# Patient Record
Sex: FEMALE | Race: BLACK OR AFRICAN AMERICAN | NOT HISPANIC OR LATINO | Employment: OTHER | URBAN - METROPOLITAN AREA
[De-identification: names, ages, dates, MRNs, and addresses within clinical notes are randomized per-mention and may not be internally consistent; named-entity substitution may affect disease eponyms.]

---

## 2017-06-14 ENCOUNTER — HISTORICAL (OUTPATIENT)
Dept: ADMINISTRATIVE | Facility: HOSPITAL | Age: 82
End: 2017-06-14

## 2018-04-27 ENCOUNTER — HISTORICAL (OUTPATIENT)
Dept: ADMINISTRATIVE | Facility: HOSPITAL | Age: 83
End: 2018-04-27

## 2018-11-02 ENCOUNTER — HISTORICAL (OUTPATIENT)
Dept: ADMINISTRATIVE | Facility: HOSPITAL | Age: 83
End: 2018-11-02

## 2019-09-25 ENCOUNTER — HISTORICAL (OUTPATIENT)
Dept: RADIOLOGY | Facility: HOSPITAL | Age: 84
End: 2019-09-25

## 2019-09-25 LAB — BCS RECOMMENDATION EXT: NORMAL

## 2019-11-14 ENCOUNTER — HISTORICAL (OUTPATIENT)
Dept: ADMINISTRATIVE | Facility: HOSPITAL | Age: 84
End: 2019-11-14

## 2020-02-17 ENCOUNTER — HISTORICAL (OUTPATIENT)
Dept: ADMINISTRATIVE | Facility: HOSPITAL | Age: 85
End: 2020-02-17

## 2020-03-12 LAB
LEFT EYE DM RETINOPATHY: POSITIVE
RIGHT EYE DM RETINOPATHY: POSITIVE

## 2020-06-01 ENCOUNTER — HISTORICAL (OUTPATIENT)
Dept: ADMINISTRATIVE | Facility: HOSPITAL | Age: 85
End: 2020-06-01

## 2020-06-09 ENCOUNTER — HISTORICAL (OUTPATIENT)
Dept: ADMINISTRATIVE | Facility: HOSPITAL | Age: 85
End: 2020-06-09

## 2020-07-01 ENCOUNTER — HISTORICAL (OUTPATIENT)
Dept: ADMINISTRATIVE | Facility: HOSPITAL | Age: 85
End: 2020-07-01

## 2021-06-11 ENCOUNTER — HISTORICAL (OUTPATIENT)
Dept: CARDIOLOGY | Facility: HOSPITAL | Age: 86
End: 2021-06-11

## 2021-06-14 ENCOUNTER — HISTORICAL (OUTPATIENT)
Dept: ADMINISTRATIVE | Facility: HOSPITAL | Age: 86
End: 2021-06-14

## 2021-06-14 LAB
ABS NEUT (OLG): 3.92 X10(3)/MCL (ref 2.1–9.2)
ALBUMIN SERPL-MCNC: 3.4 GM/DL (ref 3.4–4.8)
ALBUMIN/GLOB SERPL: 0.8 RATIO (ref 1.1–2)
ALP SERPL-CCNC: 66 UNIT/L (ref 40–150)
ALT SERPL-CCNC: 11 UNIT/L (ref 0–55)
APPEARANCE, UA: CLEAR
AST SERPL-CCNC: 14 UNIT/L (ref 5–34)
BACTERIA SPEC CULT: NORMAL /HPF
BASOPHILS # BLD AUTO: 0 X10(3)/MCL (ref 0–0.2)
BASOPHILS NFR BLD AUTO: 0 %
BILIRUB SERPL-MCNC: 0.2 MG/DL
BILIRUB UR QL STRIP: NEGATIVE
BILIRUBIN DIRECT+TOT PNL SERPL-MCNC: 0.1 MG/DL (ref 0–0.5)
BILIRUBIN DIRECT+TOT PNL SERPL-MCNC: 0.1 MG/DL (ref 0–0.8)
BUN SERPL-MCNC: 9.5 MG/DL (ref 9.8–20.1)
CALCIUM SERPL-MCNC: 9.4 MG/DL (ref 8.4–10.2)
CEA SERPL-MCNC: 22.01 NG/ML (ref 0–3)
CHLORIDE SERPL-SCNC: 105 MMOL/L (ref 98–107)
CHOLEST SERPL-MCNC: 125 MG/DL
CHOLEST/HDLC SERPL: 3 {RATIO} (ref 0–5)
CO2 SERPL-SCNC: 20 MMOL/L (ref 23–31)
COLOR UR: YELLOW
CREAT SERPL-MCNC: 0.73 MG/DL (ref 0.55–1.02)
CREAT UR-MCNC: 76.4 MG/DL (ref 45–106)
EOSINOPHIL # BLD AUTO: 0 X10(3)/MCL (ref 0–0.9)
EOSINOPHIL NFR BLD AUTO: 1 %
ERYTHROCYTE [DISTWIDTH] IN BLOOD BY AUTOMATED COUNT: 14.6 % (ref 11.5–17)
EST. AVERAGE GLUCOSE BLD GHB EST-MCNC: 159.9 MG/DL
GLOBULIN SER-MCNC: 4.1 GM/DL (ref 2.4–3.5)
GLUCOSE (UA): NEGATIVE
GLUCOSE SERPL-MCNC: 111 MG/DL (ref 82–115)
HBA1C MFR BLD: 7.2 %
HCT VFR BLD AUTO: 27.2 % (ref 37–47)
HDLC SERPL-MCNC: 38 MG/DL (ref 35–60)
HGB BLD-MCNC: 8.2 GM/DL (ref 12–16)
HGB UR QL STRIP: NEGATIVE
IRON SATN MFR SERPL: 4 % (ref 20–50)
IRON SERPL-MCNC: 15 UG/DL (ref 50–170)
KETONES UR QL STRIP: NEGATIVE
LDLC SERPL CALC-MCNC: 72 MG/DL (ref 50–140)
LEUKOCYTE ESTERASE UR QL STRIP: NEGATIVE
LYMPHOCYTES # BLD AUTO: 2.2 X10(3)/MCL (ref 0.6–4.6)
LYMPHOCYTES NFR BLD AUTO: 32 %
MCH RBC QN AUTO: 25.4 PG (ref 27–31)
MCHC RBC AUTO-ENTMCNC: 30.1 GM/DL (ref 33–36)
MCV RBC AUTO: 84.2 FL (ref 80–94)
MICROALBUMIN UR-MCNC: 43.7 UG/ML
MICROALBUMIN/CREAT RATIO PNL UR: 57.2 MG/GM CR (ref 0–30)
MONOCYTES # BLD AUTO: 0.6 X10(3)/MCL (ref 0.1–1.3)
MONOCYTES NFR BLD AUTO: 9 %
NEUTROPHILS # BLD AUTO: 3.92 X10(3)/MCL (ref 2.1–9.2)
NEUTROPHILS NFR BLD AUTO: 58 %
NITRITE UR QL STRIP: NEGATIVE
PH UR STRIP: 6.5 [PH] (ref 5–9)
PLATELET # BLD AUTO: 425 X10(3)/MCL (ref 130–400)
PMV BLD AUTO: 10.7 FL (ref 9.4–12.4)
POTASSIUM SERPL-SCNC: 5.7 MMOL/L (ref 3.5–5.1)
PROT SERPL-MCNC: 7.5 GM/DL (ref 5.8–7.6)
PROT UR QL STRIP: NEGATIVE
RBC # BLD AUTO: 3.23 X10(6)/MCL (ref 4.2–5.4)
RBC #/AREA URNS HPF: NORMAL /[HPF]
SODIUM SERPL-SCNC: 136 MMOL/L (ref 136–145)
SP GR UR STRIP: 1.01 (ref 1–1.03)
SQUAMOUS EPITHELIAL, UA: NORMAL /HPF (ref 0–4)
TIBC SERPL-MCNC: 363 UG/DL (ref 70–310)
TIBC SERPL-MCNC: 378 UG/DL (ref 250–450)
TRANSFERRIN SERPL-MCNC: 355 MG/DL
TRIGL SERPL-MCNC: 77 MG/DL (ref 37–140)
UROBILINOGEN UR STRIP-ACNC: 0.2
VLDLC SERPL CALC-MCNC: 15 MG/DL
WBC # SPEC AUTO: 6.8 X10(3)/MCL (ref 4.5–11.5)
WBC #/AREA URNS HPF: NORMAL /[HPF]

## 2021-06-17 ENCOUNTER — HISTORICAL (OUTPATIENT)
Dept: ADMINISTRATIVE | Facility: HOSPITAL | Age: 86
End: 2021-06-17

## 2021-06-17 LAB
COLOR STL: ABNORMAL
CONSISTENCY STL: ABNORMAL
HEMOCCULT SP1 STL QL: POSITIVE

## 2021-06-18 LAB
COLOR STL: ABNORMAL
CONSISTENCY STL: ABNORMAL
HEMOCCULT SP2 STL QL: POSITIVE

## 2021-06-22 ENCOUNTER — HISTORICAL (OUTPATIENT)
Dept: ADMINISTRATIVE | Facility: HOSPITAL | Age: 86
End: 2021-06-22

## 2021-06-22 LAB
COLOR STL: ABNORMAL
CONSISTENCY STL: ABNORMAL
HEMOCCULT SP1 STL QL: POSITIVE

## 2021-10-08 ENCOUNTER — HISTORICAL (OUTPATIENT)
Dept: ADMINISTRATIVE | Facility: HOSPITAL | Age: 86
End: 2021-10-08

## 2021-10-08 LAB
ABS NEUT (OLG): 2.5 X10(3)/MCL (ref 2.1–9.2)
ALBUMIN SERPL-MCNC: 3.7 GM/DL (ref 3.4–4.8)
ALBUMIN/GLOB SERPL: 1 RATIO (ref 1.1–2)
ALP SERPL-CCNC: 71 UNIT/L (ref 40–150)
ALT SERPL-CCNC: 12 UNIT/L (ref 0–55)
APPEARANCE, UA: CLEAR
AST SERPL-CCNC: 16 UNIT/L (ref 5–34)
BACTERIA SPEC CULT: NORMAL /HPF
BASOPHILS # BLD AUTO: 0 X10(3)/MCL (ref 0–0.2)
BASOPHILS NFR BLD AUTO: 0 %
BILIRUB SERPL-MCNC: 0.2 MG/DL
BILIRUB UR QL STRIP: NEGATIVE
BILIRUBIN DIRECT+TOT PNL SERPL-MCNC: 0.1 MG/DL (ref 0–0.5)
BILIRUBIN DIRECT+TOT PNL SERPL-MCNC: 0.1 MG/DL (ref 0–0.8)
BUN SERPL-MCNC: 10.5 MG/DL (ref 9.8–20.1)
CALCIUM SERPL-MCNC: 9.9 MG/DL (ref 8.4–10.2)
CHLORIDE SERPL-SCNC: 106 MMOL/L (ref 98–107)
CHOLEST SERPL-MCNC: 113 MG/DL
CHOLEST/HDLC SERPL: 3 {RATIO} (ref 0–5)
CO2 SERPL-SCNC: 25 MMOL/L (ref 23–31)
COLOR UR: YELLOW
CREAT SERPL-MCNC: 0.69 MG/DL (ref 0.55–1.02)
CREAT UR-MCNC: 41.7 MG/DL (ref 45–106)
EOSINOPHIL # BLD AUTO: 0.1 X10(3)/MCL (ref 0–0.9)
EOSINOPHIL NFR BLD AUTO: 2 %
ERYTHROCYTE [DISTWIDTH] IN BLOOD BY AUTOMATED COUNT: 14.2 % (ref 11.5–17)
EST. AVERAGE GLUCOSE BLD GHB EST-MCNC: 142.7 MG/DL
GLOBULIN SER-MCNC: 3.7 GM/DL (ref 2.4–3.5)
GLUCOSE (UA): NEGATIVE
GLUCOSE SERPL-MCNC: 92 MG/DL (ref 82–115)
HBA1C MFR BLD: 6.6 %
HCT VFR BLD AUTO: 41.5 % (ref 37–47)
HDLC SERPL-MCNC: 39 MG/DL (ref 35–60)
HGB BLD-MCNC: 12.9 GM/DL (ref 12–16)
HGB UR QL STRIP: NEGATIVE
KETONES UR QL STRIP: NEGATIVE
LDLC SERPL CALC-MCNC: 59 MG/DL (ref 50–140)
LEUKOCYTE ESTERASE UR QL STRIP: NEGATIVE
LYMPHOCYTES # BLD AUTO: 2.4 X10(3)/MCL (ref 0.6–4.6)
LYMPHOCYTES NFR BLD AUTO: 44 %
MCH RBC QN AUTO: 29.5 PG (ref 27–31)
MCHC RBC AUTO-ENTMCNC: 31.1 GM/DL (ref 33–36)
MCV RBC AUTO: 95 FL (ref 80–94)
MICROALBUMIN UR-MCNC: 9.4 UG/ML
MICROALBUMIN/CREAT RATIO PNL UR: 22.5 MG/GM CR (ref 0–30)
MONOCYTES # BLD AUTO: 0.4 X10(3)/MCL (ref 0.1–1.3)
MONOCYTES NFR BLD AUTO: 7 %
NEUTROPHILS # BLD AUTO: 2.5 X10(3)/MCL (ref 2.1–9.2)
NEUTROPHILS NFR BLD AUTO: 46 %
NITRITE UR QL STRIP: NEGATIVE
PH UR STRIP: >=9 [PH] (ref 5–9)
PLATELET # BLD AUTO: 338 X10(3)/MCL (ref 130–400)
PMV BLD AUTO: 10.3 FL (ref 9.4–12.4)
POTASSIUM SERPL-SCNC: 4.6 MMOL/L (ref 3.5–5.1)
PROT SERPL-MCNC: 7.4 GM/DL (ref 5.8–7.6)
PROT UR QL STRIP: NEGATIVE
RBC # BLD AUTO: 4.37 X10(6)/MCL (ref 4.2–5.4)
RBC #/AREA URNS HPF: NORMAL /[HPF]
SODIUM SERPL-SCNC: 140 MMOL/L (ref 136–145)
SP GR UR STRIP: 1.01 (ref 1–1.03)
SQUAMOUS EPITHELIAL, UA: NORMAL /HPF (ref 0–4)
TRIGL SERPL-MCNC: 74 MG/DL (ref 37–140)
TSH SERPL-ACNC: 2.45 UIU/ML (ref 0.35–4.94)
UROBILINOGEN UR STRIP-ACNC: 0.2
VLDLC SERPL CALC-MCNC: 15 MG/DL
WBC # SPEC AUTO: 5.4 X10(3)/MCL (ref 4.5–11.5)
WBC #/AREA URNS HPF: NORMAL /[HPF]

## 2021-10-11 ENCOUNTER — HISTORICAL (OUTPATIENT)
Dept: ADMINISTRATIVE | Facility: HOSPITAL | Age: 86
End: 2021-10-11

## 2021-10-11 LAB — HEMOCCULT SP1 STL QL: POSITIVE

## 2021-12-02 ENCOUNTER — HISTORICAL (OUTPATIENT)
Dept: ADMINISTRATIVE | Facility: HOSPITAL | Age: 86
End: 2021-12-02

## 2021-12-02 LAB
ABS NEUT (OLG): 3.75 X10(3)/MCL (ref 2.1–9.2)
ALBUMIN SERPL-MCNC: 3.8 GM/DL (ref 3.4–4.8)
ALBUMIN/GLOB SERPL: 1 RATIO (ref 1.1–2)
ALP SERPL-CCNC: 67 UNIT/L (ref 40–150)
ALT SERPL-CCNC: 12 UNIT/L (ref 0–55)
AST SERPL-CCNC: 15 UNIT/L (ref 5–34)
BASOPHILS # BLD AUTO: 0.1 X10(3)/MCL (ref 0–0.2)
BASOPHILS NFR BLD AUTO: 1 %
BILIRUB SERPL-MCNC: 0.3 MG/DL
BILIRUBIN DIRECT+TOT PNL SERPL-MCNC: 0.1 MG/DL (ref 0–0.8)
BILIRUBIN DIRECT+TOT PNL SERPL-MCNC: 0.2 MG/DL (ref 0–0.5)
BUN SERPL-MCNC: 12.6 MG/DL (ref 9.8–20.1)
CALCIUM SERPL-MCNC: 9.5 MG/DL (ref 8.7–10.5)
CHLORIDE SERPL-SCNC: 106 MMOL/L (ref 98–107)
CO2 SERPL-SCNC: 22 MMOL/L (ref 23–31)
CREAT SERPL-MCNC: 0.7 MG/DL (ref 0.55–1.02)
EOSINOPHIL # BLD AUTO: 0.1 X10(3)/MCL (ref 0–0.9)
EOSINOPHIL NFR BLD AUTO: 2 %
ERYTHROCYTE [DISTWIDTH] IN BLOOD BY AUTOMATED COUNT: 15.4 % (ref 11.5–17)
FERRITIN SERPL-MCNC: 147.03 NG/ML (ref 4.63–204)
FOLATE SERPL-MCNC: 69.9 NG/ML (ref 7–31.4)
GLOBULIN SER-MCNC: 3.7 GM/DL (ref 2.4–3.5)
GLUCOSE SERPL-MCNC: 181 MG/DL (ref 82–115)
HCT VFR BLD AUTO: 37.5 % (ref 37–47)
HGB BLD-MCNC: 12 GM/DL (ref 12–16)
IRON SATN MFR SERPL: 31 % (ref 20–50)
IRON SERPL-MCNC: 95 UG/DL (ref 50–170)
LYMPHOCYTES # BLD AUTO: 2.4 X10(3)/MCL (ref 0.6–4.6)
LYMPHOCYTES NFR BLD AUTO: 35 %
MCH RBC QN AUTO: 29.1 PG (ref 27–31)
MCHC RBC AUTO-ENTMCNC: 32 GM/DL (ref 33–36)
MCV RBC AUTO: 90.8 FL (ref 80–94)
MONOCYTES # BLD AUTO: 0.5 X10(3)/MCL (ref 0.1–1.3)
MONOCYTES NFR BLD AUTO: 7 %
NEUTROPHILS # BLD AUTO: 3.75 X10(3)/MCL (ref 2.1–9.2)
NEUTROPHILS NFR BLD AUTO: 55 %
PLATELET # BLD AUTO: 156 X10(3)/MCL (ref 130–400)
PMV BLD AUTO: 11.4 FL (ref 9.4–12.4)
POTASSIUM SERPL-SCNC: 4.5 MMOL/L (ref 3.5–5.1)
PROT SERPL-MCNC: 7.5 GM/DL (ref 5.8–7.6)
RBC # BLD AUTO: 4.13 X10(6)/MCL (ref 4.2–5.4)
SODIUM SERPL-SCNC: 136 MMOL/L (ref 136–145)
TIBC SERPL-MCNC: 213 UG/DL (ref 70–310)
TIBC SERPL-MCNC: 308 UG/DL (ref 250–450)
TRANSFERRIN SERPL-MCNC: 277 MG/DL
VIT B12 SERPL-MCNC: 482 PG/ML (ref 213–816)
WBC # SPEC AUTO: 6.8 X10(3)/MCL (ref 4.5–11.5)

## 2022-03-24 ENCOUNTER — HISTORICAL (OUTPATIENT)
Dept: RADIOLOGY | Facility: HOSPITAL | Age: 87
End: 2022-03-24

## 2022-03-24 ENCOUNTER — HISTORICAL (OUTPATIENT)
Dept: ADMINISTRATIVE | Facility: HOSPITAL | Age: 87
End: 2022-03-24

## 2022-03-24 LAB
ABS NEUT (OLG): 3.03 (ref 2.1–9.2)
ALBUMIN SERPL-MCNC: 3.8 G/DL (ref 3.4–4.8)
ALBUMIN/GLOB SERPL: 0.8 {RATIO} (ref 1.1–2)
ALP SERPL-CCNC: 63 U/L (ref 40–150)
ALT SERPL-CCNC: 16 U/L (ref 0–55)
APPEARANCE, UA: CLEAR
AST SERPL-CCNC: 17 U/L (ref 5–34)
BACTERIA SPEC CULT: NORMAL
BASOPHILS # BLD AUTO: 0 10*3/UL (ref 0–0.2)
BASOPHILS NFR BLD AUTO: 1 %
BILIRUB SERPL-MCNC: 0.4 MG/DL
BILIRUB UR QL STRIP: NEGATIVE
BILIRUBIN DIRECT+TOT PNL SERPL-MCNC: 0.2 (ref 0–0.5)
BILIRUBIN DIRECT+TOT PNL SERPL-MCNC: 0.2 (ref 0–0.8)
BUN SERPL-MCNC: 12.2 MG/DL (ref 9.8–20.1)
CALCIUM SERPL-MCNC: 9.9 MG/DL (ref 8.7–10.5)
CHLORIDE SERPL-SCNC: 106 MMOL/L (ref 98–107)
CHOLEST SERPL-MCNC: 116 MG/DL
CHOLEST/HDLC SERPL: 2 {RATIO} (ref 0–5)
CO2 SERPL-SCNC: 24 MMOL/L (ref 23–31)
COLOR UR: YELLOW
CREAT SERPL-MCNC: 0.72 MG/DL (ref 0.55–1.02)
CREAT SERPL-MCNC: 0.74 MG/DL (ref 0.55–1.02)
CREAT UR-MCNC: 53 MG/DL (ref 45–106)
EOSINOPHIL # BLD AUTO: 0.1 10*3/UL (ref 0–0.9)
EOSINOPHIL NFR BLD AUTO: 2 %
ERYTHROCYTE [DISTWIDTH] IN BLOOD BY AUTOMATED COUNT: 13.1 % (ref 11.5–14.5)
EST. AVERAGE GLUCOSE BLD GHB EST-MCNC: 159.9 MG/DL
FLAG2 (OHS): 70
FLAG3 (OHS): 80
FLAGS (OHS): 80
GLOBULIN SER-MCNC: 4.6 G/DL (ref 2.4–3.5)
GLUCOSE (UA): NORMAL
GLUCOSE SERPL-MCNC: 123 MG/DL (ref 82–115)
HBA1C MFR BLD: 7.2 %
HCT VFR BLD AUTO: 38 % (ref 35–46)
HDLC SERPL-MCNC: 50 MG/DL (ref 35–60)
HGB BLD-MCNC: 12.8 G/DL (ref 12–16)
HGB UR QL STRIP: NORMAL
HYALINE CASTS #/AREA URNS LPF: NORMAL /[LPF]
ICTERIC INTERF INDEX SERPL-ACNC: 0
IMM GRANULOCYTES # BLD AUTO: 0.01 10*3/UL
IMM GRANULOCYTES NFR BLD AUTO: 0 %
IMM. NE 2 SUSPECT FLAG (OHS): 30
KETONES UR QL STRIP: NEGATIVE
LDLC SERPL CALC-MCNC: 56 MG/DL (ref 50–140)
LEUKOCYTE ESTERASE UR QL STRIP: NEGATIVE
LOW EVENT # SUSPECT FLAG (OHS): 80
LYMPHOCYTES # BLD AUTO: 2.4 10*3/UL (ref 0.6–4.6)
LYMPHOCYTES NFR BLD AUTO: 39 %
MANUAL DIFF? (OHS): NO
MCH RBC QN AUTO: 31.4 PG (ref 26–34)
MCHC RBC AUTO-ENTMCNC: 33.7 G/DL (ref 31–37)
MCV RBC AUTO: 93.4 FL (ref 80–100)
MICROALBUMIN UR-MCNC: 396.8
MICROALBUMIN/CREAT RATIO PNL UR: 748.7 (ref 0–30)
MO BLASTS SUSPECT FLAG (OHS): 40
MONOCYTES # BLD AUTO: 0.5 10*3/UL (ref 0.1–1.3)
MONOCYTES NFR BLD AUTO: 9 %
NEUTROPHILS # BLD AUTO: 3.03 10*3/UL (ref 2.1–9.2)
NEUTROPHILS NFR BLD AUTO: 49 %
NITRITE UR QL STRIP: NEGATIVE
NRBC BLD AUTO-RTO: 0 % (ref 0–0.2)
PH UR STRIP: 7.5 [PH] (ref 4.5–8)
PLATELET # BLD AUTO: 195 10*3/UL (ref 130–400)
PLATELET CLUMPS SUSPECT FLAG (OHS): 10
PMV BLD AUTO: 10.9 FL (ref 7.4–10.4)
POTASSIUM SERPL-SCNC: 3.9 MMOL/L (ref 3.5–5.1)
PROT SERPL-MCNC: 8.4 G/DL (ref 5.8–7.6)
PROT UR QL STRIP: NORMAL
RBC # BLD AUTO: 4.07 10*6/UL (ref 4–5.2)
RBC #/AREA URNS HPF: NORMAL /[HPF] (ref 0–5)
SODIUM SERPL-SCNC: 141 MMOL/L (ref 136–145)
SP GR UR STRIP: 1.02 (ref 1–1.03)
SQUAMOUS EPITHELIAL, UA: NORMAL
TRIGL SERPL-MCNC: 52 MG/DL (ref 37–140)
TSH SERPL-ACNC: 3.98 M[IU]/L (ref 0.35–4.94)
UROBILINOGEN UR STRIP-ACNC: NORMAL
VLDLC SERPL CALC-MCNC: 10 MG/DL
WBC # SPEC AUTO: 6.2 10*3/UL (ref 4.5–11)
WBC #/AREA URNS HPF: NORMAL /[HPF] (ref 0–5)

## 2022-04-07 ENCOUNTER — HISTORICAL (OUTPATIENT)
Dept: ADMINISTRATIVE | Facility: HOSPITAL | Age: 87
End: 2022-04-07

## 2022-04-24 VITALS
BODY MASS INDEX: 15.43 KG/M2 | HEIGHT: 63 IN | SYSTOLIC BLOOD PRESSURE: 120 MMHG | OXYGEN SATURATION: 96 % | DIASTOLIC BLOOD PRESSURE: 60 MMHG | WEIGHT: 87.06 LBS

## 2022-05-10 ENCOUNTER — OFFICE VISIT (OUTPATIENT)
Dept: INTERNAL MEDICINE | Facility: CLINIC | Age: 87
End: 2022-05-10
Payer: MEDICARE

## 2022-05-10 VITALS
HEIGHT: 59 IN | DIASTOLIC BLOOD PRESSURE: 60 MMHG | OXYGEN SATURATION: 97 % | SYSTOLIC BLOOD PRESSURE: 120 MMHG | HEART RATE: 96 BPM | BODY MASS INDEX: 21.37 KG/M2 | WEIGHT: 106 LBS

## 2022-05-10 DIAGNOSIS — E78.00 HYPERCHOLESTEREMIA: ICD-10-CM

## 2022-05-10 DIAGNOSIS — M79.604 PAIN IN BOTH LOWER EXTREMITIES: ICD-10-CM

## 2022-05-10 DIAGNOSIS — D64.9 ANEMIA, UNSPECIFIED TYPE: ICD-10-CM

## 2022-05-10 DIAGNOSIS — C18.2 MALIGNANT NEOPLASM OF ASCENDING COLON: ICD-10-CM

## 2022-05-10 DIAGNOSIS — E11.9 TYPE 2 DIABETES MELLITUS WITHOUT COMPLICATION, WITHOUT LONG-TERM CURRENT USE OF INSULIN: ICD-10-CM

## 2022-05-10 DIAGNOSIS — M79.605 PAIN IN BOTH LOWER EXTREMITIES: ICD-10-CM

## 2022-05-10 DIAGNOSIS — I63.9 ISCHEMIC STROKE: ICD-10-CM

## 2022-05-10 DIAGNOSIS — H61.23 BILATERAL IMPACTED CERUMEN: ICD-10-CM

## 2022-05-10 DIAGNOSIS — R60.9 EDEMA, UNSPECIFIED TYPE: ICD-10-CM

## 2022-05-10 DIAGNOSIS — I10 HYPERTENSION, UNSPECIFIED TYPE: ICD-10-CM

## 2022-05-10 PROCEDURE — 99214 PR OFFICE/OUTPT VISIT, EST, LEVL IV, 30-39 MIN: ICD-10-PCS | Mod: ,,, | Performed by: INTERNAL MEDICINE

## 2022-05-10 PROCEDURE — 99214 OFFICE O/P EST MOD 30 MIN: CPT | Mod: ,,, | Performed by: INTERNAL MEDICINE

## 2022-05-10 RX ORDER — ASPIRIN 325 MG
325 TABLET ORAL DAILY
COMMUNITY
Start: 2022-04-27 | End: 2022-10-26

## 2022-05-10 RX ORDER — AMLODIPINE BESYLATE 10 MG/1
10 TABLET ORAL DAILY
COMMUNITY
Start: 2022-04-27 | End: 2022-10-26

## 2022-05-10 RX ORDER — GLYBURIDE 5 MG/1
5 TABLET ORAL 2 TIMES DAILY
COMMUNITY
Start: 2022-04-27 | End: 2022-10-26

## 2022-05-10 RX ORDER — TRAMADOL HYDROCHLORIDE 50 MG/1
50 TABLET ORAL EVERY 8 HOURS
COMMUNITY
Start: 2022-05-05 | End: 2022-10-26

## 2022-05-10 RX ORDER — FOLIC ACID 1 MG/1
1000 TABLET ORAL DAILY
COMMUNITY
Start: 2022-04-27 | End: 2022-10-26

## 2022-05-10 RX ORDER — LOVASTATIN 40 MG/1
40 TABLET ORAL NIGHTLY
COMMUNITY
Start: 2022-04-27 | End: 2022-10-26

## 2022-05-10 RX ORDER — LISINOPRIL 20 MG/1
20 TABLET ORAL DAILY
COMMUNITY
Start: 2022-04-27 | End: 2022-10-26

## 2022-05-10 RX ORDER — PANTOPRAZOLE SODIUM 40 MG/1
40 TABLET, DELAYED RELEASE ORAL 2 TIMES DAILY
COMMUNITY
Start: 2022-04-27 | End: 2022-10-26

## 2022-05-10 RX ORDER — MEGESTROL ACETATE 40 MG/1
TABLET ORAL
COMMUNITY
Start: 2022-04-27 | End: 2022-10-26

## 2022-05-10 RX ORDER — SODIUM BICARBONATE 650 MG/1
TABLET ORAL
COMMUNITY
Start: 2022-04-27 | End: 2022-10-26

## 2022-05-10 RX ORDER — FERROUS SULFATE 325(65) MG
TABLET ORAL 2 TIMES DAILY
COMMUNITY
Start: 2022-04-27 | End: 2022-10-26

## 2022-05-10 RX ORDER — MIRTAZAPINE 15 MG/1
TABLET, FILM COATED ORAL
COMMUNITY
Start: 2022-04-27 | End: 2022-10-26

## 2022-05-10 NOTE — PROGRESS NOTES
Subjective:      Patient ID: Lita Hess is a 86 y.o. female.    Chief Complaint: Hospital f/u      HPI:  This patient is an 86-year-old established patient who has returned for an outpatient examination.  She is being closely monitored by her family, and home health care is being provided.  She has made a lot of progress since she was in the hospital recently, and she remains under the care of multiple specialties, because of the severity of her underlying medical problems.  In general though, this patient is much improved than in the past, and except for weakness to her lower extremities, she is eating well, taking her medication as directed, and she is being well cared for by her family.     The patient's Health Maintenance was reviewed and the following appears to be due at this time:   Health Maintenance Due   Topic Date Due    Lipid Panel  Never done    COVID-19 Vaccine (1) Never done    TETANUS VACCINE  Never done    Shingles Vaccine (1 of 2) Never done    Pneumococcal Vaccines (Age 65+) (1 - PCV) Never done        Recent Labwork  No visits with results within 1 Month(s) from this visit.   Latest known visit with results is:   No results found for any previous visit.       Past Medical History:  Past Medical History:   Diagnosis Date    Anemia     Cancer     Colon cancer     CVA (cerebral vascular accident)     Diabetes mellitus, type 2     Hyperlipidemia     Hypertension     Loss of appetite     Stroke      Past Surgical History:   Procedure Laterality Date    COLECTOMY      COLONOSCOPY      ESOPHAGOGASTRODUODENOSCOPY      gastro biopsy      HIP REPLACEMENT ARTHROPLASTY      SCLEROTHERAPY WITH ULTRASOUND GUIDANCE      TRANSESOPHAGEAL ECHOCARDIOGRAPHY       Review of patient's allergies indicates:  No Known Allergies  Current Outpatient Medications on File Prior to Visit   Medication Sig Dispense Refill    amLODIPine (NORVASC) 10 MG tablet Take 10 mg by mouth once daily.      aspirin 325  MG tablet Take 325 mg by mouth once daily.      ferrous sulfate (FEOSOL) 325 mg (65 mg iron) Tab tablet 2 (two) times daily.      folic acid (FOLVITE) 1 MG tablet Take 1,000 mcg by mouth once daily.      glyBURIDE (DIABETA) 5 MG tablet 5 mg 2 (two) times daily.      lisinopriL (PRINIVIL,ZESTRIL) 20 MG tablet Take 20 mg by mouth once daily.      lovastatin (MEVACOR) 40 MG tablet Take 40 mg by mouth nightly.      megestroL (MEGACE) 40 MG Tab TAKE (1/2) TABLET ONCE DAILY      mirtazapine (REMERON) 15 MG tablet TAKE TWO TABLETS AT BEDTIME      pantoprazole (PROTONIX) 40 MG tablet 40 mg 2 (two) times daily.      sodium bicarbonate 650 MG tablet TAKE TWO TABLETS 3 TIMES DAILY.      traMADoL (ULTRAM) 50 mg tablet Take 50 mg by mouth every 8 (eight) hours.       No current facility-administered medications on file prior to visit.     Social History     Socioeconomic History    Marital status: Unknown   Tobacco Use    Smoking status: Never Smoker    Smokeless tobacco: Never Used   Substance and Sexual Activity    Alcohol use: Never    Drug use: Never    Sexual activity: Not Currently     History reviewed. No pertinent family history.    Review of Systems  Review of Systems   Constitutional: Negative.    HENT: Negative.  The patient feels that there is wax in her ears, and she has had this problem in the past.    Eyes: Negative.    Respiratory: Negative.    Cardiovascular: Negative.  History of peripheral vascular disease, with greater involvement in the right lower leg than in the left thumb.  By history, she may have an occluded vascular tree on the right, but she has obstructive disease involving the left, which seems to be creating claudication.  She is under the care of her cardiologist for this problem on her  Gastrointestinal: Negative.    Genitourinary: Negative.    Musculoskeletal: Negative.    Neurological: Negative.    Endo/Heme/Allergies: Negative.    Psychiatric/Behavioral: Negative.    Objective:  "  /60   Pulse 96   Ht 4' 11" (1.499 m)   Wt 48.1 kg (106 lb)   SpO2 97%   BMI 21.41 kg/m²         Physical Exam  Vitals and nursing note reviewed.   Constitutional:       General: He is not in acute distress.     Appearance: Normal appearance.   HENT:      Head: Normocephalic and atraumatic.      Right Ear: Tympanic membrane and ear canal normal.  Significant cerumen in the right ear canal and mild involvement on the left.     Left Ear: Tympanic membrane and ear canal normal.       Nose: Nose normal.      Mouth/Throat:      Pharynx: Oropharynx is clear. No oropharyngeal exudate or posterior oropharyngeal erythema.   Eyes:      Extraocular Movements: Extraocular movements intact.      Pupils: Pupils are equal, round, and reactive to light.   Cardiovascular:      Rate and Rhythm: Normal rate and regular rhythm.      Pulses: Normal pulses.      Heart sounds: Normal heart sounds. No murmur heard.    No friction rub. No gallop.   Pulmonary:      Effort: Pulmonary effort is normal. No respiratory distress.      Breath sounds: Normal breath sounds.   Abdominal:      General: Abdomen is flat. Bowel sounds are normal.      Palpations: Abdomen is soft.   Genitourinary:     Rectum: Normal.   Musculoskeletal:         General: Normal range of motion.  Weakness to the lower extremities, especially involving the right lower extremity, where is she has also had symptoms of claudication.     Cervical back: Normal range of motion and neck supple.   Skin:     General: Skin is warm.      Capillary Refill: Capillary refill takes less than 2 seconds.   Neurological:      General: No focal deficit present.      Mental Status: He is alert and oriented to person, place, and time.   Psychiatric:         Mood and Affect: Mood normal.         Behavior: Behavior normal.         Thought Content: Thought content normal.         Judgment: Judgment normal.   Assessment:     1. Malignant neoplasm of ascending colon    2. Ischemic stroke  "   3. Edema, unspecified type    4. Hypercholesteremia    5. Type 2 diabetes mellitus without complication, without long-term current use of insulin    6. Hypertension, unspecified type    7. Anemia, unspecified type    8. Pain in both lower extremities    9. Bilateral impacted cerumen      Plan:   I am having Lita Hess maintain her sodium bicarbonate, traMADoL, pantoprazole, mirtazapine, megestroL, lovastatin, lisinopriL, glyBURIDE, folic acid, ferrous sulfate, aspirin, and amLODIPine.  This patient is currently 86 years of age, and she is accompanied by her son.  She is doing well, and much better than she did while in the hospital.  She has improved enough, so that further testing may be considered at this point, especially in regard to peripheral artery disease, which will need to be determined by a cardiologist.  Have made arrangements for her to have the cerumen removed today, and in the near future, she will have repeat laboratory studies done through home health care, and I will review the results when available.  I will review of records to see if we can find nodes of the cardiologist, to discuss with the patient's son, so that he is prepared to speak to the cardiologist about any abnormal findings, if they have been previously found.This patient should continue to take all medication chronically given for known medical illnesses.    20 minutes were needed to perform an H and P, and to review this patient's test that were taken. It also required an additional 10 minutes to complete this electronic health record, which totaled 30 minutes of time and spent with the patient.  Problem List Items Addressed This Visit    None     Visit Diagnoses     Malignant neoplasm of ascending colon        Ischemic stroke        Edema, unspecified type        Hypercholesteremia        Type 2 diabetes mellitus without complication, without long-term current use of insulin        Relevant Medications    glyBURIDE (DIABETA) 5 MG  tablet    Other Relevant Orders    CBC Auto Differential    Comprehensive Metabolic Panel    Iron and TIBC    Transferrin    Ferritin    Folate    Hypertension, unspecified type        Relevant Orders    CBC Auto Differential    Comprehensive Metabolic Panel    Iron and TIBC    Transferrin    Ferritin    Folate    Anemia, unspecified type        Relevant Orders    CBC Auto Differential    Comprehensive Metabolic Panel    Iron and TIBC    Transferrin    Ferritin    Folate    Pain in both lower extremities        Bilateral impacted cerumen              Lita was seen today for [facility] f/u.    Diagnoses and all orders for this visit:    Malignant neoplasm of ascending colon    Ischemic stroke    Edema, unspecified type    Hypercholesteremia    Type 2 diabetes mellitus without complication, without long-term current use of insulin  -     CBC Auto Differential; Future  -     Comprehensive Metabolic Panel; Future  -     Iron and TIBC; Future  -     Transferrin; Future  -     Ferritin; Future  -     Folate; Future    Hypertension, unspecified type  -     CBC Auto Differential; Future  -     Comprehensive Metabolic Panel; Future  -     Iron and TIBC; Future  -     Transferrin; Future  -     Ferritin; Future  -     Folate; Future    Anemia, unspecified type  -     CBC Auto Differential; Future  -     Comprehensive Metabolic Panel; Future  -     Iron and TIBC; Future  -     Transferrin; Future  -     Ferritin; Future  -     Folate; Future    Pain in both lower extremities    Bilateral impacted cerumen

## 2022-05-11 ENCOUNTER — LAB REQUISITION (OUTPATIENT)
Dept: LAB | Facility: HOSPITAL | Age: 87
End: 2022-05-11
Payer: MEDICARE

## 2022-05-11 DIAGNOSIS — I48.91 UNSPECIFIED ATRIAL FIBRILLATION: ICD-10-CM

## 2022-05-11 DIAGNOSIS — D50.0 IRON DEFICIENCY ANEMIA SECONDARY TO BLOOD LOSS (CHRONIC): ICD-10-CM

## 2022-05-11 DIAGNOSIS — E11.8 TYPE 2 DIABETES MELLITUS WITH UNSPECIFIED COMPLICATIONS: ICD-10-CM

## 2022-05-11 DIAGNOSIS — E44.0 MODERATE PROTEIN-CALORIE MALNUTRITION: ICD-10-CM

## 2022-05-11 LAB
ALBUMIN SERPL-MCNC: 3.9 GM/DL (ref 3.4–4.8)
ALBUMIN/GLOB SERPL: 1 RATIO (ref 1.1–2)
ALP SERPL-CCNC: 62 UNIT/L (ref 40–150)
ALT SERPL-CCNC: 14 UNIT/L (ref 0–55)
AST SERPL-CCNC: 20 UNIT/L (ref 5–34)
BASOPHILS # BLD AUTO: 0.05 X10(3)/MCL (ref 0–0.2)
BASOPHILS NFR BLD AUTO: 0.8 %
BILIRUBIN DIRECT+TOT PNL SERPL-MCNC: 0.4 MG/DL
BUN SERPL-MCNC: 11.9 MG/DL (ref 9.8–20.1)
CALCIUM SERPL-MCNC: 9.4 MG/DL (ref 8.4–10.2)
CHLORIDE SERPL-SCNC: 103 MMOL/L (ref 98–107)
CO2 SERPL-SCNC: 21 MMOL/L (ref 23–31)
CREAT SERPL-MCNC: 0.82 MG/DL (ref 0.55–1.02)
EOSINOPHIL # BLD AUTO: 0.09 X10(3)/MCL (ref 0–0.9)
EOSINOPHIL NFR BLD AUTO: 1.5 %
ERYTHROCYTE [DISTWIDTH] IN BLOOD BY AUTOMATED COUNT: 13.4 % (ref 11.5–17)
FERRITIN SERPL-MCNC: 279.14 NG/ML (ref 4.63–204)
GLOBULIN SER-MCNC: 3.9 GM/DL (ref 2.4–3.5)
GLUCOSE SERPL-MCNC: 229 MG/DL (ref 82–115)
HCT VFR BLD AUTO: 40.7 % (ref 37–47)
HGB BLD-MCNC: 13.2 GM/DL (ref 12–16)
IMM GRANULOCYTES # BLD AUTO: 0.01 X10(3)/MCL (ref 0–0.02)
IMM GRANULOCYTES NFR BLD AUTO: 0.2 % (ref 0–0.43)
IRON SATN MFR SERPL: 34 % (ref 20–50)
IRON SERPL-MCNC: 100 UG/DL (ref 50–170)
LYMPHOCYTES # BLD AUTO: 2.05 X10(3)/MCL (ref 0.6–4.6)
LYMPHOCYTES NFR BLD AUTO: 34.3 %
MCH RBC QN AUTO: 30.8 PG (ref 27–31)
MCHC RBC AUTO-ENTMCNC: 32.4 MG/DL (ref 33–36)
MCV RBC AUTO: 94.9 FL (ref 80–94)
MONOCYTES # BLD AUTO: 0.51 X10(3)/MCL (ref 0.1–1.3)
MONOCYTES NFR BLD AUTO: 8.5 %
NEUTROPHILS # BLD AUTO: 3.3 X10(3)/MCL (ref 2.1–9.2)
NEUTROPHILS NFR BLD AUTO: 54.7 %
NRBC BLD AUTO-RTO: 0 %
PLATELET # BLD AUTO: 256 X10(3)/MCL (ref 130–400)
PMV BLD AUTO: 11 FL (ref 9.4–12.4)
POTASSIUM SERPL-SCNC: 3.7 MMOL/L (ref 3.5–5.1)
PROT SERPL-MCNC: 7.8 GM/DL (ref 5.8–7.6)
RBC # BLD AUTO: 4.29 X10(6)/MCL (ref 4.2–5.4)
SODIUM SERPL-SCNC: 137 MMOL/L (ref 136–145)
TIBC SERPL-MCNC: 197 UG/DL (ref 70–310)
TIBC SERPL-MCNC: 297 UG/DL (ref 250–450)
TRANSFERRIN SERPL-MCNC: 265 MG/DL
WBC # SPEC AUTO: 6 X10(3)/MCL (ref 4.5–11.5)

## 2022-05-11 PROCEDURE — 80053 COMPREHEN METABOLIC PANEL: CPT | Performed by: INTERNAL MEDICINE

## 2022-05-11 PROCEDURE — 83540 ASSAY OF IRON: CPT | Performed by: INTERNAL MEDICINE

## 2022-05-11 PROCEDURE — 82728 ASSAY OF FERRITIN: CPT | Performed by: INTERNAL MEDICINE

## 2022-05-11 PROCEDURE — 85025 COMPLETE CBC W/AUTO DIFF WBC: CPT | Performed by: INTERNAL MEDICINE

## 2022-05-17 DIAGNOSIS — E04.1 NONTOXIC THYROID NODULE: Primary | ICD-10-CM

## 2022-06-20 ENCOUNTER — PATIENT OUTREACH (OUTPATIENT)
Dept: ADMINISTRATIVE | Facility: HOSPITAL | Age: 87
End: 2022-06-20
Payer: MEDICARE

## 2022-06-20 NOTE — PROGRESS NOTES
Population Health Outreach.Records Received, hyper-linked into chart at this time. The following record(s)  below were uploaded for Health Maintenance .             9/25/19 MAMMOGRAM SCREENING       3/12/20 DM EYE EXAM

## 2022-07-08 ENCOUNTER — DOCUMENT SCAN (OUTPATIENT)
Dept: HOME HEALTH SERVICES | Facility: HOSPITAL | Age: 87
End: 2022-07-08
Payer: MEDICARE

## 2022-07-18 RX ORDER — LOVASTATIN 40 MG/1
TABLET ORAL
Qty: 30 TABLET | Refills: 10 | Status: SHIPPED | OUTPATIENT
Start: 2022-07-18 | End: 2023-03-10 | Stop reason: SDUPTHER

## 2022-07-18 RX ORDER — MIRTAZAPINE 15 MG/1
TABLET, FILM COATED ORAL
Qty: 60 TABLET | Refills: 10 | Status: SHIPPED | OUTPATIENT
Start: 2022-07-18 | End: 2023-03-10 | Stop reason: SDUPTHER

## 2022-07-18 RX ORDER — SODIUM BICARBONATE 650 MG/1
TABLET ORAL
Qty: 180 TABLET | Refills: 10 | Status: SHIPPED | OUTPATIENT
Start: 2022-07-18 | End: 2023-03-17 | Stop reason: SDUPTHER

## 2022-07-18 RX ORDER — FOLIC ACID 1 MG/1
TABLET ORAL
Qty: 30 TABLET | Refills: 10 | Status: SHIPPED | OUTPATIENT
Start: 2022-07-18 | End: 2023-03-17 | Stop reason: SDUPTHER

## 2022-07-18 RX ORDER — AMLODIPINE BESYLATE 10 MG/1
TABLET ORAL
Qty: 30 TABLET | Refills: 10 | Status: SHIPPED | OUTPATIENT
Start: 2022-07-18 | End: 2023-03-10 | Stop reason: SDUPTHER

## 2022-07-18 RX ORDER — LISINOPRIL 20 MG/1
TABLET ORAL
Qty: 30 TABLET | Refills: 10 | Status: SHIPPED | OUTPATIENT
Start: 2022-07-18 | End: 2023-03-10 | Stop reason: SDUPTHER

## 2022-07-18 RX ORDER — GLYBURIDE 5 MG/1
TABLET ORAL
Qty: 60 TABLET | Refills: 10 | Status: SHIPPED | OUTPATIENT
Start: 2022-07-18 | End: 2023-03-10 | Stop reason: SDUPTHER

## 2022-07-18 RX ORDER — FERROUS SULFATE 325(65) MG
TABLET ORAL
Qty: 60 TABLET | Refills: 10 | Status: SHIPPED | OUTPATIENT
Start: 2022-07-18 | End: 2023-03-10 | Stop reason: SDUPTHER

## 2022-07-18 RX ORDER — MEGESTROL ACETATE 40 MG/1
TABLET ORAL
Qty: 30 TABLET | Refills: 10 | Status: SHIPPED | OUTPATIENT
Start: 2022-07-18 | End: 2023-03-10 | Stop reason: SDUPTHER

## 2022-07-18 RX ORDER — ASPIRIN 325 MG
TABLET ORAL
Qty: 30 TABLET | Refills: 10 | Status: SHIPPED | OUTPATIENT
Start: 2022-07-18 | End: 2023-03-17 | Stop reason: SDUPTHER

## 2022-07-18 RX ORDER — ERGOCALCIFEROL 1.25 MG/1
CAPSULE ORAL
Qty: 4 CAPSULE | Refills: 10 | Status: SHIPPED | OUTPATIENT
Start: 2022-07-18 | End: 2023-03-17 | Stop reason: SDUPTHER

## 2022-07-18 RX ORDER — PANTOPRAZOLE SODIUM 40 MG/1
TABLET, DELAYED RELEASE ORAL
Qty: 60 TABLET | Refills: 10 | Status: SHIPPED | OUTPATIENT
Start: 2022-07-18 | End: 2023-03-10 | Stop reason: SDUPTHER

## 2022-07-27 ENCOUNTER — EXTERNAL HOME HEALTH (OUTPATIENT)
Dept: HOME HEALTH SERVICES | Facility: HOSPITAL | Age: 87
End: 2022-07-27
Payer: MEDICARE

## 2022-08-06 ENCOUNTER — DOCUMENT SCAN (OUTPATIENT)
Dept: HOME HEALTH SERVICES | Facility: HOSPITAL | Age: 87
End: 2022-08-06

## 2022-08-18 RX ORDER — TRAMADOL HYDROCHLORIDE 50 MG/1
TABLET ORAL
Qty: 30 TABLET | Refills: 5 | Status: SHIPPED | OUTPATIENT
Start: 2022-08-18 | End: 2022-10-13

## 2022-09-08 ENCOUNTER — TELEPHONE (OUTPATIENT)
Dept: INTERNAL MEDICINE | Facility: CLINIC | Age: 87
End: 2022-09-08
Payer: MEDICARE

## 2022-09-08 DIAGNOSIS — S91.301A OPEN WOUND OF RIGHT HEEL, INITIAL ENCOUNTER: Primary | ICD-10-CM

## 2022-09-08 RX ORDER — DOXYCYCLINE 100 MG/1
100 CAPSULE ORAL 2 TIMES DAILY
Qty: 20 CAPSULE | Refills: 0 | Status: SHIPPED | OUTPATIENT
Start: 2022-09-08 | End: 2022-09-18

## 2022-09-08 NOTE — TELEPHONE ENCOUNTER
Connie has started putting betadine on it and do wound care. Per Dr. Flynn ok to send out Doxycycline 100mg bid for 10 days. Connie advised    ----- Message from Slim Bansal sent at 9/8/2022  2:52 PM CDT -----  Regarding: Connie @ [facility]  Connie @ [facility] Called to speak to Nurse .    Lita Hess has a new wound on the heel of right foot . Needs advice.    Please call Connie at [phone].

## 2022-09-12 ENCOUNTER — TELEPHONE (OUTPATIENT)
Dept: INTERNAL MEDICINE | Facility: CLINIC | Age: 87
End: 2022-09-12
Payer: MEDICARE

## 2022-09-12 NOTE — TELEPHONE ENCOUNTER
Indira with [facility] called the patient has a wound on heel and it has opened up. The family wants to know if she should go to our wound clinic. Spoke to Dr. Flynn and he is ok with that. Indira advised

## 2022-09-13 PROCEDURE — G0179 MD RECERTIFICATION HHA PT: HCPCS | Mod: ,,, | Performed by: INTERNAL MEDICINE

## 2022-09-13 PROCEDURE — G0179 PR HOME HEALTH MD RECERTIFICATION: ICD-10-PCS | Mod: ,,, | Performed by: INTERNAL MEDICINE

## 2022-09-15 ENCOUNTER — HOSPITAL ENCOUNTER (OUTPATIENT)
Dept: WOUND CARE | Facility: HOSPITAL | Age: 87
Discharge: HOME | End: 2022-09-15
Attending: EMERGENCY MEDICINE
Payer: MEDICARE

## 2022-09-15 ENCOUNTER — TELEPHONE (OUTPATIENT)
Dept: WOUND CARE | Facility: HOSPITAL | Age: 87
End: 2022-09-15

## 2022-09-15 VITALS
RESPIRATION RATE: 20 BRPM | BODY MASS INDEX: 21.57 KG/M2 | TEMPERATURE: 99 F | WEIGHT: 107 LBS | SYSTOLIC BLOOD PRESSURE: 168 MMHG | HEART RATE: 87 BPM | HEIGHT: 59 IN | DIASTOLIC BLOOD PRESSURE: 76 MMHG

## 2022-09-15 DIAGNOSIS — E78.5 HYPERLIPIDEMIA, UNSPECIFIED HYPERLIPIDEMIA TYPE: ICD-10-CM

## 2022-09-15 DIAGNOSIS — L97.412 SKIN ULCER OF RIGHT HEEL WITH FAT LAYER EXPOSED: ICD-10-CM

## 2022-09-15 DIAGNOSIS — L97.509 TYPE 2 DIABETES MELLITUS WITH FOOT ULCER, UNSPECIFIED WHETHER LONG TERM INSULIN USE: ICD-10-CM

## 2022-09-15 DIAGNOSIS — I10 BENIGN HYPERTENSION: ICD-10-CM

## 2022-09-15 DIAGNOSIS — E11.621 TYPE 2 DIABETES MELLITUS WITH FOOT ULCER, UNSPECIFIED WHETHER LONG TERM INSULIN USE: ICD-10-CM

## 2022-09-15 DIAGNOSIS — E44.0 MALNUTRITION OF MODERATE DEGREE: ICD-10-CM

## 2022-09-15 DIAGNOSIS — D64.9 ANEMIA, UNSPECIFIED TYPE: ICD-10-CM

## 2022-09-15 DIAGNOSIS — I25.10 ATHEROSCLEROSIS OF NATIVE CORONARY ARTERY OF NATIVE HEART WITHOUT ANGINA PECTORIS: ICD-10-CM

## 2022-09-15 DIAGNOSIS — E55.9 VITAMIN D DEFICIENCY: ICD-10-CM

## 2022-09-15 DIAGNOSIS — I48.91 ATRIAL FIBRILLATION, UNSPECIFIED TYPE: ICD-10-CM

## 2022-09-15 LAB — POCT GLUCOSE: 316 MG/DL (ref 70–110)

## 2022-09-15 PROCEDURE — 27000999 HC MEDICAL RECORD PHOTO DOCUMENTATION

## 2022-09-15 PROCEDURE — 99204 OFFICE O/P NEW MOD 45 MIN: CPT

## 2022-09-15 NOTE — PROGRESS NOTES
Subjective:       Patient ID: Lita Hess is a 86 y.o. female.    Chief Complaint: Pressure Ulcer (Right posterior heel)    85 y/o BFreferred to this Wound Care Clinic to treat a right diabetic /pressure heel ulcer.  Patient has a history of diabetes, hypertension, anemia, malnutrition, prior stroke and colon cancer. She is debilitated and sedentary.   Patient has had [facility] help with her home needs since 2021.  They noted the onset of a right heel eschar in July 2022.  It was treated with topical Betadine but in early September it was noted to begin to change in open up so it was requested the patient come to the Wound Care Clinic for evaluation.  He also gave ok to send in doxycycline for 10 days on 9/8/22. She comes in today on 9/15/22 with her sister. Nurse here says soft eschar wiped off the wound bed;   No fever/chills; pt spends most of day in bed; does ambulate still; lives alone; says [facility] gave her an offloading bootie. Sister says sore much better.      Pcp: nayeli  Cards: Tatyana  Surgeon: LALITHA slade  Gi: jelena caraballo    Past Medical History:  No date: Anemia  No date: Cancer  No date: Colon cancer  No date: CVA (cerebral vascular accident)  No date: Diabetes mellitus, type 2  No date: Hyperlipidemia  No date: Hypertension  No date: Loss of appetite  No date: Stroke    Past Surgical History:  No date: COLECTOMY  No date: COLONOSCOPY  No date: ESOPHAGOGASTRODUODENOSCOPY  No date: gastro biopsy  No date: HIP REPLACEMENT ARTHROPLASTY  No date: SCLEROTHERAPY WITH ULTRASOUND GUIDANCE  No date: TRANSESOPHAGEAL ECHOCARDIOGRAPHY      Sh; lives at home,sister brings her food; son does grocery shopping   nonsmoker; no etoh      Review of Systems   Constitutional:  Negative for activity change, chills, fatigue and fever.   HENT: Negative.     Respiratory: Negative.     Cardiovascular:  Negative for chest pain, palpitations and leg swelling.   Gastrointestinal: Negative.    Musculoskeletal:   Positive for arthralgias.   Skin:  Positive for wound (see hpi). Negative for rash.   Neurological:  Positive for syncope. Negative for seizures, numbness and headaches. Weakness: general.      Objective:      Vitals:    09/15/22 1122   BP: (!) 168/76   Pulse: 87   Resp: 20   Temp: 99 °F (37.2 °C)     @poctglucose@  Recent Labs   Lab 09/15/22  1108   POCTGLUCOSE 316*     Physical Exam  Constitutional:       Appearance: She is underweight.   HENT:      Head: Normocephalic and atraumatic.      Mouth/Throat:      Pharynx: Oropharynx is clear.   Eyes:      Conjunctiva/sclera: Conjunctivae normal.   Cardiovascular:      Pulses:           Dorsalis pedis pulses are detected w/ Doppler on the right side and detected w/ Doppler on the left side.        Posterior tibial pulses are detected w/ Doppler on the right side and detected w/ Doppler on the left side.   Pulmonary:      Effort: Pulmonary effort is normal.   Abdominal:      General: Abdomen is flat.   Musculoskeletal:      Right lower leg: No edema.      Left lower leg: No edema.        Feet:    Neurological:      Mental Status: She is alert.   Psychiatric:         Behavior: Behavior is cooperative.            Altered Skin Integrity 09/15/22 1105 Right posterior Heel #1 Other (comment) (Active)   09/15/22 1105   Altered Skin Integrity Present on Admission: yes   Side: Right   Orientation: posterior   Location: Heel   Wound Number: #1   Is this injury device related?: No   Primary Wound Type: Other   Description of Altered Skin Integrity:    Ankle-Brachial Index: maryjane done 9/15/22 = (right) dp = 0.86, pt =0.92 / (left) dp = 0.89, pt = 0.94   Pulses: non palpable, + doppler, monophasic x 4   Removal Indication and Assessment:    Wound Outcome:    (Retired) Wound Length (cm):    (Retired) Wound Width (cm):    (Retired) Depth (cm):    Wound Description (Comments):    Removal Indications:    Wound Image   09/15/22 1100   Dressing Appearance Intact;Moist drainage 09/15/22 1100    Drainage Amount Moderate 09/15/22 1100   Drainage Characteristics/Odor Yellow;Serosanguineous;No odor 09/15/22 1100   Appearance Red 09/15/22 1100   Tissue loss description Full thickness 09/15/22 1100   Black (%), Wound Tissue Color 0 % 09/15/22 1100   Red (%), Wound Tissue Color 100 % 09/15/22 1100   Yellow (%), Wound Tissue Color 0 % 09/15/22 1100   Periwound Area Intact;Dry 09/15/22 1100   Wound Edges Defined 09/15/22 1100   Wound Length (cm) 1.5 cm 09/15/22 1100   Wound Width (cm) 2 cm 09/15/22 1100   Wound Depth (cm) 0.1 cm 09/15/22 1100   Wound Volume (cm^3) 0.3 cm^3 09/15/22 1100   Wound Surface Area (cm^2) 3 cm^2 09/15/22 1100   Care Cleansed with:;Wound cleanser;Applied: 09/15/22 1100   Dressing Applied;Collagen;Absorptive Pad;Cast padding;Rolled gauze 09/15/22 1100   Periwound Care Absorptive dressing applied 09/15/22 1100           Assessment:       1. Skin ulcer of right heel with fat layer exposed    2. Type 2 diabetes mellitus with foot ulcer, unspecified whether long term insulin use    3. Atrial fibrillation, unspecified type    4. Benign hypertension    5. Anemia, unspecified type    6. Atherosclerosis of native coronary artery of native heart without angina pectoris    7. Malnutrition of moderate degree    8. Hyperlipidemia, unspecified hyperlipidemia type    9. Vitamin D deficiency            Right heel diabetic heel ulcer with mixed pressure component  Diabetes, last hba1c 7.2 March 2022  Hypertension  Dyslipidemia  Prior colon cancer with resection 2021  Prior stroke  Anemia  Malnutrition    Lab Results   Component Value Date    WBC 6.0 05/11/2022    HGB 13.2 05/11/2022    HCT 40.7 05/11/2022    MCV 94.9 (H) 05/11/2022     05/11/2022         CMP  Sodium Level   Date Value Ref Range Status   05/11/2022 137 136 - 145 mmol/L Final     Potassium Level   Date Value Ref Range Status   05/11/2022 3.7 3.5 - 5.1 mmol/L Final     Carbon Dioxide   Date Value Ref Range Status   05/11/2022 21 (L)  23 - 31 mmol/L Final     Blood Urea Nitrogen   Date Value Ref Range Status   05/11/2022 11.9 9.8 - 20.1 mg/dL Final     Creatinine   Date Value Ref Range Status   05/11/2022 0.82 0.55 - 1.02 mg/dL Final     Calcium Level Total   Date Value Ref Range Status   05/11/2022 9.4 8.4 - 10.2 mg/dL Final     Albumin Level   Date Value Ref Range Status   05/11/2022 3.9 3.4 - 4.8 gm/dL Final     Bilirubin Total   Date Value Ref Range Status   05/11/2022 0.4 <=1.5 mg/dL Final     Alkaline Phosphatase   Date Value Ref Range Status   05/11/2022 62 40 - 150 unit/L Final     Aspartate Aminotransferase   Date Value Ref Range Status   05/11/2022 20 5 - 34 unit/L Final     Alanine Aminotransferase   Date Value Ref Range Status   05/11/2022 14 0 - 55 unit/L Final     Estimated GFR-Non    Date Value Ref Range Status   03/24/2022 79 >=90      Lab Results   Component Value Date    HGBA1C 7.2 03/24/2022     No results found for: SEDRATE  No results found for: CRP    Plan:     Plan of Care:    This is a new patient to this clinic.  [facility] has been following this lady for about a year and noted a right heel eschar in July of 2022.  They notified the PCP Dr. Flynn and asked for a referral to the Wound Care Clinic when it seems the eschar was coming off so she comes in today on 09/15/2022.  I reviewed the records available in [place] [facility].    I cannot find any prior vascular workup on her extremities and pt nor her sister are aware  Ulcer appears clean and healthy  Wound Care Orders: dressings of collagen; HH can apply twice a week on Tuesday and Thursdays along with a bulky heel wrap to help offload the heal  Offloading: must offload the heel/foot o potentiate wound healing: we spoke about free floating foot by putting pillow under calf; using the offloading device [facility] gave etc; pt voiced understanding  Diabetes: must have a strict diabetic diet, take meds and encourage lower hba1c; cbg high today;  she ate cereal which I explained is heavily carb loaded;  Nutrition: high protein diet to improve wound healing; target over 100g /day; take MVI and linus  Both pt and sister said very difficult to get here and relunctant to return but willing to come back in 2 weeks         The time spent including preparing to see the patient, obtaining patient history and assessment, evaluation of the plan of care, patient/caregiver counseling and education, orders, documentation, coordination of care, and other professional medical management activities for today's encounter was 50 minutes.

## 2022-09-15 NOTE — PATIENT INSTRUCTIONS
Pt seen today by: Dr. Maite Ku Home health and self care DRESSING INSTRUCTIONS:          Home health to visit and assist with dressing changes :   2 x/week or on Tuesday and Friday.     Patient and/or family may be asked to assist on other days.      Wound location: Right posterior heel    Cleanse wound with wound cleanser or saline  Apply promogran to wound bed  Apply exdry, cast padding and [place]  Change dressing 2 x per week by home health on Tuesdays and Fridays       Return visit: September 29, 2022 at 11:00 a.m.    Call our [facility] wound clinic for questions/concerns a [phone].     Wound may have been debrided in clinic: if so, WHAT YOU NEED TO KNOW:    Debridement is the removal of infected, damaged, or dead tissue so a wound can heal properly. Your wound may need more than one debridement. Debridement can cause bleeding, and a small amount of blood is expected.  AFTER A DEBRIDEMENT:    Keep your wound clean and dry. Do not remove the dressing unless instructed.  Follow the wound care orders provided to you or your home health care provider.  If you have pain, take over the counter pain relievers or pain medication if prescribed.  Elevate the wound and limit excessive activity to prevent bleeding and/or swelling in your wound.  If you see blood coming through the dressing, apply gauze and tape over the dressing and hold firm pressure to the wound with your hand for 5-10 minutes continuously, without peeking, to help the bleeding stop.  Contact [facility] wound care team at [phone] or go to the nearest Emergency department if:    You have a fever greater than 101 taken by mouth.  Your pain gets worse or does not go away, even after taking your regular pain medicine.  Your skin around your wound is red, hot, swollen, or draining pus.  You have bleeding that continues to come through the dressing after holding pressure for 10 minutes       Compression: You may be using a compressive type of  dressings to control edema: If so, keep your compression wrap or tubigrip in place. Do not get them wet, they should feel snug. If they feel tight, or cause pain in any way,  elevate your legs above your heart for 15 minutes. If the wraps still cause pain or you can not tolerate compression,  please remove and notify the clinic or your home health.     Nutrition:  The current daily value (%DV) for protein is 50 grams per day and is meant as a general goal for most people. Further increasing your dietary protein intake is very important for wound healing. Typically one needs over 100g of protein per day to help with wound healing needs.  If you are a dialysis patient or have problems with your kidneys, talk to your Nephrologist about how much protein you can take in with your condition.  Examples of high protein items that can be added to your diet include: eggs, chicken, red meats, almonds, cottage cheese, Greek yogurt, beans, and peanut butter.  Fortified protein bars, shakes and drinks can add 15-30 additional grams of protein per serving.   Also add:   1 daily general multivitamin   Kiko : 1 packet twice daily   Vitamin C : 500mg twice daily   Zinc 220 mg daily  Vit D : once daily    Offloading   Offload your wound. This means to reduce pressure on and around the wound that reduces blood flow to the wound and prevents healing. Your wound care team will discuss specific ways for you to offload your specific wound. Common offloading strategies include:    Turn or reposition every 2 hours or sooner  Use pillows, wedges, ROHO wheelchair cushions or other special devices like boots and shoes to lift the wound off of hard surfaces  Alternating Low Air-loss (ALAL) mattress may be ordered  Padded dressings can reduce wound pressure

## 2022-09-19 ENCOUNTER — EXTERNAL HOME HEALTH (OUTPATIENT)
Dept: HOME HEALTH SERVICES | Facility: HOSPITAL | Age: 87
End: 2022-09-19
Payer: MEDICARE

## 2022-09-22 ENCOUNTER — TELEPHONE (OUTPATIENT)
Dept: INTERNAL MEDICINE | Facility: CLINIC | Age: 87
End: 2022-09-22
Payer: MEDICARE

## 2022-09-22 NOTE — TELEPHONE ENCOUNTER
PAN Carlin but she was not home at the time. Tried to call again before leaving and phone not working. Left message on voice to call back with what they are needing and if Home Health could help with this as well (if she has HH)    ----- Message from Annia Lorenzo sent at 9/21/2022  3:10 PM CDT -----  Ms Hess is urinating too much especially at night, wets herself at times  Ms Carlin called to inquire about a tube for urine problems, not sure what kind of tube  Our patient Sharoncarol Richard has one  Please let Ms Carlin know [phone]

## 2022-09-23 ENCOUNTER — HOSPITAL ENCOUNTER (OUTPATIENT)
Dept: RADIOLOGY | Facility: HOSPITAL | Age: 87
Discharge: HOME | End: 2022-09-23
Attending: OTOLARYNGOLOGY
Payer: MEDICARE

## 2022-09-23 DIAGNOSIS — E04.1 NONTOXIC THYROID NODULE: ICD-10-CM

## 2022-09-23 PROCEDURE — 76536 US EXAM OF HEAD AND NECK: CPT | Mod: TC

## 2022-09-29 ENCOUNTER — HOSPITAL ENCOUNTER (OUTPATIENT)
Dept: WOUND CARE | Facility: HOSPITAL | Age: 87
Discharge: HOME | End: 2022-09-29
Attending: EMERGENCY MEDICINE
Payer: MEDICARE

## 2022-09-29 VITALS
SYSTOLIC BLOOD PRESSURE: 152 MMHG | WEIGHT: 107 LBS | BODY MASS INDEX: 21.57 KG/M2 | HEIGHT: 59 IN | HEART RATE: 89 BPM | TEMPERATURE: 99 F | RESPIRATION RATE: 16 BRPM | DIASTOLIC BLOOD PRESSURE: 74 MMHG

## 2022-09-29 DIAGNOSIS — D64.9 ANEMIA, UNSPECIFIED TYPE: ICD-10-CM

## 2022-09-29 DIAGNOSIS — L97.509 TYPE 2 DIABETES MELLITUS WITH FOOT ULCER, UNSPECIFIED WHETHER LONG TERM INSULIN USE: ICD-10-CM

## 2022-09-29 DIAGNOSIS — L97.412 SKIN ULCER OF RIGHT HEEL WITH FAT LAYER EXPOSED: ICD-10-CM

## 2022-09-29 DIAGNOSIS — I10 BENIGN HYPERTENSION: ICD-10-CM

## 2022-09-29 DIAGNOSIS — E78.5 HYPERLIPIDEMIA, UNSPECIFIED HYPERLIPIDEMIA TYPE: ICD-10-CM

## 2022-09-29 DIAGNOSIS — I25.10 ATHEROSCLEROSIS OF NATIVE CORONARY ARTERY OF NATIVE HEART WITHOUT ANGINA PECTORIS: ICD-10-CM

## 2022-09-29 DIAGNOSIS — I48.91 ATRIAL FIBRILLATION, UNSPECIFIED TYPE: ICD-10-CM

## 2022-09-29 DIAGNOSIS — E44.0 MALNUTRITION OF MODERATE DEGREE: ICD-10-CM

## 2022-09-29 DIAGNOSIS — E11.621 TYPE 2 DIABETES MELLITUS WITH FOOT ULCER, UNSPECIFIED WHETHER LONG TERM INSULIN USE: ICD-10-CM

## 2022-09-29 LAB — POCT GLUCOSE: 265 MG/DL (ref 70–110)

## 2022-09-29 PROCEDURE — 99213 OFFICE O/P EST LOW 20 MIN: CPT

## 2022-09-29 PROCEDURE — 27000999 HC MEDICAL RECORD PHOTO DOCUMENTATION

## 2022-09-29 NOTE — PROGRESS NOTES
Subjective:       Patient ID: Lita Hess is a 86 y.o. female.    Chief Complaint: No chief complaint on file.    87 y/o sendentary BF with diabetes, hypertension, anemia, malnutrition, prior stroke and colon cancer who was referred to this Wound Care Clinic to treat a chronic right diabetic /pressure heel ulcer present since at least July 2022.    Patient has had [facility] help with her home needs since 2021.  They noted the onset of a right heel eschar in July 2022.  It was treated with topical Betadine but in early September it was noted to begin to change in open up so it was requested the patient come to the Wound Care Clinic for evaluation.  He also gave ok to send in doxycycline for 10 days on 9/8/22.  She had her first clinic halle here on  9/15/22 :ulcer no  longer had eschar and looked really good/healthy. We advised of dressings of collagen and to continue offloading. Here for a recheck on 9/29/22 and it has healed.          Review of Systems   Constitutional:  Negative for activity change, chills, fatigue and fever.   HENT: Negative.     Respiratory: Negative.     Cardiovascular:  Negative for chest pain, palpitations and leg swelling.   Gastrointestinal: Negative.    Musculoskeletal:  Positive for arthralgias.   Skin:  Positive for wound (see hpi). Negative for rash.   Neurological:  Positive for syncope. Negative for seizures, numbness and headaches. Weakness: general.      Objective:      Vitals:    09/29/22 1130   BP: (!) 152/74   Pulse: 89   Resp: 16   Temp: 99.4 °F (37.4 °C)     @poctglucose@  Recent Labs   Lab 09/29/22  1128   POCTGLUCOSE 265*     Physical Exam  Constitutional:       Appearance: She is underweight.   HENT:      Head: Normocephalic and atraumatic.      Mouth/Throat:      Pharynx: Oropharynx is clear.   Eyes:      Conjunctiva/sclera: Conjunctivae normal.   Cardiovascular:      Pulses:           Dorsalis pedis pulses are detected w/ Doppler on the right side and detected w/  Doppler on the left side.        Posterior tibial pulses are detected w/ Doppler on the right side and detected w/ Doppler on the left side.   Pulmonary:      Effort: Pulmonary effort is normal.   Abdominal:      General: Abdomen is flat.   Musculoskeletal:         General: No swelling or tenderness.      Right lower leg: No edema.      Left lower leg: No edema.        Feet:    Skin:     General: Skin is warm and dry.      Capillary Refill: Capillary refill takes less than 2 seconds.   Neurological:      General: No focal deficit present.      Mental Status: She is alert and oriented to person, place, and time. Mental status is at baseline.   Psychiatric:         Behavior: Behavior is cooperative.            Altered Skin Integrity 09/15/22 1105 Right posterior Heel #1 Diabetic Ulcer (Active)   09/15/22 1105   Altered Skin Integrity Present on Admission: yes   Side: Right   Orientation: posterior   Location: Heel   Wound Number: #1   Is this injury device related?: No   Primary Wound Type: Diabetic ulc   Description of Altered Skin Integrity:    Ankle-Brachial Index: maryjane done 9/15/22 = (right) dp = 0.86, pt =0.92 / (left) dp = 0.89, pt = 0.94   Pulses: non palpable, + doppler, monophasic x 4   Removal Indication and Assessment:    Wound Outcome:    (Retired) Wound Length (cm):    (Retired) Wound Width (cm):    (Retired) Depth (cm):    Wound Description (Comments):    Removal Indications:    Wound Image   09/29/22 1133   Dressing Appearance Intact 09/29/22 1133   Drainage Amount None 09/29/22 1133   Appearance Intact 09/29/22 1133   Tissue loss description Not applicable 09/29/22 1133   Black (%), Wound Tissue Color 0 % 09/29/22 1133   Red (%), Wound Tissue Color 0 % 09/29/22 1133   Yellow (%), Wound Tissue Color 0 % 09/29/22 1133   Periwound Area Intact 09/29/22 1133   Wound Edges Approximated 09/29/22 1133   Wound Length (cm) 0 cm 09/29/22 1133   Wound Width (cm) 0 cm 09/29/22 1133   Wound Depth (cm) 0 cm 09/29/22  1133   Wound Volume (cm^3) 0 cm^3 09/29/22 1133   Wound Surface Area (cm^2) 0 cm^2 09/29/22 1133   Care Cleansed with:;Soap and water 09/29/22 1133           Assessment:       1. Skin ulcer of right heel with fat layer exposed    2. Type 2 diabetes mellitus with foot ulcer, unspecified whether long term insulin use    3. Atrial fibrillation, unspecified type    4. Benign hypertension    5. Anemia, unspecified type    6. Atherosclerosis of native coronary artery of native heart without angina pectoris    7. Malnutrition of moderate degree    8. Hyperlipidemia, unspecified hyperlipidemia type            Right heel diabetic heel ulcer with mixed pressure component: began July 2022, first clinic visit on 9/15/22, closed on 9/29/22  Diabetes, last hba1c 7.2 March 2022  Hypertension  Dyslipidemia  Prior colon cancer with resection 2021  Prior stroke  Anemia  Malnutrition        Lab Results   Component Value Date    HGBA1C 7.2 03/24/2022     Plan:     Plan of Care:         Ulcer has healed   I advise to keep it offloaded and padded with a  bordered foam dressing. Always at risk for reopening; Home health to go at [facility] weekly to monitor and to let us know if reopens. Otherwise will d/c from clinic today  Diabetes: must have a strict diabetic diet, take meds and encourage lower hba1c;  Nutrition: high protein diet to improve wound healing; target over 100g /day; take MVI and linus       The time spent including preparing to see the patient, obtaining patient history and assessment, evaluation of the plan of care, patient/caregiver counseling and education, orders, documentation, coordination of care, and other professional medical management activities for today's encounter was 20 minutes.

## 2022-09-29 NOTE — PATIENT INSTRUCTIONS
Pt seen today by: Dr. Maite Ku Home health and self care DRESSING INSTRUCTIONS:    Home health to visit and assist with dressing changes: Please visit pt on wednesdays to monitor skin integrity    Patient and/or family may be asked to assist on other days.      Wound location: Right posterior heel    Cover with border foam for protection  Change dressing when Home Health comes 1x week    Return visit: no f/u required    Call our [facility] wound clinic for questions/concerns [phone].

## 2022-10-07 ENCOUNTER — TELEPHONE (OUTPATIENT)
Dept: INTERNAL MEDICINE | Facility: CLINIC | Age: 87
End: 2022-10-07
Payer: MEDICARE

## 2022-10-07 DIAGNOSIS — L89.610 PRESSURE INJURY OF RIGHT HEEL, UNSTAGEABLE: Primary | ICD-10-CM

## 2022-10-07 DIAGNOSIS — E11.9 TYPE 2 DIABETES MELLITUS WITHOUT COMPLICATION, WITHOUT LONG-TERM CURRENT USE OF INSULIN: ICD-10-CM

## 2022-10-07 NOTE — TELEPHONE ENCOUNTER
Received a note from [facility] patient is needing wound care for wound on right heel. Referral sent to [facility]

## 2022-10-20 DIAGNOSIS — I10 HYPERTENSION, UNSPECIFIED TYPE: ICD-10-CM

## 2022-10-20 DIAGNOSIS — Z00.00 WELL ADULT EXAM: Primary | ICD-10-CM

## 2022-10-20 DIAGNOSIS — E78.00 HYPERCHOLESTEREMIA: ICD-10-CM

## 2022-10-20 DIAGNOSIS — E11.9 TYPE 2 DIABETES MELLITUS WITHOUT COMPLICATION, WITHOUT LONG-TERM CURRENT USE OF INSULIN: ICD-10-CM

## 2022-10-20 DIAGNOSIS — D64.9 ANEMIA, UNSPECIFIED TYPE: ICD-10-CM

## 2022-10-21 LAB
ALBUMIN/GLOB SERPL ELPH: 1.4 {RATIO}
ALBUMIN: 4.5
ALP ISOS SERPL LEV INH-CCNC: 84 U/L
ALT: 15
AST: 15
BASOS (CD4 / CD8): 1
BILIRUBIN TOTAL: 0.3
BUN BLD-MCNC: 11 MG/DL (ref 4–21)
BUN/CREAT RATIO: 17
CALCIUM SERPL-MCNC: 10 MG/DL
CHLORIDE: 97 MMOL/L
CHOLEST SERPL-MCNC: 109 MG/DL
CO2 SERPL-SCNC: 21 MMOL/L
CREAT SERPL-MCNC: 0.7 MG/DL
EOS, FLUID: 2 %
ERYTHROCYTE [DISTWIDTH] IN BLOOD BY AUTOMATED COUNT: 13.4 %
GLOBULIN: 3.3
GLUCOSE: 183
HBA1C MFR BLD: 10.4 %
HCT VFR BLD AUTO: 42.4 % (ref 36–46)
HDLC SERPL-MCNC: 43 MG/DL
HGB BLD-MCNC: 13.7 G/DL (ref 12–16)
LDLC SERPL CALC-MCNC: 50 MG/DL
LYMPHOCYTES NFR CSF MANUAL: 38 %
MCH RBC QN AUTO: 30.1 PG
MCHC RBC AUTO-ENTMCNC: 32.3 G/DL
MCV RBC AUTO: 93 FL
MONOCYTES NFR BLD: 9 %
NEUTROPHILS NFR BLD: 50 %
PLATELETS: 239
POTASSIUM: 3.9 MMOL/L
PROTEIN: 7.8
RBC # BLD AUTO: 4.55 10*6/UL
SODIUM: 138 MMOL/L
TRIGL SERPL-MCNC: 81 MG/DL
TSH: 3.81
VLDL CHOLESTEROL: 16 MG/DL
WBC: 5.7

## 2022-10-26 ENCOUNTER — OFFICE VISIT (OUTPATIENT)
Dept: INTERNAL MEDICINE | Facility: CLINIC | Age: 87
End: 2022-10-26
Payer: MEDICARE

## 2022-10-26 VITALS
DIASTOLIC BLOOD PRESSURE: 68 MMHG | HEIGHT: 59 IN | SYSTOLIC BLOOD PRESSURE: 124 MMHG | OXYGEN SATURATION: 97 % | HEART RATE: 80 BPM | BODY MASS INDEX: 21.61 KG/M2 | RESPIRATION RATE: 14 BRPM

## 2022-10-26 DIAGNOSIS — E11.65 TYPE 2 DIABETES MELLITUS WITH HYPERGLYCEMIA, WITHOUT LONG-TERM CURRENT USE OF INSULIN: ICD-10-CM

## 2022-10-26 DIAGNOSIS — G89.29 CHRONIC BACK PAIN, UNSPECIFIED BACK LOCATION, UNSPECIFIED BACK PAIN LATERALITY: ICD-10-CM

## 2022-10-26 DIAGNOSIS — G45.9 TRANSIENT ISCHEMIC ATTACK: ICD-10-CM

## 2022-10-26 DIAGNOSIS — R00.2 PALPITATIONS: ICD-10-CM

## 2022-10-26 DIAGNOSIS — I65.23 BILATERAL CAROTID ARTERY STENOSIS: ICD-10-CM

## 2022-10-26 DIAGNOSIS — R35.0 URINARY FREQUENCY: ICD-10-CM

## 2022-10-26 DIAGNOSIS — Z23 NEED FOR VACCINATION FOR PNEUMOCOCCUS: ICD-10-CM

## 2022-10-26 DIAGNOSIS — D50.8 IRON DEFICIENCY ANEMIA SECONDARY TO INADEQUATE DIETARY IRON INTAKE: ICD-10-CM

## 2022-10-26 DIAGNOSIS — M54.9 CHRONIC BACK PAIN, UNSPECIFIED BACK LOCATION, UNSPECIFIED BACK PAIN LATERALITY: ICD-10-CM

## 2022-10-26 DIAGNOSIS — R07.9 EXERTIONAL CHEST PAIN: ICD-10-CM

## 2022-10-26 DIAGNOSIS — Z00.00 WELL ADULT EXAM: Primary | ICD-10-CM

## 2022-10-26 DIAGNOSIS — I25.10 ARTERIOSCLEROSIS OF CORONARY ARTERY: ICD-10-CM

## 2022-10-26 DIAGNOSIS — K21.9 GASTROESOPHAGEAL REFLUX DISEASE WITHOUT ESOPHAGITIS: ICD-10-CM

## 2022-10-26 DIAGNOSIS — E78.00 HYPERCHOLESTEROLEMIA: ICD-10-CM

## 2022-10-26 DIAGNOSIS — Z23 NEED FOR INFLUENZA VACCINATION: ICD-10-CM

## 2022-10-26 DIAGNOSIS — M41.9 SCOLIOSIS, UNSPECIFIED SCOLIOSIS TYPE, UNSPECIFIED SPINAL REGION: ICD-10-CM

## 2022-10-26 PROBLEM — I65.29 STENOSIS OF CAROTID ARTERY: Status: ACTIVE | Noted: 2022-10-26

## 2022-10-26 PROBLEM — I10 HYPERTENSION: Status: ACTIVE | Noted: 2022-10-26

## 2022-10-26 PROBLEM — K44.9 HIATAL HERNIA: Status: ACTIVE | Noted: 2022-10-26

## 2022-10-26 PROBLEM — Z91.89 AT RISK OF PRESSURE INJURY OF SKIN: Status: ACTIVE | Noted: 2022-10-26

## 2022-10-26 PROBLEM — E11.9 TYPE 2 DIABETES MELLITUS WITHOUT COMPLICATION: Status: ACTIVE | Noted: 2022-10-26

## 2022-10-26 LAB
ALBUMIN, URINE: 168.1 MG/L
APPEARANCE SNV: CLEAR
BILIRUBIN UA: NEGATIVE
CREATININE, URINE: 95.6 MG/DL
CRYSTALS UA, AUTO: ABNORMAL
EPITHELIAL CELLS UA, AUTO: ABNORMAL
GLUCOSE UR QL: NEGATIVE
HGB UR QL STRIP: NEGATIVE
KETONES UR QL STRIP: NEGATIVE
MUCOUS THREADS #/AREA URNS HPF: ABNORMAL /HPF
NITRITE UR QL STRIP: NEGATIVE
PH UR STRIP: 6.5 [PH] (ref 4.5–8)
PLATELET # BLD: ABNORMAL 10*3/UL
PROT UR QL STRIP: ABNORMAL
RBC # UR STRIP: ABNORMAL /UL
SP GR UR STRIP: 1.02
URINE-COLOR: YELLOW
UROBILINOGEN, URINE: 1
WBC # UR STRIP: ABNORMAL /UL

## 2022-10-26 PROCEDURE — G0008 ADMIN INFLUENZA VIRUS VAC: HCPCS | Mod: ,,, | Performed by: NURSE PRACTITIONER

## 2022-10-26 PROCEDURE — 99214 OFFICE O/P EST MOD 30 MIN: CPT | Mod: 25,,, | Performed by: NURSE PRACTITIONER

## 2022-10-26 PROCEDURE — 99214 PR OFFICE/OUTPT VISIT, EST, LEVL IV, 30-39 MIN: ICD-10-PCS | Mod: 25,,, | Performed by: NURSE PRACTITIONER

## 2022-10-26 PROCEDURE — 90694 VACC AIIV4 NO PRSRV 0.5ML IM: CPT | Mod: ,,, | Performed by: NURSE PRACTITIONER

## 2022-10-26 PROCEDURE — 90677 PNEUMOCOCCAL CONJUGATE VACCINE 20-VALENT: ICD-10-PCS | Mod: ,,, | Performed by: NURSE PRACTITIONER

## 2022-10-26 PROCEDURE — G0008 FLU VACCINE - QUADRIVALENT - ADJUVANTED: ICD-10-PCS | Mod: ,,, | Performed by: NURSE PRACTITIONER

## 2022-10-26 PROCEDURE — 90694 FLU VACCINE - QUADRIVALENT - ADJUVANTED: ICD-10-PCS | Mod: ,,, | Performed by: NURSE PRACTITIONER

## 2022-10-26 PROCEDURE — G0009 ADMIN PNEUMOCOCCAL VACCINE: HCPCS | Mod: ,,, | Performed by: NURSE PRACTITIONER

## 2022-10-26 PROCEDURE — G0439 PPPS, SUBSEQ VISIT: HCPCS | Mod: ,,, | Performed by: NURSE PRACTITIONER

## 2022-10-26 PROCEDURE — G0009 PNEUMOCOCCAL CONJUGATE VACCINE 20-VALENT: ICD-10-PCS | Mod: ,,, | Performed by: NURSE PRACTITIONER

## 2022-10-26 PROCEDURE — 90677 PCV20 VACCINE IM: CPT | Mod: ,,, | Performed by: NURSE PRACTITIONER

## 2022-10-26 PROCEDURE — G0439 PR MEDICARE ANNUAL WELLNESS SUBSEQUENT VISIT: ICD-10-PCS | Mod: ,,, | Performed by: NURSE PRACTITIONER

## 2022-10-26 RX ORDER — CARTILAGE/COLLAGEN/BOR/HYALUR 40-10-5 MG
500 TABLET ORAL
Status: ON HOLD | COMMUNITY
Start: 2021-08-20 | End: 2023-06-20

## 2022-10-26 RX ORDER — FLUTICASONE PROPIONATE 50 MCG
SPRAY, SUSPENSION (ML) NASAL
Status: ON HOLD | COMMUNITY
Start: 2021-10-19 | End: 2023-06-20

## 2022-10-26 RX ORDER — METFORMIN HYDROCHLORIDE 500 MG/1
500 TABLET ORAL NIGHTLY
Qty: 30 TABLET | Refills: 5 | Status: SHIPPED | OUTPATIENT
Start: 2022-10-26 | End: 2023-03-10 | Stop reason: SDUPTHER

## 2022-10-26 NOTE — PATIENT INSTRUCTIONS
Diabetes and Heart Disease  If you have diabetes, you are two to four times more likely to have heart disease than someone without diabetes. This is because most people with diabetes also have the main risk factors for heart disease. But theres good news. You can help control your health risks by making some changes in your life.      Your healthcare team will develop a treatment plan that works best for you.        Your Main Risk Factors  Three major risk factors for heart disease are high blood sugar, high blood pressure, and high levels of lipids. By keeping risk factors under control, you can help keep your heart and arteries healthy. This may reduce your chances of a heart attack.  Blood sugar. High blood sugar can make artery walls rough. Plaque (waxy material in the blood) can then build up, making it harder for blood to flow through the arteries. And having high blood sugar makes you more likely to have high blood pressure and high levels of lipids.  Blood pressure. When blood pressure is high all the time, artery walls become damaged. Plaque is more likely to build up.  Lipids. The body needs some lipids in the blood to stay healthy. But lipid levels that are too high can damage the artery walls. Lipids include cholesterol and triglycerides. There are two kinds of cholesterol. LDL (bad) cholesterol can damage the arteries. But HDL (good) cholesterol helps clear LDL cholesterol from the blood. This helps keep the arteries healthy. When blood sugar is high, the level of triglycerides in the blood may also be high. High blood triglyceride levels can cause plaque to form.   Other Risk Factors  Certain lifestyle factors can increase levels of your blood sugar, blood pressure, and lipids. Such increases raise your risk of heart disease.  Smoking damages the lining of your arteries. This allows plaque to build up in the artery walls. Smoking also constricts (narrows) the arteries, which can raise blood  pressure.  Not being active makes it harder for your heart to do its work. Inactivity is linked to many other risk factors, such as high blood pressure and poor cholesterol levels.  Being overweight makes it harder for your body to use insulin. It also makes your heart work too hard.   Changes You Can Make        Take your medications as directed each day, even if you feel fine.    Following a few simple steps can help keep your risk factors under control. Work with your healthcare team to reach your goals.  Quitting smoking could save your life. Smoking damages the lining of the blood vessels and raises blood pressure. Smoking also affects how your body uses insulin. This makes it harder to keep blood sugar under control. If you smoke and need help quitting, talk to your healthcare team.  Testing your blood sugar  is the only way to know whether it is under control. Be sure to test your blood sugar yourself. Also get your blood tested in the lab, as directed.  Monitoring your blood pressure and lipid levels can help you achieve safe levels. Visit your healthcare team as scheduled.  Taking medications as directed can help control blood sugar, blood pressure, blood clotting, and/or cholesterol levels.  Eating right can reduce your risk factors and help you lose weight. Try to limit the amount of carbohydrates you eat at one time. Eat foods low in saturated fat and cholesterol. Eat fiber, including vegetables and whole grains. And cut down on salt. A dietitian or diabetes educator can help form a meal plan that works for you.  Being active can help reduce your weight, strengthen your heart, and lower your lipid levels and blood pressure. Exercise and activity are good for your whole body. Talk to your healthcare team about increasing your activity safely over time.  Keeping your appointments with your healthcare provider helps you stay healthy. Go in for checkups and lab tests as scheduled.  © 7483-5060 [facility], [address] [place] [address]. All rights reserved. This information is not intended as a substitute for professional medical care. Always follow your healthcare professional's instructions.Diabetes and Kidney Disease  Diabetes makes your body less able to use the foods you eat as sources of energy. As a result, glucose (the form of sugar the body uses as fuel) builds up in the blood. Over time, having too much glucose in your blood can damage blood vessels and kidneys. By controlling diabetes, you can maintain a healthy blood glucose level and slow or prevent kidney damage.   Visit your healthcare provider as scheduled.    Follow Your Diet    To get the most energy from the foods you eat and feel your best, you may have to follow a special diet. Work closely with your healthcare team to design a meal plan that is right for you.  You May Also Need To:  Eat less protein.  Drink less fluid.  Limit sodium (salt) intake.  Eat foods that are low in phosphorus and potassium.  Take Insulin and Diabetes Medication as Directed  Insulin is a hormone that helps your body use glucose. You may give yourself insulin to increase your bodys supply. Or you may take other medications to help your body release more insulin or use insulin better. The stage of your kidney disease can reduce the amount of insulin your body needs. So your insulin injections or other medication may be adjusted. Talk with your doctor if your blood glucose level is often too low. Monitor your blood glucose with a meter as directed by your doctor.  Stay Active    Exercise helps the body use glucose. For best results:  Talk with your doctor before starting a fitness program.  Ask your doctor how often you should exercise and for how long.  Your doctor may be able to suggest activities that will help you feel your best.  Eat 1-2 hours before you exercise.  © 9268-1298 [facility], [address] [place] [address]. All rights  reserved. This information is not intended as a substitute for professional medical care. Always follow your healthcare professional's instructions.    DIABETES [General Information]  Cells of the body need glucose (sugar) for fuel. Insulin is the hormone in the body that lets glucose move from the blood into the cells. Diabetes is a chronic health condition where the body is not able to produce enough insulin, or does not respond well to its own insulin. Because the glucose in the blood cannot get into the cells, it builds up in the blood causing high blood sugar (hyperglycemia).  Your actual blood sugar level is a result of the balance between several factors. These include what kind of food you eat and how much of it you eat, how much exercise you get, and the amount of insulin present in your body. Eating too much of the wrong kinds of food or not taking diabetes medicine on time can cause high blood sugar. Infections can cause high blood sugar even if you are taking medicines correctly. Missing meals, not eating enough food, or taking too much diabetes medicine can lead to low blood sugar.  Untreated over long periods of time, diabetes can cause serious problems such as heart disease, stroke, kidney failure, blindness, nerve pain or loss of feeling in the legs and feet, and gangrene of the feet. With good treatment keeping your blood sugar under control, you can prevent or delay the complications of diabetes. Normal blood sugar levels are between  before a meal and not more than 180 two hours after a meal.  HOME CARE:  1) Follow your prescribed diabetic diet and take insulin or oral diabetic medicine exactly as ordered.  2) Monitor blood sugars as advised. Keep a log of your results. This will help your doctor adjust your medicines to keep your blood sugar under control.  3) Try to achieve your ideal weight. Proper diet and exercise can reduce or eliminate the need to take diabetes medicine.  4) Avoid  "tobacco smoking, which worsens the effect of diabetes on your circulation. The risk of a heart attack in a diabetic is 15 times more likely if you smoke.  5) Pay attention to good foot care. If you have lost feeling in your feet you may not notice an injury or infection. Check your feet and between your toes at least once a week.  6) Wear a Medic-Alert bracelet or carry a card in your wallet explaining that you are diabetic. In the event that you become very ill and are unable to give this information, it will help medical personnel provide proper care.  7) If you become ill with an infection (viral or bacterial), check your blood sugar often. If the blood sugar is above your usual range, and if you take insulin, use the "sliding scale" dose of regular (R) insulin dose your doctor gave you. Recheck your blood sugar in four hours. If you do not take insulin or if no "sliding scale" instructions were given, contact your doctor for further advice.  8) Always carry a source of high sugar food with you in case you get symptoms of low blood sugar again. At the first sign of low blood sugar, eat the sugar source to raise your blood sugar while you seek medical help. Examples of high sugar foods include:  Glucose tablets (found at most drug stores), non-diet cola drinks (Coke, Pepsi, Root Beer, etc.), milk chocolate candies, hard candies, orange juice or apple juice with added sugar.  Check your blood sugar 20 minutes after treating yourself. If it is still low, go to an emergency room.  9) If you are not able to eat due to illness, loss of appetite or vomiting, you MUST reduce your diabetes medicine. If you take oral diabetes medicine, stop your pills and contact your doctor. If you take insulin, contact your doctor to ask for a temporary adjustment of your dose. If there is a delay in reaching your doctor, reduce your daily insulindose to ONE-HALF (½) of what you usually take. Monitor your blood sugar every 4-6 hours, " until you are able to begin eating again normally.  FOLLOW UP with your doctor as advised by our staff. For more information, contact the American Diabetes Association. www.diabetes.org or 800-DIABETES). You can get a Medic-Alert bracelet from www.medicalert.org ([phone].  GET PROMPT MEDICAL ATTENTION if any of the following occur:  -- HIGH BLOOD SUGAR: frequent urination, dizziness, drowsiness, thirst, headache, nausea or vomiting, abdominal pain, vision changes, fast breathing, confusion or loss of consciousness  -- LOW BLOOD SUGAR: fatigue, headache, shakes, excess sweating, hunger, feeling anxious or restless, vision changes, drowsiness, weakness, confusion or loss of consciousness  -- Chest pain or shortness of breath  -- Dizziness or fainting  -- Weakness of an arm or leg or one side of the face  -- Trouble with speech or vision  © 4399-5946 [facility], [address] [place] [address]. All rights reserved. This information is not intended as a substitute for professional medical care. Always follow your healthcare professional's instructions.    Diabetes: Activity Tips    Being more active can help you manage your diabetes. The tips on this sheet can help you get the most from your exercise. They can also help you stay safe.  Benefit from Briskness  Brisk activity gets your heart beating faster. This can help you increase your fitness, lose extra weight, and manage your blood sugar level. Try brisk walking. Or, if you have foot or leg problems, you can try swimming or bike riding. You can break up your exercise into chunks throughout the day. Work up to at least 30 minutes of steady, brisk exercise on most days.  Warm Up and Cool Down  Warming up and cooling down reduce your risk of injury. They also help limit muscle soreness. Do a mild version of your activity for 5 minutes before and after your routine. You can also learn stretches that will help keep your muscles loose. Your  healthcare provider may show you good ways to warm up and stretch.  Do the Talk-Sing Test  The talk-sing test is a simple way to tell how hard youre exercising. If you can talk while exercising, youre in a safe range. If youre out of breath, slow down. If you can carry a tune, its time to  the pace. Walk up a hill. Increase the resistance on your stationary bike. Or swim faster.  What About Eating?  You may be told to plan your exercise for 1-2 hours after a meal. In most cases, you dont need to eat while being active. If you take insulin or medication that can cause low blood sugar, test your blood sugar before exercising. And carry a fast-acting sugar that will raise your blood sugar level quickly. This includes glucose tablets or hard candy. Use it if you feel low blood sugar symptoms.  Safety Tips  These tips can help you stay safe as you become fit:  Exercise with a friend or carry a cell phone if you have one.  Carry or wear identification that says you have diabetes.  Use the proper footwear and safety equipment for your activity.  Drink water before, during, and after exercise.  Dress properly for the weather.  Dont exercise in very hot or very cold weather.  Dont exercise if you are sick.  If you are instructed to do so, test your blood sugar before and after you exercise.  When to Stop Exercising and Call Your Doctor  Stop exercising and call your doctor right away if you notice any of the following:  Pain, pressure, tightness, or heaviness in the chest  Pain or heaviness in the neck, shoulders, back, arms, legs, or feet  Unusual shortness of breath  Dizziness or lightheadedness  Unusually rapid or slow pulse  Increased joint or muscle pain  Nausea or vomiting  © 1510-2908 [facility], [address] [place] [address], [place] [address]. All rights reserved. This information is not intended as a substitute for professional medical care. Always follow your healthcare professional's  instructions.Diabetes: Caring for Your Body  When you have diabetes, your body needs special care. This care helps you stay healthy and prevent complications. Exercise and healthy eating are a part of this. You can also protect yourself by taking special care of your feet, skin, and teeth.    Caring for Your Feet  Follow these tips to help keep your feet healthy.  Check your feet every day for redness, blisters, cracks, dry skin, or numbness. Use a mirror to inspect the bottoms of your feet, if needed. Or, ask for help.  Wash your feet in warm (not hot) water. Dont soak them.  Use an emery board to keep your toenails even with the ends of your toes. File away sharp edges. A podiatrist (foot doctor) may need to cut your toenails for you.  Always wear shoes or slippers, even inside your home. Make sure that shoes are properly fitted. Change your socks daily.  Call your healthcare provider right away if your feet are numb or painful, or if a cut or sore doesnt heal within a few days.  Preventing Skin Infections  To prevent skin infections, bathe every day. Dry yourself well, especially between your toes. Wash any cuts with warm, soapy water and cover with a sterile bandage. Call your healthcare provider if a cut or sore doesn't heal in a few days, feels warm, itches, or has a bad odor.  Caring for Your Teeth  Brush your teeth twice daily.  Floss your teeth daily.  See your dentist at least twice yearly.  If You Smoke, Quit   Smoking is dangerous for everyone, especially people with diabetes. It can harm the blood vessels in your eyes, kidneys, and heart. It raises blood pressure. Smoking can also slow healing, so infections are more likely. Ask your healthcare provider about programs to help you stop smoking.    © 2627-8909 [facility], [address] [facility], [place] [address]. All rights reserved. This information is not intended as a substitute for professional medical care. Always follow your healthcare  professional's instructions.Diabetes: Food Pyramid  The diabetes food pyramid is a tool to help you eat a wide range of healthy foods. If you eat the lowest number of servings for each type of food, youll eat about 1,600 calories a day. The highest number of servings will give you about 2,800 calories a day. Your calorie needs are based in part on your height, gender, and activity level. Your healthcare provider can help you determine a calorie level thats right for you.    Fruits  (2 to 4 servings a day)  A serving has 15 grams of carbohydrate, about 60 calories, and no fat.  Healthy low-fat choices: whole fresh fruits or canned fruit with no sugar added.  Milk and Yogurt  (2 to 3 servings a day)  A serving has 12 grams of carbohydrate and 8 grams of protein. Low-fat and fat-free choices have about 100 calories and little fat.  Healthy, low-fat choices: low-fat or fat-free milk and fat-free yogurt with artificial sweetener.  Breads, Grains, and Other Starches  (6 to 11 servings a day)  A serving has 15 grams of carbohydrate, 3 grams of protein, and about 80 calories. Most have no more than 1 gram of fat.  Healthy, low-fat choices: whole-grain breads and cereals, corn, tortillas, oatmeal, bulgur, brown rice, dried beans, lentils, peas, yams, acorn or butternut squash, pumpkin.  Fats, Sweets, and Alcohol  (use sparingly)  The foods in this group are high in calories. The best choices of fat are olive and canola oil and tub or liquid margarine. A serving of fat is 1 teaspoon. It has 45 calories and 5 grams of fat. Sweets often have fat and carbohydrate. Eat them in small amounts.  Meat, Meat Substitutes, and Other Proteins  (2 to 3 servings a day)  A serving has 21 grams of protein and no carbohydrate. Lean and very lean choices have the least fat and the fewest calories.  Healthy, low-fat choices: fish, white-meat chicken or turkey, lean red meat, reduced-fat or fat-free cheese.  Vegetables  (3 to 5 servings a  day)  A serving has 5 grams of carbohydrate, about 25 calories, and no fat.  Healthy low-fat choices: fresh vegetables or frozen vegetables without sauce, butter, or margarine.  What About Alcohol?   Work with your healthcare team to decide if you can have alcohol. If you do drink, do so in moderation. And always eat a carbohydrate at the same time to avoid low blood sugar.    © 7077-2573 [facility], [address] [place] [address], [place] [address]. All rights reserved. This information is not intended as a substitute for professional medical care. Always follow your healthcare professional's instructions.Diabetes: Getting Started with Exercise  Getting started is easier than you think. Simple and small movements can get you started on a regular exercise routine. You dont need to join a gym to start moving. Choose an activity you enjoy. Start slowly and set small goals. Work activity into your daily life. Talk to your healthcare provider before starting an activity program. You may need to have a checkup before you begin.  Start with Movement    If youre not used to being active, start with gentle movements while you watch TV. Raise your arms and legs while seated. Then repeat for 5-10 minutes. With time, add some slow walking. Even taking a flight of stairs instead of the elevator can lift you to healthier heights. These types of brief activities are great ways to get started. They can help lower your blood sugar level, strengthen your heart, and improve your energy.  Steps Toward Being More Active  Your goal, especially at first, is to keep your activity simple. Slowly work up to 30 minutes of activity a day. But you dont need to do it all at once. You can be active in three 10-minute sessions a day. You can also combine being active with the other things you need to do. For instance, stand up from your desk and walk around often when at work. Or, go for a walk around the mall before you shop.  Keep Your Activity  Simple  Why make activity hard on yourself? Choose things that you like to do and that fit into your schedule. Here are some tips:  Get off the bus a stop or two early and walk the rest of the way.  Run small shopping errands on your bike.  Go for a 10-minute walk after each meal.  Park your car in the space farthest from where youre going.  Get a pedometer that records the number of steps you take. Make a goal for the number of steps you take each day. Increase your goal a little each week.  Keep Your Activity Safe  Be sure to warm up before you start and cool down when youre done.  Carry or wear identification that says that you have diabetes.  Eat 1-2 hours before you exercise, if instructed.  Check your blood sugar before and after you exercise, if instructed. Check your blood sugar if you feel symptoms.  Carry fast-acting sugar with you in case you have low blood sugar.  Wear socks and well-fitting shoes.  Think about the weather in your area. At times, you may need to choose indoor rather than outdoor activities.  Make Your Activity Fun  Mix fitness with fun. The more fun you have, the more likely you are to stick to your plan. You can have a better blood sugar level along with an active, fun day. Try these hints:  Choose an exercise that you enjoy and can do easily.  Join a social club that goes for walks or does other physical activities.  Go bird watching or do something else that gets you outdoors.  Put on some music and dance.  Involve your family or friends in your physical activity.  © 1737-4394 [facility], [address] [place] [address], [place] [address]. All rights reserved. This information is not intended as a substitute for professional medical care. Always follow your healthcare professional's instructions.Diabetes: Inspecting Your Feet  Diabetes increases your chances of developing foot problems. So inspect your feet every day. This helps you find small skin irritations before they become serious  infections. If you have trouble seeing the bottoms of your feet, use a mirror or ask a family member or friend to help.      Pressure spots on the bottom of the foot are common areas where problems develop.    How to Check Your Feet  Below are tips to help you look for foot problems. Try to check your feet at the same time each day, such as when you get out of bed in the morning.  Check the top of each foot. The tops of toes, back of the heel, and outer edge of the foot can get a lot of rubbing from poor-fitting shoes.  Check the bottom of each foot. Daily wear and tear often leads to problems at pressure spots.  Check the toes and nails. Fungal infections often occur between toes. Toenail problems can also be a sign of fungal infections or lead to breaks in the skin.  Check your shoes, too. Loose objects inside a shoe can injure the foot. Use your hand to feel inside your shoes for things like nehemiah, loose stitching, or rough areas that could irritate your skin.  Warning Signs  Look for any color changes in the foot. Redness with streaks can signal a severe infection, which needs immediate medical attention. Tell your doctor right away if you have any of these problems:  Swelling, sometimes with color changes, may be a sign of poor blood flow or infection. Symptoms include tenderness and an increase in the size of your foot.  Warm or hot areas on your feet may be signs of infection. A foot that is cold may not be getting enough blood.  Sensations such as burning, tingling, or pins and needles can be signs of a problem. Also check for areas that may be numb.  Hot spots  are caused by friction or pressure. Look for hot spots in areas that get a lot of rubbing. Hot spots can turn into blisters, calluses, or sores.  Cracks and sores are caused by dry or irritated skin. They are a sign that the skin is breaking down, which can lead to infection.  Toenail problems to watch for include nails growing into the skin  (ingrown toenail) and causing redness or pain. Thick, yellow, or discolored nails can signal a fungal infection.  Drainage and odor can develop from untreated sores and ulcers. Call your doctor right away if you notice white or yellow drainage, bleeding, or unpleasant odor.   © 8567-1625 [facility], [address] [place] [address]. All rights reserved. This information is not intended as a substitute for professional medical care. Always follow your healthcare professional's instructions.Diabetes: Keeping Feet Healthy      Inspect your feet every day for signs of a problem.    Diabetes can damage nerves in your feet and cause neuropathy. This condition makes it hard for you to feel injuries or sore spots. Diabetes can also change blood flow, making it harder for small problems, like a blister, to heal properly. In fact, minor injuries can quickly become serious infections that send you to the hospital. Practice self-care to protect your feet and keep them healthy.   Take Special Care  Inspect your feet daily for problems such as redness, blisters, cracks, dry skin, or numbness. Use a mirror to see the bottoms of your feet. Or, ask for help.  Manage your diabetes. Monitor and control your blood sugar. Take all your medications as prescribed.  Avoid walking barefoot, even indoors.  Wash your feet with warm water and mild soap. Dry well, especially between toes.  Dont treat corns or calluses yourself. Talk to your doctor or podiatrist (a doctor who specializes in foot care) if you need assistance trimming your toenails.  Use moisturizing cream or lotion if you have dry skin, but dont use it between toes.  Dont use heating pads on your feet. If you have neuropathy, you could get a burn and not feel it.  Stop smoking. Smoking restricts blood flow and can make it harder for wounds to heal.  Have Regular Checkups  Foot problems can develop quickly. So be sure to follow your healthcare teams schedule for  regular checkups. During office visits, take off your shoes and socks as soon as you get in the exam room. Ask your healthcare provider to examine your feet for problems. This will make it easier to find and treat small skin irritations before they get worse. Regular checkups can also help keep track of the blood flow and feeling in your feet. If you have neuropathy, you may need to have checkups more often.  Wear Proper Footwear  Wearing proper footwear is very important. If areas of your feet have been damaged by too much pressure, your healthcare provider may recommend changing your footwear. In some cases, avoiding high heels or tight work boots may be all thats needed. Or, your healthcare provider may recommend special shoes or custom inserts. These help protect your feet and keep existing irritations from getting worse. If you need special footwear, ask your healthcare provider if you qualify for Medicares diabetic shoe program.  Make Sure Shoes and Socks Fit  Any pair of shoes--new or old--should feel comfortable as soon as you put them on. There shouldnt be any rubbing when you walk. Wear the right shoe for any activity. For instance, a running shoe is designed to keep your feet injury-free while jogging. Buy shoes at the end of the day, when your feet are larger. Make sure they provide support without feeling too loose. Make sure your socks fit, too. Wear soft, seamless, well-padded socks for activity. Cotton or microfiber socks are best to help to absorb sweat. To protect your feet, avoid shoes that are open-toed or open-heeled. If you have questions about what kinds of shoes and socks are best, talk to your healthcare team.  Get Regular Exercise  Regular exercise improves blood flow in your feet. It also increases foot strength and flexibility. Gentle exercises, like walking or riding a stationary bicycle, are best. You can also do special foot exercises. Just be sure to talk with your healthcare  provider before starting any exercise program. Also mention if any exercise causes pain, redness, or other signs of foot problems.  Note: If you have any kind of break in the skin of your foot or ankle, keep the area clean. Then call your doctor--especially if the area doesnt appear to be healing.    © 6333-1602 Trell [employer], [address] [place] [address], [place] [address]. All rights reserved. This information is not intended as a substitute for professional medical care. Always follow your healthcare professional's instructions.Diabetes: Learning About Serving and Portion Sizes  Servings and portions. Whats the difference? These terms can be very confusing. But learning to measure serving sizes can help you figure out how many carbohydrates (carbs) and other foods you eat each day. They are also powerful tools for managing your weight.  Servings and Portions      A good rule of thumb: Devote half your plate to vegetables and green salad. Split the other half between protein and starchy carbohydrates. Fruit makes a good dessert.    Many different words are used to describe amounts of food. If your healthcare provider uses a term youre not sure of, dont be afraid to ask. It helps to know the difference between servings and portions.  A serving size is a fixed size. Food producers use this term to describe their products. For example, the label on a cereal box could say that 1 cup of dry cereal = 1 serving.  A portion (also called a helping) is how much you eat or how much you put on your plate at a meal. For example, you might eat 2 cups of cereal at breakfast.  Using Serving Information  The portion you choose to eat (such as 2 cups of cereal) may be more than one serving as listed on the food label (such as 1 cup of cereal). Thats why it helps to measure or weigh the food you eat. Because the food label values are based on servings, youll need to know how many servings you eat at one sitting.      Ounces: 2  to 3 ounces is about the size of your palm.          1 Cup: 1 cup (or a medium-sized piece) is about the size of your fist.          1/2 Cup: 1/2 cup is about the size of your cupped hand.        Keeping Track of Serving Sizes  When youre planning for a snack or a meal, keep servings in mind. If you dont have measuring cups or a scale handy, there are ways to eyeball serving sizes.  Managing Portion Sizes  If your weight is a concern, reducing your portions can help. You can eat more than one serving of a food at once. But to keep from eating too much at one meal, learn how to manage your portions. A portion is the amount of each type of food on your plate. See the plate diagram for an example of balanced portions.  © 4161-7102 Trell [facility], [address] [place] [address], [place] [address]. All rights reserved. This information is not intended as a substitute for professional medical care. Always follow your healthcare professional's instructions.Diabetes: Meal Planning  You can help keep your blood sugar level in your target range by eating healthy foods. Your healthcare team can help you create a low-fat, nutritious meal plan. Take an active role in your diabetes management by following your meal plan and working with your healthcare team.      Aim for meals that include foods from all the food groups.    Make Your Meal Plan  A meal plan gives guidelines for the types and amounts of food you should eat. The goal is to balance food and insulin (or other diabetes medications). Your dietitian will help you make a flexible meal plan that includes many foods that you like.  Watch Serving Sizes  Your meal plan will group foods by servings. To learn how much a serving is, start by measuring food portions at each meal. Soon youll know what a serving looks like on your plate. Ask your healthcare provider about how to balance servings of different foods.  Eat from All the Food Groups  The basis of a healthy meal plan is  variety (eating lots of different foods). Choose lean meats, fresh fruits and vegetables, whole grains, and low-fat or non-fat dairy products. Eating a wide variety of foods provides the nutrients your body needs. It can also keep you from getting bored with your meal plan.  Learn About Carbohydrates, Fats, and Protein  Carbohydrates are starches and sugars. They are found in many foods, including fruit, bread, pasta, milk, and sweets. Of all the foods you eat, carbohydrates have the most effect on your blood sugar. Your dietitian may teach you about carb counting, a way to figure out the amount of carbohydrates in a meal.  Fats have the most calories. They also have the most effect on your weight and your risk of heart disease. When you have diabetes, its important to control your weight and protect your heart. Foods that are high in fat include whole milk, cheese, snack foods, and desserts.  Protein is important for building and repairing muscles and bones. Choose low-fat protein sources, such as fish, egg whites, and skinless chicken.  Reduce Liquid Sugars  Extra calories from sodas, sports drinks, and fruit drinks make it hard to keep blood sugar in range. Cut as many liquid sugars from your meal plan as you can.  This includes most fruit juices, which are often high in natural or added sugar. Instead, drink plenty of water and other sugar-free beverages.  Eat Less Fat  If you need to lose weight, try to reduce the amount of fat in your diet. This can also help lower your cholesterol level to keep blood vessels healthier. Cut fat by using only small amounts of liquid oil for cooking. Read food labels carefully to avoid foods with unhealthy trans fats.  Timing Your Meals   When it comes to blood sugar control, when you eat is as important as what you eat. You may need to eat several small meals spaced evenly throughout the day to stay in your target range. So dont skip breakfast or wait until late in the day to  get most of your calories. Doing so can cause your blood sugar to rise too high or fall too low.       © 6130-1471 Trell [facility], [address] [facility], [place] [address]. All rights reserved. This information is not intended as a substitute for professional medical care. Always follow your healthcare professional's instructions.Diabetes: The Benefits of Exercise  Exercise can lower your blood sugar level, help you control your weight, improve your circulation, lower your blood pressure, and improve your heart health. It can also boost your mental outlook. Even a small amount of regular activity can greatly improve your health.      Take the stairs whenever you can.    What Can You Improve with Exercise?  Blood sugar: Regular exercise has been shown to improve blood sugar control. Exercise helps your body use insulin.  Mental and emotional health: Physical activity relieves stress and helps you sleep better.  Heart health: With regular exercise, you can reduce your risk of heart disease and high blood pressure. You can also improve your cholesterol and triglyceride levels.  Weight: Exercise helps you lose fat, gain muscle, and control your weight.  Health of blood vessels and nerves: Activity helps lower blood sugar. This helps prevent damage to blood vessels and nerves that can cause problems with your brain, eyes, feet, and legs.  Finances: If you manage your blood sugar, you may spend less on medical care.  Two Types of Exercise  Two types of exercise help your body use blood sugar. Both types of exercise are recommended for people with diabetes.  Aerobic exercise is of rhythmic, repeated, and continuous movements of the same large muscle groups for at least 10 minutes at a time. Examples include walking, bicycling, jogging, swimming, water aerobics, and many sports.  Resistance exercise (strength training) is using muscles to move a weight or work against a resistive load. Examples include weightlifting and  exercises using weight machines.  A Goal to Shoot For  Your main goal is to become more active. Even a little bit helps. Choose an activity that you like. Walking is one great form of exercise that everyone can do. Talk to your doctor about any limits you may have before starting with an exercise program. Then aim for 30 minutes of activity most days of the week.  Getting Activity into Your Day  Being more active doesnt have to be hard work. Try these to get more activity into your day:  Taking the stairs instead of the elevator  Gardening, housework, and yard work  Choosing a parking space farther from the store  Walking to talk to a colleague instead of calling  Taking a 10-minute walk around the block at lunch  Walking to a bus stop a little farther from your home or office  Walking the dog after dinner  © 2000-2011 [address] [address]. All rights reserved. This information is not intended as a substitute for professional medical care. Always follow your healthcare professional's instructions.Diabetes: Understanding Carbohydrates, Fats, and Protein  Food is a source of fuel and nourishment for your body. Its also a source of pleasure. Having diabetes doesnt mean you have to eat special foods or give up desserts. Instead, your dietitian can show you how to plan meals to suit your body. To start, learn how different foods affect blood sugar.    Carbohydrates  Carbohydrates are the main source of fuel for the body. Carbohydrates raise blood sugar. Many people think carbohydrates are only found in pasta or bread. But carbohydrates are actually in many kinds of foods.  Sugars occur naturally in foods such as fruit, milk, honey, and molasses. Sugars can also be added to many foods, from cereals and yogurt to candy and desserts. Sugars raise blood sugar.  Starches are found in bread, cereals, pasta, and dried beans. Theyre also found in corn, peas, potatoes, yam, acorn squash,  and butternut squash. Starches also raise blood sugar.   Fiber is found in foods such as vegetables, fruits, and whole grains. Unlike other carbs, fiber isnt digested or absorbed. So it doesnt raise blood sugar. In fact, fiber can help keep blood sugar from rising too fast. It also helps keep blood cholesterol at a healthy level.  Did You Know?   Even though carbohydrates raise blood sugar, its best to have some in every meal. They are an important part of a healthy diet.    Fat  Fat is an energy source that can be stored until needed. Fat does not raise blood sugar. However, it can raise blood cholesterol, increasing the risk of heart disease. Fat is also high in calories, which can cause weight gain. Not all types of fat are the same.  More Healthy  Monounsaturated fats are mostly found in vegetable oils such as olive, canola, and peanut oils. They are also found in avocados and some nuts. Monounsaturated fats are healthy for your heart. Thats because they lower LDL (unhealthy) cholesterol.  Polyunsaturated fats are mostly found in vegetable oils such as corn, safflower, and soybean oils. They are also found in some seeds, nuts, and fish. Polyunsaturated fats lower LDL (unhealthy) cholesterol. So, choosing them instead of saturated fats is healthy for your heart.  Less Healthy  Saturated fats are found in animal products such as meat, poultry, whole milk, lard, and butter. Saturated fats raise LDL cholesterol and are not healthy for your heart.  Hydrogenated oils and trans fats are formed when vegetable oils are processed into solid fats. They are found in many processed foods. Hydrogenated oils and trans fats raise LDL cholesterol and lower HDL (healthy) cholesterol. They are not healthy for your heart.  Protein  Protein helps the body build and repair muscle and other tissue. Protein has little or no effect on blood sugar. However, many foods that contain protein also contain saturated fat. By choosing  low-fat protein sources, you can get the benefits of protein without the extra fat.  Plant protein is found in dry beans and peas, nuts, and soy products such as tofu and soymilk. These sources tend to be cholesterol-free and low in saturated fat.  Animal protein is found in fish, poultry, meat, cheese, milk, and eggs. These contain cholesterol and can be high in saturated fat. Aim for lean, lower-fat choices.  © 7634-8374 [employer] [facility], [address] [place] [address]. All rights reserved. This information is not intended as a substitute for professional medical care. Always follow your healthcare professional's instructions.Healthy Meals for Diabetes  Ask your healthcare team to help you make a meal plan that fits your needs. Your meal plan tells you when to eat your meals and snacks, what kinds of foods to eat, and how much of each food to eat. You dont have to give up all the foods you like. But you do need to follow some guidelines.  Eat Foods Rich in Fiber  Fiber is a carbohydrate that breaks down slowly. Fiber is also healthy for your heart. Fiber-rich foods include:  Whole-grain breads and cereals  Bulgur wheat  Brown rice      Whole-wheat pasta  Fruits and vegetables  Dry beans, and peas    Choose Healthy Protein Foods  Eating protein that is low in fat can help you control your weight. It also helps keep your heart healthy. Low-fat protein foods include:  Fish  Plant proteins, such as dry beans and peas, nuts, and soy products like tofu and soymilk  Lean meat with all visible fat removed  Poultry with the skin removed  Low-fat or nonfat milk, cheese, and yogurt  Limit Unhealthy Fats and Sugar  Saturated and trans fats are unhealthy for your heart. They raise LDL (bad) cholesterol. Fat is also high in calories, so it can make you gain weight. To cut down on unhealthy fats, limit these foods:  Butter or margarine  Palm and palm kernel oils and coconut oil  Cream  Cheese  Duarte  Lunch meats       Ice cream  Sweet bakery goods such as pies, muffins, and donuts  Jams and jellies  Candy bars  Regular sodas    How Much to Eat  The amount of food you eat affects your blood sugar. It also affects your weight. Your healthcare team will tell you how much of each type of food you should eat.  Use measuring cups and spoons and a food scale to measure serving sizes.  Learn what a correct serving size looks like on your plate. This will help when you are away from home and cant measure your servings.  Eat only the number of servings given on your meal pan for each food. Dont take seconds.  When to Eat  Your meal plan will likely include breakfast, lunch, dinner, and some snacks.  Try to eat your meals and snacks at about the same times each day.  Eat all your meals and snacks. Skipping a meal or snack can make your blood sugar drop too low. It can also cause you to eat too much at the next meal or snack. Then your blood sugar could get too high.  © 2640-4587 Trell \[facility]\"", [address] [place] [address]. All rights reserved. This information is not intended as a substitute for professional medical care. Always follow your healthcare professional's instructions.Long-Term Complications of Diabetes  Diabetes can cause health problems over time. These are called complications. They are more likely to occur if your blood sugar is often too high. Over time, high blood sugar can damage blood vessels. It is important to keep your blood sugar in your target range. This can help prevent or delay complications.    Possible Complications  Complications of diabetes include:  Eye problems, including damage to the blood vessels in the eyes (retinopathy), pressure in the eye (glaucoma), and clouding of the eyes lens (a cataract)  Tooth and gum problems (periodontal disease), causing loss of teeth and bone  Blood vessel (vascular) disease leading to circulation problems, heart attack, or stroke  Problems with sexual  function  Kidney disease (nephropathy)  Nerve problems (neuropathy), causing pain or loss of feeling in your feet and other parts of your body  High blood pressure (hypertension), putting strain on your heart and blood vessels  Serious infections, possibly leading to loss of toes, feet, or limbs  How to Avoid Complications  You can avoid complications by managing your diabetes. This can help you feel better and stay healthy. You can manage diabetes by tracking your blood sugar. You can also eat healthy and exercise. And you should take medication if directed.  © 1432-8747 Trell [facility], [address] [place] [address], [place] [address]. All rights reserved. This information is not intended as a substitute for professional medical care. Always follow your healthcare professional's instructions.Managing Diabetes: The A1C Test      Healthy red blood cells have some glucose stuck to them.        A high A1C means that unhealthy amounts of glucose are stuck to the cells.    What Is the A1C Test?  Using your meter helps you track your blood sugar every day. But you also need to know if your treatment plan is keeping you healthy over time. An A1C (glycated hemoglobin) test can help. This test measures your average blood sugar level over a few months. A higher A1C result means that you have a higher risk of developing complications.  The A1C test  The A1C is a blood test done by your healthcare provider. You will likely have an A1C test every 2 to 3 months.  Your Blood Glucose Goal  A1C has been shown as a percentage. But it can also be shown as a number representing the estimated Average Glucose (eAG). Unlike the A1C percentage, eAG is a number similar to the numbers listed on your daily glucose monitor. Both A1C and eAG measure the amount of glucose stuck to a protein called hemoglobin in red blood cells. Your healthcare provider will help you figure out what your ideal A1C or eAG should be. Your target number will depend on  your age, general health, and other factors. If your current number is too high, your treatment plan may need changes, such as different medications.  Sample Results  Most people aim for an A1c lower than 7%. Thats an eAG less than 154 mg/dL. Or, your healthcare provider may want you to aim for an A1C of 6%. Thats an eAG of 126 mg/dL.  Glucose Calculator   Visit http://professional.diabetes.org/glucosecalculator.aspx for a chart that helps convert your A1C percentages into eAG numbers.    © 9907-3400 Trell [employer], [address] [place] [address]. All rights reserved. This information is not intended as a substitute for professional medical care. Always follow your healthcare professional's instructions.Understanding Type 2 Diabetes  When your body is working normally, the food you eat is digested and used as fuel. This fuel supplies energy to the bodys cells. When you have diabetes, the fuel cant enter the cells. Without treatment, diabetes can cause serious long-term health problems.      Your body breaks down the food you eat into glucose.        How the Body Gets Energy   The digestive system breaks down food, resulting in a sugar called glucose. Some of this glucose is stored in the liver. But most of it enters the bloodstream and travels to the cells to be used as fuel. Glucose needs the help of a hormone called insulin to enter the cells. Insulin is made in the pancreas. It is released into the bloodstream in response to the presence of glucose in the blood. Think of insulin as a key. When insulin reaches a cell, it attaches to the cell wall. This signals the cell to create an opening that allows glucose to enter the cell.            When You Have Type 2 Diabetes  Early in type 2 diabetes, your cells dont respond properly to insulin. Because of this, less glucose than normal moves into cells. This is called insulin resistance. In response, the pancreas makes more insulin. But eventually, the  pancreas cant produce enough insulin to overcome insulin resistance. As less and less glucose enters cells, it builds up to a harmful level in the bloodstream. This is known as high blood sugar or hyperglycemia. The result is type 2 diabetes. The cells become starved for energy, which can leave you feeling tired and rundown.  Why High Blood Sugar Is a Problem  If high blood sugar is not controlled, blood vessels throughout the body become damaged. Prolonged high blood sugar affects organs and nerves. As a result, the risks of damage to the heart, kidneys, eyes, and limbs increase. Diabetes also makes other problems, such as high blood pressure and high cholesterol, more dangerous. Over time, people with uncontrolled high blood sugar have a high chance of dying of, or being disabled by, heart attack or stroke.       © 8313-2466 Trell [employer], [address] [place] [address], [place] [address]. All rights reserved. This information is not intended as a substitute for professional medical care. Always follow your healthcare professional's instructions.What Is Type 2 Diabetes?  Type 2 diabetes is a chronic (lifelong) condition. With diabetes, the sugar level in your blood is too high. Diabetes keeps your body from turning food into energy. Thats why you may feel tired and rundown, especially after eating. Controlling your diabetes means making some changes that may be hard at first. Your healthcare team will help you.  Check Your Blood Sugar    You will most likely need to check your blood sugar each day. This tells you whether your blood sugar is within your target range.  Your healthcare team will tell you how often and when you need to test.  When your blood sugar is within your target range, your meal plan, activity plan, and medication are working to keep you healthy.  If your blood sugar is too high or too low, your healthcare team may make changes in your meal plan, activity plan, or adjust your medication.  Follow  Your Meal Plan    Following your meal plan helps control the amount of sugar in your blood. It also helps you control your weight. Excess weight keeps your body from using its own insulin to turn food into energy.  Your healthcare team will help you create a meal plan that works for you.  You dont have to give up all the foods you like. But you may need to eat smaller amounts of some foods. Eating balanced meals with vegetables, fruits, lean meats, and whole grains will help control your blood sugar.  You need to eat the right amount of food. Eat your meals and snacks at about the same time each day. Do not skip meals.  Be Physically Active    Being active helps lower your blood sugar. It does this by helping your body use insulin to turn food into energy. Activity also helps you manage your weight.  Your healthcare team will work with you to create an activity program thats right for you.  Your activity program will be based on your age, general health, and what type of activity you like to do. For many people, walking after meals is a great start.  Take Care of Yourself  When you have diabetes, you may be more likely to develop other health problems. These include foot, eye, heart, and kidney problems.  Your healthcare team will tell you how to care for yourself to help prevent these problems.  You also need to have frequent checkups, including eye and foot exams, and blood tests. At least two times a year, ask your doctor to give you an A1C test. This blood test helps show how well you have been controlling your blood sugar in the past 2 to 3 months.  If you smoke, quit! Smoking makes your diabetes and the problems you can have from it even worse. Ask your doctor about ways to quit.  © 8601-8694 [employer], [address] [place] [address], [place] [address]. All rights reserved. This information is not intended as a substitute for professional medical care. Always follow your healthcare professional's  instructions.Your Diabetes Foot Care Program      A filament test uses a plastic wire to gently check your sensitivity to light touch.    Every day you depend on your feet to keep you moving. But when you have diabetes, your feet need special care. Even a small foot problem can become very serious. So dont take your feet for granted. By working with your diabetes healthcare team, you can learn how to protect your feet and keep them healthy.  Evaluating Your Feet  An evaluation helps your healthcare provider check the condition of your feet. The evaluation includes a review of your diabetes history and overall health. It may also include a foot exam, x-rays, or other tests. These can help show problems beneath the skin that you cant see or feel.  Medical History  You will be asked about your overall health and any history of foot problems. Youll also discuss your diabetes history, such as whether your blood sugar level has changed over time. Be sure to mention any medications, supplements, or herbal remedies you take.  Foot Exam  A foot exam checks the condition of different parts of your foot. First, your skin and nails are examined for any signs of infection. Blood flow is checked by feeling for the pulses in each foot. You may also have tests to study the nerves in the foot. These include using a small filament (wire) to see how sensitive your feet are. In certain cases, you will be asked to walk a short distance to check for bone, joint, and muscle problems.  Diagnostic Tests      The Doppler test uses painless sound waves to measure the blood flow in your feet.    If needed, your healthcare provider will suggest certain tests to learn more about your feet. These include:  Doppler tests to measure blood flow in the feet and lower leg.  X-rays, which can show bone or joint problems.  Imaging tests, such as an MRI (magnetic resonance imaging), bone scan, and CT (computed tomography) scan. These can help show  bone infections.  Other tests, such as vascular tests, which study the blood flow in your feet and legs. You may also have nerve studies to learn how sensitive your feet are.  Creating a Foot Care Program  Based on the evaluation, your healthcare provider will create a foot care program for you. Your program may be as simple as starting a daily self-care routine and changing the types of shoes your wear. It may also involve treating minor foot problems, such as a corn or blister. In some cases, surgery will be needed to treat an infection.  Preventing Problems  When you have diabetes, its easier to prevent problems than to treat them later on. So see your healthcare team for regular checkups and foot care. Your healthcare team can also help you learn more about caring for your feet at home. For example, you may be told to avoid walking barefoot. Or you may be told that special footwear is needed to protect your feet.  Have Regular Checkups  Foot problems can develop quickly. So be sure to follow your healthcare teams schedule for regular checkups. During office visits, take off your shoes and socks as soon as you get in the exam room. Ask your healthcare provider to examine your feet for problems. This will make it easier to find and treat small skin irritations before they get worse. Regular checkups can also help keep track of the blood flow and feeling in your feet. If you have neuropathy (lack of feeling in your feet), you will need to have checkups more often.  Learn About Self-Care  The more you know about diabetes and your feet, the easier it will be to prevent problems. Members of your healthcare team can teach you how to inspect your feet and teach you to look for warning signs. They can also give you other foot care tips. During office visits, be sure to ask any questions you have.  © 1597-9695 [employer] Shelton, [address] [place] [address], [place] [address]. All rights reserved. This information is not  intended as a substitute for professional medical care. Always follow your healthcare professional's instructions.Your Diabetes Toolkit  Do you find it hard to keep track of your supplies? Make it easy by creating a diabetes toolkit. Find a small makeup or travel bag. Then fill it with what you need to care for your diabetes. The list of supplies below can help you get started.    What to Include in Your Toolkit  Extra insulin, syringes, pens, or insulin pump supplies  Other medications you take for diabetes-related problems  Copies of all prescriptions  The pharmacy label that came with your insulin (this is needed for air travel)  Fast-acting sugar, such as glucose tablets, for hypoglycemia  Glucagon for severe hypoglycemia  A blood glucose meter, lancets, test strips, and a log book  Extra batteries for your meter  An ID card that says you have diabetes and lists emergency contact numbers  © 7162-1470 Trell Shelton, [address] [place] [address], [place] [address]. All rights reserved. This information is not intended as a substitute for professional medical care. Always follow your healthcare professional's instructions.

## 2022-10-26 NOTE — PROGRESS NOTES
Patient ID: [id number]     Chief Complaint: Annual Exam (Pt is here for wellness and she thinks she is doing fine has a heart monitor and has heart palpations, frequent urination and is always.)      HPI:     Lita Hess is a 86 y.o. female here today for a Medicare Wellness.  She is on chronic tramadol for LE pain. Significant h/o CAD and known blockage. She sees BROOKE Perkins.  She is not a sx candidate presently.    A separate, significant E/M service performed for c/o chills and urinary frequency. UA collected today per . She is under the care of [facility] and family reports ranging in 200-300s. She eats a lot of rice. She lives independently and son checks on her every few days. Slightly forgetful today.    Opioid Screening: Patient medication list reviewed, patient is taking prescription opioids. Patient is not using additional opioids than prescribed. Patient is not at low risk of substance abuse based on this opioid use history.       ----------------------------  Anemia  Cancer  Colon cancer  CVA (cerebral vascular accident)  Diabetes mellitus, type 2  Hyperlipidemia  Hypertension  Loss of appetite  Stroke     Past Surgical History:   Procedure Laterality Date    COLECTOMY      COLONOSCOPY      ESOPHAGOGASTRODUODENOSCOPY      gastro biopsy      HIP REPLACEMENT ARTHROPLASTY      SCLEROTHERAPY WITH ULTRASOUND GUIDANCE      TRANSESOPHAGEAL ECHOCARDIOGRAPHY         Review of patient's allergies indicates:  No Known Allergies    Outpatient Medications Marked as Taking for the 10/26/22 encounter (Office Visit) with Loretta Parson NP   Medication Sig Dispense Refill    amLODIPine (NORVASC) 10 MG tablet TAKE ONE TABLET ONCE DAILY 30 tablet 10    aspirin 325 MG tablet TAKE ONE TABLET ONCE DAILY 30 tablet 10    cyanocobalamin, vitamin B-12, 500 mcg Lozg Take 500 mcg by mouth.      ergocalciferol (ERGOCALCIFEROL) 50,000 unit Cap TAKE ONE CAPSULE ONCE WEEKLY FOR 4 WEEKS 4 capsule 10    ferrous sulfate (FEOSOL) 325 mg  (65 mg iron) Tab tablet TAKE ONE TABLET TWICE A DAY 60 tablet 10    fluticasone propionate (FLONASE) 50 mcg/actuation nasal spray   See Instructions, USE 2 SPRAYS IN EACH NOSTRIL TWICE DAILY, # 16 gm, 10 Refill(s), Pharmacy: [facility], 157.5, cm, Height/Length Dosing, 08/10/21 15:22:00 CDT, 39.5, kg, Weight Dosing, 10/14/21 14:47:00 CDT      folic acid (FOLVITE) 1 MG tablet TAKE ONE TABLET ONCE DAILY 30 tablet 10    glyBURIDE (DIABETA) 5 MG tablet TAKE ONE TABLET TWICE A DAY 60 tablet 10    lisinopriL (PRINIVIL,ZESTRIL) 20 MG tablet TAKE ONE TABLET ONCE DAILY 30 tablet 10    lovastatin (MEVACOR) 40 MG tablet TAKE ONE TABLET AT BEDTIME 30 tablet 10    megestroL (MEGACE) 40 MG Tab TAKE (1/2) TABLET ONCE DAILY 30 tablet 10    mirtazapine (REMERON) 15 MG tablet TAKE TWO TABLETS AT BEDTIME 60 tablet 10    multivitamin with minerals tablet Take by mouth.      pantoprazole (PROTONIX) 40 MG tablet TAKE ONE TABLET TWICE A DAY 60 tablet 10    polyethylene glycol 3350 (MIRALAX ORAL) Take 17 g by mouth.      sodium bicarbonate 650 MG tablet TAKE TWO TABLETS 3 TIMES DAILY. 180 tablet 10    traMADoL (ULTRAM) 50 mg tablet Take 1 tablet (50 mg total) by mouth every 8 (eight) hours. 60 tablet 2    UNABLE TO FIND   Shoe lift, See Instructions, 1 shoe lift for right foot, left leg longer than right, # 1 EA, 0 Refill(s)         Social History     Socioeconomic History    Marital status:    Tobacco Use    Smoking status: Never    Smokeless tobacco: Never   Substance and Sexual Activity    Alcohol use: Never    Drug use: Never    Sexual activity: Not Currently   Social History Narrative    ** Merged History Encounter **         ** Merged History Encounter **             History reviewed. No pertinent family history.     Patient Care Team:  Andrey Flynn Jr., MD as PCP - General (Internal Medicine)  MD Andrey Xiong Jr., Jr., MD (Internal Medicine)  Pending sale to [facility] -  Cholo Cifuentes MD as Consulting Physician (Cardiology)  Justo Henry MD as Consulting Physician (Surgical Oncology)       Subjective:     ROS  Constitutional: No fever, No chills, No sweats, No fatigue, No weight loss.  Eyes: No blurring.  Ear/Nose/Mouth/Throat: No nasal congestion, No vertigo.  Respiratory: No shortness of breath, No cough, No sputum production, No wheezing, exertional dyspnea.   Cardiovascular: occ chest pain, No palpitations, No claudication, No orthopnea, occ peripheral edema.  Gastrointestinal: No nausea, No vomiting, No diarrhea, No rectal bleeding, No constipation, No abdominal pain.  Genitourinary: No dysuria, No hematuria, frequency.  Endocrine: No excessive thirst, No polyuria, No cold intolerance, No heat intolerance.  Musculoskeletal: No joint pain, No muscle pain.  Integumentary: No rash, No ecchymosis.  Neurologic: No altered mental status, No headaches.  Psychiatrics: No anxiety,No depression, No SI/HI.    Patient Reported Health Risk Assessment  What is your age?: 80 or older  Are you male or female?: Female  During the past four weeks, how much have you been bothered by emotional problems such as feeling anxious, depressed, irritable, sad, or downhearted and blue?: Not at all  During the past five weeks, has your physical and/or emotional health limited your social activities with family, friends, neighbors, or groups?: Not at all  During the past four weeks, how much bodily pain have you generally had?: Severe pain  During the past four weeks, was someone available to help if you needed and wanted help?: Yes, as much as I wanted  During the past four weeks, what was the hardest physical activity you could do for at least two minutes?: Very light  Can you get to places out of walking distance without help?  (For example, can you travel alone on buses or taxis, or drive your own car?): No  Can you go shopping for groceries or clothes without someone's help?: No  Can  you prepare your own meals?: Yes  Can you do your own housework without help?: No  Because of any health problems, do you need the help of another person with your personal care needs such as eating, bathing, dressing, or getting around the house?: Yes  Can you handle your own money without help?: Yes  During the past four weeks, how would you rate your health in general?: Fair  How have things been going for you during the past four weeks?: Good and bad parts about equal  Are you having difficulties driving your car?: Not applicable, I do not use a car  Do you always fasten your seat belt when you are in a car?: Yes, usually  How often in the past four weeks have you been bothered by falling or dizzy when standing up?: Often  How often in the past four weeks have you been bothered by sexual problems?: Never  How often in the past four weeks have you been bothered by trouble eating well?: Never  How often in the past four weeks have you been bothered by teeth or denture problems?: Never  How often in the past four weeks have you been bothered with problems using the telephone?: Never  How often in the past four weeks have you been bothered by tiredness or fatigue?: Sometimes  Have you fallen two or more times in the past year?: No  Are you afraid of falling?: Yes  Are you a smoker?: No  During the past four weeks, how many drinks of wine, beer, or other alcoholic beverages did you have?: No alcohol at all  Do you exercise for about 20 minutes three or more days a week?: No, I usually do not exercise this much  Have you been given any information to help you with hazards in your house that might hurt you?: Yes  Have you been given any information to help you with keeping track of your medications?: No  How often do you have trouble taking medicines the way you've been told to take them?: I always take them as prescribed  How confident are you that you can control and manage most of your health problems?: Not very  "confident  What is your race? (Check all that apply.):     Objective:     /68 (BP Location: Left arm, Patient Position: Sitting, BP Method: Medium (Manual))   Pulse 80   Resp 14   Ht 4' 11" (1.499 m)   SpO2 97%   BMI 21.61 kg/m²     Physical Exam  Vitals and nursing note reviewed.   Constitutional:       Appearance: Normal appearance. She is normal weight.   HENT:      Head: Normocephalic and atraumatic.      Right Ear: There is impacted cerumen.      Left Ear: Tympanic membrane, ear canal and external ear normal.      Nose: Nose normal.      Mouth/Throat:      Mouth: Mucous membranes are moist.      Pharynx: Oropharynx is clear.   Eyes:      Extraocular Movements: Extraocular movements intact.      Conjunctiva/sclera: Conjunctivae normal.      Pupils: Pupils are equal, round, and reactive to light.   Cardiovascular:      Rate and Rhythm: Normal rate and regular rhythm.      Pulses: Normal pulses.      Heart sounds: Normal heart sounds.   Pulmonary:      Effort: Pulmonary effort is normal.      Breath sounds: Normal breath sounds.   Abdominal:      General: Abdomen is flat. Bowel sounds are normal.      Palpations: Abdomen is soft.   Musculoskeletal:         General: Normal range of motion.      Cervical back: Normal range of motion and neck supple.   Skin:     General: Skin is warm and dry.   Neurological:      General: No focal deficit present.      Mental Status: She is alert and oriented to person, place, and time. Mental status is at baseline.   Psychiatric:         Mood and Affect: Mood normal.         Thought Content: Thought content normal.         Judgment: Judgment normal.         Checklist of Activities of Daily Living 10/26/2022   Bathing Independent   Dressing Independent   Grooming Independent   Oral Care Independent   Toileting Independent   Transferring Needs Help   Walking Needs Help   Climbing Stairs Needs Help   Eating Independent   Shopping Dependent   Cooking " Independent   Managing Medications Dependent   Using the Phone Independent   Housework Dependent   Laundry Dependent   Driving Does not do   Managing Finances Independent     Fall Risk Assessment - Outpatient 10/26/2022 5/10/2022   Mobility Status Ambulatory Ambulatory   Number of falls 0 0   Identified as fall risk 0 0           Depression Screening  Over the past two weeks, has the patient felt down, depressed, or hopeless?: No  Over the past two weeks, has the patient felt little interest or pleasure in doing things?: No  Functional Ability/Safety Screening  Was the patient's timed Up & Go test unsteady or longer than 30 seconds?: Yes  Does the patient need help with phone, transportation, shopping, preparing meals, housework, laundry, meds, or managing money?: Yes  Does the patient's home have rugs in the hallway, lack grab bars in the bathroom, lack handrails on the stairs or have poor lighting?: No  Have you noticed any hearing difficulties?: Yes  Cognitive Function (Assessed through direct observation with due consideration of information obtained by way of patient reports and/or concerns raised by family, friends, caretakers, or others)    Does the patient repeat questions/statements in the same day?: Yes  Does the patient have trouble remembering the date, year, and time?: Yes  Does the patient have difficulty managing finances?: No  Does the patient have a decreased sense of direction?: Yes  Assessment/Plan:     1. Well adult exam  Reviewed current labs extensively with son and patient.    2. Type 2 diabetes mellitus with hyperglycemia, without long-term current use of insulin    DIABETES RECOMMENDATIONS:  diabetic diet discussed in detail, written exchange diet given, low cholesterol diet, weight control and daily exercise discussed with recommendation for 150 minutes per week, home glucose monitoring emphasized, all medications, side effects and compliance discussed carefully, glycohemoglobin and other  lab monitoring discussed, low carbohydrate diet , and continue diet and exercise for management of diabetes    -     metFORMIN (GLUCOPHAGE) 500 MG tablet; Take 1 tablet (500 mg total) by mouth every evening.  Dispense: 30 tablet; Refill: 5    Advised of purpose of med, possible s/e's, and benefits.  Rec she monitor BS at home and bring log to NOV.    3. Exertional chest pain  Known blockage and managed by CV  ER precautions    4. Palpitations  Currently being monitored by CV    5. Urinary frequency  Ua done today per HH.    6. Bilateral carotid artery stenosis  Per CV.    7. Hypercholesterolemia  Stable.    8. Chronic back pain, unspecified back location, unspecified back pain laterality  On Tramadol    9. Transient ischemic attack  No s/s presently.    10. Arteriosclerosis of coronary artery  Per CV.    11. Gastroesophageal reflux disease without esophagitis  Stable on current Rx'd meds.    12. Iron deficiency anemia secondary to inadequate dietary iron intake  Stable CBC    13. Scoliosis, unspecified scoliosis type, unspecified spinal region    14. Need for vaccination for pneumococcus  -     (In Office Administered) Pneumococcal Conjugate Vaccine (20 Valent) (IM)    15. Need for influenza vaccination  -     Influenza (FLUAD) - Quadrivalent (Adjuvanted) *Preferred* (65+) (PF)         Medicare Annual Wellness and Personalized Prevention Plan:   Fall Risk + Home Safety + Hearing Impairment + Depression Screen + Opioid and Substance Abuse Screening + Cognitive Impairment Screen + Health Risk Assessment all reviewed.     Health Maintenance Topics with due status: Not Due       Topic Last Completion Date    Diabetes Urine Screening 03/24/2022    Lipid Panel 03/24/2022    Hemoglobin A1c 06/16/2022    Aspirin/Antiplatelet Therapy 10/26/2022      The patient's Health Maintenance was reviewed and the following appears to be due at this time:   Health Maintenance Due   Topic Date Due    TETANUS VACCINE  Never done     Shingles Vaccine (1 of 2) Never done    Eye Exam  03/12/2021    Pneumococcal Vaccines (Age 65+) (2 - PCV) 11/12/2021    COVID-19 Vaccine (5 - Booster for Moderna series) 07/30/2022    Influenza Vaccine (1) 09/01/2022       Advance Care Planning   I attest to discussing Advance Care Planning with patient and/or family member.  Education was provided including the importance of the Health Care Power of , Advance Directives, and/or [place] documentation.  The patient expressed understanding to the importance of this information and discussion. Son will collab with sister and have her sent to office to keep on file.         Follow up in about 2 weeks (around 11/9/2022) for With . In addition to their scheduled follow up, the patient has also been instructed to follow up on as needed basis.

## 2022-10-27 ENCOUNTER — DOCUMENTATION ONLY (OUTPATIENT)
Dept: INTERNAL MEDICINE | Facility: CLINIC | Age: 87
End: 2022-10-27
Payer: MEDICARE

## 2022-10-27 ENCOUNTER — TELEPHONE (OUTPATIENT)
Dept: INTERNAL MEDICINE | Facility: CLINIC | Age: 87
End: 2022-10-27
Payer: MEDICARE

## 2022-10-27 NOTE — TELEPHONE ENCOUNTER
Please let pt/family know that UA neg for infection.  Suspect frequency r/t uncontrolled T2DM.  Proceed with POC. F/u as needed/as scheduled.

## 2022-10-27 NOTE — TELEPHONE ENCOUNTER
Called and spoke to Indira at [facility] and she had faxed it to Dr Flynn but will fax to you as well.

## 2022-11-04 ENCOUNTER — OFFICE VISIT (OUTPATIENT)
Dept: INTERNAL MEDICINE | Facility: CLINIC | Age: 87
End: 2022-11-04
Payer: MEDICARE

## 2022-11-04 DIAGNOSIS — E11.9 DIABETES MELLITUS TYPE 2, NONINSULIN DEPENDENT: Primary | ICD-10-CM

## 2022-11-04 PROCEDURE — 99441 PR PHYSICIAN TELEPHONE EVALUATION 5-10 MIN: CPT | Mod: 95,,, | Performed by: INTERNAL MEDICINE

## 2022-11-04 PROCEDURE — 99441 PR PHYSICIAN TELEPHONE EVALUATION 5-10 MIN: ICD-10-PCS | Mod: 95,,, | Performed by: INTERNAL MEDICINE

## 2022-11-04 NOTE — PROGRESS NOTES
Established Patient - Audio Only Telehealth Visit     The patient location is: home  The chief complaint leading to consultation is: control of diabetes and see if anemia has improved  Visit type: Virtual visit with audio only (telephone)  Total time spent with patient: 30       The reason for the audio only service rather than synchronous audio and video virtual visit was related to technical difficulties or patient preference/necessity.     Each patient to whom I provide medical services by telemedicine is:  (1) informed of the relationship between the physician and patient and the respective role of any other health care provider with respect to management of the patient; and (2) notified that they may decline to receive medical services by telemedicine and may withdraw from such care at any time. Patient verbally consented to receive this service via voice-only telephone call.       HPI:  This patient is an 87-year-old established patient, who has had multiple medical problems.  She was recently hospitalized with a GI hemorrhage, and she required a number of units of blood in transfusion.  She has been on iron supplements, and in talking to the patient and her family, she is due for laboratory testing, to see if her anemia has improved or not on the current dose of medication given.  She remains on antacid therapy because of the GI bleeding that she had.    In addition, this patient's blood sugar has been out of control, and she follows no particular diet.  Mostly she is home alone, and she has adult children who or living outside of this immediate area.  She does not have good control of her blood sugars, as they have been considerably high, as measured by her A1c value.  In addition, she does not have a sensor to check her blood sugars at home, and getting a glucometer may be of significant help in getting her blood sugar under better control.  Her family would like me to look into this for her, with the help  of home health care.      This patient probably has a degree of again a brain syndrome, which does not allow her to reach the quality of care that she deserves.  If she had more family in this area, who could monitor her progress, this patient may enjoy her life better.  I will see if home health care can see her more often, or try to help in the quality of her life going forward.  I will start by having laboratory studies drawn, and I will try to get a sensor for her to have her blood sugars done by glucometer.     Assessment and plan:  As mentioned in the paragraphs above.  I have talked to her son, who lives in [facility].  He is concerned about her, and he appreciates any help that we can give.                       This service was not originating from a related E/M service provided within the previous 7 days nor will  to an E/M service or procedure within the next 24 hours or my soonest available appointment.  Prevailing standard of care was able to be met in this audio-only visit.

## 2022-11-12 PROCEDURE — G0179 PR HOME HEALTH MD RECERTIFICATION: ICD-10-PCS | Mod: ,,, | Performed by: INTERNAL MEDICINE

## 2022-11-12 PROCEDURE — G0179 MD RECERTIFICATION HHA PT: HCPCS | Mod: ,,, | Performed by: INTERNAL MEDICINE

## 2022-11-21 ENCOUNTER — EXTERNAL HOME HEALTH (OUTPATIENT)
Dept: HOME HEALTH SERVICES | Facility: HOSPITAL | Age: 87
End: 2022-11-21
Payer: MEDICARE

## 2022-12-27 ENCOUNTER — TELEPHONE (OUTPATIENT)
Dept: INTERNAL MEDICINE | Facility: CLINIC | Age: 87
End: 2022-12-27
Payer: MEDICARE

## 2022-12-27 ENCOUNTER — DOCUMENTATION ONLY (OUTPATIENT)
Dept: INTERNAL MEDICINE | Facility: CLINIC | Age: 87
End: 2022-12-27
Payer: MEDICARE

## 2022-12-27 ENCOUNTER — LAB REQUISITION (OUTPATIENT)
Dept: LAB | Facility: HOSPITAL | Age: 87
End: 2022-12-27
Payer: MEDICARE

## 2022-12-27 DIAGNOSIS — I10 ESSENTIAL (PRIMARY) HYPERTENSION: ICD-10-CM

## 2022-12-27 DIAGNOSIS — D63.0 ANEMIA IN NEOPLASTIC DISEASE: ICD-10-CM

## 2022-12-27 LAB
ANION GAP SERPL CALC-SCNC: 13 MEQ/L
BASOPHILS # BLD AUTO: 0.04 X10(3)/MCL (ref 0–0.2)
BASOPHILS NFR BLD AUTO: 0.7 %
BUN SERPL-MCNC: 13.1 MG/DL (ref 9.8–20.1)
CALCIUM SERPL-MCNC: 10.1 MG/DL (ref 8.4–10.2)
CHLORIDE SERPL-SCNC: 104 MMOL/L (ref 98–107)
CO2 SERPL-SCNC: 23 MMOL/L (ref 23–31)
CREAT SERPL-MCNC: 0.8 MG/DL (ref 0.55–1.02)
CREAT/UREA NIT SERPL: 16
EOSINOPHIL # BLD AUTO: 0.03 X10(3)/MCL (ref 0–0.9)
EOSINOPHIL NFR BLD AUTO: 0.5 %
ERYTHROCYTE [DISTWIDTH] IN BLOOD BY AUTOMATED COUNT: 13.3 % (ref 11–14.5)
GFR SERPLBLD CREATININE-BSD FMLA CKD-EPI: >60 MLS/MIN/1.73/M2
GLUCOSE SERPL-MCNC: 146 MG/DL (ref 82–115)
HCT VFR BLD AUTO: 41.2 % (ref 37–47)
HGB BLD-MCNC: 13.3 GM/DL (ref 12–16)
IMM GRANULOCYTES # BLD AUTO: 0.01 X10(3)/MCL (ref 0–0.04)
IMM GRANULOCYTES NFR BLD AUTO: 0.2 %
IRON SATN MFR SERPL: 27 % (ref 20–50)
IRON SERPL-MCNC: 79 UG/DL (ref 50–170)
LYMPHOCYTES # BLD AUTO: 1.94 X10(3)/MCL (ref 0.6–4.6)
LYMPHOCYTES NFR BLD AUTO: 32.5 %
MCH RBC QN AUTO: 30.4 PG
MCHC RBC AUTO-ENTMCNC: 32.3 MG/DL (ref 33–36)
MCV RBC AUTO: 94.3 FL (ref 80–94)
MONOCYTES # BLD AUTO: 0.5 X10(3)/MCL (ref 0.1–1.3)
MONOCYTES NFR BLD AUTO: 8.4 %
NEUTROPHILS # BLD AUTO: 3.45 X10(3)/MCL (ref 2.1–9.2)
NEUTROPHILS NFR BLD AUTO: 57.7 %
NRBC BLD AUTO-RTO: 0 % (ref 0–1)
PLATELET # BLD AUTO: 284 X10(3)/MCL (ref 140–371)
PMV BLD AUTO: 10.2 FL (ref 9.4–12.4)
POTASSIUM SERPL-SCNC: 4.2 MMOL/L (ref 3.5–5.1)
RBC # BLD AUTO: 4.37 X10(6)/MCL (ref 4.2–5.4)
SODIUM SERPL-SCNC: 140 MMOL/L (ref 136–145)
TIBC SERPL-MCNC: 216 UG/DL (ref 70–310)
TIBC SERPL-MCNC: 295 UG/DL (ref 250–450)
TRANSFERRIN SERPL-MCNC: 276 MG/DL
WBC # SPEC AUTO: 6 X10(3)/MCL (ref 4.5–11.5)

## 2022-12-27 PROCEDURE — 83550 IRON BINDING TEST: CPT | Performed by: INTERNAL MEDICINE

## 2022-12-27 PROCEDURE — 80048 BASIC METABOLIC PNL TOTAL CA: CPT | Performed by: INTERNAL MEDICINE

## 2022-12-27 PROCEDURE — 85025 COMPLETE CBC W/AUTO DIFF WBC: CPT | Performed by: INTERNAL MEDICINE

## 2022-12-27 NOTE — TELEPHONE ENCOUNTER
Spoke to Indira with [facility]. Patient's son stated that she is weak, cold, sluggish. Asked if we can order some blood work, ok'd BMP, CBC, Iron panels at this time

## 2023-01-11 PROCEDURE — G0179 MD RECERTIFICATION HHA PT: HCPCS | Mod: ,,, | Performed by: INTERNAL MEDICINE

## 2023-01-11 PROCEDURE — G0179 PR HOME HEALTH MD RECERTIFICATION: ICD-10-PCS | Mod: ,,, | Performed by: INTERNAL MEDICINE

## 2023-01-30 PROBLEM — G45.9 TRANSIENT ISCHEMIC ATTACK: Status: RESOLVED | Noted: 2022-10-26 | Resolved: 2023-01-30

## 2023-02-02 ENCOUNTER — TELEPHONE (OUTPATIENT)
Dept: INTERNAL MEDICINE | Facility: CLINIC | Age: 88
End: 2023-02-02
Payer: MEDICARE

## 2023-02-03 NOTE — TELEPHONE ENCOUNTER
Call returned he wanted to know if we received a plan of care from 11/17/2022, advised that we have not and he said he will resend. Spoke to Elmira FENG this morning and they will look into it and readvise that it has to go through the [facility]

## 2023-02-07 ENCOUNTER — TELEPHONE (OUTPATIENT)
Dept: ADMINISTRATIVE | Facility: HOSPITAL | Age: 88
End: 2023-02-07
Payer: MEDICARE

## 2023-02-07 ENCOUNTER — EXTERNAL HOME HEALTH (OUTPATIENT)
Dept: HOME HEALTH SERVICES | Facility: HOSPITAL | Age: 88
End: 2023-02-07
Payer: MEDICARE

## 2023-02-07 DIAGNOSIS — I10 PRIMARY HYPERTENSION: Primary | ICD-10-CM

## 2023-02-07 DIAGNOSIS — E11.9 TYPE 2 DIABETES MELLITUS WITHOUT COMPLICATION, WITHOUT LONG-TERM CURRENT USE OF INSULIN: ICD-10-CM

## 2023-02-07 DIAGNOSIS — M41.80 OTHER FORM OF SCOLIOSIS, UNSPECIFIED SPINAL REGION: ICD-10-CM

## 2023-02-07 NOTE — TELEPHONE ENCOUNTER
Spoke to Dyllan advised that Medicare might have a say in home health not being able to go further with the services. He advised that she cant use her hands any more cant even open the blister packs for her medication. He wants to see if another home health can help. Will send referral to see if insurance would approve.

## 2023-02-08 ENCOUNTER — TELEPHONE (OUTPATIENT)
Dept: ADMINISTRATIVE | Facility: HOSPITAL | Age: 88
End: 2023-02-08
Payer: MEDICARE

## 2023-03-10 ENCOUNTER — TELEPHONE (OUTPATIENT)
Dept: ADMINISTRATIVE | Facility: HOSPITAL | Age: 88
End: 2023-03-10
Payer: MEDICARE

## 2023-03-10 DIAGNOSIS — I10 PRIMARY HYPERTENSION: Primary | ICD-10-CM

## 2023-03-10 DIAGNOSIS — E11.65 TYPE 2 DIABETES MELLITUS WITH HYPERGLYCEMIA, WITHOUT LONG-TERM CURRENT USE OF INSULIN: ICD-10-CM

## 2023-03-10 DIAGNOSIS — F32.A DEPRESSION, UNSPECIFIED DEPRESSION TYPE: ICD-10-CM

## 2023-03-10 DIAGNOSIS — K21.9 GASTROESOPHAGEAL REFLUX DISEASE WITHOUT ESOPHAGITIS: ICD-10-CM

## 2023-03-10 DIAGNOSIS — E78.00 HYPERCHOLESTEROLEMIA: ICD-10-CM

## 2023-03-10 DIAGNOSIS — R63.0 LOSS OF APPETITE: ICD-10-CM

## 2023-03-10 DIAGNOSIS — D50.8 IRON DEFICIENCY ANEMIA SECONDARY TO INADEQUATE DIETARY IRON INTAKE: ICD-10-CM

## 2023-03-10 RX ORDER — AMLODIPINE BESYLATE 10 MG/1
10 TABLET ORAL DAILY
Qty: 30 TABLET | Refills: 6 | Status: SHIPPED | OUTPATIENT
Start: 2023-03-10 | End: 2023-03-13 | Stop reason: SDUPTHER

## 2023-03-10 RX ORDER — PANTOPRAZOLE SODIUM 40 MG/1
TABLET, DELAYED RELEASE ORAL
Qty: 60 TABLET | Refills: 6 | Status: SHIPPED | OUTPATIENT
Start: 2023-03-10 | End: 2023-03-13 | Stop reason: SDUPTHER

## 2023-03-10 RX ORDER — LOVASTATIN 40 MG/1
40 TABLET ORAL NIGHTLY
Qty: 30 TABLET | Refills: 6 | Status: SHIPPED | OUTPATIENT
Start: 2023-03-10 | End: 2023-03-13 | Stop reason: SDUPTHER

## 2023-03-10 RX ORDER — MEGESTROL ACETATE 20 MG/1
TABLET ORAL
Qty: 30 TABLET | Refills: 6 | Status: SHIPPED | OUTPATIENT
Start: 2023-03-10 | End: 2023-03-13 | Stop reason: SDUPTHER

## 2023-03-10 RX ORDER — FERROUS SULFATE 325(65) MG
TABLET ORAL
Qty: 60 TABLET | Refills: 6 | Status: SHIPPED | OUTPATIENT
Start: 2023-03-10 | End: 2023-03-13 | Stop reason: SDUPTHER

## 2023-03-10 RX ORDER — LISINOPRIL 20 MG/1
20 TABLET ORAL DAILY
Qty: 30 TABLET | Refills: 6 | Status: SHIPPED | OUTPATIENT
Start: 2023-03-10 | End: 2023-03-13 | Stop reason: SDUPTHER

## 2023-03-10 RX ORDER — MIRTAZAPINE 15 MG/1
TABLET, FILM COATED ORAL
Qty: 60 TABLET | Refills: 6 | Status: SHIPPED | OUTPATIENT
Start: 2023-03-10 | End: 2023-03-13 | Stop reason: SDUPTHER

## 2023-03-10 RX ORDER — GLYBURIDE 5 MG/1
TABLET ORAL
Qty: 60 TABLET | Refills: 6 | Status: SHIPPED | OUTPATIENT
Start: 2023-03-10 | End: 2023-03-13 | Stop reason: SDUPTHER

## 2023-03-10 RX ORDER — METFORMIN HYDROCHLORIDE 500 MG/1
500 TABLET ORAL NIGHTLY
Qty: 30 TABLET | Refills: 6 | Status: SHIPPED | OUTPATIENT
Start: 2023-03-10 | End: 2023-03-13 | Stop reason: SDUPTHER

## 2023-03-13 ENCOUNTER — TELEPHONE (OUTPATIENT)
Dept: INTERNAL MEDICINE | Facility: CLINIC | Age: 88
End: 2023-03-13
Payer: MEDICARE

## 2023-03-13 DIAGNOSIS — E11.65 TYPE 2 DIABETES MELLITUS WITH HYPERGLYCEMIA, WITHOUT LONG-TERM CURRENT USE OF INSULIN: ICD-10-CM

## 2023-03-13 DIAGNOSIS — E11.9 DIABETES MELLITUS TYPE 2, NONINSULIN DEPENDENT: Primary | ICD-10-CM

## 2023-03-13 DIAGNOSIS — E78.00 HYPERCHOLESTEROLEMIA: ICD-10-CM

## 2023-03-13 DIAGNOSIS — G45.9 TRANSIENT ISCHEMIC ATTACK: ICD-10-CM

## 2023-03-13 DIAGNOSIS — R63.0 LOSS OF APPETITE: ICD-10-CM

## 2023-03-13 DIAGNOSIS — F32.A DEPRESSION, UNSPECIFIED DEPRESSION TYPE: ICD-10-CM

## 2023-03-13 DIAGNOSIS — I10 PRIMARY HYPERTENSION: ICD-10-CM

## 2023-03-13 DIAGNOSIS — K21.9 GASTROESOPHAGEAL REFLUX DISEASE WITHOUT ESOPHAGITIS: ICD-10-CM

## 2023-03-13 DIAGNOSIS — D50.8 IRON DEFICIENCY ANEMIA SECONDARY TO INADEQUATE DIETARY IRON INTAKE: ICD-10-CM

## 2023-03-13 RX ORDER — FERROUS SULFATE 325(65) MG
TABLET ORAL
Qty: 180 TABLET | Refills: 1 | Status: SHIPPED | OUTPATIENT
Start: 2023-03-13 | End: 2023-03-17 | Stop reason: SDUPTHER

## 2023-03-13 RX ORDER — AMLODIPINE BESYLATE 10 MG/1
10 TABLET ORAL DAILY
Qty: 90 TABLET | Refills: 1 | Status: SHIPPED | OUTPATIENT
Start: 2023-03-13 | End: 2023-03-17 | Stop reason: SDUPTHER

## 2023-03-13 RX ORDER — METFORMIN HYDROCHLORIDE 500 MG/1
500 TABLET ORAL NIGHTLY
Qty: 90 TABLET | Refills: 1 | Status: SHIPPED | OUTPATIENT
Start: 2023-03-13 | End: 2023-03-17 | Stop reason: SDUPTHER

## 2023-03-13 RX ORDER — LISINOPRIL 20 MG/1
20 TABLET ORAL DAILY
Qty: 90 TABLET | Refills: 1 | Status: SHIPPED | OUTPATIENT
Start: 2023-03-13 | End: 2023-03-17 | Stop reason: SDUPTHER

## 2023-03-13 RX ORDER — PANTOPRAZOLE SODIUM 40 MG/1
TABLET, DELAYED RELEASE ORAL
Qty: 180 TABLET | Refills: 1 | Status: SHIPPED | OUTPATIENT
Start: 2023-03-13 | End: 2023-03-17 | Stop reason: SDUPTHER

## 2023-03-13 RX ORDER — MEGESTROL ACETATE 20 MG/1
TABLET ORAL
Qty: 30 TABLET | Refills: 1 | Status: SHIPPED | OUTPATIENT
Start: 2023-03-13 | End: 2023-03-17 | Stop reason: SDUPTHER

## 2023-03-13 RX ORDER — MIRTAZAPINE 15 MG/1
TABLET, FILM COATED ORAL
Qty: 90 TABLET | Refills: 1 | Status: SHIPPED | OUTPATIENT
Start: 2023-03-13 | End: 2023-03-17 | Stop reason: SDUPTHER

## 2023-03-13 RX ORDER — GLYBURIDE 5 MG/1
TABLET ORAL
Qty: 180 TABLET | Refills: 1 | Status: SHIPPED | OUTPATIENT
Start: 2023-03-13 | End: 2023-03-17 | Stop reason: SDUPTHER

## 2023-03-13 RX ORDER — LOVASTATIN 40 MG/1
40 TABLET ORAL NIGHTLY
Qty: 90 TABLET | Refills: 1 | Status: SHIPPED | OUTPATIENT
Start: 2023-03-13 | End: 2023-03-17 | Stop reason: SDUPTHER

## 2023-03-13 NOTE — TELEPHONE ENCOUNTER
Will send referral to Suzette GARCIA advised of situation and is waiting on referral.   Will send refills to [facility]

## 2023-03-17 ENCOUNTER — OFFICE VISIT (OUTPATIENT)
Dept: INTERNAL MEDICINE | Facility: CLINIC | Age: 88
End: 2023-03-17
Payer: MEDICARE

## 2023-03-17 VITALS
HEART RATE: 96 BPM | TEMPERATURE: 99 F | DIASTOLIC BLOOD PRESSURE: 68 MMHG | WEIGHT: 110 LBS | SYSTOLIC BLOOD PRESSURE: 126 MMHG | OXYGEN SATURATION: 99 % | RESPIRATION RATE: 18 BRPM | BODY MASS INDEX: 22.22 KG/M2

## 2023-03-17 DIAGNOSIS — K21.9 GASTROESOPHAGEAL REFLUX DISEASE WITHOUT ESOPHAGITIS: ICD-10-CM

## 2023-03-17 DIAGNOSIS — I10 PRIMARY HYPERTENSION: ICD-10-CM

## 2023-03-17 DIAGNOSIS — E11.65 TYPE 2 DIABETES MELLITUS WITH HYPERGLYCEMIA, WITHOUT LONG-TERM CURRENT USE OF INSULIN: ICD-10-CM

## 2023-03-17 DIAGNOSIS — F32.A DEPRESSION, UNSPECIFIED DEPRESSION TYPE: ICD-10-CM

## 2023-03-17 DIAGNOSIS — R54 FRAILTY: ICD-10-CM

## 2023-03-17 DIAGNOSIS — T88.9XXA COMPLICATIONS OF MEDICAL CARE, INITIAL ENCOUNTER: ICD-10-CM

## 2023-03-17 DIAGNOSIS — E55.9 VITAMIN D DEFICIENCY: Chronic | ICD-10-CM

## 2023-03-17 DIAGNOSIS — F51.01 PRIMARY INSOMNIA: ICD-10-CM

## 2023-03-17 DIAGNOSIS — E78.00 HYPERCHOLESTEROLEMIA: ICD-10-CM

## 2023-03-17 DIAGNOSIS — D50.8 IRON DEFICIENCY ANEMIA SECONDARY TO INADEQUATE DIETARY IRON INTAKE: ICD-10-CM

## 2023-03-17 DIAGNOSIS — Z74.2 NEED FOR HOME HEALTH CARE: Primary | ICD-10-CM

## 2023-03-17 DIAGNOSIS — R63.0 LOSS OF APPETITE: Chronic | ICD-10-CM

## 2023-03-17 DIAGNOSIS — I63.9 CEREBROVASCULAR ACCIDENT (CVA), UNSPECIFIED MECHANISM: Chronic | ICD-10-CM

## 2023-03-17 PROBLEM — G89.29 CHRONIC BACK PAIN: Chronic | Status: ACTIVE | Noted: 2022-10-26

## 2023-03-17 PROBLEM — E78.5 HYPERLIPIDEMIA: Status: ACTIVE | Noted: 2022-10-26

## 2023-03-17 PROBLEM — E11.9 TYPE 2 DIABETES MELLITUS WITHOUT COMPLICATION: Chronic | Status: ACTIVE | Noted: 2022-10-26

## 2023-03-17 PROBLEM — M54.9 CHRONIC BACK PAIN: Chronic | Status: ACTIVE | Noted: 2022-10-26

## 2023-03-17 PROBLEM — E78.2 MIXED HYPERLIPIDEMIA: Chronic | Status: ACTIVE | Noted: 2022-10-26

## 2023-03-17 PROCEDURE — 99213 OFFICE O/P EST LOW 20 MIN: CPT | Mod: ,,,

## 2023-03-17 PROCEDURE — 99213 PR OFFICE/OUTPT VISIT, EST, LEVL III, 20-29 MIN: ICD-10-PCS | Mod: ,,,

## 2023-03-17 RX ORDER — MIRTAZAPINE 15 MG/1
TABLET, FILM COATED ORAL
Qty: 90 TABLET | Refills: 1 | Status: SHIPPED | OUTPATIENT
Start: 2023-03-17 | End: 2023-05-30 | Stop reason: SDUPTHER

## 2023-03-17 RX ORDER — GLYBURIDE 5 MG/1
TABLET ORAL
Qty: 180 TABLET | Refills: 1 | Status: ON HOLD | OUTPATIENT
Start: 2023-03-17 | End: 2023-06-09 | Stop reason: HOSPADM

## 2023-03-17 RX ORDER — METFORMIN HYDROCHLORIDE 500 MG/1
500 TABLET ORAL NIGHTLY
Qty: 90 TABLET | Refills: 1 | Status: ON HOLD | OUTPATIENT
Start: 2023-03-17 | End: 2023-06-09 | Stop reason: HOSPADM

## 2023-03-17 RX ORDER — AMLODIPINE BESYLATE 10 MG/1
10 TABLET ORAL DAILY
Qty: 90 TABLET | Refills: 1 | Status: ON HOLD | OUTPATIENT
Start: 2023-03-17 | End: 2023-06-20 | Stop reason: SDUPTHER

## 2023-03-17 RX ORDER — SODIUM BICARBONATE 650 MG/1
TABLET ORAL
Qty: 180 TABLET | Refills: 10 | Status: ON HOLD | OUTPATIENT
Start: 2023-03-17 | End: 2023-06-20

## 2023-03-17 RX ORDER — FERROUS SULFATE 325(65) MG
TABLET ORAL
Qty: 180 TABLET | Refills: 1 | Status: SHIPPED | OUTPATIENT
Start: 2023-03-17 | End: 2023-05-30 | Stop reason: SDUPTHER

## 2023-03-17 RX ORDER — PANTOPRAZOLE SODIUM 40 MG/1
TABLET, DELAYED RELEASE ORAL
Qty: 180 TABLET | Refills: 1 | Status: ON HOLD | OUTPATIENT
Start: 2023-03-17 | End: 2023-06-20

## 2023-03-17 RX ORDER — MEGESTROL ACETATE 20 MG/1
20 TABLET ORAL DAILY
Qty: 90 TABLET | Refills: 1 | Status: ON HOLD | OUTPATIENT
Start: 2023-03-17 | End: 2023-06-20

## 2023-03-17 RX ORDER — LOVASTATIN 40 MG/1
40 TABLET ORAL NIGHTLY
Qty: 90 TABLET | Refills: 1 | Status: ON HOLD | OUTPATIENT
Start: 2023-03-17 | End: 2023-06-20

## 2023-03-17 RX ORDER — ASPIRIN 325 MG
325 TABLET ORAL DAILY
Qty: 90 TABLET | Refills: 1 | Status: SHIPPED | OUTPATIENT
Start: 2023-03-17 | End: 2023-05-30 | Stop reason: SDUPTHER

## 2023-03-17 RX ORDER — FOLIC ACID 1 MG/1
1000 TABLET ORAL DAILY
Qty: 90 TABLET | Refills: 1 | Status: ON HOLD | OUTPATIENT
Start: 2023-03-17 | End: 2023-06-20 | Stop reason: SDUPTHER

## 2023-03-17 RX ORDER — LISINOPRIL 20 MG/1
20 TABLET ORAL DAILY
Qty: 90 TABLET | Refills: 1 | Status: ON HOLD | OUTPATIENT
Start: 2023-03-17 | End: 2023-06-20 | Stop reason: SDUPTHER

## 2023-03-17 RX ORDER — ERGOCALCIFEROL 1.25 MG/1
50000 CAPSULE ORAL
Qty: 12 CAPSULE | Refills: 1 | Status: ON HOLD | OUTPATIENT
Start: 2023-03-17 | End: 2023-06-20

## 2023-03-17 NOTE — PROGRESS NOTES
Patient ID: Lita Hess is a 87 y.o. female.    Chief Complaint: [facility] (Pt doing well today )    87-year-old female seen today for requested visit as a Dr. Flynn patient.  Accompanied today by son, primary caretaker.  Patient presents sitting in wheelchair and lives independently at home, however some commutes from [place] routinely to help coordinate care.  Not currently interested in long-term placement or assisted living at this time.  Since last visit patient reports he has been fairly well without acute injury or major illness.  Does have some concerns regarding previous home health agency as well as complications with pre filled medication packs from pharmacy.  Per son will be establishing with [facility] home health and interested in pill planner instead.  Son states mother has hard time remembering to take pre filled packets and not able to tear due to age-related weakness of hands.  Of note, patient's PCP is retiring and subsequently encouraged to obtain new patient appointment with new primary provider.  Medications refilled for 6 months to allow easier transition.  Tramadol for chronic pain not refilled, encouraged to obtain from PCP prior to senior care to avoid missed doses.  Otherwise patient is stable considering medical conditions.      MEDICAL HISTORY:    Past Medical History:   Diagnosis Date    Chronic back pain 10/26/2022    Colon cancer     CVA (cerebral vascular accident)     Gastroesophageal reflux disease without esophagitis 10/26/2022    Hypertension     Iron deficiency anemia secondary to inadequate dietary iron intake 10/26/2022    Loss of appetite     Mixed hyperlipidemia 10/26/2022    Stroke     Type 2 diabetes mellitus without complication 10/26/2022    Vitamin D deficiency       Past Surgical History:   Procedure Laterality Date    COLECTOMY      COLONOSCOPY      ESOPHAGOGASTRODUODENOSCOPY      gastro biopsy      HIP REPLACEMENT ARTHROPLASTY      SCLEROTHERAPY WITH ULTRASOUND  GUIDANCE      TRANSESOPHAGEAL ECHOCARDIOGRAPHY        Social History     Tobacco Use    Smoking status: Never    Smokeless tobacco: Never   Substance Use Topics    Alcohol use: Never    Drug use: Never          Health Maintenance Due   Topic Date Due    TETANUS VACCINE  Never done    Sign Pain Contract  Never done    Complete Opioid Risk Tool  Never done    Shingles Vaccine (1 of 2) Never done    Eye Exam  03/12/2021    Hemoglobin A1c  01/21/2023    Diabetes Urine Screening  03/24/2023          Patient Care Team:  Andrey Flynn Jr., MD as PCP - General (Internal Medicine)  MD Andrey Xiong Jr., Jr., MD (Internal Medicine)  [facility]  Henry Cifuentes MD as Consulting Physician (Cardiology)  Justo Henry MD as Consulting Physician (Surgical Oncology)      Review of Systems   Constitutional:  Negative for fatigue and fever.   HENT:  Negative for congestion, rhinorrhea, sore throat and trouble swallowing.    Eyes:  Negative for redness and visual disturbance.   Respiratory:  Negative for cough, chest tightness and shortness of breath.    Cardiovascular:  Negative for chest pain and palpitations.   Gastrointestinal:  Negative for abdominal pain, constipation, diarrhea, nausea and vomiting.   Genitourinary:  Negative for dysuria, flank pain, frequency and urgency.   Musculoskeletal:  Negative for arthralgias, gait problem and myalgias.   Skin:  Negative for rash and wound.   Neurological:  Negative for facial asymmetry, speech difficulty, weakness and headaches.   All other systems reviewed and are negative.    Objective:   /68 (BP Location: Right arm, Patient Position: Sitting, BP Method: Small (Automatic))   Pulse 96   Temp 98.7 °F (37.1 °C) (Temporal)   Resp 18   Wt 49.9 kg (110 lb)   SpO2 99%   BMI 22.22 kg/m²      Physical Exam  Constitutional:       General: She is not in acute distress.     Appearance: Normal appearance.   HENT:      Right Ear:  Tympanic membrane, ear canal and external ear normal.      Left Ear: Tympanic membrane, ear canal and external ear normal.      Nose: Nose normal.      Mouth/Throat:      Mouth: Mucous membranes are moist.      Pharynx: Oropharynx is clear.   Eyes:      Extraocular Movements: Extraocular movements intact.      Conjunctiva/sclera: Conjunctivae normal.      Pupils: Pupils are equal, round, and reactive to light.   Cardiovascular:      Rate and Rhythm: Normal rate and regular rhythm.      Pulses: Normal pulses.      Heart sounds: Normal heart sounds. No murmur heard.    No gallop.   Pulmonary:      Effort: Pulmonary effort is normal.      Breath sounds: Normal breath sounds. No wheezing.   Abdominal:      General: Bowel sounds are normal. There is no distension.      Palpations: Abdomen is soft. There is no mass.      Tenderness: There is no abdominal tenderness. There is no guarding.   Musculoskeletal:         General: Normal range of motion.      Comments: Sitting in wheelchair   Skin:     General: Skin is warm and dry.   Neurological:      Mental Status: She is alert. Mental status is at baseline.      Sensory: No sensory deficit.      Motor: No weakness.         Assessment:       ICD-10-CM ICD-9-CM   1. Need for home health care  Z74.2 V60.4   2. Complications of medical care, initial encounter  T88.9XXA 999.9   3. Frailty  R54 797   4. Cerebrovascular accident (CVA), unspecified mechanism  I63.9 434.91   5. Type 2 diabetes mellitus with hyperglycemia, without long-term current use of insulin  E11.65 250.00     790.29   6. Primary hypertension  I10 401.9   7. Hypercholesterolemia  E78.00 272.0   8. Loss of appetite  R63.0 783.0   9. Vitamin D deficiency  E55.9 268.9   10. Depression, unspecified depression type  F32.A 311   11. Primary insomnia  F51.01 307.42   12. Gastroesophageal reflux disease without esophagitis  K21.9 530.81   13. Iron deficiency anemia secondary to inadequate dietary iron intake  D50.8 280.1         Plan:     Problem List Items Addressed This Visit          Neuro    CVA (cerebral vascular accident) (Chronic)    Relevant Medications    lovastatin (MEVACOR) 40 MG tablet    lisinopriL (PRINIVIL,ZESTRIL) 20 MG tablet    aspirin 325 MG tablet       Cardiac/Vascular    Hypertension (Chronic)    Relevant Medications    amLODIPine (NORVASC) 10 MG tablet    lisinopriL (PRINIVIL,ZESTRIL) 20 MG tablet    aspirin 325 MG tablet       Oncology    Iron deficiency anemia secondary to inadequate dietary iron intake (Chronic)    Relevant Medications    ferrous sulfate (FEOSOL) 325 mg (65 mg iron) Tab tablet       Endocrine    Vitamin D deficiency (Chronic)    Relevant Medications    ergocalciferol (ERGOCALCIFEROL) 50,000 unit Cap       GI    Gastroesophageal reflux disease without esophagitis (Chronic)    Relevant Medications    pantoprazole (PROTONIX) 40 MG tablet       Other    Loss of appetite (Chronic)    Relevant Medications    megestroL (MEGACE) 20 MG Tab    Frailty (Chronic)    Relevant Medications    megestroL (MEGACE) 20 MG Tab    Need for home health care - Primary     -reports recently initiated process of starting with a new home health company   -primary caretaker (son) resides in [facility] however commutes routinely  -mother lives independently at home however struggles with medication management, no interested in placement at this time   -patient frail elderly with memory trouble  -recommend home health with eval and treat all services, since would benefit from multiple disciplines lines         Complications of medical care     -patient's PCP retiring, encouraged to establish with new PCP   -Most medications refilled for six-month supply to allow time to schedule  -chronic pain medication NOT refilled due to limitations of prescriptive authority on NPs in [place]          Other Visit Diagnoses       Type 2 diabetes mellitus with hyperglycemia, without long-term current use of insulin        Relevant  Medications    lisinopriL (PRINIVIL,ZESTRIL) 20 MG tablet    glyBURIDE (DIABETA) 5 MG tablet    metFORMIN (GLUCOPHAGE) 500 MG tablet    Hypercholesterolemia        Relevant Medications    lovastatin (MEVACOR) 40 MG tablet    Depression, unspecified depression type        Relevant Medications    mirtazapine (REMERON) 15 MG tablet    Primary insomnia        Relevant Medications    mirtazapine (REMERON) 15 MG tablet               Follow up for Previously scheduled and PRN if need.   -plan specifics discussed above    Orders Placed This Encounter    mirtazapine (REMERON) 15 MG tablet    pantoprazole (PROTONIX) 40 MG tablet    lovastatin (MEVACOR) 40 MG tablet    ferrous sulfate (FEOSOL) 325 mg (65 mg iron) Tab tablet    megestroL (MEGACE) 20 MG Tab    amLODIPine (NORVASC) 10 MG tablet    lisinopriL (PRINIVIL,ZESTRIL) 20 MG tablet    glyBURIDE (DIABETA) 5 MG tablet    metFORMIN (GLUCOPHAGE) 500 MG tablet    aspirin 325 MG tablet    ergocalciferol (ERGOCALCIFEROL) 50,000 unit Cap    folic acid (FOLVITE) 1 MG tablet    multivitamin with minerals tablet    sodium bicarbonate 650 MG tablet        Medication List with Changes/Refills   Current Medications    CYANOCOBALAMIN, VITAMIN B-12, 500 MCG LOZG    Take 500 mcg by mouth.    FLUTICASONE PROPIONATE (FLONASE) 50 MCG/ACTUATION NASAL SPRAY      See Instructions, USE 2 SPRAYS IN EACH NOSTRIL TWICE DAILY, # 16 gm, 10 Refill(s), Pharmacy: [facility], 157.5, cm, Height/Length Dosing, 08/10/21 15:22:00 CDT, 39.5, kg, Weight Dosing, 10/14/21 14:47:00 CDT    POLYETHYLENE GLYCOL 3350 (MIRALAX ORAL)    Take 17 g by mouth.    TRAMADOL (ULTRAM) 50 MG TABLET    TAKE ONE TABLET BY MOUTH EVERY 8 HOURS    UNABLE TO FIND      Shoe lift, See Instructions, 1 shoe lift for right foot, left leg longer than right, # 1 EA, 0 Refill(s)   Changed and/or Refilled Medications    Modified Medication Previous Medication    AMLODIPINE (NORVASC) 10 MG TABLET amLODIPine (NORVASC) 10 MG  tablet       Take 1 tablet (10 mg total) by mouth once daily.    Take 1 tablet (10 mg total) by mouth once daily.    ASPIRIN 325 MG TABLET aspirin 325 MG tablet       Take 1 tablet (325 mg total) by mouth once daily.    TAKE ONE TABLET ONCE DAILY    ERGOCALCIFEROL (ERGOCALCIFEROL) 50,000 UNIT CAP ergocalciferol (ERGOCALCIFEROL) 50,000 unit Cap       Take 1 capsule (50,000 Units total) by mouth every 7 days.    TAKE ONE CAPSULE ONCE WEEKLY FOR 4 WEEKS    FERROUS SULFATE (FEOSOL) 325 MG (65 MG IRON) TAB TABLET ferrous sulfate (FEOSOL) 325 mg (65 mg iron) Tab tablet       TAKE ONE TABLET TWICE A DAY    TAKE ONE TABLET TWICE A DAY    FOLIC ACID (FOLVITE) 1 MG TABLET folic acid (FOLVITE) 1 MG tablet       Take 1 tablet (1,000 mcg total) by mouth once daily.    TAKE ONE TABLET ONCE DAILY    GLYBURIDE (DIABETA) 5 MG TABLET glyBURIDE (DIABETA) 5 MG tablet       TAKE ONE TABLET TWICE A DAY    TAKE ONE TABLET TWICE A DAY    LISINOPRIL (PRINIVIL,ZESTRIL) 20 MG TABLET lisinopriL (PRINIVIL,ZESTRIL) 20 MG tablet       Take 1 tablet (20 mg total) by mouth once daily.    Take 1 tablet (20 mg total) by mouth once daily.    LOVASTATIN (MEVACOR) 40 MG TABLET lovastatin (MEVACOR) 40 MG tablet       Take 1 tablet (40 mg total) by mouth every evening.    Take 1 tablet (40 mg total) by mouth every evening.    MEGESTROL (MEGACE) 20 MG TAB megestroL (MEGACE) 20 MG Tab       Take 1 tablet (20 mg total) by mouth once daily Take 1 tablet daily.    Take 1 tablet daily.    METFORMIN (GLUCOPHAGE) 500 MG TABLET metFORMIN (GLUCOPHAGE) 500 MG tablet       Take 1 tablet (500 mg total) by mouth every evening.    Take 1 tablet (500 mg total) by mouth every evening.    MIRTAZAPINE (REMERON) 15 MG TABLET mirtazapine (REMERON) 15 MG tablet       TAKE TWO TABLETS AT BEDTIME    TAKE TWO TABLETS AT BEDTIME    MULTIVITAMIN WITH MINERALS TABLET multivitamin with minerals tablet       Take 1 tablet by mouth once daily.    Take by mouth.    PANTOPRAZOLE  (PROTONIX) 40 MG TABLET pantoprazole (PROTONIX) 40 MG tablet       TAKE ONE TABLET TWICE A DAY    TAKE ONE TABLET TWICE A DAY    SODIUM BICARBONATE 650 MG TABLET sodium bicarbonate 650 MG tablet       TAKE TWO TABLETS 3 TIMES DAILY.    TAKE TWO TABLETS 3 TIMES DAILY.

## 2023-03-20 PROBLEM — T88.9XXA COMPLICATIONS OF MEDICAL CARE: Status: ACTIVE | Noted: 2023-03-20

## 2023-03-20 PROBLEM — Z74.2 NEED FOR HOME HEALTH CARE: Status: ACTIVE | Noted: 2023-03-20

## 2023-03-20 NOTE — ASSESSMENT & PLAN NOTE
-reports recently initiated process of starting with a new home health company   -primary caretaker (son) resides in [facility] however commutes routinely  -mother lives independently at home however struggles with medication management, no interested in placement at this time   -patient frail elderly with memory trouble  -recommend home health with eval and treat all services, since would benefit from multiple disciplines lines

## 2023-03-20 NOTE — ASSESSMENT & PLAN NOTE
-patient's PCP retiring, encouraged to establish with new PCP   -Most medications refilled for six-month supply to allow time to schedule  -chronic pain medication NOT refilled due to limitations of prescriptive authority on NPs in [place]

## 2023-03-28 ENCOUNTER — TELEPHONE (OUTPATIENT)
Dept: INTERNAL MEDICINE | Facility: CLINIC | Age: 88
End: 2023-03-28
Payer: MEDICARE

## 2023-03-28 DIAGNOSIS — G45.9 TRANSIENT ISCHEMIC ATTACK: ICD-10-CM

## 2023-03-28 DIAGNOSIS — E11.9 TYPE 2 DIABETES MELLITUS WITHOUT COMPLICATION, WITH LONG-TERM CURRENT USE OF INSULIN: Primary | Chronic | ICD-10-CM

## 2023-03-28 DIAGNOSIS — Z79.4 TYPE 2 DIABETES MELLITUS WITHOUT COMPLICATION, WITH LONG-TERM CURRENT USE OF INSULIN: Primary | Chronic | ICD-10-CM

## 2023-03-28 NOTE — TELEPHONE ENCOUNTER
Patients son called this morning [facility] still has not received the order for home health. With Dr. Flynn leaving we can not send referral because he will be unable to sign the plan of care. Can you resend the referral with Cyrus's notes. We were told that the plan of cares can be signed by NP's until they are seen by a new physician. Thank you.

## 2023-03-30 ENCOUNTER — LAB REQUISITION (OUTPATIENT)
Dept: LAB | Facility: HOSPITAL | Age: 88
End: 2023-03-30
Attending: INTERNAL MEDICINE
Payer: MEDICARE

## 2023-03-30 ENCOUNTER — TELEPHONE (OUTPATIENT)
Dept: INTERNAL MEDICINE | Facility: CLINIC | Age: 88
End: 2023-03-30
Payer: MEDICARE

## 2023-03-30 DIAGNOSIS — G45.9 TRANSIENT CEREBRAL ISCHEMIC ATTACK, UNSPECIFIED: ICD-10-CM

## 2023-03-30 DIAGNOSIS — I65.29 OCCLUSION AND STENOSIS OF UNSPECIFIED CAROTID ARTERY: ICD-10-CM

## 2023-03-30 DIAGNOSIS — I10 ESSENTIAL (PRIMARY) HYPERTENSION: ICD-10-CM

## 2023-03-30 LAB
ANION GAP SERPL CALC-SCNC: 12 MEQ/L
BUN SERPL-MCNC: 15.5 MG/DL (ref 9.8–20.1)
CALCIUM SERPL-MCNC: 10.6 MG/DL (ref 8.4–10.2)
CHLORIDE SERPL-SCNC: 97 MMOL/L (ref 98–107)
CO2 SERPL-SCNC: 25 MMOL/L (ref 23–31)
CREAT SERPL-MCNC: 0.88 MG/DL (ref 0.55–1.02)
CREAT/UREA NIT SERPL: 18
GFR SERPLBLD CREATININE-BSD FMLA CKD-EPI: >60 MLS/MIN/1.73/M2
GLUCOSE SERPL-MCNC: 359 MG/DL (ref 82–115)
POTASSIUM SERPL-SCNC: 4.5 MMOL/L (ref 3.5–5.1)
SODIUM SERPL-SCNC: 134 MMOL/L (ref 136–145)

## 2023-03-30 PROCEDURE — 80048 BASIC METABOLIC PNL TOTAL CA: CPT | Performed by: INTERNAL MEDICINE

## 2023-03-30 PROCEDURE — G0180 MD CERTIFICATION HHA PATIENT: HCPCS | Mod: ,,,

## 2023-03-30 PROCEDURE — G0180 PR HOME HEALTH MD CERTIFICATION: ICD-10-PCS | Mod: ,,,

## 2023-03-30 NOTE — TELEPHONE ENCOUNTER
Got a call from irene Johnson with [facility]  about Lita Hess with a blood sugar of 435, She had No anti diabetic Agents to bring her blood sugar down in her residence , the pt stated she hadn't eaten all day , Than later she stated she may have eaten a sandwich at some point in the day, wasn't sure if was today or yesterday,  Pt lives by herself, Pt stated that she was having palpitations and urination frequently, Irene called for medical advice and stated that she did take glyburide 5 mg tab as well as metformin 500 mg tab but there is none in the residence she could see. Cyrus advised that she needed medical attention that we couldn't give her in office to bring her to hospital . She advised that she was calling her son .

## 2023-03-30 NOTE — TELEPHONE ENCOUNTER
Noted.  If unable to contact son and get hands on diabetic medication for patient to take, she will need ER or urgent care eval to possibly receive some insulin to bring down blood sugars.

## 2023-04-10 DIAGNOSIS — G89.29 CHRONIC BACK PAIN, UNSPECIFIED BACK LOCATION, UNSPECIFIED BACK PAIN LATERALITY: Primary | ICD-10-CM

## 2023-04-10 DIAGNOSIS — M54.9 CHRONIC BACK PAIN, UNSPECIFIED BACK LOCATION, UNSPECIFIED BACK PAIN LATERALITY: Primary | ICD-10-CM

## 2023-04-10 DIAGNOSIS — M41.9 SCOLIOSIS, UNSPECIFIED SCOLIOSIS TYPE, UNSPECIFIED SPINAL REGION: ICD-10-CM

## 2023-04-10 RX ORDER — TRAMADOL HYDROCHLORIDE 50 MG/1
TABLET ORAL
Qty: 60 TABLET | Refills: 1 | OUTPATIENT
Start: 2023-04-10

## 2023-04-10 NOTE — TELEPHONE ENCOUNTER
Patient is requesting a refill on Tramadol. Was seen by Cyrus on 3/17/2023 refill then was denied. Can you approve a referral for the patient?

## 2023-04-24 ENCOUNTER — TELEPHONE (OUTPATIENT)
Dept: INTERNAL MEDICINE | Facility: CLINIC | Age: 88
End: 2023-04-24
Payer: MEDICARE

## 2023-04-24 NOTE — TELEPHONE ENCOUNTER
Spoke with the pt son about a sooner appointment. Inform the son that Dr. Ny has nothing sooner for the pt.I also afford for them to see the NP to refill medication until up and coming appointment.

## 2023-04-24 NOTE — TELEPHONE ENCOUNTER
----- Message from Poornima Robin sent at 4/24/2023 11:59 AM CDT -----  Regarding: Sooner Apoointment Request  Type:  Sooner Apoointment Request    Caller is requesting a sooner appointment.  Caller declined first available appointment listed below.  Caller will not accept being placed on the waitlist and is requesting a message be sent to doctor.  Name of Caller: Pt  When is the first available appointment? 8/24  Symptoms:  Would the patient rather a call back or a response via [facility]?   Best Call Back Number:[phone]  Additional Information: pt is requesting a sooner appt.    88 year old patient cancer and stroke survivor needs an appt as a first time patient transfer from Dr. Andrey Flynn. Next available is in August. Transfer pt from Dr. Flynn.

## 2023-04-27 ENCOUNTER — TELEPHONE (OUTPATIENT)
Dept: INTERNAL MEDICINE | Facility: CLINIC | Age: 88
End: 2023-04-27
Payer: MEDICARE

## 2023-04-27 NOTE — TELEPHONE ENCOUNTER
From: Harmeet Cruz   Sent: 4/27/2023   9:38 AM CDT   To: [facility] Staff   Subject: FW: Appointment Request                               ----- Message -----   From: Lita Hess   Sent: 4/27/2023   9:32 AM CDT   To: [facility] Scheduling Pool   Subject: Appointment Request                                Good Morning,    Lita Hess needs a refill of her Tramadol prescription. She is 88 years old, colon cancer survivor , two strokes and has chronic scoliosis with extreme curvature of her spine . Her mobility has been decreased due to pain caused by these issues. She can't wait until August to receive a prescription. Also, she has issues with post nasal drip which is causing her to loose sleep. Neglect of all of these issues is degrading her health and mental awareness contributing to mental decline. She was instructed to transfer to Dr. Haynes from Dr. Flynn, but is not receiving medical support. Please let us know if her prescriptions will be filled and care administered by this office.      Response to previous message:    Spoke to patient's son at this time. Explained that once Dr. Flynn retired our protocol had changed and that the NP's are unable to fill chronic pain meds and that's why she has an appointment with Dr. Carbajal on 5/18/2023. He stated that she is out of the Tramadol and she doesn't take it unless she has to go out for office visits or if the pain is too unbearable (which is not everyday). He is coming in this weekend to be with her. I called [facility] with Dr. Wells, Dr. Dixon, and Dr. Simental to see if she can get in sooner, was able to get her an appointment for 5/4/2023 at 1:30. Patient's son advised and will make sure she gets to that appointment. Advised in the mean time she can go to any Urgent Care for the post nasal drip, he verbalized understanding.

## 2023-04-29 ENCOUNTER — EXTERNAL HOME HEALTH (OUTPATIENT)
Dept: HOME HEALTH SERVICES | Facility: HOSPITAL | Age: 88
End: 2023-04-29
Payer: MEDICARE

## 2023-05-08 DIAGNOSIS — F32.A DEPRESSION, UNSPECIFIED DEPRESSION TYPE: ICD-10-CM

## 2023-05-08 DIAGNOSIS — F51.01 PRIMARY INSOMNIA: ICD-10-CM

## 2023-05-08 RX ORDER — MIRTAZAPINE 15 MG/1
TABLET, FILM COATED ORAL
Qty: 90 TABLET | Refills: 1 | OUTPATIENT
Start: 2023-05-08

## 2023-05-12 ENCOUNTER — TELEPHONE (OUTPATIENT)
Dept: INTERNAL MEDICINE | Facility: CLINIC | Age: 88
End: 2023-05-12
Payer: MEDICARE

## 2023-05-12 ENCOUNTER — PATIENT MESSAGE (OUTPATIENT)
Dept: INTERNAL MEDICINE | Facility: CLINIC | Age: 88
End: 2023-05-12
Payer: MEDICARE

## 2023-05-12 NOTE — TELEPHONE ENCOUNTER
----- Message from ALIIRO Cho sent at 5/11/2023  4:07 PM CDT -----    ----- Message -----  From: Ibeth Lopez  Sent: 5/11/2023   2:28 PM CDT  To: ALIRIO Cho    Dyllan (pt son) was calling back for you to discuss pain mgmt options [phone]

## 2023-05-15 ENCOUNTER — TELEPHONE (OUTPATIENT)
Dept: INTERNAL MEDICINE | Facility: CLINIC | Age: 88
End: 2023-05-15
Payer: MEDICARE

## 2023-05-18 ENCOUNTER — HOSPITAL ENCOUNTER (EMERGENCY)
Facility: HOSPITAL | Age: 88
Discharge: HOME | End: 2023-05-18
Attending: EMERGENCY MEDICINE
Payer: MEDICARE

## 2023-05-18 ENCOUNTER — TELEPHONE (OUTPATIENT)
Dept: INTERNAL MEDICINE | Facility: CLINIC | Age: 88
End: 2023-05-18
Payer: MEDICARE

## 2023-05-18 VITALS
DIASTOLIC BLOOD PRESSURE: 92 MMHG | RESPIRATION RATE: 19 BRPM | OXYGEN SATURATION: 99 % | BODY MASS INDEX: 22.22 KG/M2 | HEART RATE: 99 BPM | SYSTOLIC BLOOD PRESSURE: 168 MMHG | TEMPERATURE: 99 F | WEIGHT: 110 LBS

## 2023-05-18 DIAGNOSIS — R73.9 HYPERGLYCEMIA: ICD-10-CM

## 2023-05-18 DIAGNOSIS — I10 ASYMPTOMATIC HYPERTENSION: Primary | ICD-10-CM
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
## 2023-05-18 LAB
ALBUMIN SERPL-MCNC: 3.9 G/DL (ref 3.4–4.8)
ALBUMIN/GLOB SERPL: 0.9 RATIO (ref 1.1–2)
ALP SERPL-CCNC: 81 UNIT/L (ref 40–150)
ALT SERPL-CCNC: 21 UNIT/L (ref 0–55)
APPEARANCE UR: CLEAR
AST SERPL-CCNC: 24 UNIT/L (ref 5–34)
BACTERIA #/AREA URNS AUTO: NORMAL /HPF
BASOPHILS # BLD AUTO: 0.04 X10(3)/MCL
BASOPHILS NFR BLD AUTO: 0.5 %
BILIRUB UR QL STRIP.AUTO: NEGATIVE MG/DL
BILIRUBIN DIRECT+TOT PNL SERPL-MCNC: 0.2 MG/DL
BUN SERPL-MCNC: 22.1 MG/DL (ref 9.8–20.1)
CALCIUM SERPL-MCNC: 10.3 MG/DL (ref 8.4–10.2)
CHLORIDE SERPL-SCNC: 103 MMOL/L (ref 98–107)
CO2 SERPL-SCNC: 23 MMOL/L (ref 23–31)
COLOR UR: ABNORMAL
CREAT SERPL-MCNC: 0.87 MG/DL (ref 0.55–1.02)
EOSINOPHIL # BLD AUTO: 0.04 X10(3)/MCL (ref 0–0.9)
EOSINOPHIL NFR BLD AUTO: 0.5 %
ERYTHROCYTE [DISTWIDTH] IN BLOOD BY AUTOMATED COUNT: 12.8 % (ref 11.5–17)
GFR SERPLBLD CREATININE-BSD FMLA CKD-EPI: >60 MLS/MIN/1.73/M2
GLOBULIN SER-MCNC: 4.5 GM/DL (ref 2.4–3.5)
GLUCOSE SERPL-MCNC: 334 MG/DL (ref 82–115)
GLUCOSE UR QL STRIP.AUTO: >=1000 MG/DL
HCT VFR BLD AUTO: 43.1 % (ref 37–47)
HGB BLD-MCNC: 14.3 G/DL (ref 12–16)
IMM GRANULOCYTES # BLD AUTO: 0.03 X10(3)/MCL (ref 0–0.04)
IMM GRANULOCYTES NFR BLD AUTO: 0.4 %
KETONES UR QL STRIP.AUTO: NEGATIVE MG/DL
LEUKOCYTE ESTERASE UR QL STRIP.AUTO: NEGATIVE UNIT/L
LYMPHOCYTES # BLD AUTO: 1.62 X10(3)/MCL (ref 0.6–4.6)
LYMPHOCYTES NFR BLD AUTO: 21.7 %
MCH RBC QN AUTO: 31.4 PG (ref 27–31)
MCHC RBC AUTO-ENTMCNC: 33.2 G/DL (ref 33–36)
MCV RBC AUTO: 94.7 FL (ref 80–94)
MONOCYTES # BLD AUTO: 0.54 X10(3)/MCL (ref 0.1–1.3)
MONOCYTES NFR BLD AUTO: 7.2 %
NEUTROPHILS # BLD AUTO: 5.21 X10(3)/MCL (ref 2.1–9.2)
NEUTROPHILS NFR BLD AUTO: 69.7 %
NITRITE UR QL STRIP.AUTO: NEGATIVE
NRBC BLD AUTO-RTO: 0 %
PH UR STRIP.AUTO: 7 [PH]
PLATELET # BLD AUTO: 207 X10(3)/MCL (ref 130–400)
PMV BLD AUTO: 11.6 FL (ref 7.4–10.4)
POCT GLUCOSE: 241 MG/DL (ref 70–110)
POCT GLUCOSE: 304 MG/DL (ref 70–110)
POTASSIUM SERPL-SCNC: 3.9 MMOL/L (ref 3.5–5.1)
PROT SERPL-MCNC: 8.4 GM/DL (ref 5.8–7.6)
PROT UR QL STRIP.AUTO: 30 MG/DL
RBC # BLD AUTO: 4.55 X10(6)/MCL (ref 4.2–5.4)
RBC #/AREA URNS AUTO: NORMAL /HPF
RBC UR QL AUTO: ABNORMAL UNIT/L
SODIUM SERPL-SCNC: 137 MMOL/L (ref 136–145)
SP GR UR STRIP.AUTO: 1.01
SQUAMOUS #/AREA URNS AUTO: NORMAL /HPF
UROBILINOGEN UR STRIP-ACNC: 0.2 MG/DL
WBC # SPEC AUTO: 7.48 X10(3)/MCL (ref 4.5–11.5)
WBC #/AREA URNS AUTO: NORMAL /HPF

## 2023-05-18 PROCEDURE — 80053 COMPREHEN METABOLIC PANEL: CPT

## 2023-05-18 PROCEDURE — 81001 URINALYSIS AUTO W/SCOPE: CPT

## 2023-05-18 PROCEDURE — 85025 COMPLETE CBC W/AUTO DIFF WBC: CPT

## 2023-05-18 PROCEDURE — 96374 THER/PROPH/DIAG INJ IV PUSH: CPT

## 2023-05-18 PROCEDURE — 99285 EMERGENCY DEPT VISIT HI MDM: CPT | Mod: 25

## 2023-05-18 PROCEDURE — 96361 HYDRATE IV INFUSION ADD-ON: CPT

## 2023-05-18 PROCEDURE — 25000003 PHARM REV CODE 250

## 2023-05-18 PROCEDURE — 63600175 PHARM REV CODE 636 W HCPCS

## 2023-05-18 PROCEDURE — 82962 GLUCOSE BLOOD TEST: CPT

## 2023-05-18 RX ORDER — HYDRALAZINE HYDROCHLORIDE 20 MG/ML
10 INJECTION INTRAMUSCULAR; INTRAVENOUS
Status: COMPLETED | OUTPATIENT
Start: 2023-05-18 | End: 2023-05-18

## 2023-05-18 RX ORDER — SODIUM CHLORIDE 9 MG/ML
500 INJECTION, SOLUTION INTRAVENOUS
Status: COMPLETED | OUTPATIENT
Start: 2023-05-18 | End: 2023-05-18

## 2023-05-18 RX ADMIN — SODIUM CHLORIDE 500 ML: 9 INJECTION, SOLUTION INTRAVENOUS at 01:05

## 2023-05-18 RX ADMIN — SODIUM CHLORIDE 500 ML: 9 INJECTION, SOLUTION INTRAVENOUS at 12:05

## 2023-05-18 RX ADMIN — HYDRALAZINE HYDROCHLORIDE 10 MG: 20 INJECTION INTRAMUSCULAR; INTRAVENOUS at 03:05

## 2023-05-18 NOTE — ED PROVIDER NOTES
Encounter Date: 5/18/2023       History     Chief Complaint   Patient presents with    Hypertension     HH nurse concerned at today's visit bc patient was Hypertensive w/elevated blood sugars, pt denies complaints at this time. Zack @ bedside.      87 y.o.  female with a history of CVA, hypertension, and T2DM presents to Emergency Department with a chief complaint of hypertension. Symptoms began today after the ambulance arrived to her residence after a fall and have been gradually improving since onset. Patient reports while using her walker she fell to the side and landed on the floor. Associated symptoms include hyperglycemia. The patient denies blood thinner use, head injury, CP, SOB, abdominal pain, vomiting, or dizziness. No other reported symptoms at this time. Patient reports she took anti-hypertensive agents prior to arrival. Patient denies any complaints at this time.       The history is provided by the patient and a relative. No  was used.   Hypertension   This is a chronic problem. The current episode started today. The problem has been gradually improving. Pertinent negatives include no chest pain, no orthopnea, no palpitations, no anxiety, no headaches, no tinnitus, no peripheral edema, no dizziness and no shortness of breath.   Review of patient's allergies indicates:  No Known Allergies  Past Medical History:   Diagnosis Date    Chronic back pain 10/26/2022    Colon cancer     CVA (cerebral vascular accident)     Gastroesophageal reflux disease without esophagitis 10/26/2022    Hypertension     Iron deficiency anemia secondary to inadequate dietary iron intake 10/26/2022    Loss of appetite     Mixed hyperlipidemia 10/26/2022    Stroke     Type 2 diabetes mellitus without complication 10/26/2022    Vitamin D deficiency      Past Surgical History:   Procedure Laterality Date    COLECTOMY      COLONOSCOPY      ESOPHAGOGASTRODUODENOSCOPY      gastro biopsy      HIP  REPLACEMENT ARTHROPLASTY      SCLEROTHERAPY WITH ULTRASOUND GUIDANCE      TRANSESOPHAGEAL ECHOCARDIOGRAPHY       History reviewed. No pertinent family history.  Social History     Tobacco Use    Smoking status: Never    Smokeless tobacco: Never   Substance Use Topics    Alcohol use: Never    Drug use: Never     Review of Systems   Constitutional:  Negative for chills, fatigue and fever.   HENT:  Negative for tinnitus.    Eyes:  Negative for photophobia and visual disturbance.   Respiratory:  Negative for cough, shortness of breath, wheezing and stridor.    Cardiovascular:  Negative for chest pain, palpitations, orthopnea and leg swelling.   Gastrointestinal:  Negative for abdominal pain, nausea and vomiting.   Neurological:  Negative for dizziness, weakness, numbness and headaches.   All other systems reviewed and are negative.    Physical Exam     Initial Vitals [05/18/23 1159]   BP Pulse Resp Temp SpO2   (!) 190/116 99 20 98.7 °F (37.1 °C) 96 %      MAP       --         Physical Exam    Nursing note and vitals reviewed.  Constitutional: She is not diaphoretic. She is cooperative.  Non-toxic appearance. No distress.   Frail appearing elderly female   HENT:   Head: Normocephalic and atraumatic.   Right Ear: External ear normal.   Left Ear: External ear normal.   Nose: Nose normal.   Mouth/Throat: Oropharynx is clear and moist. No oropharyngeal exudate.   Eyes: Conjunctivae and EOM are normal. Pupils are equal, round, and reactive to light. Right eye exhibits no discharge. Left eye exhibits no discharge.   Neck: Neck supple. No thyromegaly present. No tracheal deviation present. No JVD present.   Normal range of motion.  Cardiovascular:  Normal rate, regular rhythm, S1 normal, S2 normal, normal heart sounds, intact distal pulses and normal pulses.           Pulmonary/Chest: Effort normal and breath sounds normal. No respiratory distress. She has no wheezes. She exhibits no tenderness.   Abdominal: Abdomen is soft.  Bowel sounds are normal. She exhibits no distension. There is no abdominal tenderness. There is no guarding.   Musculoskeletal:         General: Normal range of motion.      Cervical back: Normal range of motion and neck supple.     Neurological: She is alert and oriented to person, place, and time. She has normal strength. No sensory deficit. GCS score is 15. GCS eye subscore is 4. GCS verbal subscore is 5. GCS motor subscore is 6.   Skin: Skin is warm and dry. Capillary refill takes less than 2 seconds.   Psychiatric: She has a normal mood and affect. Thought content normal.       ED Course   Procedures  Labs Reviewed   COMPREHENSIVE METABOLIC PANEL - Abnormal; Notable for the following components:       Result Value    Glucose Level 334 (*)     Blood Urea Nitrogen 22.1 (*)     Calcium Level Total 10.3 (*)     Protein Total 8.4 (*)     Globulin 4.5 (*)     Albumin/Globulin Ratio 0.9 (*)     All other components within normal limits   URINALYSIS, REFLEX TO URINE CULTURE - Abnormal; Notable for the following components:    Protein, UA 30 (*)     Glucose, UA >=1000 (*)     Blood, UA Trace (*)     All other components within normal limits   CBC WITH DIFFERENTIAL - Abnormal; Notable for the following components:    MCV 94.7 (*)     MCH 31.4 (*)     MPV 11.6 (*)     All other components within normal limits   POCT GLUCOSE - Abnormal; Notable for the following components:    POCT Glucose 304 (*)     All other components within normal limits   POCT GLUCOSE - Abnormal; Notable for the following components:    POCT Glucose 241 (*)     All other components within normal limits   URINALYSIS, MICROSCOPIC - Normal   CBC W/ AUTO DIFFERENTIAL    Narrative:     The following orders were created for panel order CBC auto differential.  Procedure                               Abnormality         Status                     ---------                               -----------         ------                     CBC with  Differential[[id number]]        Abnormal            Final result                 Please view results for these tests on the individual orders.   POCT GLUCOSE, HAND-HELD DEVICE          Imaging Results              CT Cervical Spine Without Contrast (Final result)  Result time 05/18/23 13:40:33      Final result by Pat Novak MD (05/18/23 13:40:33)                   Impression:      No acute fracture identified.      Electronically signed by: Pat Novak  Date:    05/18/2023  Time:    13:40               Narrative:    EXAMINATION:  CT CERVICAL SPINE WITHOUT CONTRAST    CLINICAL HISTORY:  Polytrauma, blunt;    TECHNIQUE:  Noncontrast CT images of the cervical spine. Axial, coronal, and sagittal reformatted images were obtained. Dose length product is 1005 mGycm. Automatic exposure control, adjustment of mA/kV or iterative reconstruction technique was used to limit radiation dose.    COMPARISON:  CT cervical spine dated 06/05/2020    FINDINGS:  The cervical spine is visualized through the level of T2.    There is no acute fracture identified.  There are multilevel degenerative changes with small marginal osteophytes and facet arthropathy.  There is no paraspinal hematoma.                                       CT Head Without Contrast (Final result)  Result time 05/18/23 13:30:06      Final result by Tre Rwoley MD (05/18/23 13:30:06)                   Impression:      1.  No acute intracranial findings identified.    2.  Old infarcts, marked chronic microvascular ischemia and atrophy.      Electronically signed by: Tre Rowley  Date:    05/18/2023  Time:    13:30               Narrative:    EXAMINATION:  CT HEAD WITHOUT CONTRAST    CLINICAL HISTORY:  Polytrauma, blunt;    TECHNIQUE:  Sequential axial images were performed of the brain without contrast.    Dose product length of 1005 mGycm. Automated exposure control was utilized to minimize radiation dose.    COMPARISON:  July 21,  2021.    FINDINGS:  There is no intracranial mass effect, midline shift, hydrocephalus or hemorrhage. There is no sulcal effacement or low attenuation changes to suggest recent large vessel territory infarction.  There are right cerebellar old infarcts and there is also right basal ganglia old lacunar infarct.  Chronic microvascular ischemic changes are severe with extensive periventricular and deep white matter patchy hypodensities.  Generalized cerebral and cerebellar cortical volume loss. There is no acute extra axial fluid collection.  There is mild mucoperiosteal thickening of the right maxillary sinus.  Otherwise, visualized paranasal sinuses are clear without mucosal thickening, polypoidal abnormality or air-fluid levels. Mastoid air cells aeration is optimal.                                       Medications   0.9%  NaCl infusion (0 mLs Intravenous Stopped 5/18/23 1341)   0.9%  NaCl infusion (500 mLs Intravenous New Bag 5/18/23 1345)   hydrALAZINE injection 10 mg (10 mg Intravenous Given 5/18/23 1531)     Medical Decision Making:   Initial Assessment:   Patient awake, alert, has non-labored breathing, and follows commands appropriately. Arrived to ED due to hypertension and hyperglycemia. Hx of hypertension and hyperglycemia. Patient had a fall prior to arrival and when EMS arrived her BP was elevated and her cbg was elevated. Patient reports she had a fall while using her walker today. Reports she took her anti-hypertensive agents prior to arrival. Denies any complaints. NAD noted.   Differential Diagnosis:   Fall, Hypertension, Hyperglycemia   Clinical Tests:   Lab Tests: Ordered and Reviewed  Radiological Study: Reviewed and Ordered  ED Management:  Co-morbidities and/or factors adding to the complexity or risk for the patient?: none  Differential diagnoses: Fall, Hypertension, Hyperglycemia   Decision to obtain previous or outside records?: I did not review records.   Chart Review (nursing home, outside  records, CareEverywhere): none  Review of RX medications/new RX prescribed by me?: none  Labs/imaging/other tests obtained/considered (risk/benefits of testing discussed): cbc, cmp, ua ct head, ct cervical spine   Labs/tests intepretation: glucose 334. UA- glucose in urine. CT head- 1. No acute intracranial findings identified. 2. Old infarcts, marked chronic microvascular ischemia and atrophy. CT cervical spine-  No acute fracture identified. Informed patient and family of results.   My independent imaging interpretation: none  Treatment/interventions, IV fluids, IV medications: IV fluids & Hydralazine given in ER with improvement of her glucose and BP.   Complex management (IV controlled substances, went to OR, DNR, meds requiring monitoring, transfer, etc)?: none  Workup/treatment affected by social determinants of health?:none   Point of care US done/interpretation: none  Consults/radiologist/EMS/social work/family discussion/alternate history: Discussed patient's results with patient's son via telephone, patient's sister, and patient's neighbor.   Advanced care planning/end of life discussion: none  Shared decision making: Discussed plan of care and interventions with patient. Agreed to and aware of plan of care. Comfortable being discharged home.   ETOH/smoking/drug cessation discussion: none  Dispo: Patient discharged home. Patient denies new or additional complaints; no further tests indicated at this time. Verbalized understanding of instructions. No emergent or apparent distress noted prior to discharge. To follow up with PCP in 1 week as needed. Strict ER return precautions given.                ED Course as of 05/18/23 1558   Thu May 18, 2023   1555 Patient's BP improved after med admin and glucose improved. NAD noted. Patient denies any complaints.  [JA]      ED Course User Index  [JA] Lea Westfall NP                 Clinical Impression:   Final diagnoses:  [I10] Asymptomatic hypertension  (Primary)  [R73.9] Hyperglycemia  [W19.XXXA] Fall, initial encounter        ED Disposition Condition    Discharge Stable          ED Prescriptions    None       Follow-up Information       Follow up With Specialties Details Why Contact Info    Andrey Flynn Jr., MD Internal Medicine Call in 1 week If symptoms worsen, As needed [address]  [facility] [id number]  [phone]      [facility] - Emergency Dept Emergency Medicine Go to  If symptoms worsen, As needed [id number]  [facility] [phone]             Lea Westfall NP  05/18/23 [id number]

## 2023-05-18 NOTE — DISCHARGE INSTRUCTIONS
Thanks for letting us take care of you today!  It is our goal to give you courteous care and to keep you comfortable and informed, if you have any questions before you leave I will be happy to try and answer them.    Here is some advice after your visit:      Your visit in the emergency department is NOT definitive care - please follow-up with your primary care doctor and/or specialist within 1 week.  Please return if you have any worsening in your condition or if you have any other concerns.    If you had radiology exams like an XRAY or CT in the emergency Department the interpreation on them may be preliminary - there may be less time sensitive findings on the reports please obtain these reports within 24 hours from the hospital or by using your out on your mobile phone to access records.  Bring these to your primary care doctor and/or specialist for further review of incidental findings.    Please review any LAB WORK from your visit today with your primary care physician.    Continue taking previously prescribed medications as indicated.

## 2023-05-18 NOTE — ED TRIAGE NOTES
HH nurse concerned at today's visit bc patient was Hypertensive w/elevated blood sugars, pt denies complaints at this time. Sitter @ bedside.

## 2023-05-18 NOTE — TELEPHONE ENCOUNTER
Spoke with Nikole Gautam.  She states that the son was able to get his mother to go to the ER.  Nikole Gautam states that there was urine and stool all over the patients floor.  The patient does not wear pull ups.  Nikole Gautam will keep the office updated with any changes she hears.

## 2023-05-30 DIAGNOSIS — K21.9 GASTROESOPHAGEAL REFLUX DISEASE WITHOUT ESOPHAGITIS: ICD-10-CM

## 2023-05-30 DIAGNOSIS — I10 PRIMARY HYPERTENSION: ICD-10-CM

## 2023-05-30 DIAGNOSIS — J30.9 ALLERGIC RHINITIS, UNSPECIFIED SEASONALITY, UNSPECIFIED TRIGGER: ICD-10-CM

## 2023-05-30 DIAGNOSIS — K59.00 CONSTIPATION, UNSPECIFIED CONSTIPATION TYPE: ICD-10-CM

## 2023-05-30 DIAGNOSIS — F32.A DEPRESSION, UNSPECIFIED DEPRESSION TYPE: ICD-10-CM

## 2023-05-30 DIAGNOSIS — D64.9 ANEMIA, UNSPECIFIED TYPE: Primary | ICD-10-CM

## 2023-05-30 DIAGNOSIS — E78.00 HYPERCHOLESTEROLEMIA: ICD-10-CM

## 2023-05-30 DIAGNOSIS — D50.8 IRON DEFICIENCY ANEMIA SECONDARY TO INADEQUATE DIETARY IRON INTAKE: ICD-10-CM

## 2023-05-30 DIAGNOSIS — R63.0 LOSS OF APPETITE: Chronic | ICD-10-CM

## 2023-05-30 DIAGNOSIS — E55.9 VITAMIN D DEFICIENCY: Chronic | ICD-10-CM

## 2023-05-30 DIAGNOSIS — F51.01 PRIMARY INSOMNIA: ICD-10-CM

## 2023-05-30 DIAGNOSIS — I63.9 CEREBROVASCULAR ACCIDENT (CVA), UNSPECIFIED MECHANISM: Chronic | ICD-10-CM

## 2023-05-30 DIAGNOSIS — E11.65 TYPE 2 DIABETES MELLITUS WITH HYPERGLYCEMIA, WITHOUT LONG-TERM CURRENT USE OF INSULIN: ICD-10-CM

## 2023-05-30 DIAGNOSIS — M54.9 CHRONIC BACK PAIN, UNSPECIFIED BACK LOCATION, UNSPECIFIED BACK PAIN LATERALITY: Chronic | ICD-10-CM

## 2023-05-30 DIAGNOSIS — G89.29 CHRONIC BACK PAIN, UNSPECIFIED BACK LOCATION, UNSPECIFIED BACK PAIN LATERALITY: Chronic | ICD-10-CM

## 2023-05-30 DIAGNOSIS — R54 FRAILTY: ICD-10-CM

## 2023-05-30 RX ORDER — LISINOPRIL 20 MG/1
20 TABLET ORAL DAILY
Qty: 90 TABLET | Refills: 1 | OUTPATIENT
Start: 2023-05-30 | End: 2023-11-26

## 2023-05-30 RX ORDER — MIRTAZAPINE 15 MG/1
TABLET, FILM COATED ORAL
Qty: 90 TABLET | Refills: 0 | OUTPATIENT
Start: 2023-05-30

## 2023-05-30 RX ORDER — AMLODIPINE BESYLATE 10 MG/1
10 TABLET ORAL DAILY
Qty: 90 TABLET | Refills: 0 | OUTPATIENT
Start: 2023-05-30 | End: 2023-08-28

## 2023-05-30 RX ORDER — SODIUM BICARBONATE 650 MG/1
TABLET ORAL
Qty: 180 TABLET | Refills: 10 | OUTPATIENT
Start: 2023-05-30

## 2023-05-30 RX ORDER — POLYETHYLENE GLYCOL 3350 17 G/17G
17 POWDER, FOR SOLUTION ORAL DAILY PRN
Qty: 30 EACH | Refills: 1 | OUTPATIENT
Start: 2023-05-30 | End: 2023-07-29

## 2023-05-30 RX ORDER — FOLIC ACID 1 MG/1
1000 TABLET ORAL DAILY
Qty: 90 TABLET | Refills: 1 | OUTPATIENT
Start: 2023-05-30 | End: 2023-11-26

## 2023-05-30 RX ORDER — FLUTICASONE PROPIONATE 50 MCG
SPRAY, SUSPENSION (ML) NASAL
Qty: 16 G | Refills: 2 | OUTPATIENT
Start: 2023-05-30

## 2023-05-30 RX ORDER — ERGOCALCIFEROL 1.25 MG/1
50000 CAPSULE ORAL
Qty: 12 CAPSULE | Refills: 1 | OUTPATIENT
Start: 2023-05-30 | End: 2023-11-26

## 2023-05-30 RX ORDER — ASPIRIN 325 MG
325 TABLET ORAL DAILY
Qty: 90 TABLET | Refills: 0 | Status: ON HOLD | OUTPATIENT
Start: 2023-05-30 | End: 2023-06-20 | Stop reason: SDUPTHER

## 2023-05-30 RX ORDER — PANTOPRAZOLE SODIUM 40 MG/1
TABLET, DELAYED RELEASE ORAL
Qty: 180 TABLET | Refills: 1 | OUTPATIENT
Start: 2023-05-30

## 2023-05-30 RX ORDER — TRAMADOL HYDROCHLORIDE 50 MG/1
50 TABLET ORAL EVERY 8 HOURS
Qty: 60 TABLET | Refills: 2 | OUTPATIENT
Start: 2023-05-30

## 2023-05-30 RX ORDER — MEGESTROL ACETATE 20 MG/1
20 TABLET ORAL DAILY
Qty: 90 TABLET | Refills: 1 | OUTPATIENT
Start: 2023-05-30 | End: 2023-11-26

## 2023-05-30 RX ORDER — MIRTAZAPINE 15 MG/1
30 TABLET, FILM COATED ORAL NIGHTLY
Qty: 60 TABLET | Refills: 2 | Status: ON HOLD | OUTPATIENT
Start: 2023-05-30 | End: 2023-06-09 | Stop reason: HOSPADM

## 2023-05-30 RX ORDER — GLYBURIDE 5 MG/1
TABLET ORAL
Qty: 180 TABLET | Refills: 1 | OUTPATIENT
Start: 2023-05-30

## 2023-05-30 RX ORDER — CARTILAGE/COLLAGEN/BOR/HYALUR 40-10-5 MG
500 TABLET ORAL 2 TIMES DAILY
Qty: 90 LOZENGE | Refills: 2 | OUTPATIENT
Start: 2023-05-30

## 2023-05-30 RX ORDER — LOVASTATIN 40 MG/1
40 TABLET ORAL NIGHTLY
Qty: 90 TABLET | Refills: 1 | OUTPATIENT
Start: 2023-05-30 | End: 2023-11-26

## 2023-05-30 RX ORDER — FERROUS SULFATE 325(65) MG
325 TABLET ORAL DAILY
Qty: 90 TABLET | Refills: 0 | Status: ON HOLD | OUTPATIENT
Start: 2023-05-30 | End: 2023-06-20

## 2023-05-31 NOTE — TELEPHONE ENCOUNTER
Spoke with constantino at [facility], [place] informed her of what we refilled, also told her that pt other medications should have refills at pts pharmacy. Constantino is also aware that this should cover the pt until her appt in August with Dr. Ny. Constantino voiced understanding.

## 2023-06-06 ENCOUNTER — HOSPITAL ENCOUNTER (INPATIENT)
Facility: HOSPITAL | Age: 88
LOS: 3 days | Discharge: NURSING, REHAB OR LONG-TERM CARE | DRG: 184 | End: 2023-06-09
Attending: EMERGENCY MEDICINE | Admitting: SURGERY
Payer: MEDICARE

## 2023-06-06 ENCOUNTER — PATIENT MESSAGE (OUTPATIENT)
Dept: INTERNAL MEDICINE | Facility: CLINIC | Age: 88
End: 2023-06-06
Payer: MEDICARE

## 2023-06-06 DIAGNOSIS — M25.552 PAIN OF LEFT HIP: ICD-10-CM
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
## 2023-06-06 DIAGNOSIS — S22.42XA CLOSED FRACTURE OF MULTIPLE RIBS OF LEFT SIDE, INITIAL ENCOUNTER: Primary | ICD-10-CM

## 2023-06-06 LAB
ALBUMIN SERPL-MCNC: 3.8 G/DL (ref 3.4–4.8)
ALBUMIN/GLOB SERPL: 0.9 RATIO (ref 1.1–2)
ALP SERPL-CCNC: 77 UNIT/L (ref 40–150)
ALT SERPL-CCNC: 24 UNIT/L (ref 0–55)
APTT PPP: 26.3 SECONDS (ref 23.2–33.7)
AST SERPL-CCNC: 19 UNIT/L (ref 5–34)
BASOPHILS # BLD AUTO: 0.03 X10(3)/MCL
BASOPHILS NFR BLD AUTO: 0.4 %
BILIRUBIN DIRECT+TOT PNL SERPL-MCNC: 0.3 MG/DL
BUN SERPL-MCNC: 13 MG/DL (ref 9.8–20.1)
CALCIUM SERPL-MCNC: 9.9 MG/DL (ref 8.4–10.2)
CHLORIDE SERPL-SCNC: 106 MMOL/L (ref 98–107)
CO2 SERPL-SCNC: 22 MMOL/L (ref 23–31)
CREAT SERPL-MCNC: 0.89 MG/DL (ref 0.55–1.02)
EOSINOPHIL # BLD AUTO: 0.05 X10(3)/MCL (ref 0–0.9)
EOSINOPHIL NFR BLD AUTO: 0.6 %
ERYTHROCYTE [DISTWIDTH] IN BLOOD BY AUTOMATED COUNT: 12.8 % (ref 11.5–17)
EST. AVERAGE GLUCOSE BLD GHB EST-MCNC: 254.7 MG/DL
GFR SERPLBLD CREATININE-BSD FMLA CKD-EPI: >60 MLS/MIN/1.73/M2
GLOBULIN SER-MCNC: 4.2 GM/DL (ref 2.4–3.5)
GLUCOSE SERPL-MCNC: 209 MG/DL (ref 82–115)
HBA1C MFR BLD: 10.5 %
HCT VFR BLD AUTO: 40.9 % (ref 37–47)
HGB BLD-MCNC: 13.8 G/DL (ref 12–16)
IMM GRANULOCYTES # BLD AUTO: 0.05 X10(3)/MCL (ref 0–0.04)
IMM GRANULOCYTES NFR BLD AUTO: 0.6 %
INR BLD: 1.04 (ref 0–1.3)
LYMPHOCYTES # BLD AUTO: 1.3 X10(3)/MCL (ref 0.6–4.6)
LYMPHOCYTES NFR BLD AUTO: 15.7 %
MCH RBC QN AUTO: 31.5 PG (ref 27–31)
MCHC RBC AUTO-ENTMCNC: 33.7 G/DL (ref 33–36)
MCV RBC AUTO: 93.4 FL (ref 80–94)
MONOCYTES # BLD AUTO: 0.49 X10(3)/MCL (ref 0.1–1.3)
MONOCYTES NFR BLD AUTO: 5.9 %
NEUTROPHILS # BLD AUTO: 6.35 X10(3)/MCL (ref 2.1–9.2)
NEUTROPHILS NFR BLD AUTO: 76.8 %
NRBC BLD AUTO-RTO: 0 %
PLATELET # BLD AUTO: 217 X10(3)/MCL (ref 130–400)
PMV BLD AUTO: 10.2 FL (ref 7.4–10.4)
POCT GLUCOSE: 341 MG/DL (ref 70–110)
POTASSIUM SERPL-SCNC: 4.2 MMOL/L (ref 3.5–5.1)
PROT SERPL-MCNC: 8 GM/DL (ref 5.8–7.6)
PROTHROMBIN TIME: 13.5 SECONDS (ref 12.5–14.5)
RBC # BLD AUTO: 4.38 X10(6)/MCL (ref 4.2–5.4)
SODIUM SERPL-SCNC: 137 MMOL/L (ref 136–145)
WBC # SPEC AUTO: 8.27 X10(3)/MCL (ref 4.5–11.5)

## 2023-06-06 PROCEDURE — 27000646 HC AEROBIKA DEVICE

## 2023-06-06 PROCEDURE — 25500020 PHARM REV CODE 255

## 2023-06-06 PROCEDURE — 63600175 PHARM REV CODE 636 W HCPCS

## 2023-06-06 PROCEDURE — 20000000 HC ICU ROOM

## 2023-06-06 PROCEDURE — 94640 AIRWAY INHALATION TREATMENT: CPT

## 2023-06-06 PROCEDURE — 63600175 PHARM REV CODE 636 W HCPCS: Performed by: SURGERY

## 2023-06-06 PROCEDURE — 25000003 PHARM REV CODE 250

## 2023-06-06 PROCEDURE — 85025 COMPLETE CBC W/AUTO DIFF WBC: CPT | Performed by: EMERGENCY MEDICINE

## 2023-06-06 PROCEDURE — 25000242 PHARM REV CODE 250 ALT 637 W/ HCPCS

## 2023-06-06 PROCEDURE — 99285 EMERGENCY DEPT VISIT HI MDM: CPT | Mod: 25

## 2023-06-06 PROCEDURE — 85730 THROMBOPLASTIN TIME PARTIAL: CPT | Performed by: EMERGENCY MEDICINE

## 2023-06-06 PROCEDURE — 99900035 HC TECH TIME PER 15 MIN (STAT)

## 2023-06-06 PROCEDURE — 83036 HEMOGLOBIN GLYCOSYLATED A1C: CPT

## 2023-06-06 PROCEDURE — 80053 COMPREHEN METABOLIC PANEL: CPT | Performed by: EMERGENCY MEDICINE

## 2023-06-06 PROCEDURE — 85610 PROTHROMBIN TIME: CPT | Performed by: EMERGENCY MEDICINE

## 2023-06-06 PROCEDURE — 94664 DEMO&/EVAL PT USE INHALER: CPT

## 2023-06-06 RX ORDER — IBUPROFEN 200 MG
15 TABLET ORAL
Status: DISCONTINUED | OUTPATIENT
Start: 2023-06-06 | End: 2023-06-09 | Stop reason: HOSPADM

## 2023-06-06 RX ORDER — OXYCODONE HYDROCHLORIDE 10 MG/1
10 TABLET ORAL EVERY 6 HOURS PRN
Status: DISCONTINUED | OUTPATIENT
Start: 2023-06-06 | End: 2023-06-09 | Stop reason: HOSPADM

## 2023-06-06 RX ORDER — SODIUM,POTASSIUM PHOSPHATES 280-250MG
2 POWDER IN PACKET (EA) ORAL
Status: DISCONTINUED | OUTPATIENT
Start: 2023-06-06 | End: 2023-06-09 | Stop reason: HOSPADM

## 2023-06-06 RX ORDER — MORPHINE SULFATE 4 MG/ML
2 INJECTION, SOLUTION INTRAMUSCULAR; INTRAVENOUS EVERY 4 HOURS PRN
Status: DISCONTINUED | OUTPATIENT
Start: 2023-06-06 | End: 2023-06-09 | Stop reason: HOSPADM

## 2023-06-06 RX ORDER — GLUCAGON 1 MG
1 KIT INJECTION
Status: DISCONTINUED | OUTPATIENT
Start: 2023-06-06 | End: 2023-06-08

## 2023-06-06 RX ORDER — ACETAMINOPHEN 325 MG/1
650 TABLET ORAL EVERY 6 HOURS PRN
Status: DISCONTINUED | OUTPATIENT
Start: 2023-06-06 | End: 2023-06-09 | Stop reason: HOSPADM

## 2023-06-06 RX ORDER — ENOXAPARIN SODIUM 100 MG/ML
40 INJECTION SUBCUTANEOUS EVERY 24 HOURS
Status: DISCONTINUED | OUTPATIENT
Start: 2023-06-06 | End: 2023-06-06

## 2023-06-06 RX ORDER — ENOXAPARIN SODIUM 100 MG/ML
40 INJECTION SUBCUTANEOUS 2 TIMES DAILY
Status: DISCONTINUED | OUTPATIENT
Start: 2023-06-06 | End: 2023-06-06

## 2023-06-06 RX ORDER — IPRATROPIUM BROMIDE AND ALBUTEROL SULFATE 2.5; .5 MG/3ML; MG/3ML
3 SOLUTION RESPIRATORY (INHALATION) EVERY 4 HOURS
Status: DISCONTINUED | OUTPATIENT
Start: 2023-06-06 | End: 2023-06-09

## 2023-06-06 RX ORDER — LANOLIN ALCOHOL/MO/W.PET/CERES
800 CREAM (GRAM) TOPICAL
Status: DISCONTINUED | OUTPATIENT
Start: 2023-06-06 | End: 2023-06-09 | Stop reason: HOSPADM

## 2023-06-06 RX ORDER — INSULIN ASPART 100 [IU]/ML
0-5 INJECTION, SOLUTION INTRAVENOUS; SUBCUTANEOUS
Status: DISCONTINUED | OUTPATIENT
Start: 2023-06-06 | End: 2023-06-08

## 2023-06-06 RX ORDER — ENOXAPARIN SODIUM 100 MG/ML
30 INJECTION SUBCUTANEOUS 2 TIMES DAILY
Status: DISCONTINUED | OUTPATIENT
Start: 2023-06-06 | End: 2023-06-09 | Stop reason: HOSPADM

## 2023-06-06 RX ORDER — GABAPENTIN 300 MG/1
300 CAPSULE ORAL 3 TIMES DAILY
Status: DISCONTINUED | OUTPATIENT
Start: 2023-06-06 | End: 2023-06-09 | Stop reason: HOSPADM

## 2023-06-06 RX ORDER — SODIUM CHLORIDE 0.9 % (FLUSH) 0.9 %
10 SYRINGE (ML) INJECTION
Status: DISCONTINUED | OUTPATIENT
Start: 2023-06-06 | End: 2023-06-09 | Stop reason: HOSPADM

## 2023-06-06 RX ORDER — ACETAMINOPHEN 500 MG
1000 TABLET ORAL
Status: COMPLETED | OUTPATIENT
Start: 2023-06-06 | End: 2023-06-06

## 2023-06-06 RX ORDER — IBUPROFEN 200 MG
24 TABLET ORAL
Status: DISCONTINUED | OUTPATIENT
Start: 2023-06-06 | End: 2023-06-09 | Stop reason: HOSPADM

## 2023-06-06 RX ORDER — OXYCODONE HYDROCHLORIDE 5 MG/1
5 TABLET ORAL EVERY 6 HOURS PRN
Status: DISCONTINUED | OUTPATIENT
Start: 2023-06-06 | End: 2023-06-09 | Stop reason: HOSPADM

## 2023-06-06 RX ADMIN — IPRATROPIUM BROMIDE AND ALBUTEROL SULFATE 3 ML: .5; 3 SOLUTION RESPIRATORY (INHALATION) at 07:06

## 2023-06-06 RX ADMIN — GABAPENTIN 300 MG: 300 CAPSULE ORAL at 08:06

## 2023-06-06 RX ADMIN — ACETAMINOPHEN 1000 MG: 500 TABLET, FILM COATED ORAL at 03:06

## 2023-06-06 RX ADMIN — ENOXAPARIN SODIUM 30 MG: 30 INJECTION SUBCUTANEOUS at 08:06

## 2023-06-06 RX ADMIN — INSULIN ASPART 4 UNITS: 100 INJECTION, SOLUTION INTRAVENOUS; SUBCUTANEOUS at 08:06

## 2023-06-06 RX ADMIN — IOPAMIDOL 100 ML: 755 INJECTION, SOLUTION INTRAVENOUS at 02:06

## 2023-06-06 NOTE — H&P
Acute Care Surgery   History and Physical    Patient Name: Lita Hess  YOB: 1935  Date: 06/06/2023 4:27 PM  Date of Admission: 6/6/2023  HD#0  POD#* No surgery found *    PRESENTING HISTORY   Chief Complaint/Reason for Admission: fall from standing    History of Present Illness:  Pt is an 88 y/o F with PMHx of two CVA, MI, hypertension, and remote history of colon cancer s/p colectomy who presented today after a fall from standing. Her son states that she was at home cooking in the kitchen when she accidentally walked backwards into her walker behind her and fell, landing on her left side. Her son states that she has been eating less, had decreased cognition and decreased ability to walk with her rolling walker in the past few weeks and has had 3-4 falls at home just in the last month. She was admitted to [facility] about 2 weeks ago after a fall. 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 Imaging today concerning for acute fractures of ribs 4,5,6, and 7 on the left side. She is hemodynamically stable and states that pain is controlled at this time.    Review of Systems:  12 point ROS negative except as stated in HPI    PAST HISTORY:   Past medical history:  Past Medical History:   Diagnosis Date    Chronic back pain 10/26/2022    Colon cancer     CVA (cerebral vascular accident)     Gastroesophageal reflux disease without esophagitis 10/26/2022    Hypertension     Iron deficiency anemia secondary to inadequate dietary iron intake 10/26/2022    Loss of appetite     Mixed hyperlipidemia 10/26/2022    Stroke     Type 2 diabetes mellitus without complication 10/26/2022    Vitamin D deficiency        Past surgical history:  Past Surgical History:   Procedure Laterality Date    COLECTOMY      COLONOSCOPY      ESOPHAGOGASTRODUODENOSCOPY      gastro biopsy      HIP REPLACEMENT ARTHROPLASTY      SCLEROTHERAPY WITH ULTRASOUND GUIDANCE      TRANSESOPHAGEAL  ECHOCARDIOGRAPHY         Family history:  No family history on file.    Social history:  Social History     Socioeconomic History    Marital status:    Tobacco Use    Smoking status: Never    Smokeless tobacco: Never   Substance and Sexual Activity    Alcohol use: Never    Drug use: Never    Sexual activity: Not Currently   Social History Narrative    ** Merged History Encounter **         ** Merged History Encounter **          Social History     Tobacco Use   Smoking Status Never   Smokeless Tobacco Never      Social History     Substance and Sexual Activity   Alcohol Use Never        MEDICATIONS & ALLERGIES:     No current facility-administered medications on file prior to encounter.     Current Outpatient Medications on File Prior to Encounter   Medication Sig    amLODIPine (NORVASC) 10 MG tablet Take 1 tablet (10 mg total) by mouth once daily.    aspirin 325 MG tablet Take 1 tablet (325 mg total) by mouth once daily.    cyanocobalamin, vitamin B-12, 500 mcg Lozg Take 500 mcg by mouth.    ergocalciferol (ERGOCALCIFEROL) 50,000 unit Cap Take 1 capsule (50,000 Units total) by mouth every 7 days. (Patient taking differently: Take 50,000 Units by mouth every 7 days. Takes every wednesdays)    ferrous sulfate (FEOSOL) 325 mg (65 mg iron) Tab tablet Take 1 tablet (325 mg total) by mouth once daily. TAKE ONE TABLET TWICE A DAY    folic acid (FOLVITE) 1 MG tablet Take 1 tablet (1,000 mcg total) by mouth once daily.    glyBURIDE (DIABETA) 5 MG tablet TAKE ONE TABLET TWICE A DAY    lisinopriL (PRINIVIL,ZESTRIL) 20 MG tablet Take 1 tablet (20 mg total) by mouth once daily.    lovastatin (MEVACOR) 40 MG tablet Take 1 tablet (40 mg total) by mouth every evening.    megestroL (MEGACE) 20 MG Tab Take 1 tablet (20 mg total) by mouth once daily Take 1 tablet daily.    metFORMIN (GLUCOPHAGE) 500 MG tablet Take 1 tablet (500 mg total) by mouth every evening.    mirtazapine (REMERON) 15 MG tablet Take 2 tablets (30 mg total)  by mouth every evening. TAKE TWO TABLETS AT BEDTIME    multivitamin with minerals tablet Take 1 tablet by mouth once daily.    pantoprazole (PROTONIX) 40 MG tablet TAKE ONE TABLET TWICE A DAY    sodium bicarbonate 650 MG tablet TAKE TWO TABLETS 3 TIMES DAILY.    traMADoL (ULTRAM) 50 mg tablet TAKE ONE TABLET BY MOUTH EVERY 8 HOURS    fluticasone propionate (FLONASE) 50 mcg/actuation nasal spray   See Instructions, USE 2 SPRAYS IN EACH NOSTRIL TWICE DAILY, # 16 gm, 10 Refill(s), Pharmacy: [facility] Prescription [facility], 157.5, cm, Height/Length Dosing, 08/10/21 15:22:00 CDT, 39.5, kg, Weight Dosing, 10/14/21 14:47:00 CDT    polyethylene glycol 3350 (MIRALAX ORAL) Take 17 g by mouth.    UNABLE TO FIND   Shoe lift, See Instructions, 1 shoe lift for right foot, left leg longer than right, # 1 EA, 0 Refill(s)       Allergies: Review of patient's allergies indicates:  No Known Allergies    Scheduled Meds:   albuterol-ipratropium  3 mL Nebulization Q4H    enoxparin  40 mg Subcutaneous Daily    gabapentin  300 mg Oral TID       Continuous Infusions:    PRN Meds:acetaminophen, dextrose 10%, dextrose 10%, glucagon (human recombinant), glucose, glucose, insulin aspart U-100, iopamidoL, magnesium oxide, magnesium oxide, morphine, oxyCODONE, oxyCODONE, potassium bicarbonate, potassium bicarbonate, potassium bicarbonate, potassium, sodium phosphates, potassium, sodium phosphates, potassium, sodium phosphates, sodium chloride 0.9%    OBJECTIVE:   Vital Signs:  VITAL SIGNS: 24 HR MIN & MAX LAST   Temp  Min: 98.1 °F (36.7 °C)  Max: 98.1 °F (36.7 °C)  98.1 °F (36.7 °C)   BP  Min: 113/63  Max: 144/71  (!) 144/71    Pulse  Min: 90  Max: 104  95    Resp  Min: 14  Max: 27  (!) 27    SpO2  Min: 97 %  Max: 100 %  97 %      HT:    WT:    BMI:       Intake/output:  Intake/Output - Last 3 Shifts       None          No intake or output data in the 24 hours ending 06/06/23 5827      Physical Exam:  General: Well developed, well nourished, no acute  distress  HEENT: Normocephalic, atraumatic, PERRL  CV: RR  Resp: NWOB on room air  GI:  Abdomen soft, non-tender, non-distended, no guarding, no rebound, normoactive bowel sounds, no masses  :  Deferred  MSK: No muscle atrophy, cyanosis, peripheral edema, moving all extremities spontaneously  Skin/wounds:  No rashes, ulcers, erythema  Neuro:  CNII-XII grossly intact, alert and oriented to person, place, and time    Labs:  Troponin:  No results for input(s): TROPONINI in the last 72 hours.  CBC:  Recent Labs     06/06/23  1403   WBC 8.27   RBC 4.38   HGB 13.8   HCT 40.9      MCV 93.4   MCH 31.5*   MCHC 33.7     CMP:  Recent Labs     06/06/23  1403   CALCIUM 9.9   ALBUMIN 3.8      K 4.2   CO2 22*   BUN 13.0   CREATININE 0.89   ALKPHOS 77   ALT 24   AST 19   BILITOT 0.3     Lactic Acid:  No results for input(s): LACTATE in the last 72 hours.  ETOH:  No results for input(s): ETHANOL in the last 72 hours.   Urine Drug Screen:  No results for input(s): COCAINE, OPIATE, BARBITURATE, AMPHETAMINE, FENTANYL, CANNABINOIDS, MDMA in the last 72 hours.    Invalid input(s): BENZODIAZEPINE, PHENCYCLIDINE   ABG:  No results for input(s): PH, PO2, PCO2, HCO3, BE in the last 168 hours.     Diagnostic Results:  CT Head Without Contrast   Final Result      No acute intracranial abnormality identified.  Findings of chronic microvascular ischemic disease.         Electronically signed by: Alen Tripp   Date:    06/06/2023   Time:    15:00      CT Cervical Spine Without Contrast   Final Result      No acute fracture identified         Electronically signed by: Alen Tripp   Date:    06/06/2023   Time:    15:03      CT Chest Abdomen Pelvis With Contrast (xpd)   Final Result      No definite acute intra-abdominal or intrathoracic abnormality.  There are acute left-sided rib fractures as above.  Evaluation for additional fractures is limited by motion artifact.  Soft tissue density at the left sacrum as would be seen with  decubitus ulcer is again noted.         Electronically signed by: Alen Tripp   Date:    06/06/2023   Time:    15:39      X-Ray Femur Ap/Lat Left   Final Result      No acute osseous abnormality.      The bones are demineralized.         Electronically signed by: Nohemi Soliz   Date:    06/06/2023   Time:    13:35      XR Ribs Min 3 views w/PA Chest Left   Final Result      Nondisplaced fractures of the left 5th, 6th and 7th ribs laterally.         Electronically signed by: Nohemi Soliz   Date:    06/06/2023   Time:    13:38      X-Ray Hip 2 or 3 views Left (with Pelvis when performed)   Final Result      No acute osseous abnormality.      The bones are demineralized.         Electronically signed by: Nohemi Soliz   Date:    06/06/2023   Time:    13:35          ASSESSMENT & PLAN:    88 y/o F with PMHx of two CVA, MI, hypertension, and remote history of colon cancer s/p colectomy who presented today after a fall from standing. Found to have acute fractures of left ribs 4,5,6, and 7.    Neuro:  - GCS 15(E 4, V 5, M 6)   - Multimodal pain control   - C-Collar No     Pulmonary:  - Encourage IS/OOB/pulmonary toilet  - Duonebs  - Chest physiotherapy     Cardiovascular:  - Cardiac monitoring while in the ICU     Renal:  - Strict I&Os     FEN/GI:  - IVF: none  - Diet: Diabetic diet  - Daily BMP  - Replace electrolytes as needed based on daily labs     Heme/ID:  - Daily CBC  - Transfuse for Hgb < 7     Endocrine:  -  on arrival  - Will start low dose SSI     Musculoskeletal:  - PT/OT when able to participate  - WB status:   RUE: WBAT  LUE: WBAT  RLE: WBAT  LLE: WBAT  - Tertiary when appropriate      Wounds:  - Local wound care      Precautions:  Aspiration and Fall     Prophylaxis:  GI: Not indicated. Tolerating ordered diet.  Seizure: Not indicated.  DVT: Prophylactic Lovenox 40mg BID     LDA:  Peripheral IV     Disposition:  Admit to trauma ICU for close respiratory monitoring and pain control.       Jerri Pinto MD  [employer] General Surgery PGY-1

## 2023-06-06 NOTE — ED PROVIDER NOTES
Encounter Date: 6/6/2023       History     Chief Complaint   Patient presents with    Fall     Pt to ER via [facility] for fall.  Pt tripped over walker today and fell.  -LOC, -thinners.  Pt complains of left hip pain.  Previous R hip replacement per family.  Hx dementia. At baseline per family     87 y.o.  female with a history of CVA, MI, and hypertension presents to Emergency Department with a chief complaint of fall. Symptoms began today while standing using walker and have been constant since onset. Patient reports she fell on her L side and used her emergency button to call EMS. Associated symptoms include L rib pain and L hip pain. Symptoms are aggravated with movement and palpation and there are no alleviating factors. The patient denies CP, SOB, weakness, fever, chills, or abdominal pain. No other reported symptoms at this time. Unknown if patient hit her head.       The history is provided by the patient. No  was used.   Fall  The accident occurred just prior to arrival. The fall occurred while standing. She landed on A hard floor. There was no blood loss. The point of impact was the left hip. The pain is present in the left hip and chest. She was Not ambulatory at the scene. There was No entrapment after the fall. There was No drug use involved in the accident. There was No alcohol use involved in the accident. Pertinent negatives include no neck pain, no back pain, no paresthesias, no paralysis, no visual change, no fever, no numbness, no abdominal pain, no nausea, no vomiting and no loss of consciousness. The symptoms are aggravated by activity. She has tried nothing for the symptoms.   Review of patient's allergies indicates:  No Known Allergies  Past Medical History:   Diagnosis Date    Chronic back pain 10/26/2022    Colon cancer     CVA (cerebral vascular accident)     Gastroesophageal reflux disease without esophagitis 10/26/2022    Hypertension     Iron deficiency  anemia secondary to inadequate dietary iron intake 10/26/2022    Loss of appetite     Mixed hyperlipidemia 10/26/2022    Stroke     Type 2 diabetes mellitus without complication 10/26/2022    Vitamin D deficiency      Past Surgical History:   Procedure Laterality Date    COLECTOMY      COLONOSCOPY      ESOPHAGOGASTRODUODENOSCOPY      gastro biopsy      HIP REPLACEMENT ARTHROPLASTY      SCLEROTHERAPY WITH ULTRASOUND GUIDANCE      TRANSESOPHAGEAL ECHOCARDIOGRAPHY       No family history on file.  Social History     Tobacco Use    Smoking status: Never    Smokeless tobacco: Never   Substance Use Topics    Alcohol use: Never    Drug use: Never     Review of Systems   Constitutional:  Negative for chills, fatigue and fever.   Eyes:  Negative for photophobia and visual disturbance.   Respiratory:  Negative for cough, shortness of breath, wheezing and stridor.    Cardiovascular:  Negative for chest pain, palpitations and leg swelling.   Gastrointestinal:  Negative for abdominal pain, nausea and vomiting.   Musculoskeletal:  Positive for arthralgias. Negative for back pain, joint swelling and neck pain.   Skin:  Negative for color change and wound.   Neurological:  Negative for dizziness, loss of consciousness, syncope, weakness, numbness and paresthesias.   All other systems reviewed and are negative.    Physical Exam     Initial Vitals [06/06/23 1243]   BP Pulse Resp Temp SpO2   113/63 104 18 98.1 °F (36.7 °C) 100 %      MAP       --         Physical Exam    Nursing note and vitals reviewed.  Constitutional: She appears well-nourished. She is not diaphoretic. She is cooperative.  Non-toxic appearance. No distress.   Frail appearing elderly woman.    HENT:   Head: Normocephalic and atraumatic.   Right Ear: External ear normal.   Left Ear: External ear normal.   Nose: Nose normal.   Mouth/Throat: Oropharynx is clear and moist.   Eyes: Conjunctivae and EOM are normal. Pupils are equal, round, and reactive to light.   Neck:  Neck supple. No tracheal deviation present. No JVD present.   Normal range of motion.  Cardiovascular:  Normal rate, regular rhythm, S1 normal, S2 normal, normal heart sounds, intact distal pulses and normal pulses.           Pulmonary/Chest: Effort normal and breath sounds normal. No respiratory distress. She has no wheezes. She exhibits tenderness.   Abdominal: Abdomen is soft. Bowel sounds are normal. She exhibits no distension. There is no abdominal tenderness. There is no guarding.   Musculoskeletal:         General: Tenderness present. Normal range of motion.      Cervical back: Normal range of motion and neck supple.      Right hip: Normal.      Left hip: Tenderness present. No deformity. Normal range of motion. Normal strength.      Comments: Tenderness noted to L hip. CMS intact. All other adjacent joints otherwise normal.        Neurological: She is alert and oriented to person, place, and time. She has normal strength. No sensory deficit. GCS score is 15. GCS eye subscore is 4. GCS verbal subscore is 5. GCS motor subscore is 6.   Skin: Skin is warm and dry. Capillary refill takes less than 2 seconds. No rash noted. No erythema.   Psychiatric: She has a normal mood and affect. Thought content normal.       ED Course   Procedures  Labs Reviewed   COMPREHENSIVE METABOLIC PANEL - Abnormal; Notable for the following components:       Result Value    Carbon Dioxide 22 (*)     Glucose Level 209 (*)     Protein Total 8.0 (*)     Globulin 4.2 (*)     Albumin/Globulin Ratio 0.9 (*)     All other components within normal limits   CBC WITH DIFFERENTIAL - Abnormal; Notable for the following components:    MCH 31.5 (*)     IG# 0.05 (*)     All other components within normal limits   APTT - Normal   PROTIME-INR - Normal   CBC W/ AUTO DIFFERENTIAL    Narrative:     The following orders were created for panel order CBC auto differential.  Procedure                               Abnormality         Status                      ---------                               -----------         ------                     CBC with Differential[[id number]]        Abnormal            Final result                 Please view results for these tests on the individual orders.   HEMOGLOBIN A1C   POCT GLUCOSE MONITORING CONTINUOUS          Imaging Results              CT Head Without Contrast (Final result)  Result time 06/06/23 15:00:55      Final result by Alen Tripp MD (06/06/23 15:00:55)                   Impression:      No acute intracranial abnormality identified.  Findings of chronic microvascular ischemic disease.      Electronically signed by: Alen Tripp  Date:    06/06/2023  Time:    15:00               Narrative:    EXAMINATION:  CT HEAD WITHOUT CONTRAST    CLINICAL HISTORY:  Head trauma, minor (Age >= 65y);    TECHNIQUE:  Low dose axial images were obtained through the head.  Coronal and sagittal reformations were also performed. Contrast was not administered.    Automatic exposure control was utilized to reduce the patient's radiation dose.    DLP= 16 90    COMPARISON:  05/18/2023    FINDINGS:  No acute intracranial hemorrhage, edema or mass. No acute parenchymal abnormality.    Diffuse cerebral atrophy with concordant ventricular enlargement    Scattered hypodensities throughout the deep periventricular white matter.    The osseous structures are normal.    The mastoid air cells are clear.    The auditory canals are patent bilaterally.    The globes and orbital contents are normal bilaterally.    The visualized maxillary, ethmoid and sphenoid sinuses are clear.                                       CT Cervical Spine Without Contrast (Final result)  Result time 06/06/23 15:03:40      Final result by Alen Tripp MD (06/06/23 15:03:40)                   Impression:      No acute fracture identified      Electronically signed by: Alen Tripp  Date:    06/06/2023  Time:    15:03               Narrative:    EXAMINATION:  CT  CERVICAL SPINE WITHOUT CONTRAST    CLINICAL HISTORY:  Neck trauma (Age >= 65y);    TECHNIQUE:  CT of the cervical spine Without contrast. Sagittal and coronal reconstructions were performed on the source images.    Automatic exposure control was utilized to reduce the patient's radiation dose.    DLP = 250    COMPARISON:  05/18/2023    FINDINGS:  There is no acute fracture, subluxation, or dislocation. Limited detail regarding cervical discs, but there is no finding seen to suggest acute disc herniation. The lateral masses are symmetric about the dens. There is normal lordotic curvature of the cervical spine.    The prevertebral soft tissues are normal. There is no lymphadenopathy.                                       CT Chest Abdomen Pelvis With Contrast (xpd) (Final result)  Result time 06/06/23 15:39:22      Final result by Alen Tripp MD (06/06/23 15:39:22)                   Impression:      No definite acute intra-abdominal or intrathoracic abnormality.  There are acute left-sided rib fractures as above.  Evaluation for additional fractures is limited by motion artifact.  Soft tissue density at the left sacrum as would be seen with decubitus ulcer is again noted.      Electronically signed by: Alen Tripp  Date:    06/06/2023  Time:    15:39               Narrative:    EXAMINATION:  CT CHEST ABDOMEN PELVIS WITH CONTRAST (XPD)    CLINICAL HISTORY:  Polytrauma, blunt;    TECHNIQUE:  Axial images of the chest, abdomen, and pelvis were obtained With Contrast. Sagittal and coronal reconstructed images were available for review.    Automatic exposure control was utilized to reduce the patient's radiation dose.    DLP = 1404    COMPARISON:  No prior images available for comparison.    FINDINGS:  AORTA: The thoracoabdominal aorta is normal in course and caliber. Scattered atherosclerotic disease is noted.    HEART: Normal size. No pericardial effusion.  Coronary atherosclerotic disease is present.    THYROID  GLAND: Right-sided thyroid nodule is noted smaller than previous.    AIRWAYS: Atelectatic changes of the perineum.    LUNGS: There are no masses or nodules identified. No pleural effusion or pneumothorax.    THROACIC LYMPH NODES: There is no significant mediastinal, axillary or hilar lymphadenopathy.    HEPATOBILIARY: No focal hepatic lesion is identified, The gallbladder is normal.    SPLEEN: Normal    PANCREAS: No focal masses or ductal dilatation.    ADRENALS: No adrenal nodules.    KIDNEYS: The bilateral kidneys are unremarkable with no evidence of hydronephrosis.    ABDOMINAL LYMPHADENOPATHY/RETROPERITONEUM: There is no retroperitoneal lymphadenopathy.    BOWEL: No acute bowel related abnormalities. No evidence of appendiceal inflammation.    PELVIC VISCERA: Normal. No pelvic mass.    PELVIC LYMPH NODES: No lymphadenopathy.    PERITONEUM/ BODY WALL: Soft tissue density at left perineum posteriorly    SKELETAL: Acute fractures of the 4th 5th 6th and 7th ribs.  There is scoliotic curvature of the spine with no acute fracture identified.  No fracture of the right ribs identified, however limited by motion artifact severe scoliotic curvature of the spine.                                       X-Ray Femur Ap/Lat Left (Final result)  Result time 06/06/23 13:35:47      Final result by Nohemi Soliz MD (06/06/23 13:35:47)                   Impression:      No acute osseous abnormality.    The bones are demineralized.      Electronically signed by: Nohemi Soliz  Date:    06/06/2023  Time:    13:35               Narrative:    EXAMINATION:  XR HIP WITH PELVIS WHEN PERFORMED, 2 OR 3 VIEWS LEFT; XR FEMUR 2 VIEW LEFT    CLINICAL HISTORY:  fall; Unspecified fall, initial encounter    TECHNIQUE:  AP view of the pelvis and AP and frog leg lateral view of the left hip were performed.    AP and lateral views of the left femur were obtained.    COMPARISON:  None.    FINDINGS:  BONE: The bones appear demineralized.  No  fracture. No dislocation.  There are postsurgical changes of right hip arthroplasty.  Femoral stem is incompletely imaged.  Hardware appears engaged and intact.  Evaluation of the sacrum is limited due to overlying bowel.    SOFT TISSUES: Femoral and popliteal calcific atherosclerosis.                                       XR Ribs Min 3 views w/PA Chest Left (Final result)  Result time 06/06/23 13:38:44      Final result by Nohemi Soliz MD (06/06/23 13:38:44)                   Impression:      Nondisplaced fractures of the left 5th, 6th and 7th ribs laterally.      Electronically signed by: Nohemi Soliz  Date:    06/06/2023  Time:    13:38               Narrative:    EXAMINATION:  XR RIBS MIN 3 VIEWS W/ PA CHEST LEFT    CLINICAL HISTORY:  fall;    TECHNIQUE:  Frontal and oblique views of the left ribs were performed.  Frontal view of the chest.    COMPARISON:  07/21/2021    FINDINGS:  There nondisplaced fractures of the left 5th, 6th and 7th ribs laterally.  Scoliosis with multilevel insufficiency fractures, poorly characterized but grossly similar to prior exam.    No pneumothorax.  Trace left pleural effusion.    Lungs are clear.    Enlarged cardiac silhouette.  Aortic atherosclerosis.    There is a left chest wall loop recorder device.                                       X-Ray Hip 2 or 3 views Left (with Pelvis when performed) (Final result)  Result time 06/06/23 13:35:47      Final result by Nohemi Soliz MD (06/06/23 13:35:47)                   Impression:      No acute osseous abnormality.    The bones are demineralized.      Electronically signed by: Nohemi Soliz  Date:    06/06/2023  Time:    13:35               Narrative:    EXAMINATION:  XR HIP WITH PELVIS WHEN PERFORMED, 2 OR 3 VIEWS LEFT; XR FEMUR 2 VIEW LEFT    CLINICAL HISTORY:  fall; Unspecified fall, initial encounter    TECHNIQUE:  AP view of the pelvis and AP and frog leg lateral view of the left hip were performed.    AP  and lateral views of the left femur were obtained.    COMPARISON:  None.    FINDINGS:  BONE: The bones appear demineralized.  No fracture. No dislocation.  There are postsurgical changes of right hip arthroplasty.  Femoral stem is incompletely imaged.  Hardware appears engaged and intact.  Evaluation of the sacrum is limited due to overlying bowel.    SOFT TISSUES: Femoral and popliteal calcific atherosclerosis.                                       Medications   iopamidoL (ISOVUE-370) injection 100 mL (has no administration in time range)   sodium chloride 0.9% flush 10 mL (has no administration in time range)   enoxaparin injection 40 mg (has no administration in time range)   potassium bicarbonate disintegrating tablet 50 mEq (has no administration in time range)   potassium bicarbonate disintegrating tablet 35 mEq (has no administration in time range)   potassium bicarbonate disintegrating tablet 60 mEq (has no administration in time range)   magnesium oxide tablet 800 mg (has no administration in time range)   magnesium oxide tablet 800 mg (has no administration in time range)   potassium, sodium phosphates 280-160-250 mg packet 2 packet (has no administration in time range)   potassium, sodium phosphates 280-160-250 mg packet 2 packet (has no administration in time range)   potassium, sodium phosphates 280-160-250 mg packet 2 packet (has no administration in time range)   albuterol-ipratropium 2.5 mg-0.5 mg/3 mL nebulizer solution 3 mL (has no administration in time range)   oxyCODONE immediate release tablet 5 mg (has no administration in time range)   oxyCODONE immediate release tablet Tab 10 mg (has no administration in time range)   morphine injection 2 mg (has no administration in time range)   gabapentin capsule 300 mg (has no administration in time range)   acetaminophen tablet 650 mg (has no administration in time range)   glucagon (human recombinant) injection 1 mg (has no administration in time  range)   dextrose 10% bolus 125 mL 125 mL (has no administration in time range)   dextrose 10% bolus 250 mL 250 mL (has no administration in time range)   glucose chewable tablet 16 g (has no administration in time range)   glucose chewable tablet 24 g (has no administration in time range)   insulin aspart U-100 injection 0-5 Units (has no administration in time range)   iopamidoL (ISOVUE-370) injection 100 mL (100 mLs Intravenous Given 6/6/23 1443)   acetaminophen tablet 1,000 mg (1,000 mg Oral Given 6/6/23 1518)     Medical Decision Making:   Initial Assessment:   Patient awake, alert, has non-labored breathing, and follows commands appropriately. C/o L rib pain and L hip pain after a fall that occurred prior to arrival. NAD noted. Denies LOC. Unknown if she hit her head.   Differential Diagnosis:   Hip Fracture, Fall, Rib Fracture   Clinical Tests:   Lab Tests: Ordered and Reviewed  Radiological Study: Ordered and Reviewed  ED Management:  Co-morbidities and/or factors adding to the complexity or risk for the patient?: none  Differential diagnoses: Hip Fracture, Fall, Rib Fracture   Decision to obtain previous or outside records?: I did not review records.   Chart Review (nursing home, outside records, CareEverywhere): none  Labs/imaging/other tests obtained/considered (risk/benefits of testing discussed): cbc, cmp, pt, ptt, ct head, ct cervical spine, ct chest/abdomen/pelvis, xray hip, xray femur, xray ribs  Labs/tests intepretation: Labs unremarkable. CT head- No acute intracranial abnormality identified. Findings of chronic microvascular ischemic disease. CT cervical spine- No acute fracture identified. CT chest- Acute fractures of the 4th 5th 6th and 7th ribs.  No definite acute intra-abdominal or intrathoracic abnormality. There are acute left-sided rib fractures as above. Femur- No acute osseous abnormality. The bones are demineralized. Hip-  No acute osseous abnormality. The bones are demineralized. Rib-  Nondisplaced fractures of the left 5th, 6th and 7th ribs laterally. Informed patient and family of results.   My independent imaging interpretation: none  Treatment/interventions, IV fluids, IV medications: Tylenol given in ER.   Complex management (IV controlled substances, went to OR, DNR, meds requiring monitoring, transfer, etc)?: none  Workup/treatment affected by social determinants of health?:none   Point of care US done/interpretation: none  Consults/radiologist/EMS/social work/family discussion/alternate history: Consulted surgery.   Advanced care planning/end of life discussion: none  Shared decision making: Discussed plan of care and interventions with patient. Agreed to and aware of plan of care. Comfortable being admitted to hospital.   ETOH/smoking/drug cessation discussion: none  Dispo: Patient admitted to hospital.    Other:   I discussed test(s) with the performing physician.  I have discussed this case with another health care provider.           ED Course as of 06/06/23 1627   [facility]   1355 Discussed patient with Dr. Kim, who recommended labs, imaging, and admission to trauma services.  [JA]   1553 Discussed patient with Dr. Pinto, with trauma services. Reports she will come down and assess the patient.  [JA]   1618 Patient admitted to trauma services.  [JA]      ED Course User Index  [JA] Lea Westfall NP                   Clinical Impression:   Final diagnoses:  [W19.XXXA] Fall  [S22.42XA] Closed fracture of multiple ribs of left side, initial encounter (Primary)  [M25.552] Pain of left hip        ED Disposition Condition    Admit                 Lea Westfall NP  06/06/23 [id number]

## 2023-06-06 NOTE — TELEPHONE ENCOUNTER
Spoke with the pts son, pt will be admitted to the hospital. I have inform the  pt son that once the pt has been discharge to home we can schedule a appointment with a NP until she has her establish visit with Dr. Ny.

## 2023-06-06 NOTE — H&P
Acute Care Surgery   History and Physical     Patient Name: Lita Hess  YOB: 1935  Date: 06/06/2023 4:27 PM  Date of Admission: 6/6/2023  HD#0  POD#* No surgery found *     PRESENTING HISTORY   Chief Complaint/Reason for Admission: fall from standing     History of Present Illness:  Pt is an 88 y/o F with PMHx of two CVA, MI, hypertension, and remote history of colon cancer s/p colectomy who presented today after a fall from standing. Her son states that she was at home cooking in the kitchen when she accidentally walked backwards into her walker behind her and fell, landing on her left side. Her son states that she has been eating less, had decreased cognition and decreased ability to walk with her rolling walker in the past few weeks and has had 3-4 falls at home just in the last month. She was admitted to [facility] about 2 weeks ago after a fall. 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 Imaging today concerning for acute fractures of ribs 4,5,6, and 7 on the left side. She is hemodynamically stable and states that pain is controlled at this time.     Review of Systems:  12 point ROS negative except as stated in HPI     PAST HISTORY:   Past medical history:       Past Medical History:   Diagnosis Date    Chronic back pain 10/26/2022    Colon cancer      CVA (cerebral vascular accident)      Gastroesophageal reflux disease without esophagitis 10/26/2022    Hypertension      Iron deficiency anemia secondary to inadequate dietary iron intake 10/26/2022    Loss of appetite      Mixed hyperlipidemia 10/26/2022    Stroke      Type 2 diabetes mellitus without complication 10/26/2022    Vitamin D deficiency           Past surgical history:        Past Surgical History:   Procedure Laterality Date    COLECTOMY        COLONOSCOPY        ESOPHAGOGASTRODUODENOSCOPY        gastro biopsy        HIP REPLACEMENT ARTHROPLASTY        SCLEROTHERAPY WITH ULTRASOUND GUIDANCE         TRANSESOPHAGEAL ECHOCARDIOGRAPHY             Family history:  No family history on file.     Social history:  Social History           Socioeconomic History    Marital status:    Tobacco Use    Smoking status: Never    Smokeless tobacco: Never   Substance and Sexual Activity    Alcohol use: Never    Drug use: Never    Sexual activity: Not Currently   Social History Narrative     ** Merged History Encounter **           ** Merged History Encounter **            Social History          Tobacco Use   Smoking Status Never   Smokeless Tobacco Never      Social History          Substance and Sexual Activity   Alcohol Use Never         MEDICATIONS & ALLERGIES:      No current facility-administered medications on file prior to encounter.           Current Outpatient Medications on File Prior to Encounter   Medication Sig    amLODIPine (NORVASC) 10 MG tablet Take 1 tablet (10 mg total) by mouth once daily.    aspirin 325 MG tablet Take 1 tablet (325 mg total) by mouth once daily.    cyanocobalamin, vitamin B-12, 500 mcg Lozg Take 500 mcg by mouth.    ergocalciferol (ERGOCALCIFEROL) 50,000 unit Cap Take 1 capsule (50,000 Units total) by mouth every 7 days. (Patient taking differently: Take 50,000 Units by mouth every 7 days. Takes every wednesdays)    ferrous sulfate (FEOSOL) 325 mg (65 mg iron) Tab tablet Take 1 tablet (325 mg total) by mouth once daily. TAKE ONE TABLET TWICE A DAY    folic acid (FOLVITE) 1 MG tablet Take 1 tablet (1,000 mcg total) by mouth once daily.    glyBURIDE (DIABETA) 5 MG tablet TAKE ONE TABLET TWICE A DAY    lisinopriL (PRINIVIL,ZESTRIL) 20 MG tablet Take 1 tablet (20 mg total) by mouth once daily.    lovastatin (MEVACOR) 40 MG tablet Take 1 tablet (40 mg total) by mouth every evening.    megestroL (MEGACE) 20 MG Tab Take 1 tablet (20 mg total) by mouth once daily Take 1 tablet daily.    metFORMIN (GLUCOPHAGE) 500 MG tablet Take 1 tablet (500 mg total) by mouth every evening.     mirtazapine (REMERON) 15 MG tablet Take 2 tablets (30 mg total) by mouth every evening. TAKE TWO TABLETS AT BEDTIME    multivitamin with minerals tablet Take 1 tablet by mouth once daily.    pantoprazole (PROTONIX) 40 MG tablet TAKE ONE TABLET TWICE A DAY    sodium bicarbonate 650 MG tablet TAKE TWO TABLETS 3 TIMES DAILY.    traMADoL (ULTRAM) 50 mg tablet TAKE ONE TABLET BY MOUTH EVERY 8 HOURS    fluticasone propionate (FLONASE) 50 mcg/actuation nasal spray    See Instructions, USE 2 SPRAYS IN EACH NOSTRIL TWICE DAILY, # 16 gm, 10 Refill(s), Pharmacy: [facility] [place], 157.5, cm, Height/Length Dosing, 08/10/21 15:22:00 CDT, 39.5, kg, Weight Dosing, 10/14/21 14:47:00 CDT    polyethylene glycol 3350 (MIRALAX ORAL) Take 17 g by mouth.    UNABLE TO FIND    Shoe lift, See Instructions, 1 shoe lift for right foot, left leg longer than right, # 1 EA, 0 Refill(s)         Allergies: Review of patient's allergies indicates:  No Known Allergies     Scheduled Meds:   albuterol-ipratropium  3 mL Nebulization Q4H    enoxparin  40 mg Subcutaneous Daily    gabapentin  300 mg Oral TID         Continuous Infusions:     PRN Meds:acetaminophen, dextrose 10%, dextrose 10%, glucagon (human recombinant), glucose, glucose, insulin aspart U-100, iopamidoL, magnesium oxide, magnesium oxide, morphine, oxyCODONE, oxyCODONE, potassium bicarbonate, potassium bicarbonate, potassium bicarbonate, potassium, sodium phosphates, potassium, sodium phosphates, potassium, sodium phosphates, sodium chloride 0.9%     OBJECTIVE:   Vital Signs:  VITAL SIGNS: 24 HR MIN & MAX LAST   Temp  Min: 98.1 °F (36.7 °C)  Max: 98.1 °F (36.7 °C)  98.1 °F (36.7 °C)   BP  Min: 113/63  Max: 144/71  (!) 144/71    Pulse  Min: 90  Max: 104  95    Resp  Min: 14  Max: 27  (!) 27    SpO2  Min: 97 %  Max: 100 %  97 %       HT:    WT:    BMI:        Intake/output:  Intake/Output - Last 3 Shifts         None             No intake or output data in the 24 hours ending  06/06/23 1627       Physical Exam:  General: Well developed, well nourished, no acute distress  HEENT: Normocephalic, atraumatic, PERRL  CV: RR  Resp: NWOB on room air  GI:  Abdomen soft, non-tender, non-distended, no guarding, no rebound, normoactive bowel sounds, no masses  :  Deferred  MSK: No muscle atrophy, cyanosis, peripheral edema, moving all extremities spontaneously  Skin/wounds:  No rashes, ulcers, erythema  Neuro:  CNII-XII grossly intact, alert and oriented to person, place, and time     Labs:  Troponin:  No results for input(s): TROPONINI in the last 72 hours.  CBC:      Recent Labs     06/06/23  1403   WBC 8.27   RBC 4.38   HGB 13.8   HCT 40.9      MCV 93.4   MCH 31.5*   MCHC 33.7      CMP:      Recent Labs     06/06/23  1403   CALCIUM 9.9   ALBUMIN 3.8      K 4.2   CO2 22*   BUN 13.0   CREATININE 0.89   ALKPHOS 77   ALT 24   AST 19   BILITOT 0.3      Lactic Acid:  No results for input(s): LACTATE in the last 72 hours.  ETOH:  No results for input(s): ETHANOL in the last 72 hours.   Urine Drug Screen:  No results for input(s): COCAINE, OPIATE, BARBITURATE, AMPHETAMINE, FENTANYL, CANNABINOIDS, MDMA in the last 72 hours.     Invalid input(s): BENZODIAZEPINE, PHENCYCLIDINE   ABG:  No results for input(s): PH, PO2, PCO2, HCO3, BE in the last 168 hours.      Diagnostic Results:  CT Head Without Contrast   Final Result       No acute intracranial abnormality identified.  Findings of chronic microvascular ischemic disease.           Electronically signed by:       Alen Tripp   Date:                                                06/06/2023   Time:                                                15:00       CT Cervical Spine Without Contrast   Final Result       No acute fracture identified           Electronically signed by:       Alen Tripp   Date:                                                06/06/2023   Time:                                                15:03       CT Chest  Abdomen Pelvis With Contrast (xpd)   Final Result       No definite acute intra-abdominal or intrathoracic abnormality.  There are acute left-sided rib fractures as above.  Evaluation for additional fractures is limited by motion artifact.  Soft tissue density at the left sacrum as would be seen with decubitus ulcer is again noted.           Electronically signed by:       Alen Tripp   Date:                                                06/06/2023   Time:                                                15:39       X-Ray Femur Ap/Lat Left   Final Result       No acute osseous abnormality.       The bones are demineralized.           Electronically signed by:       Nohemi Soliz   Date:                                                06/06/2023   Time:                                                13:35       XR Ribs Min 3 views w/PA Chest Left   Final Result       Nondisplaced fractures of the left 5th, 6th and 7th ribs laterally.           Electronically signed by:       Nohemi Soliz   Date:                                                06/06/2023   Time:                                                13:38       X-Ray Hip 2 or 3 views Left (with Pelvis when performed)   Final Result       No acute osseous abnormality.       The bones are demineralized.           Electronically signed by:       Nohemi Soliz   Date:                                                06/06/2023   Time:                                                13:35             ASSESSMENT & PLAN:    88 y/o F with PMHx of two CVA, MI, hypertension, and remote history of colon cancer s/p colectomy who presented today after a fall from standing. Found to have acute fractures of left ribs 4,5,6, and 7.     Neuro:  - GCS 15(E 4, V 5, M 6)   - Multimodal pain control   - C-Collar No     Pulmonary:  - Encourage IS/OOB/pulmonary toilet  - Duonebs  - Chest physiotherapy     Cardiovascular:  - Cardiac monitoring while in the ICU     Renal:  -  Strict I&Os     FEN/GI:  - IVF: none  - Diet: Diabetic diet  - Daily BMP  - Replace electrolytes as needed based on daily labs     Heme/ID:  - Daily CBC  - Transfuse for Hgb < 7     Endocrine:  -  on arrival  - Will start low dose SSI     Musculoskeletal:  - PT/OT when able to participate  - WB status:   RUE: WBAT  LUE: WBAT  RLE: WBAT  LLE: WBAT  - Tertiary when appropriate      Wounds:  - Local wound care      Precautions:  Aspiration and Fall     Prophylaxis:  GI: Not indicated. Tolerating ordered diet.  Seizure: Not indicated.  DVT: Prophylactic Lovenox 40mg BID     LDA:  Peripheral IV     Disposition:  Admit to trauma ICU for close respiratory monitoring and pain control.       Jerri Pinto MD  [employer] General Surgery PGY-1

## 2023-06-06 NOTE — FIRST PROVIDER EVALUATION
Medical screening examination initiated.  I have conducted a focused provider triage encounter, findings are as follows:    Chief Complaint   Patient presents with    Fall     Pt to ER via [facility] for fall.  Pt tripped over walker today and fell.  -LOC, -thinners.  Pt complains of left hip pain.  Previous R hip replacement per family.  Hx dementia. At baseline per family     Brief history of present illness: 87 y.o. female presents to the ED via EMS with left rib pain, left hip pain and left thigh pain secondary to trip and fall onset this morning.  Denies hitting her head or LOC. Unsure if she was on a blood thinner.      Vitals:    06/06/23 1243   BP: 113/63   Pulse: 104   Resp: 18   Temp: 98.1 °F (36.7 °C)   TempSrc: Temporal   SpO2: 100%       Pertinent physical exam:  Awake, alert, non-labored respirations    Brief workup plan:  imaging     Preliminary workup initiated; this workup will be continued and followed by the physician or advanced practice provider that is assigned to the patient when roomed.

## 2023-06-07 ENCOUNTER — EXTERNAL HOME HEALTH (OUTPATIENT)
Dept: HOME HEALTH SERVICES | Facility: HOSPITAL | Age: 88
End: 2023-06-07
Payer: MEDICARE

## 2023-06-07 PROBLEM — E43 SEVERE MALNUTRITION: Status: ACTIVE | Noted: 2023-06-07

## 2023-06-07 LAB
ANION GAP SERPL CALC-SCNC: 11 MEQ/L
BASOPHILS # BLD AUTO: 0.03 X10(3)/MCL
BASOPHILS NFR BLD AUTO: 0.4 %
BUN SERPL-MCNC: 11 MG/DL (ref 9.8–20.1)
CALCIUM SERPL-MCNC: 9.1 MG/DL (ref 8.4–10.2)
CHLORIDE SERPL-SCNC: 107 MMOL/L (ref 98–107)
CO2 SERPL-SCNC: 19 MMOL/L (ref 23–31)
CREAT SERPL-MCNC: 0.82 MG/DL (ref 0.55–1.02)
CREAT/UREA NIT SERPL: 13
EOSINOPHIL # BLD AUTO: 0.05 X10(3)/MCL (ref 0–0.9)
EOSINOPHIL NFR BLD AUTO: 0.7 %
ERYTHROCYTE [DISTWIDTH] IN BLOOD BY AUTOMATED COUNT: 12.9 % (ref 11.5–17)
GFR SERPLBLD CREATININE-BSD FMLA CKD-EPI: >60 MLS/MIN/1.73/M2
GLUCOSE SERPL-MCNC: 283 MG/DL (ref 82–115)
HCT VFR BLD AUTO: 36.8 % (ref 37–47)
HGB BLD-MCNC: 12.3 G/DL (ref 12–16)
IMM GRANULOCYTES # BLD AUTO: 0.02 X10(3)/MCL (ref 0–0.04)
IMM GRANULOCYTES NFR BLD AUTO: 0.3 %
LYMPHOCYTES # BLD AUTO: 2.06 X10(3)/MCL (ref 0.6–4.6)
LYMPHOCYTES NFR BLD AUTO: 28.7 %
MAGNESIUM SERPL-MCNC: 1.6 MG/DL (ref 1.6–2.6)
MCH RBC QN AUTO: 31.1 PG (ref 27–31)
MCHC RBC AUTO-ENTMCNC: 33.4 G/DL (ref 33–36)
MCV RBC AUTO: 92.9 FL (ref 80–94)
MONOCYTES # BLD AUTO: 0.58 X10(3)/MCL (ref 0.1–1.3)
MONOCYTES NFR BLD AUTO: 8.1 %
NEUTROPHILS # BLD AUTO: 4.45 X10(3)/MCL (ref 2.1–9.2)
NEUTROPHILS NFR BLD AUTO: 61.8 %
NRBC BLD AUTO-RTO: 0 %
PHOSPHATE SERPL-MCNC: 2.7 MG/DL (ref 2.3–4.7)
PLATELET # BLD AUTO: 197 X10(3)/MCL (ref 130–400)
PMV BLD AUTO: 11 FL (ref 7.4–10.4)
POCT GLUCOSE: 299 MG/DL (ref 70–110)
POCT GLUCOSE: 339 MG/DL (ref 70–110)
POCT GLUCOSE: 346 MG/DL (ref 70–110)
POCT GLUCOSE: 454 MG/DL (ref 70–110)
POCT GLUCOSE: 492 MG/DL (ref 70–110)
POTASSIUM SERPL-SCNC: 3.8 MMOL/L (ref 3.5–5.1)
RBC # BLD AUTO: 3.96 X10(6)/MCL (ref 4.2–5.4)
SODIUM SERPL-SCNC: 137 MMOL/L (ref 136–145)
WBC # SPEC AUTO: 7.19 X10(3)/MCL (ref 4.5–11.5)

## 2023-06-07 PROCEDURE — 63600175 PHARM REV CODE 636 W HCPCS

## 2023-06-07 PROCEDURE — 80048 BASIC METABOLIC PNL TOTAL CA: CPT

## 2023-06-07 PROCEDURE — 94640 AIRWAY INHALATION TREATMENT: CPT

## 2023-06-07 PROCEDURE — 63600175 PHARM REV CODE 636 W HCPCS: Performed by: SURGERY

## 2023-06-07 PROCEDURE — 25000242 PHARM REV CODE 250 ALT 637 W/ HCPCS

## 2023-06-07 PROCEDURE — 11000001 HC ACUTE MED/SURG PRIVATE ROOM

## 2023-06-07 PROCEDURE — 27000646 HC AEROBIKA DEVICE

## 2023-06-07 PROCEDURE — 94664 DEMO&/EVAL PT USE INHALER: CPT

## 2023-06-07 PROCEDURE — 99900031 HC PATIENT EDUCATION (STAT)

## 2023-06-07 PROCEDURE — 97163 PT EVAL HIGH COMPLEX 45 MIN: CPT

## 2023-06-07 PROCEDURE — 94761 N-INVAS EAR/PLS OXIMETRY MLT: CPT

## 2023-06-07 PROCEDURE — 25000003 PHARM REV CODE 250

## 2023-06-07 PROCEDURE — 94799 UNLISTED PULMONARY SVC/PX: CPT

## 2023-06-07 PROCEDURE — 85025 COMPLETE CBC W/AUTO DIFF WBC: CPT

## 2023-06-07 PROCEDURE — 99900035 HC TECH TIME PER 15 MIN (STAT)

## 2023-06-07 PROCEDURE — 84100 ASSAY OF PHOSPHORUS: CPT

## 2023-06-07 PROCEDURE — 83735 ASSAY OF MAGNESIUM: CPT

## 2023-06-07 RX ORDER — PANTOPRAZOLE SODIUM 40 MG/1
40 TABLET, DELAYED RELEASE ORAL 2 TIMES DAILY
Status: DISCONTINUED | OUTPATIENT
Start: 2023-06-07 | End: 2023-06-09 | Stop reason: HOSPADM

## 2023-06-07 RX ORDER — ASPIRIN 325 MG
325 TABLET ORAL DAILY
Status: DISCONTINUED | OUTPATIENT
Start: 2023-06-07 | End: 2023-06-09 | Stop reason: HOSPADM

## 2023-06-07 RX ORDER — MEGESTROL ACETATE 20 MG/1
20 TABLET ORAL DAILY
Status: DISCONTINUED | OUTPATIENT
Start: 2023-06-07 | End: 2023-06-09 | Stop reason: HOSPADM

## 2023-06-07 RX ORDER — MIRTAZAPINE 15 MG/1
30 TABLET, FILM COATED ORAL NIGHTLY
Status: DISCONTINUED | OUTPATIENT
Start: 2023-06-07 | End: 2023-06-09 | Stop reason: HOSPADM

## 2023-06-07 RX ORDER — LISINOPRIL 20 MG/1
20 TABLET ORAL DAILY
Status: DISCONTINUED | OUTPATIENT
Start: 2023-06-07 | End: 2023-06-09 | Stop reason: HOSPADM

## 2023-06-07 RX ORDER — FOLIC ACID 1 MG/1
1000 TABLET ORAL DAILY
Status: DISCONTINUED | OUTPATIENT
Start: 2023-06-07 | End: 2023-06-09 | Stop reason: HOSPADM

## 2023-06-07 RX ORDER — ATORVASTATIN CALCIUM 10 MG/1
10 TABLET, FILM COATED ORAL DAILY
Status: DISCONTINUED | OUTPATIENT
Start: 2023-06-07 | End: 2023-06-09 | Stop reason: HOSPADM

## 2023-06-07 RX ORDER — AMLODIPINE BESYLATE 5 MG/1
10 TABLET ORAL DAILY
Status: DISCONTINUED | OUTPATIENT
Start: 2023-06-07 | End: 2023-06-09 | Stop reason: HOSPADM

## 2023-06-07 RX ADMIN — GABAPENTIN 300 MG: 300 CAPSULE ORAL at 08:06

## 2023-06-07 RX ADMIN — PANTOPRAZOLE SODIUM 40 MG: 40 TABLET, DELAYED RELEASE ORAL at 08:06

## 2023-06-07 RX ADMIN — INSULIN ASPART 5 UNITS: 100 INJECTION, SOLUTION INTRAVENOUS; SUBCUTANEOUS at 08:06

## 2023-06-07 RX ADMIN — MEGESTROL ACETATE 20 MG: 20 TABLET ORAL at 08:06

## 2023-06-07 RX ADMIN — OXYCODONE HYDROCHLORIDE 5 MG: 5 TABLET ORAL at 08:06

## 2023-06-07 RX ADMIN — AMLODIPINE BESYLATE 10 MG: 5 TABLET ORAL at 08:06

## 2023-06-07 RX ADMIN — IPRATROPIUM BROMIDE AND ALBUTEROL SULFATE 3 ML: .5; 3 SOLUTION RESPIRATORY (INHALATION) at 08:06

## 2023-06-07 RX ADMIN — INSULIN DETEMIR 20 UNITS: 100 INJECTION, SOLUTION SUBCUTANEOUS at 08:06

## 2023-06-07 RX ADMIN — OXYCODONE HYDROCHLORIDE 5 MG: 5 TABLET ORAL at 10:06

## 2023-06-07 RX ADMIN — ENOXAPARIN SODIUM 30 MG: 30 INJECTION SUBCUTANEOUS at 08:06

## 2023-06-07 RX ADMIN — OXYCODONE HYDROCHLORIDE 5 MG: 5 TABLET ORAL at 12:06

## 2023-06-07 RX ADMIN — MAGNESIUM OXIDE TAB 400 MG (241.3 MG ELEMENTAL MG) 800 MG: 400 (241.3 MG) TAB at 03:06

## 2023-06-07 RX ADMIN — IPRATROPIUM BROMIDE AND ALBUTEROL SULFATE 3 ML: .5; 3 SOLUTION RESPIRATORY (INHALATION) at 04:06

## 2023-06-07 RX ADMIN — MIRTAZAPINE 30 MG: 15 TABLET, FILM COATED ORAL at 08:06

## 2023-06-07 RX ADMIN — IPRATROPIUM BROMIDE AND ALBUTEROL SULFATE 3 ML: .5; 3 SOLUTION RESPIRATORY (INHALATION) at 12:06

## 2023-06-07 RX ADMIN — ASPIRIN 325 MG ORAL TABLET 325 MG: 325 PILL ORAL at 08:06

## 2023-06-07 RX ADMIN — INSULIN ASPART 1 UNITS: 100 INJECTION, SOLUTION INTRAVENOUS; SUBCUTANEOUS at 12:06

## 2023-06-07 RX ADMIN — ATORVASTATIN CALCIUM 10 MG: 10 TABLET, FILM COATED ORAL at 08:06

## 2023-06-07 RX ADMIN — INSULIN ASPART 4 UNITS: 100 INJECTION, SOLUTION INTRAVENOUS; SUBCUTANEOUS at 11:06

## 2023-06-07 RX ADMIN — POTASSIUM & SODIUM PHOSPHATES POWDER PACK 280-160-250 MG 2 PACKET: 280-160-250 PACK at 03:06

## 2023-06-07 RX ADMIN — LISINOPRIL 20 MG: 20 TABLET ORAL at 08:06

## 2023-06-07 RX ADMIN — INSULIN ASPART 4 UNITS: 100 INJECTION, SOLUTION INTRAVENOUS; SUBCUTANEOUS at 04:06

## 2023-06-07 RX ADMIN — FOLIC ACID 1000 MCG: 1 TABLET ORAL at 08:06

## 2023-06-07 NOTE — NURSING
Spoke with sister Raegan @ [phone] about evening update and plan of care for the day. All questions and concerns answered.

## 2023-06-07 NOTE — TERTIARY TRAUMA SURVEY NOTE
TERTIARY TRAUMA SURVEY (TTS)    List Injuries Identified to Date:   1. Acute fractures of the 4th 5th 6th and 7th ribs    List Operations and Procedures:     Past Surgical History:   Procedure Laterality Date    COLECTOMY      COLONOSCOPY      ESOPHAGOGASTRODUODENOSCOPY      gastro biopsy      HIP REPLACEMENT ARTHROPLASTY      SCLEROTHERAPY WITH ULTRASOUND GUIDANCE      TRANSESOPHAGEAL ECHOCARDIOGRAPHY         Incidental findings:   1. Right thyroid nodule    Follow up with PCP    Past Medical History:     Past Medical History:   Diagnosis Date    Chronic back pain 10/26/2022    Colon cancer     CVA (cerebral vascular accident)     Gastroesophageal reflux disease without esophagitis 10/26/2022    Hypertension     Iron deficiency anemia secondary to inadequate dietary iron intake 10/26/2022    Loss of appetite     Mixed hyperlipidemia 10/26/2022    Stroke     Type 2 diabetes mellitus without complication 10/26/2022    Vitamin D deficiency        Tertiary Physical Exam:     Physical Exam  HENT:      Head: Normocephalic and atraumatic.      Right Ear: External ear normal.      Left Ear: External ear normal.      Nose: Nose normal.   Eyes:      Pupils: Pupils are equal, round, and reactive to light.   Cardiovascular:      Rate and Rhythm: Normal rate.      Pulses: Normal pulses.   Pulmonary:      Effort: Pulmonary effort is normal.   Abdominal:      General: Abdomen is flat.      Palpations: Abdomen is soft.   Musculoskeletal:         General: No tenderness, deformity or signs of injury.      Cervical back: No tenderness.   Skin:     General: Skin is warm.   Neurological:      Mental Status: She is alert. Mental status is at baseline.      Comments: Oriented x2 (person, place)       Imaging Review:     Imaging Results              CT Head Without Contrast (Final result)  Result time 06/06/23 15:00:55      Final result by Alen Tripp MD (06/06/23 15:00:55)                   Impression:      No acute intracranial  abnormality identified.  Findings of chronic microvascular ischemic disease.      Electronically signed by: Alen Tripp  Date:    06/06/2023  Time:    15:00               Narrative:    EXAMINATION:  CT HEAD WITHOUT CONTRAST    CLINICAL HISTORY:  Head trauma, minor (Age >= 65y);    TECHNIQUE:  Low dose axial images were obtained through the head.  Coronal and sagittal reformations were also performed. Contrast was not administered.    Automatic exposure control was utilized to reduce the patient's radiation dose.    DLP= 16 90    COMPARISON:  05/18/2023    FINDINGS:  No acute intracranial hemorrhage, edema or mass. No acute parenchymal abnormality.    Diffuse cerebral atrophy with concordant ventricular enlargement    Scattered hypodensities throughout the deep periventricular white matter.    The osseous structures are normal.    The mastoid air cells are clear.    The auditory canals are patent bilaterally.    The globes and orbital contents are normal bilaterally.    The visualized maxillary, ethmoid and sphenoid sinuses are clear.                                       CT Cervical Spine Without Contrast (Final result)  Result time 06/06/23 15:03:40      Final result by Alen Tripp MD (06/06/23 15:03:40)                   Impression:      No acute fracture identified      Electronically signed by: Alen Tripp  Date:    06/06/2023  Time:    15:03               Narrative:    EXAMINATION:  CT CERVICAL SPINE WITHOUT CONTRAST    CLINICAL HISTORY:  Neck trauma (Age >= 65y);    TECHNIQUE:  CT of the cervical spine Without contrast. Sagittal and coronal reconstructions were performed on the source images.    Automatic exposure control was utilized to reduce the patient's radiation dose.    DLP = 250    COMPARISON:  05/18/2023    FINDINGS:  There is no acute fracture, subluxation, or dislocation. Limited detail regarding cervical discs, but there is no finding seen to suggest acute disc herniation. The lateral  masses are symmetric about the dens. There is normal lordotic curvature of the cervical spine.    The prevertebral soft tissues are normal. There is no lymphadenopathy.                                       CT Chest Abdomen Pelvis With Contrast (xpd) (Final result)  Result time 06/06/23 15:39:22      Final result by Alen Tripp MD (06/06/23 15:39:22)                   Impression:      No definite acute intra-abdominal or intrathoracic abnormality.  There are acute left-sided rib fractures as above.  Evaluation for additional fractures is limited by motion artifact.  Soft tissue density at the left sacrum as would be seen with decubitus ulcer is again noted.      Electronically signed by: Alen Tripp  Date:    06/06/2023  Time:    15:39               Narrative:    EXAMINATION:  CT CHEST ABDOMEN PELVIS WITH CONTRAST (XPD)    CLINICAL HISTORY:  Polytrauma, blunt;    TECHNIQUE:  Axial images of the chest, abdomen, and pelvis were obtained With Contrast. Sagittal and coronal reconstructed images were available for review.    Automatic exposure control was utilized to reduce the patient's radiation dose.    DLP = 1404    COMPARISON:  No prior images available for comparison.    FINDINGS:  AORTA: The thoracoabdominal aorta is normal in course and caliber. Scattered atherosclerotic disease is noted.    HEART: Normal size. No pericardial effusion.  Coronary atherosclerotic disease is present.    THYROID GLAND: Right-sided thyroid nodule is noted smaller than previous.    AIRWAYS: Atelectatic changes of the perineum.    LUNGS: There are no masses or nodules identified. No pleural effusion or pneumothorax.    THROACIC LYMPH NODES: There is no significant mediastinal, axillary or hilar lymphadenopathy.    HEPATOBILIARY: No focal hepatic lesion is identified, The gallbladder is normal.    SPLEEN: Normal    PANCREAS: No focal masses or ductal dilatation.    ADRENALS: No adrenal nodules.    KIDNEYS: The bilateral kidneys  are unremarkable with no evidence of hydronephrosis.    ABDOMINAL LYMPHADENOPATHY/RETROPERITONEUM: There is no retroperitoneal lymphadenopathy.    BOWEL: No acute bowel related abnormalities. No evidence of appendiceal inflammation.    PELVIC VISCERA: Normal. No pelvic mass.    PELVIC LYMPH NODES: No lymphadenopathy.    PERITONEUM/ BODY WALL: Soft tissue density at left perineum posteriorly    SKELETAL: Acute fractures of the 4th 5th 6th and 7th ribs.  There is scoliotic curvature of the spine with no acute fracture identified.  No fracture of the right ribs identified, however limited by motion artifact severe scoliotic curvature of the spine.                                       X-Ray Femur Ap/Lat Left (Final result)  Result time 06/06/23 13:35:47      Final result by Nohemi Soliz MD (06/06/23 13:35:47)                   Impression:      No acute osseous abnormality.    The bones are demineralized.      Electronically signed by: Nohemi Soliz  Date:    06/06/2023  Time:    13:35               Narrative:    EXAMINATION:  XR HIP WITH PELVIS WHEN PERFORMED, 2 OR 3 VIEWS LEFT; XR FEMUR 2 VIEW LEFT    CLINICAL HISTORY:  fall; Unspecified fall, initial encounter    TECHNIQUE:  AP view of the pelvis and AP and frog leg lateral view of the left hip were performed.    AP and lateral views of the left femur were obtained.    COMPARISON:  None.    FINDINGS:  BONE: The bones appear demineralized.  No fracture. No dislocation.  There are postsurgical changes of right hip arthroplasty.  Femoral stem is incompletely imaged.  Hardware appears engaged and intact.  Evaluation of the sacrum is limited due to overlying bowel.    SOFT TISSUES: Femoral and popliteal calcific atherosclerosis.                                       XR Ribs Min 3 views w/PA Chest Left (Final result)  Result time 06/06/23 13:38:44      Final result by Nohemi Soliz MD (06/06/23 13:38:44)                   Impression:      Nondisplaced  fractures of the left 5th, 6th and 7th ribs laterally.      Electronically signed by: Nohemi Soliz  Date:    06/06/2023  Time:    13:38               Narrative:    EXAMINATION:  XR RIBS MIN 3 VIEWS W/ PA CHEST LEFT    CLINICAL HISTORY:  fall;    TECHNIQUE:  Frontal and oblique views of the left ribs were performed.  Frontal view of the chest.    COMPARISON:  07/21/2021    FINDINGS:  There nondisplaced fractures of the left 5th, 6th and 7th ribs laterally.  Scoliosis with multilevel insufficiency fractures, poorly characterized but grossly similar to prior exam.    No pneumothorax.  Trace left pleural effusion.    Lungs are clear.    Enlarged cardiac silhouette.  Aortic atherosclerosis.    There is a left chest wall loop recorder device.                                       X-Ray Hip 2 or 3 views Left (with Pelvis when performed) (Final result)  Result time 06/06/23 13:35:47      Final result by Nohemi Soliz MD (06/06/23 13:35:47)                   Impression:      No acute osseous abnormality.    The bones are demineralized.      Electronically signed by: Nohemi Soliz  Date:    06/06/2023  Time:    13:35               Narrative:    EXAMINATION:  XR HIP WITH PELVIS WHEN PERFORMED, 2 OR 3 VIEWS LEFT; XR FEMUR 2 VIEW LEFT    CLINICAL HISTORY:  fall; Unspecified fall, initial encounter    TECHNIQUE:  AP view of the pelvis and AP and frog leg lateral view of the left hip were performed.    AP and lateral views of the left femur were obtained.    COMPARISON:  None.    FINDINGS:  BONE: The bones appear demineralized.  No fracture. No dislocation.  There are postsurgical changes of right hip arthroplasty.  Femoral stem is incompletely imaged.  Hardware appears engaged and intact.  Evaluation of the sacrum is limited due to overlying bowel.    SOFT TISSUES: Femoral and popliteal calcific atherosclerosis.                                       Lab Review:   CBC:  Recent Labs   Lab Result Units 05/18/23  1248  06/06/23  1403 06/07/23  0130   WBC x10(3)/mcL 7.48 8.27 7.19   RBC x10(6)/mcL 4.55 4.38 3.96*   Hgb g/dL 14.3 13.8 12.3   Hct % 43.1 40.9 36.8*   Platelet x10(3)/mcL 207 217 197   MCV fL 94.7* 93.4 92.9   MCH pg 31.4* 31.5* 31.1*   MCHC g/dL 33.2 33.7 33.4       CMP:  Recent Labs   Lab Result Units 05/18/23  1248 06/06/23  1403 06/07/23  0130   Calcium Level Total mg/dL 10.3* 9.9 9.1   Albumin Level g/dL 3.9 3.8  --    Sodium Level mmol/L 137 137 137   Potassium Level mmol/L 3.9 4.2 3.8   Carbon Dioxide mmol/L 23 22* 19*   Blood Urea Nitrogen mg/dL 22.1* 13.0 11.0   Creatinine mg/dL 0.87 0.89 0.82   Alkaline Phosphatase unit/L 81 77  --    Alanine Aminotransferase unit/L 21 24  --    Aspartate Aminotransferase unit/L 24 19  --    Bilirubin Total mg/dL 0.2 0.3  --        Troponin:  No results for input(s): TROPONINI in the last 2160 hours.    ETOH:  No results for input(s): ETHANOL in the last 72 hours.     Urine Drug Screen:  No results for input(s): COCAINE, OPIATE, BARBITURATE, AMPHETAMINE, FENTANYL, CANNABINOIDS, MDMA in the last 72 hours.    Invalid input(s): BENZODIAZEPINE, PHENCYCLIDINE   Plan:     - Diabetic diet  - PT/OT  - Pain control  - Lovenox (adjusted for low BMI)  - IS  - Likely discharge today vs downgrade to the floor with trauma      6/7/2023 8:26 AM     The above findings, diagnostics, and treatment plan were discussed with Dr. Flo Stokes  who will follow with further assessments and recommendations. Please call with any questions, concerns, or clinical status changes.

## 2023-06-07 NOTE — PROGRESS NOTES
Inpatient Nutrition Assessment    Admit Date: 6/6/2023   Total duration of encounter: 1 day     Nutrition Recommendation/Prescription     Continue current diet as tolerated.  Add Boost Very High Calorie (provides 530 kcal, 22 g protein per serving) TID.  Continue megace per MD.    Communication of Recommendations: reviewed with nurse and reviewed with patient    Nutrition Assessment     Malnutrition Assessment/Nutrition-Focused Physical Exam    Malnutrition Context: chronic illness  Malnutrition Level: severe  Energy Intake (Malnutrition): other (see comments) (does not meet criteria)  Weight Loss (Malnutrition): greater than 5% in 1 month  Subcutaneous Fat (Malnutrition): mild depletion  Orbital Region (Subcutaneous Fat Loss): mild depletion  Upper Arm Region (Subcutaneous Fat Loss): mild depletion     Muscle Mass (Malnutrition): severe depletion  Erwin Region (Muscle Loss): severe depletion  Clavicle Bone Region (Muscle Loss): severe depletion        Dorsal Hand (Muscle Loss): severe depletion           Fluid Accumulation (Malnutrition): other (see comments) (does not meet criteria)        A minimum of two characteristics is recommended for diagnosis of either severe or non-severe malnutrition.    Chart Review    Reason Seen: malnutrition screening tool (MST)    Malnutrition Screening Tool Results   Have you recently lost weight without trying?: Unsure  Have you been eating poorly because of a decreased appetite?: No   MST Score: 2     Diagnosis:  fall from standing    Relevant Medical History: CVA, MI, hypertension, and remote history of colon cancer s/p colectomy     Nutrition-Related Medications: folic acid, megace, SSI  Calorie Containing IV Medications: no significant kcals from medications at this time    Nutrition-Related Labs:  6/7 Glu 283, Hgb A1C 10.5    Diet/PN Order: Diet diabetic  Oral Supplement Order: none  Tube Feeding Order: not applicable  Appetite/Oral Intake: fair/25-50% of meals  Factors  "Affecting Nutritional Intake: decreased appetite  Food/Taoist/Cultural Preferences:  Strawberry ONS  Food Allergies: none reported       Wound(s):   none noted    Comments    23: Noted MST indicates unsure wt loss PTA. Pt confirmed UBW, EMR wt indicates wt loss. Pt stated last wt ~2 weeks ago (stated she is weighed by home health at home.) Drinks 1 ONS/day at home. Offered ONS here and encouraged increasing to at least 2/day at home and here. Pt agreeable. Stated appetite good at home but noted MD notes indicate decreased appetite per son. Discussed with RN, will add pt flavor pref and higher kcal version instead of DM version of ONS in order to provide more kcal since pt usually only drinks 1/day.     Anthropometrics    Height: 4' 11.02" (149.9 cm) Height Method: Estimated  Last Weight: 44.3 kg (97 lb 10.6 oz) (23 0924) Weight Method: Bed Scale  BMI (Calculated): 19.7  BMI Classification: underweight (BMI less than 22 if >65 years of age)     Ideal Body Weight (IBW), Female: 95.1 lb     % Ideal Body Weight, Female (lb): 102.69 %                    Usual Body Weight (UBW), k kg  % Usual Body Weight: 88.79  % Weight Change From Usual Weight: -11.4 %  Usual Weight Provided By: patient    Wt Readings from Last 5 Encounters:   23 44.3 kg (97 lb 10.6 oz)   23 49.9 kg (110 lb)   23 49.9 kg (110 lb)   22 48.5 kg (107 lb)   09/15/22 48.5 kg (107 lb)     Weight Change(s) Since Admission:  Admit Weight: 44.3 kg (97 lb 10.6 oz) (23 1845)    Estimated Needs    Weight Used For Calorie Calculations: 44.3 kg (97 lb 10.6 oz)  Energy Calorie Requirements (kcal): 1519kcal (1.3 stress factor +500kcal for wt gain)  Energy Need Method: [place]-St Jeor  Weight Used For Protein Calculations: 44.3 kg (97 lb 10.6 oz)  Protein Requirements: 62-71gm (1.4-1.6g/kg)  Fluid Requirements (mL): 1519ml (1ml/kcal)  Temp (24hrs), Av.4 °F (36.9 °C), Min:97.9 °F (36.6 °C), Max:98.9 °F (37.2 °C)     "     Enteral Nutrition    Patient not receiving enteral nutrition at this time.    Parenteral Nutrition    Patient not receiving parenteral nutrition support at this time.    Evaluation of Received Nutrient Intake    Calories: not meeting estimated needs  Protein: not meeting estimated needs    Patient Education    Not applicable.    Nutrition Diagnosis     PES: Malnutrition related to chronic illness as evidenced by muscle wasting, fat loss, wt loss. (new)    Interventions/Goals     Intervention(s): general/healthful diet, commercial beverage, prescription medication, and collaboration with other providers  Goal: Meet greater than 75% of nutritional needs by follow-up. (new)    Monitoring & Evaluation     Dietitian will monitor energy intake.  Nutrition Risk/Follow-Up: moderate (follow-up in 3-5 days)   Please consult if re-assessment needed sooner.

## 2023-06-07 NOTE — PLAN OF CARE
Spoke with son Dyllan who works in [employer] during the week and his home with his mom on the weekends. He updates me that pt is family with [facility] and also [facility] as she had a stroke before. Depending on what therapy recommends he likely will want one of these two locations but will want to make definitive choice. I will email him list of skill locations in [email or web address].  Will follow

## 2023-06-07 NOTE — PT/OT/SLP EVAL
Physical Therapy Evaluation    Patient Name:  Lita Hess   MRN:  [id number]    Recommendations:     Discharge Recommendations: nursing facility, skilled   Discharge Equipment Recommendations:     Barriers to discharge:  medical status    Assessment:     Lita Hess is a 87 y.o. female admitted with a medical diagnosis of fall over walker w/ L 4-7 rib fxs. Pt alert, oriented but does seem mildly confused. Pt lives alone and is not safe to DC home at this time. Pt agreeable to SNF placement. We discussed possible TCU placement. Will speak w/ CM.  She presents with the following impairments/functional limitations: weakness, impaired endurance, impaired functional mobility, impaired balance, gait instability, impaired cognition, pain.    Rehab Prognosis: Good; patient would benefit from acute skilled PT services to address these deficits and reach maximum level of function.    Recent Surgery: * No surgery found *      Plan:     During this hospitalization, patient to be seen 6 x/week to address the identified rehab impairments via gait training, therapeutic activities, therapeutic exercises and progress toward the following goals:    Plan of Care Expires:  07/20/23    Subjective     Chief Complaint: weakness and L rib pain  Patient/Family Comments/goals: return to PLOF  Pain/Comfort:       Patients cultural, spiritual, Orthodox conflicts given the current situation: no    Living Environment:  Pt lives at home alone. Has a caregiver for a few hours during the day. Uses a RW to ambulate at baseline.   Upon discharge, patient will have assistance from unsure.    Objective:     Communicated with RN prior to session.  Patient found HOB elevated upon PT entry to room.    General Precautions: Standard,    Respiratory Status: Room air    Exams:  RLE ROM: WFL  RLE Strength: 4-/5  LLE ROM: WFL  LLE Strength: 4-/5    Functional Mobility:  Bed Mobility:     Supine to Sit: moderate assistance  Transfers:     Sit to Stand:  minimum  assistance with rolling walker  Gait: Pt ambulated 30' w/ RW, min A for safety. Pt demo'd forward flexed posture, poor endurance.        Patient left up in chair with all lines intact.    GOALS:   Multidisciplinary Problems       Physical Therapy Goals          Problem: Physical Therapy    Goal Priority Disciplines Outcome Goal Variances Interventions   Physical Therapy Goal     PT, PT/OT Ongoing, Progressing     Description: Goals to be met by: 23     Patient will increase functional independence with mobility by performin. Supine to sit with Stand-by Assistance  2. Sit to supine with Stand-by Assistance  3. Sit to stand transfer with Stand-by Assistance  4. Gait  x 200 feet with Stand-by Assistance using Rolling Walker.                          History:     Past Medical History:   Diagnosis Date    Chronic back pain 10/26/2022    Colon cancer     CVA (cerebral vascular accident)     Gastroesophageal reflux disease without esophagitis 10/26/2022    Hypertension     Iron deficiency anemia secondary to inadequate dietary iron intake 10/26/2022    Loss of appetite     Mixed hyperlipidemia 10/26/2022    Stroke     Type 2 diabetes mellitus without complication 10/26/2022    Vitamin D deficiency        Past Surgical History:   Procedure Laterality Date    COLECTOMY      COLONOSCOPY      ESOPHAGOGASTRODUODENOSCOPY      gastro biopsy      HIP REPLACEMENT ARTHROPLASTY      SCLEROTHERAPY WITH ULTRASOUND GUIDANCE      TRANSESOPHAGEAL ECHOCARDIOGRAPHY         Time Tracking:     PT Received On: 23  PT Start Time: 1400     PT Stop Time: 1430  PT Total Time (min): 30 min     Billable Minutes: Evaluation 30      2023

## 2023-06-07 NOTE — NURSING
Nurses Note -- 4 Eyes      6/6/2023   7:18 PM      Skin assessed during: Admit      [] No Altered Skin Integrity Present    [x]Prevention Measures Documented      [x] Yes- Altered Skin Integrity Present or Discovered   [] LDA Added if Not in Epic (Describe Wound)   [] New Altered Skin Integrity was Present on Admit and Documented in LDA   [x] Wound Image Taken    Wound Care Consulted? No    Attending Nurse:  Kirstie Schofield RN     Second RN/Staff Member:  Seamus Choi RN

## 2023-06-08 LAB
ALBUMIN SERPL-MCNC: 3.1 G/DL (ref 3.4–4.8)
ALBUMIN/GLOB SERPL: 1 RATIO (ref 1.1–2)
ALP SERPL-CCNC: 62 UNIT/L (ref 40–150)
ALT SERPL-CCNC: 15 UNIT/L (ref 0–55)
AST SERPL-CCNC: 10 UNIT/L (ref 5–34)
BASOPHILS # BLD AUTO: 0.05 X10(3)/MCL
BASOPHILS NFR BLD AUTO: 0.8 %
BILIRUBIN DIRECT+TOT PNL SERPL-MCNC: 0.2 MG/DL
BUN SERPL-MCNC: 9.6 MG/DL (ref 9.8–20.1)
CALCIUM SERPL-MCNC: 8.5 MG/DL (ref 8.4–10.2)
CHLORIDE SERPL-SCNC: 103 MMOL/L (ref 98–107)
CO2 SERPL-SCNC: 21 MMOL/L (ref 23–31)
CREAT SERPL-MCNC: 0.84 MG/DL (ref 0.55–1.02)
CRP SERPL-MCNC: 52.7 MG/L
EOSINOPHIL # BLD AUTO: 0.15 X10(3)/MCL (ref 0–0.9)
EOSINOPHIL NFR BLD AUTO: 2.4 %
ERYTHROCYTE [DISTWIDTH] IN BLOOD BY AUTOMATED COUNT: 13.2 % (ref 11.5–17)
GFR SERPLBLD CREATININE-BSD FMLA CKD-EPI: >60 MLS/MIN/1.73/M2
GLOBULIN SER-MCNC: 3.1 GM/DL (ref 2.4–3.5)
GLUCOSE SERPL-MCNC: 346 MG/DL (ref 82–115)
HCT VFR BLD AUTO: 35.7 % (ref 37–47)
HGB BLD-MCNC: 11.7 G/DL (ref 12–16)
IMM GRANULOCYTES # BLD AUTO: 0.01 X10(3)/MCL (ref 0–0.04)
IMM GRANULOCYTES NFR BLD AUTO: 0.2 %
LYMPHOCYTES # BLD AUTO: 1.5 X10(3)/MCL (ref 0.6–4.6)
LYMPHOCYTES NFR BLD AUTO: 24.4 %
MAGNESIUM SERPL-MCNC: 1.8 MG/DL (ref 1.6–2.6)
MCH RBC QN AUTO: 31.3 PG (ref 27–31)
MCHC RBC AUTO-ENTMCNC: 32.8 G/DL (ref 33–36)
MCV RBC AUTO: 95.5 FL (ref 80–94)
MONOCYTES # BLD AUTO: 0.5 X10(3)/MCL (ref 0.1–1.3)
MONOCYTES NFR BLD AUTO: 8.1 %
NEUTROPHILS # BLD AUTO: 3.93 X10(3)/MCL (ref 2.1–9.2)
NEUTROPHILS NFR BLD AUTO: 64.1 %
NRBC BLD AUTO-RTO: 0 %
PHOSPHATE SERPL-MCNC: 3 MG/DL (ref 2.3–4.7)
PLATELET # BLD AUTO: 191 X10(3)/MCL (ref 130–400)
PMV BLD AUTO: 10.2 FL (ref 7.4–10.4)
POCT GLUCOSE: 312 MG/DL (ref 70–110)
POCT GLUCOSE: 330 MG/DL (ref 70–110)
POCT GLUCOSE: 388 MG/DL (ref 70–110)
POCT GLUCOSE: 400 MG/DL (ref 70–110)
POTASSIUM SERPL-SCNC: 4 MMOL/L (ref 3.5–5.1)
PREALB SERPL-MCNC: 20.4 MG/DL (ref 14–37)
PROT SERPL-MCNC: 6.2 GM/DL (ref 5.8–7.6)
RBC # BLD AUTO: 3.74 X10(6)/MCL (ref 4.2–5.4)
SODIUM SERPL-SCNC: 133 MMOL/L (ref 136–145)
WBC # SPEC AUTO: 6.14 X10(3)/MCL (ref 4.5–11.5)

## 2023-06-08 PROCEDURE — 94761 N-INVAS EAR/PLS OXIMETRY MLT: CPT

## 2023-06-08 PROCEDURE — 25000003 PHARM REV CODE 250

## 2023-06-08 PROCEDURE — 84134 ASSAY OF PREALBUMIN: CPT

## 2023-06-08 PROCEDURE — 86140 C-REACTIVE PROTEIN: CPT

## 2023-06-08 PROCEDURE — 25000242 PHARM REV CODE 250 ALT 637 W/ HCPCS

## 2023-06-08 PROCEDURE — 25000003 PHARM REV CODE 250: Performed by: NURSE PRACTITIONER

## 2023-06-08 PROCEDURE — 94799 UNLISTED PULMONARY SVC/PX: CPT

## 2023-06-08 PROCEDURE — 85025 COMPLETE CBC W/AUTO DIFF WBC: CPT

## 2023-06-08 PROCEDURE — 63600175 PHARM REV CODE 636 W HCPCS: Performed by: NURSE PRACTITIONER

## 2023-06-08 PROCEDURE — 11000001 HC ACUTE MED/SURG PRIVATE ROOM

## 2023-06-08 PROCEDURE — 94668 MNPJ CHEST WALL SBSQ: CPT

## 2023-06-08 PROCEDURE — 83735 ASSAY OF MAGNESIUM: CPT

## 2023-06-08 PROCEDURE — 94640 AIRWAY INHALATION TREATMENT: CPT

## 2023-06-08 PROCEDURE — 84100 ASSAY OF PHOSPHORUS: CPT

## 2023-06-08 PROCEDURE — 63600175 PHARM REV CODE 636 W HCPCS

## 2023-06-08 PROCEDURE — 97166 OT EVAL MOD COMPLEX 45 MIN: CPT

## 2023-06-08 PROCEDURE — 63600175 PHARM REV CODE 636 W HCPCS: Performed by: STUDENT IN AN ORGANIZED HEALTH CARE EDUCATION/TRAINING PROGRAM

## 2023-06-08 PROCEDURE — 63600175 PHARM REV CODE 636 W HCPCS: Performed by: SURGERY

## 2023-06-08 PROCEDURE — 80053 COMPREHEN METABOLIC PANEL: CPT

## 2023-06-08 PROCEDURE — 97530 THERAPEUTIC ACTIVITIES: CPT | Mod: CQ

## 2023-06-08 RX ORDER — INSULIN ASPART 100 [IU]/ML
0-15 INJECTION, SOLUTION INTRAVENOUS; SUBCUTANEOUS
Status: DISCONTINUED | OUTPATIENT
Start: 2023-06-08 | End: 2023-06-09 | Stop reason: HOSPADM

## 2023-06-08 RX ORDER — GLUCAGON 1 MG
1 KIT INJECTION
Status: DISCONTINUED | OUTPATIENT
Start: 2023-06-08 | End: 2023-06-08

## 2023-06-08 RX ORDER — POLYETHYLENE GLYCOL 3350 17 G/17G
17 POWDER, FOR SOLUTION ORAL DAILY
Status: DISCONTINUED | OUTPATIENT
Start: 2023-06-08 | End: 2023-06-09 | Stop reason: HOSPADM

## 2023-06-08 RX ORDER — INSULIN ASPART 100 [IU]/ML
1-10 INJECTION, SOLUTION INTRAVENOUS; SUBCUTANEOUS
Status: DISCONTINUED | OUTPATIENT
Start: 2023-06-08 | End: 2023-06-08

## 2023-06-08 RX ORDER — IBUPROFEN 200 MG
24 TABLET ORAL
Status: DISCONTINUED | OUTPATIENT
Start: 2023-06-08 | End: 2023-06-09 | Stop reason: HOSPADM

## 2023-06-08 RX ORDER — IBUPROFEN 200 MG
16 TABLET ORAL
Status: DISCONTINUED | OUTPATIENT
Start: 2023-06-08 | End: 2023-06-09 | Stop reason: HOSPADM

## 2023-06-08 RX ADMIN — IPRATROPIUM BROMIDE AND ALBUTEROL SULFATE 3 ML: .5; 3 SOLUTION RESPIRATORY (INHALATION) at 03:06

## 2023-06-08 RX ADMIN — ENOXAPARIN SODIUM 30 MG: 30 INJECTION SUBCUTANEOUS at 08:06

## 2023-06-08 RX ADMIN — IPRATROPIUM BROMIDE AND ALBUTEROL SULFATE 3 ML: .5; 3 SOLUTION RESPIRATORY (INHALATION) at 08:06

## 2023-06-08 RX ADMIN — PANTOPRAZOLE SODIUM 40 MG: 40 TABLET, DELAYED RELEASE ORAL at 08:06

## 2023-06-08 RX ADMIN — INSULIN DETEMIR 20 UNITS: 100 INJECTION, SOLUTION SUBCUTANEOUS at 08:06

## 2023-06-08 RX ADMIN — MEGESTROL ACETATE 20 MG: 20 TABLET ORAL at 09:06

## 2023-06-08 RX ADMIN — IPRATROPIUM BROMIDE AND ALBUTEROL SULFATE 3 ML: .5; 3 SOLUTION RESPIRATORY (INHALATION) at 01:06

## 2023-06-08 RX ADMIN — INSULIN ASPART 12 UNITS: 100 INJECTION, SOLUTION INTRAVENOUS; SUBCUTANEOUS at 05:06

## 2023-06-08 RX ADMIN — ATORVASTATIN CALCIUM 10 MG: 10 TABLET, FILM COATED ORAL at 08:06

## 2023-06-08 RX ADMIN — GABAPENTIN 300 MG: 300 CAPSULE ORAL at 08:06

## 2023-06-08 RX ADMIN — INSULIN ASPART 2 UNITS: 100 INJECTION, SOLUTION INTRAVENOUS; SUBCUTANEOUS at 02:06

## 2023-06-08 RX ADMIN — FOLIC ACID 1000 MCG: 1 TABLET ORAL at 08:06

## 2023-06-08 RX ADMIN — LISINOPRIL 20 MG: 20 TABLET ORAL at 08:06

## 2023-06-08 RX ADMIN — AMLODIPINE BESYLATE 10 MG: 5 TABLET ORAL at 08:06

## 2023-06-08 RX ADMIN — INSULIN ASPART 10 UNITS: 100 INJECTION, SOLUTION INTRAVENOUS; SUBCUTANEOUS at 10:06

## 2023-06-08 RX ADMIN — GABAPENTIN 300 MG: 300 CAPSULE ORAL at 02:06

## 2023-06-08 RX ADMIN — OXYCODONE HYDROCHLORIDE 5 MG: 5 TABLET ORAL at 08:06

## 2023-06-08 RX ADMIN — IPRATROPIUM BROMIDE AND ALBUTEROL SULFATE 3 ML: .5; 3 SOLUTION RESPIRATORY (INHALATION) at 04:06

## 2023-06-08 RX ADMIN — INSULIN ASPART 10 UNITS: 100 INJECTION, SOLUTION INTRAVENOUS; SUBCUTANEOUS at 01:06

## 2023-06-08 RX ADMIN — MIRTAZAPINE 30 MG: 15 TABLET, FILM COATED ORAL at 08:06

## 2023-06-08 RX ADMIN — POLYETHYLENE GLYCOL 3350 17 G: 17 POWDER, FOR SOLUTION ORAL at 10:06

## 2023-06-08 RX ADMIN — ASPIRIN 325 MG ORAL TABLET 325 MG: 325 PILL ORAL at 08:06

## 2023-06-08 RX ADMIN — IPRATROPIUM BROMIDE AND ALBUTEROL SULFATE 3 ML: .5; 3 SOLUTION RESPIRATORY (INHALATION) at 11:06

## 2023-06-08 RX ADMIN — OXYCODONE HYDROCHLORIDE 5 MG: 5 TABLET ORAL at 10:06

## 2023-06-08 NOTE — NURSING
Spoke with son Dyllan @ [phone] about evening update and plan of care for the day. All questions and concerns answered.

## 2023-06-08 NOTE — PLAN OF CARE
Follow up phone call made to son Dyllan via phone to discuss discharge planning. He would like to proceed with [facility]. I did explain to son that PT is recommending skilled nursing facility but would like rehab to evaluate first. Referral sent via [facility].

## 2023-06-08 NOTE — PT/OT/SLP EVAL
"Occupational Therapy  Evaluation    Name: Lita Hess  MRN: [id number]  Admitting Diagnosis: L rib fxs (4-7)  Recent Surgery: * No surgery found *      Recommendations:     Discharge Recommendations: nursing facility, skilled  Discharge Equipment Recommendations:  to be determined by next level of care  Barriers to discharge:  Decreased caregiver support (fall risk, decreased safety awareness)    Assessment:     Lita Hess is a 87 y.o. female with a medical diagnosis of fall resulting in L 4-7 rib fxs. Performance deficits affecting function: weakness, upper extremity deficits, ROM deficits, impaired endurance, gait instability, balance deficits, pain, self care deficits, impaired cognition. She presents with pain during eval that overall limits Pt's independence. Pt also presents mildly confused. Would benefit from placement at this time as Pt lives alone and is not safe to discharge home.     Rehab Prognosis: Good; patient would benefit from acute skilled OT services to address these deficits and reach maximum level of function.       Plan:     Patient to be seen 5 x/week to address the above listed problems via self-care/home management, therapeutic activities, therapeutic exercises  Plan of Care Expires: 07/08/23  Plan of Care Reviewed with: patient    Subjective     Chief Complaint: pain  Patient/Family Comments/goals: "to get better"    Occupational Profile:  Living Environment: lives alone in a SLH with no WES; walk in shower. Receives assist from caregivers (unsure of hours per week)  Previous level of function: Mod I for functional mobility; independent for ADLs; received assist for heavy IADLS  Roles and Routines: mother  Equipment Used at Home: walker, rolling (built in shower bench; has quad cane but does not use)  Assistance upon Discharge: son, caregiver    Pain/Comfort:  Pain Rating 1:  (pain in left rib site, hip, and knee; not objectively stated)  Pain Addressed 1: Reposition, Cessation of Activity, " Nurse notified    Patients cultural, spiritual, Buddhism conflicts given the current situation: no    Objective:     Communicated with: NSG prior to session.  Patient found up in chair with  (Purewick, PIV, tele, BP cuff, pulse ox) upon OT entry to room.    General Precautions: Standard, fall  Orthopedic Precautions:    Braces:    Respiratory Status: Room air  Vital Signs:   Prior: 122/73 117 bpm 95%  Endin/74 113 bpm 95%    Occupational Performance:    Bed Mobility:    Patient completed Scooting/Bridging with minimum assistance  Patient completed Sit to Supine with minimum assistance    Functional Mobility/Transfers:  Patient completed Sit <> Stand Transfer with Mod A initially; Min A as session continues  with  rolling walker   Patient completed Bed <> Chair Transfer using Step Transfer technique with minimum assistance with rolling walker  Functional Mobility: Min A-CGA for safety with RW during ~5 steps (anteriorly, posteriorly, laterally)    Cognitive/Visual Perceptual:  Cognitive/Psychosocial Skills:     -       Oriented to: Person, Place, and Situation   -       Follows Commands/attention:Follows one-step commands  -       Safety awareness/insight to disability: impaired   Visual/Perceptual:      -Intact tracking in all visual fields    Physical Exam:  Upper Extremity Range of Motion:     -       Right Upper Extremity: WFL  -       Left Upper Extremity: WFL except shoulder flexion limited by rib pain; able to achieve 70-90 degrees  Upper Extremity Strength:    -       Right Upper Extremity: WFL  -       Left Upper Extremity: unable to test 2/2 rib pain   Strength:    -       Right Upper Extremity: WFL  -       Left Upper Extremity: WFL  Fine Motor Coordination:    -       Intact  Right hand, finger to nose  -       Impaired  Left hand, finger to nose shoulder limitations 2/2 pain    Therapeutic Positioning  Risk for acquired pressure injuries is increased due to relative decrease in mobility d/t  hospitalization .    OT interventions performed during the course of today's session in an effort to prevent and/or reduce acquired pressure injuries:   Education on Pressure Ulcer Prevention provided  Therapeutic positioning completed     Skin assessment: full body skin assessment was performed    Findings: no redness or breakdown noted    OT recommendations for therapeutic positioning throughout hospitalization:   Follow [facility] Pressure Injury Prevention Protocol  Geomat recommended for sacral protection while UIC      Patient Education:  Patient provided with verbal education regarding OT role/goals/POC, fall prevention, safety awareness, Discharge/DME recommendations, and pressure ulcer prevention.  Understanding was verbalized.     Patient left HOB elevated with all lines intact, call button in reach, and NSG notified    GOALS:   Multidisciplinary Problems       Occupational Therapy Goals          Problem: Occupational Therapy    Goal Priority Disciplines Outcome Interventions   Occupational Therapy Goal     OT, PT/OT Ongoing, Progressing    Description: STGs to be met in 2 weeks:    Patient will increase functional independence with ADLs by performing:    LE Dressing with Modified [place].  Grooming while standing at sink with Modified [place].  Toileting from toilet with Modified [place] for hygiene and clothing management.   Toilet transfer to toilet with Modified [place].  Increased L UE ROM to 3/5 for increased independence in self care tasks.                         History:     Past Medical History:   Diagnosis Date    Chronic back pain 10/26/2022    Colon cancer     CVA (cerebral vascular accident)     Gastroesophageal reflux disease without esophagitis 10/26/2022    Hypertension     Iron deficiency anemia secondary to inadequate dietary iron intake 10/26/2022    Loss of appetite     Mixed hyperlipidemia 10/26/2022    Stroke     Type 2 diabetes mellitus without complication 10/26/2022     Vitamin D deficiency          Past Surgical History:   Procedure Laterality Date    COLECTOMY      COLONOSCOPY      ESOPHAGOGASTRODUODENOSCOPY      gastro biopsy      HIP REPLACEMENT ARTHROPLASTY      SCLEROTHERAPY WITH ULTRASOUND GUIDANCE      TRANSESOPHAGEAL ECHOCARDIOGRAPHY         Time Tracking:     OT Date of Treatment:    OT Start Time: 1341  OT Stop Time: 1406  OT Total Time (min): 25 min    Billable Minutes:Evaluation mod    6/8/2023

## 2023-06-08 NOTE — PROGRESS NOTES
Acute Care Surgery   Progress Note  Admit Date: 6/6/2023  HD#2  POD#* No surgery found *    Subjective:   Interval history:  NAEON. AF, VSS. Pain controlled with oral pain meds. Pt tolerating regular diet, no N/V. Voiding, having BM. Working with PT/OT.    Home Meds:  Current Outpatient Medications   Medication Instructions    amLODIPine (NORVASC) 10 mg, Oral, Daily    aspirin 325 mg, Oral, Daily    cyanocobalamin (vitamin B-12) 500 mcg, Oral    ergocalciferol (ERGOCALCIFEROL) 50,000 Units, Oral, Every 7 days    ferrous sulfate (FEOSOL) 325 mg, Oral, Daily, TAKE ONE TABLET TWICE A DAY    fluticasone propionate (FLONASE) 50 mcg/actuation nasal spray   See Instructions, USE 2 SPRAYS IN EACH NOSTRIL TWICE DAILY, # 16 gm, 10 Refill(s), Pharmacy: [facility], 157.5, cm, Height/Length Dosing, 08/10/21 15:22:00 CDT, 39.5, kg, Weight Dosing, 10/14/21 14:47:00 CDT    folic acid (FOLVITE) 1,000 mcg, Oral, Daily    glyBURIDE (DIABETA) 5 MG tablet TAKE ONE TABLET TWICE A DAY    lisinopriL (PRINIVIL,ZESTRIL) 20 mg, Oral, Daily    lovastatin (MEVACOR) 40 mg, Oral, Nightly    megestroL (MEGACE) 20 mg, Oral, Daily, Take 1 tablet daily    metFORMIN (GLUCOPHAGE) 500 mg, Oral, Nightly    mirtazapine (REMERON) 30 mg, Oral, Nightly, TAKE TWO TABLETS AT BEDTIME    multivitamin with minerals tablet 1 tablet, Oral, Daily    pantoprazole (PROTONIX) 40 MG tablet TAKE ONE TABLET TWICE A DAY    polyethylene glycol 3350 (MIRALAX ORAL) 17 g, Oral    sodium bicarbonate 650 MG tablet TAKE TWO TABLETS 3 TIMES DAILY.    traMADoL (ULTRAM) 50 mg tablet TAKE ONE TABLET BY MOUTH EVERY 8 HOURS    UNABLE TO FIND   Shoe lift, See Instructions, 1 shoe lift for right foot, left leg longer than right, # 1 EA, 0 Refill(s)      Scheduled Meds:   albuterol-ipratropium  3 mL Nebulization Q4H    amLODIPine  10 mg Oral Daily    aspirin  325 mg Oral Daily    atorvastatin  10 mg Oral Daily    enoxparin  30 mg Subcutaneous BID    folic acid  1,000  "mcg Oral Daily    gabapentin  300 mg Oral TID    insulin detemir U-100  20 Units Subcutaneous BID    lisinopriL  20 mg Oral Daily    megestroL  20 mg Oral Daily    mirtazapine  30 mg Oral QHS    pantoprazole  40 mg Oral BID     Continuous Infusions:  PRN Meds:acetaminophen, dextrose 10%, dextrose 10%, glucagon (human recombinant), glucose, glucose, insulin aspart U-100, iopamidoL, magnesium oxide, magnesium oxide, morphine, oxyCODONE, oxyCODONE, potassium bicarbonate, potassium bicarbonate, potassium bicarbonate, potassium, sodium phosphates, potassium, sodium phosphates, potassium, sodium phosphates, sodium chloride 0.9%     Objective:     VITAL SIGNS: 24 HR MIN & MAX LAST   Temp  Min: 97.9 °F (36.6 °C)  Max: 98.2 °F (36.8 °C)  97.9 °F (36.6 °C)   BP  Min: 97/65  Max: 143/73  121/73    Pulse  Min: 86  Max: 108  105    Resp  Min: 14  Max: 28  (!) 22    SpO2  Min: 92 %  Max: 98 %  (!) 94 %      HT: 4' 11.02" (149.9 cm)  WT: 44.3 kg (97 lb 10.6 oz)  BMI: 19.7     Intake/output:  Intake/Output - Last 3 Shifts         06/06 0700 06/07 0659 06/07 0700 06/08 0659 06/08 0700 06/09 0659    Urine (mL/kg/hr) 300 850 (0.8)     Total Output 300 850     Net -300 -850            Urine Occurrence  1 x             Intake/Output Summary (Last 24 hours) at 6/8/2023 0848  Last data filed at 6/8/2023 0000  Gross per 24 hour   Intake --   Output 600 ml   Net -600 ml           Lines/drains/airway:       Peripheral IV - Single Lumen 06/06/23 1405 20 G Left Antecubital (Active)   Site Assessment Clean;Dry;Intact;No redness;No swelling 06/08/23 0600   Extremity Assessment Distal to IV No abnormal discoloration;No redness;No swelling;No warmth 06/08/23 0600   Line Status Saline locked 06/08/23 0600   Dressing Status Clean;Dry;Intact 06/08/23 0600   Dressing Intervention Integrity maintained 06/08/23 0600   Number of days: 1       Female External Urinary Catheter 06/06/23 5549 (Active)   Skin no redness;no breakdown 06/07/23 2000 "   Tolerance no signs/symptoms of discomfort 06/07/23 2000   Suction Continuous suction at 70 mmHg 06/07/23 2000   Date of last wick change 06/07/23 06/07/23 2000   Time of last wick change 2200 06/07/23 2200   Output (mL) 350 mL 06/08/23 0000   Number of days: 1       Physical examination:  Gen: NAD, AAOx3, answering questions appropriately  HEENT: normocephalic, atraumatic  CV: RRR  Resp: NWOB on room air  Abd: S/NT/ND  Ext: moving all extremities spontaneously and purposefully  Neuro: CN II-XII grossly intact    Labs:  Renal:  Recent Labs     06/06/23  1403 06/07/23  0130 06/08/23 0117   BUN 13.0 11.0 9.6*   CREATININE 0.89 0.82 0.84     No results for input(s): LACTIC in the last 72 hours.  FENGI:  Recent Labs     06/06/23  1403 06/07/23  0130 06/08/23  0117    137 133*   K 4.2 3.8 4.0   CO2 22* 19* 21*   CALCIUM 9.9 9.1 8.5   MG  --  1.60 1.80   PHOS  --  2.7 3.0   ALBUMIN 3.8  --  3.1*   BILITOT 0.3  --  0.2   AST 19  --  10   ALKPHOS 77  --  62   ALT 24  --  15     Heme:  Recent Labs     06/06/23  1403 06/07/23  0130 06/08/23  0117   HGB 13.8 12.3 11.7*   HCT 40.9 36.8* 35.7*    197 191   PTT 26.3  --   --    INR 1.04  --   --      ID:  Recent Labs     06/06/23  1403 06/07/23  0130 06/08/23  0117   WBC 8.27 7.19 6.14     CBG:  Recent Labs     06/06/23  1403 06/07/23  0130 06/08/23  0117   GLUCOSE 209* 283* 346*      Cardiovascular:  No results for input(s): TROPONINI, CKTOTAL, CKMB, BNP in the last 168 hours.  ABG:  No results for input(s): PH, PO2, PCO2, HCO3, BE in the last 168 hours.   I have reviewed all pertinent lab results within the past 24 hours.    Imaging:  CT Head Without Contrast   Final Result      No acute intracranial abnormality identified.  Findings of chronic microvascular ischemic disease.         Electronically signed by: Alen Tripp   Date:    06/06/2023   Time:    15:00      CT Cervical Spine Without Contrast   Final Result      No acute fracture identified          Electronically signed by: Alen Tripp   Date:    06/06/2023   Time:    15:03      CT Chest Abdomen Pelvis With Contrast (xpd)   Final Result      No definite acute intra-abdominal or intrathoracic abnormality.  There are acute left-sided rib fractures as above.  Evaluation for additional fractures is limited by motion artifact.  Soft tissue density at the left sacrum as would be seen with decubitus ulcer is again noted.         Electronically signed by: Alen Tripp   Date:    06/06/2023   Time:    15:39      X-Ray Femur Ap/Lat Left   Final Result      No acute osseous abnormality.      The bones are demineralized.         Electronically signed by: Nohemi Soliz   Date:    06/06/2023   Time:    13:35      XR Ribs Min 3 views w/PA Chest Left   Final Result      Nondisplaced fractures of the left 5th, 6th and 7th ribs laterally.         Electronically signed by: Nohemi Soliz   Date:    06/06/2023   Time:    13:38      X-Ray Hip 2 or 3 views Left (with Pelvis when performed)   Final Result      No acute osseous abnormality.      The bones are demineralized.         Electronically signed by: Nohemi Soliz   Date:    06/06/2023   Time:    13:35         I have reviewed all pertinent imaging results/findings within the past 24 hours.    Micro/Path/Other:  Microbiology Results (last 7 days)       ** No results found for the last 168 hours. **           Specimen (168h ago, onward)      None             Assessment & Plan:   86 y/o F with PMHx of two CVA, MI, hypertension, and remote history of colon cancer s/p colectomy who presented today after a fall from standing. Found to have acute fractures of left ribs 4,5,6, and 7.    - Diabetic diet  - PT/OT  - Encourage IS/OOB  - Pain control  - Lovenox (adjusted for low BMI)  - IS  - CM on board for discharge planning, possibly d/c home with family today    Jerri Pinto MD  [employer] General Surgery PGY-1

## 2023-06-08 NOTE — PT/OT/SLP PROGRESS
Physical Therapy Treatment    Patient Name:  Lita Hess   MRN:  [id number]    Recommendations:     Discharge Recommendations: nursing facility, skilled  Discharge Equipment Recommendations:    Barriers to discharge:  placement    Assessment:     Lita Hess is a 87 y.o. female admitted with a medical diagnosis of  fall over walker w/ L 4-7 rib fxs.  She presents with the following impairments/functional limitations: weakness, impaired endurance, impaired self care skills, gait instability, impaired functional mobility .    Pt yahaira tx fairly. Held gait training secondary to pain.    Rehab Prognosis: Good; patient would benefit from acute skilled PT services to address these deficits and reach maximum level of function.    Recent Surgery: * No surgery found *      Plan:     During this hospitalization, patient to be seen 6 x/week to address the identified rehab impairments via gait training, therapeutic activities, therapeutic exercises and progress toward the following goals:    Plan of Care Expires:  07/20/23    Subjective     Chief Complaint: L rib pain  Patient/Family Comments/goals:   Pain/Comfort:         Objective:     Communicated with RN prior to session.  Patient found HOB elevated with   upon PT entry to room.     General Precautions: Standard,    Orthopedic Precautions:    Braces:    Respiratory Status: Room air  Blood Pressure: 123/64, HR:111  Skin Integrity: Visible skin intact      Functional Mobility:  Bed Mobility:     Scooting: minimum assistance  Supine to Sit: moderate assistance  Transfers:     Sit to Stand:  minimum assistance with rolling walker  Bed to Chair: minimum assistance with  rolling walker  using  Step Transfer    Therapeutic Activities/Exercises:  Pt sat EOB Philip for 8 minutes before performing bed>chair TF Philip with RW.     Education:  Patient provided with verbal education regarding POC.  Understanding was verbalized, however additional teaching warranted.     Patient left up in chair  with all lines intact, call button in reach, and RN notified..    GOALS:   Multidisciplinary Problems       Physical Therapy Goals          Problem: Physical Therapy    Goal Priority Disciplines Outcome Goal Variances Interventions   Physical Therapy Goal     PT, PT/OT Ongoing, Progressing     Description: Goals to be met by: 23     Patient will increase functional independence with mobility by performin. Supine to sit with Stand-by Assistance  2. Sit to supine with Stand-by Assistance  3. Sit to stand transfer with Stand-by Assistance  4. Gait  x 200 feet with Stand-by Assistance using Rolling Walker.                          Time Tracking:     PT Received On: 23  PT Start Time: 1114     PT Stop Time: 1144  PT Total Time (min): 30 min     Billable Minutes: Therapeutic Activity 30    Treatment Type: Treatment  PT/PTA: PTA     Number of PTA visits since last PT visit: 2023

## 2023-06-08 NOTE — PLAN OF CARE
Problem: Occupational Therapy  Goal: Occupational Therapy Goal  Description: STGs to be met in 2 weeks:    Patient will increase functional independence with ADLs by performing:    LE Dressing with Modified [place].  Grooming while standing at sink with Modified [place].  Toileting from toilet with Modified [place] for hygiene and clothing management.   Toilet transfer to toilet with Modified [place].  Increased L UE ROM to 3/5 for increased independence in self care tasks.    Outcome: Ongoing, Progressing

## 2023-06-09 ENCOUNTER — HOSPITAL ENCOUNTER (INPATIENT)
Facility: HOSPITAL | Age: 88
LOS: 14 days | Discharge: NURSING, REHAB OR LONG-TERM CARE | DRG: 948 | End: 2023-06-23
Attending: INTERNAL MEDICINE | Admitting: INTERNAL MEDICINE
Payer: MEDICARE

## 2023-06-09 VITALS
OXYGEN SATURATION: 95 % | BODY MASS INDEX: 19.69 KG/M2 | RESPIRATION RATE: 15 BRPM | DIASTOLIC BLOOD PRESSURE: 57 MMHG | TEMPERATURE: 98 F | WEIGHT: 97.69 LBS | HEIGHT: 59 IN | SYSTOLIC BLOOD PRESSURE: 117 MMHG | HEART RATE: 100 BPM

## 2023-06-09 DIAGNOSIS — R53.81 DEBILITY: Primary | ICD-10-CM

## 2023-06-09 DIAGNOSIS — I10 PRIMARY HYPERTENSION: ICD-10-CM

## 2023-06-09 DIAGNOSIS — E11.65 TYPE 2 DIABETES MELLITUS WITH HYPERGLYCEMIA, WITHOUT LONG-TERM CURRENT USE OF INSULIN: ICD-10-CM

## 2023-06-09 DIAGNOSIS — S22.42XD CLOSED FRACTURE OF MULTIPLE RIBS OF LEFT SIDE WITH ROUTINE HEALING, SUBSEQUENT ENCOUNTER: ICD-10-CM

## 2023-06-09 DIAGNOSIS — I63.9 CEREBROVASCULAR ACCIDENT (CVA), UNSPECIFIED MECHANISM: Chronic | ICD-10-CM
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
## 2023-06-09 PROBLEM — S22.39XA RIB FRACTURE: Status: ACTIVE | Noted: 2023-06-09

## 2023-06-09 PROBLEM — T88.9XXA COMPLICATIONS OF MEDICAL CARE: Status: RESOLVED | Noted: 2023-03-20 | Resolved: 2023-06-09

## 2023-06-09 PROBLEM — Z74.2 NEED FOR HOME HEALTH CARE: Status: RESOLVED | Noted: 2023-03-20 | Resolved: 2023-06-09

## 2023-06-09 LAB
ALBUMIN SERPL-MCNC: 2.9 G/DL (ref 3.4–4.8)
ALBUMIN/GLOB SERPL: 0.9 RATIO (ref 1.1–2)
ALP SERPL-CCNC: 59 UNIT/L (ref 40–150)
ALT SERPL-CCNC: 12 UNIT/L (ref 0–55)
AST SERPL-CCNC: 13 UNIT/L (ref 5–34)
BASOPHILS # BLD AUTO: 0.04 X10(3)/MCL
BASOPHILS NFR BLD AUTO: 0.6 %
BILIRUBIN DIRECT+TOT PNL SERPL-MCNC: 0.3 MG/DL
BUN SERPL-MCNC: 9.5 MG/DL (ref 9.8–20.1)
CALCIUM SERPL-MCNC: 8.9 MG/DL (ref 8.4–10.2)
CHLORIDE SERPL-SCNC: 103 MMOL/L (ref 98–107)
CO2 SERPL-SCNC: 24 MMOL/L (ref 23–31)
CREAT SERPL-MCNC: 0.73 MG/DL (ref 0.55–1.02)
EOSINOPHIL # BLD AUTO: 0.26 X10(3)/MCL (ref 0–0.9)
EOSINOPHIL NFR BLD AUTO: 4.1 %
ERYTHROCYTE [DISTWIDTH] IN BLOOD BY AUTOMATED COUNT: 13.2 % (ref 11.5–17)
GFR SERPLBLD CREATININE-BSD FMLA CKD-EPI: >60 MLS/MIN/1.73/M2
GLOBULIN SER-MCNC: 3.2 GM/DL (ref 2.4–3.5)
GLUCOSE SERPL-MCNC: 245 MG/DL (ref 82–115)
HCT VFR BLD AUTO: 34.5 % (ref 37–47)
HGB BLD-MCNC: 11.3 G/DL (ref 12–16)
IMM GRANULOCYTES # BLD AUTO: 0.02 X10(3)/MCL (ref 0–0.04)
IMM GRANULOCYTES NFR BLD AUTO: 0.3 %
LYMPHOCYTES # BLD AUTO: 1.61 X10(3)/MCL (ref 0.6–4.6)
LYMPHOCYTES NFR BLD AUTO: 25.2 %
MAGNESIUM SERPL-MCNC: 1.8 MG/DL (ref 1.6–2.6)
MCH RBC QN AUTO: 31.5 PG (ref 27–31)
MCHC RBC AUTO-ENTMCNC: 32.8 G/DL (ref 33–36)
MCV RBC AUTO: 96.1 FL (ref 80–94)
MONOCYTES # BLD AUTO: 0.58 X10(3)/MCL (ref 0.1–1.3)
MONOCYTES NFR BLD AUTO: 9.1 %
NEUTROPHILS # BLD AUTO: 3.89 X10(3)/MCL (ref 2.1–9.2)
NEUTROPHILS NFR BLD AUTO: 60.7 %
NRBC BLD AUTO-RTO: 0 %
PHOSPHATE SERPL-MCNC: 2.3 MG/DL (ref 2.3–4.7)
PLATELET # BLD AUTO: 132 X10(3)/MCL (ref 130–400)
PMV BLD AUTO: 11.6 FL (ref 7.4–10.4)
POCT GLUCOSE: 127 MG/DL (ref 70–110)
POCT GLUCOSE: 279 MG/DL (ref 70–110)
POCT GLUCOSE: 324 MG/DL (ref 70–110)
POTASSIUM SERPL-SCNC: 4.4 MMOL/L (ref 3.5–5.1)
PROT SERPL-MCNC: 6.1 GM/DL (ref 5.8–7.6)
RBC # BLD AUTO: 3.59 X10(6)/MCL (ref 4.2–5.4)
SODIUM SERPL-SCNC: 137 MMOL/L (ref 136–145)
WBC # SPEC AUTO: 6.4 X10(3)/MCL (ref 4.5–11.5)

## 2023-06-09 PROCEDURE — 25000003 PHARM REV CODE 250

## 2023-06-09 PROCEDURE — 84100 ASSAY OF PHOSPHORUS: CPT

## 2023-06-09 PROCEDURE — 97530 THERAPEUTIC ACTIVITIES: CPT | Mod: CO

## 2023-06-09 PROCEDURE — 94761 N-INVAS EAR/PLS OXIMETRY MLT: CPT

## 2023-06-09 PROCEDURE — 80053 COMPREHEN METABOLIC PANEL: CPT

## 2023-06-09 PROCEDURE — 97535 SELF CARE MNGMENT TRAINING: CPT

## 2023-06-09 PROCEDURE — 83735 ASSAY OF MAGNESIUM: CPT

## 2023-06-09 PROCEDURE — 25000003 PHARM REV CODE 250: Performed by: NURSE PRACTITIONER

## 2023-06-09 PROCEDURE — 94799 UNLISTED PULMONARY SVC/PX: CPT

## 2023-06-09 PROCEDURE — 25000242 PHARM REV CODE 250 ALT 637 W/ HCPCS

## 2023-06-09 PROCEDURE — S0179 MEGESTROL 20 MG: HCPCS | Performed by: NURSE PRACTITIONER

## 2023-06-09 PROCEDURE — 11800000 HC REHAB PRIVATE ROOM

## 2023-06-09 PROCEDURE — 63600175 PHARM REV CODE 636 W HCPCS: Performed by: NURSE PRACTITIONER

## 2023-06-09 PROCEDURE — 51798 US URINE CAPACITY MEASURE: CPT

## 2023-06-09 PROCEDURE — 97535 SELF CARE MNGMENT TRAINING: CPT | Mod: CO

## 2023-06-09 PROCEDURE — 99223 PR INITIAL HOSPITAL CARE,LEVL III: ICD-10-PCS | Mod: ,,, | Performed by: NURSE PRACTITIONER

## 2023-06-09 PROCEDURE — 99900035 HC TECH TIME PER 15 MIN (STAT)

## 2023-06-09 PROCEDURE — 94640 AIRWAY INHALATION TREATMENT: CPT

## 2023-06-09 PROCEDURE — 99223 1ST HOSP IP/OBS HIGH 75: CPT | Mod: ,,, | Performed by: NURSE PRACTITIONER

## 2023-06-09 PROCEDURE — 97166 OT EVAL MOD COMPLEX 45 MIN: CPT

## 2023-06-09 PROCEDURE — 85025 COMPLETE CBC W/AUTO DIFF WBC: CPT

## 2023-06-09 PROCEDURE — 63600175 PHARM REV CODE 636 W HCPCS: Performed by: SURGERY

## 2023-06-09 PROCEDURE — 25000003 PHARM REV CODE 250: Performed by: PHYSICAL MEDICINE & REHABILITATION

## 2023-06-09 RX ORDER — LABETALOL HCL 20 MG/4 ML
10 SYRINGE (ML) INTRAVENOUS EVERY 4 HOURS PRN
Status: DISCONTINUED | OUTPATIENT
Start: 2023-06-09 | End: 2023-06-23 | Stop reason: HOSPADM

## 2023-06-09 RX ORDER — ENOXAPARIN SODIUM 100 MG/ML
30 INJECTION SUBCUTANEOUS EVERY 12 HOURS
Status: DISCONTINUED | OUTPATIENT
Start: 2023-06-09 | End: 2023-06-23 | Stop reason: HOSPADM

## 2023-06-09 RX ORDER — NITROGLYCERIN 0.4 MG/1
0.4 TABLET SUBLINGUAL EVERY 5 MIN PRN
Status: DISCONTINUED | OUTPATIENT
Start: 2023-06-09 | End: 2023-06-23 | Stop reason: HOSPADM

## 2023-06-09 RX ORDER — AMLODIPINE BESYLATE 5 MG/1
10 TABLET ORAL DAILY
Status: DISCONTINUED | OUTPATIENT
Start: 2023-06-10 | End: 2023-06-23 | Stop reason: HOSPADM

## 2023-06-09 RX ORDER — INSULIN ASPART 100 [IU]/ML
1-10 INJECTION, SOLUTION INTRAVENOUS; SUBCUTANEOUS
Status: DISCONTINUED | OUTPATIENT
Start: 2023-06-09 | End: 2023-06-23 | Stop reason: HOSPADM

## 2023-06-09 RX ORDER — ONDANSETRON 4 MG/1
4 TABLET, ORALLY DISINTEGRATING ORAL EVERY 6 HOURS PRN
Status: DISCONTINUED | OUTPATIENT
Start: 2023-06-09 | End: 2023-06-23 | Stop reason: HOSPADM

## 2023-06-09 RX ORDER — BENZONATATE 100 MG/1
100 CAPSULE ORAL 3 TIMES DAILY PRN
Status: DISCONTINUED | OUTPATIENT
Start: 2023-06-09 | End: 2023-06-23 | Stop reason: HOSPADM

## 2023-06-09 RX ORDER — GABAPENTIN 300 MG/1
300 CAPSULE ORAL 3 TIMES DAILY
Qty: 90 CAPSULE | Refills: 0 | Status: ON HOLD
Start: 2023-06-09 | End: 2023-06-20 | Stop reason: SDUPTHER

## 2023-06-09 RX ORDER — GABAPENTIN 300 MG/1
300 CAPSULE ORAL 3 TIMES DAILY
Status: DISCONTINUED | OUTPATIENT
Start: 2023-06-09 | End: 2023-06-13

## 2023-06-09 RX ORDER — PROMETHAZINE HYDROCHLORIDE 25 MG/1
25 TABLET ORAL EVERY 6 HOURS PRN
Status: DISCONTINUED | OUTPATIENT
Start: 2023-06-09 | End: 2023-06-23 | Stop reason: HOSPADM

## 2023-06-09 RX ORDER — LIDOCAINE 50 MG/G
1 PATCH TOPICAL
Status: DISCONTINUED | OUTPATIENT
Start: 2023-06-10 | End: 2023-06-23 | Stop reason: HOSPADM

## 2023-06-09 RX ORDER — BISACODYL 10 MG
10 SUPPOSITORY, RECTAL RECTAL DAILY PRN
Status: DISCONTINUED | OUTPATIENT
Start: 2023-06-09 | End: 2023-06-23 | Stop reason: HOSPADM

## 2023-06-09 RX ORDER — HYDROXYZINE PAMOATE 50 MG/1
50 CAPSULE ORAL NIGHTLY PRN
Status: DISCONTINUED | OUTPATIENT
Start: 2023-06-09 | End: 2023-06-23 | Stop reason: HOSPADM

## 2023-06-09 RX ORDER — ACETAMINOPHEN 325 MG/1
650 TABLET ORAL EVERY 4 HOURS PRN
Status: DISCONTINUED | OUTPATIENT
Start: 2023-06-09 | End: 2023-06-13

## 2023-06-09 RX ORDER — OXYCODONE HYDROCHLORIDE 5 MG/1
5 TABLET ORAL EVERY 6 HOURS PRN
Refills: 0 | Status: ON HOLD
Start: 2023-06-09 | End: 2023-06-20 | Stop reason: HOSPADM

## 2023-06-09 RX ORDER — IBUPROFEN 200 MG
24 TABLET ORAL
Status: DISCONTINUED | OUTPATIENT
Start: 2023-06-09 | End: 2023-06-23 | Stop reason: HOSPADM

## 2023-06-09 RX ORDER — MEGESTROL ACETATE 40 MG/ML
400 SUSPENSION ORAL 2 TIMES DAILY
Status: COMPLETED | OUTPATIENT
Start: 2023-06-09 | End: 2023-06-14

## 2023-06-09 RX ORDER — LISINOPRIL 10 MG/1
20 TABLET ORAL DAILY
Status: DISCONTINUED | OUTPATIENT
Start: 2023-06-10 | End: 2023-06-23 | Stop reason: HOSPADM

## 2023-06-09 RX ORDER — MIRTAZAPINE 30 MG/1
30 TABLET, FILM COATED ORAL NIGHTLY
Qty: 30 TABLET | Refills: 11 | Status: ON HOLD
Start: 2023-06-09 | End: 2023-06-20 | Stop reason: HOSPADM

## 2023-06-09 RX ORDER — ASPIRIN 325 MG
325 TABLET ORAL DAILY
Status: DISCONTINUED | OUTPATIENT
Start: 2023-06-10 | End: 2023-06-23 | Stop reason: HOSPADM

## 2023-06-09 RX ORDER — POLYETHYLENE GLYCOL 3350 17 G/17G
17 POWDER, FOR SOLUTION ORAL 2 TIMES DAILY PRN
Status: DISCONTINUED | OUTPATIENT
Start: 2023-06-09 | End: 2023-06-23 | Stop reason: HOSPADM

## 2023-06-09 RX ORDER — FOLIC ACID 1 MG/1
1000 TABLET ORAL DAILY
Status: DISCONTINUED | OUTPATIENT
Start: 2023-06-10 | End: 2023-06-23 | Stop reason: HOSPADM

## 2023-06-09 RX ORDER — ATORVASTATIN CALCIUM 10 MG/1
10 TABLET, FILM COATED ORAL DAILY
Status: DISCONTINUED | OUTPATIENT
Start: 2023-06-10 | End: 2023-06-23 | Stop reason: HOSPADM

## 2023-06-09 RX ORDER — DOCUSATE SODIUM 100 MG/1
100 CAPSULE, LIQUID FILLED ORAL 2 TIMES DAILY
Status: DISCONTINUED | OUTPATIENT
Start: 2023-06-09 | End: 2023-06-23 | Stop reason: HOSPADM

## 2023-06-09 RX ORDER — HYDRALAZINE HYDROCHLORIDE 20 MG/ML
10 INJECTION INTRAMUSCULAR; INTRAVENOUS EVERY 4 HOURS PRN
Status: DISCONTINUED | OUTPATIENT
Start: 2023-06-09 | End: 2023-06-23 | Stop reason: HOSPADM

## 2023-06-09 RX ORDER — INSULIN ASPART 100 [IU]/ML
0-15 INJECTION, SOLUTION INTRAVENOUS; SUBCUTANEOUS
Status: ON HOLD
Start: 2023-06-09 | End: 2023-06-20

## 2023-06-09 RX ORDER — GLUCAGON 1 MG
1 KIT INJECTION
Status: DISCONTINUED | OUTPATIENT
Start: 2023-06-09 | End: 2023-06-23 | Stop reason: HOSPADM

## 2023-06-09 RX ORDER — ALPRAZOLAM 0.25 MG/1
0.25 TABLET ORAL 3 TIMES DAILY PRN
Status: DISCONTINUED | OUTPATIENT
Start: 2023-06-09 | End: 2023-06-23 | Stop reason: HOSPADM

## 2023-06-09 RX ORDER — BISACODYL 10 MG
10 SUPPOSITORY, RECTAL RECTAL ONCE
Status: COMPLETED | OUTPATIENT
Start: 2023-06-09 | End: 2023-06-09

## 2023-06-09 RX ORDER — METOPROLOL TARTRATE 1 MG/ML
10 INJECTION, SOLUTION INTRAVENOUS
Status: DISCONTINUED | OUTPATIENT
Start: 2023-06-09 | End: 2023-06-23 | Stop reason: HOSPADM

## 2023-06-09 RX ORDER — OXYCODONE AND ACETAMINOPHEN 5; 325 MG/1; MG/1
1 TABLET ORAL EVERY 6 HOURS PRN
Status: DISCONTINUED | OUTPATIENT
Start: 2023-06-09 | End: 2023-06-10

## 2023-06-09 RX ORDER — IBUPROFEN 200 MG
16 TABLET ORAL
Status: DISCONTINUED | OUTPATIENT
Start: 2023-06-09 | End: 2023-06-23 | Stop reason: HOSPADM

## 2023-06-09 RX ADMIN — POLYETHYLENE GLYCOL 3350 17 G: 17 POWDER, FOR SOLUTION ORAL at 08:06

## 2023-06-09 RX ADMIN — MEGESTROL ACETATE 20 MG: 20 TABLET ORAL at 08:06

## 2023-06-09 RX ADMIN — BISACODYL 10 MG: 10 SUPPOSITORY RECTAL at 08:06

## 2023-06-09 RX ADMIN — IPRATROPIUM BROMIDE AND ALBUTEROL SULFATE 3 ML: .5; 3 SOLUTION RESPIRATORY (INHALATION) at 04:06

## 2023-06-09 RX ADMIN — LISINOPRIL 20 MG: 20 TABLET ORAL at 08:06

## 2023-06-09 RX ADMIN — INSULIN DETEMIR 20 UNITS: 100 INJECTION, SOLUTION SUBCUTANEOUS at 08:06

## 2023-06-09 RX ADMIN — IPRATROPIUM BROMIDE AND ALBUTEROL SULFATE 3 ML: .5; 3 SOLUTION RESPIRATORY (INHALATION) at 12:06

## 2023-06-09 RX ADMIN — ASPIRIN 325 MG ORAL TABLET 325 MG: 325 PILL ORAL at 08:06

## 2023-06-09 RX ADMIN — INSULIN ASPART 3 UNITS: 100 INJECTION, SOLUTION INTRAVENOUS; SUBCUTANEOUS at 10:06

## 2023-06-09 RX ADMIN — IPRATROPIUM BROMIDE AND ALBUTEROL SULFATE 3 ML: .5; 3 SOLUTION RESPIRATORY (INHALATION) at 08:06

## 2023-06-09 RX ADMIN — OXYCODONE HYDROCHLORIDE AND ACETAMINOPHEN 1 TABLET: 5; 325 TABLET ORAL at 02:06

## 2023-06-09 RX ADMIN — ENOXAPARIN SODIUM 30 MG: 30 INJECTION SUBCUTANEOUS at 08:06

## 2023-06-09 RX ADMIN — INSULIN ASPART 10 UNITS: 100 INJECTION, SOLUTION INTRAVENOUS; SUBCUTANEOUS at 05:06

## 2023-06-09 RX ADMIN — ENOXAPARIN SODIUM 30 MG: 30 INJECTION SUBCUTANEOUS at 10:06

## 2023-06-09 RX ADMIN — PANTOPRAZOLE SODIUM 40 MG: 40 TABLET, DELAYED RELEASE ORAL at 08:06

## 2023-06-09 RX ADMIN — AMLODIPINE BESYLATE 10 MG: 5 TABLET ORAL at 08:06

## 2023-06-09 RX ADMIN — FOLIC ACID 1000 MCG: 1 TABLET ORAL at 08:06

## 2023-06-09 RX ADMIN — GABAPENTIN 300 MG: 300 CAPSULE ORAL at 08:06

## 2023-06-09 RX ADMIN — GABAPENTIN 300 MG: 300 CAPSULE ORAL at 10:06

## 2023-06-09 RX ADMIN — MEGESTROL ACETATE 400 MG: 40 SUSPENSION ORAL at 08:06

## 2023-06-09 RX ADMIN — GABAPENTIN 300 MG: 300 CAPSULE ORAL at 02:06

## 2023-06-09 RX ADMIN — INSULIN DETEMIR 20 UNITS: 100 INJECTION, SOLUTION SUBCUTANEOUS at 09:06

## 2023-06-09 RX ADMIN — DOCUSATE SODIUM 100 MG: 100 CAPSULE, LIQUID FILLED ORAL at 08:06

## 2023-06-09 RX ADMIN — INSULIN ASPART 10 UNITS: 100 INJECTION, SOLUTION INTRAVENOUS; SUBCUTANEOUS at 01:06

## 2023-06-09 RX ADMIN — ATORVASTATIN CALCIUM 10 MG: 10 TABLET, FILM COATED ORAL at 08:06

## 2023-06-09 NOTE — PLAN OF CARE
Pt has been approved for inpt rehab per Umberto  DC order requested  Nursing has number for report  [facility] confirms [facility] is not available to transport as Dr Guerin wants pt to arrive no later than 2pm. [facility]   Nursing has contacted son.

## 2023-06-09 NOTE — PT/OT/SLP EVAL
"Occupational Therapy Inpatient Rehab Evaluation    Name: Lita Hess  MRN: [id number]    Recommendations:     Discharge Recommendations:      Discharge Equipment Recommendations:     Barriers to discharge: Decreased caregiver support    Assessment:  Lita Hess is a 87 y.o. female admitted with a medical diagnosis of Debility.  She presents with the following impairments/functional limitations:    .    General Precautions: Standard, fall     Orthopedic Precautions: N/A    Braces: N/A    Rehab Prognosis: Fair; patient would benefit from acute skilled OT services to address these deficits and reach maximum level of function.      History:     Past Medical History:   Diagnosis Date    Chronic back pain 10/26/2022    Colon cancer     CVA (cerebral vascular accident)     Gastroesophageal reflux disease without esophagitis 10/26/2022    Hypertension     Iron deficiency anemia secondary to inadequate dietary iron intake 10/26/2022    Loss of appetite     Mixed hyperlipidemia 10/26/2022    Stroke     Type 2 diabetes mellitus without complication 10/26/2022    Vitamin D deficiency        Past Surgical History:   Procedure Laterality Date    COLECTOMY      COLONOSCOPY      ESOPHAGOGASTRODUODENOSCOPY      gastro biopsy      HIP REPLACEMENT ARTHROPLASTY      SCLEROTHERAPY WITH ULTRASOUND GUIDANCE      TRANSESOPHAGEAL ECHOCARDIOGRAPHY         Subjective     Orientation: Not oriented to time    Chief Complaint: "I'm in pain."    Respiratory Status: Room air    Patients cultural, spiritual, Hoahaoism conflicts given the current situation:         Living Environment   Living Environment  People in Home: alone  Living Arrangements: house  Home Accessibility: wheelchair accessible  Equipment Currently Used at Home: walker, rolling, shower chair  Shower Setup: Tub/Shower combo    Prior Level of Function  BADL: Independent  IADL: Independent per chart review    Equipment used at home: walker, rolling, shower chair.  DME owned (not " currently used):       Upon discharge, patient will have assistance from her son.    Objective:     Patient found supine upon OT entry to room. Pt stated that she was in a great deal of pain.    Mobility   Patient completed:  Supine to Sit with maximal assistance  Sit to Supine with maximal assistance  Sit to Stand Transfer with moderate assistance with rolling walker  Stand to Sit Transfer with moderate assistance with rolling walker  Toilet Transfer Stand Pivot technique with moderate assistance with  rolling walker and bedside commode    ADLs   Current Status   Eating     Toileting Hygiene   pt needs assistance with donning and doffing underwear. Pt able to perform mohsen hygiene. + void, urine   Toilet Transfer   pt needs assistance standing up from the commode     Limiting Factors for ADLs: motor, psychsocial, endurance, balance, weakness, coordination, cognition, safety awareness, and pain    Exams     ROM:          -       WFL    Hand Dominance: Right    ROM Hand  Left Hand: WFL  Right Hand: WFL    Strength  Overall Strength:          -       WFL    Sensation  Left:          -       WNL  Right:          -       WNL    Coordination:      -       Intact      Visual/Perceptual  Intact    Cognition:   WFL    Balance    Sitting  Sitting Surface: EOB  Static: No UE Support, Fair  Dynamic: Bilateral UE extremity support, Fair    Standing  Static: Bilateral UE Extremity Support, Fair  Dynamic: Bilateral UE extremity support, Fair (-)      LifeStyle Change and Education     Patient left with bed in chair position with call button in reach.    Education provided: Roles and goals of OT, ADLs, transfer training, bed mobility, assistive device, sequencing, safety precautions, fall prevention, equipment recommendations, and home safety    Multidisciplinary Problems       Occupational Therapy Goals          Problem: Occupational Therapy    Goal Priority Disciplines Outcome Interventions   Occupational Therapy Goal     OT,  PT/OT Ongoing, Progressing    Description: ADLs:  Pt to perform grooming tasks with set up and clean up  Pt to perform UB dressing with set up and clean up   Pt to perform LB dressing with mod A with use of AD   Pt to perform putting on/off footwear task with min A and AD  Pt to perform toileting with mod a using RW and BSC  Pt to perform bathing with mod a using TTB    Functional Transfers:  Pt to perform toilet transfers with CGA and RW  Pt to perform a tub transfer with mod a and TTB                           Plan     During this hospitalization, patient to be seen 5 x/week to address the identified rehab impairments via self-care/home management, community/work re-entry, therapeutic activities, therapeutic exercises, cognitive retraining, sensory integration and progress toward the following goals:    Plan of Care Expires:  06/15/23    Time Tracking     OT Received On: 06/9/2023  Time In 1310  Time Out 1350  Total Time 40  Therapy Time: 40 min  Missed Time:    Missed Time Reason:      Billable Minutes: Evaluation 30 min and Self Care/Home Management 10 min    06/09/2023

## 2023-06-09 NOTE — H&P
[facility] ([facility])  Rehab Admission H&P    Patient Name: Lita Hess  MRN: [id number]  Age: 87 y.o. Sex: female  : 1935  Hospital Length of Stay: 0 days  Date of Service: 2023   Chief Complaint: Debility 2/2 frequent falls with left 4th-7th rib fractures    Subjective:   History of Present Illness   87-year-old  female presented to [facility] ED on 2023 complaining of left side pain after accidentally falling backwards over walker.  PMH significant for CVA x2, CAD s/p MI, HTN, carotid artery stenosis, remote history of colon cancer s/p colectomy.  Workup significant for left 4th through 7th rib fractures.  Limited mobility 2/2 pain.  Tolerated transfer to [facility] inpatient rehab unit on  without incident.    Sitting up in bed.  Reports good sleep and appetite.  Last BM .  Vital signs stable with no recorded fevers.  CBC unremarkable.  CMP stable.  Glycemic control improving.  Recent imaging reported below.    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     (-) ETOH.   (-) Illicit drug use.   Completed college.  Retired as a teacher.  .  Lives with son in [place].   Patient sister is 80 and has elderly .  Limited support system when patient discharges home.    Prior level of function:  Independent ADLs, uses rolling walker for mobility, does some cooking, and like cleaning.  She does not drive.  Son does the yd work.  Residence:  Lives alone in [place], [place]  in a single-story home with 1 threshold step to enter the residence.  She has a tub/shower combination with tub transfer bench and hand-held shower.  No grab bars.  She has an elevated toilet with grab bars adjacent.  DME:  Rolling walker, hand-held shower, grab bars near toilet, Rollator, tub transfer bench, wheelchair, medical alert  Anticipated discharge destination:  Home with home health    Review of patient's allergies indicates:  No Known Allergies   No current facility-administered medications for this encounter.    Current Outpatient Medications:     amLODIPine (NORVASC) 10 MG tablet, Take 1 tablet (10 mg total) by mouth once daily., Disp: 90 tablet, Rfl: 1    aspirin 325 MG tablet, Take 1 tablet (325 mg total) by mouth once daily., Disp: 90 tablet, Rfl: 0    cyanocobalamin, vitamin B-12, 500 mcg Lozg, Take 500 mcg by mouth., Disp: , Rfl:     ergocalciferol (ERGOCALCIFEROL) 50,000 unit Cap, Take 1 capsule (50,000 Units total) by mouth every 7 days. (Patient taking differently: Take 50,000 Units by mouth every 7 days. Takes every wednesdays), Disp: 12 capsule, Rfl: 1    ferrous sulfate (FEOSOL) 325 mg (65 mg iron) Tab tablet, Take 1 tablet (325 mg total) by mouth once daily. TAKE ONE TABLET TWICE A DAY, Disp: 90 tablet, Rfl: 0    fluticasone propionate (FLONASE) 50 mcg/actuation nasal spray,  See Instructions, USE 2 SPRAYS IN EACH NOSTRIL TWICE DAILY, # 16 gm, 10 Refill(s), Pharmacy: [facility], 157.5, cm, Height/Length Dosing, 08/10/21 15:22:00 CDT, 39.5, kg, Weight Dosing, 10/14/21 14:47:00 CDT, Disp: , Rfl:     folic acid (FOLVITE) 1 MG tablet, Take 1 tablet (1,000 mcg total) by mouth once daily., Disp: 90 tablet, Rfl: 1    gabapentin (NEURONTIN) 300 MG capsule, Take 1 capsule (300 mg total) by mouth 3 (three) times daily., Disp: 90 capsule, Rfl: 0    insulin aspart U-100 (NOVOLOG) 100 unit/mL injection, Inject 0-15 Units into the skin before meals and at bedtime as needed for High Blood  Sugar., Disp: , Rfl:     insulin detemir U-100 (LEVEMIR) 100 unit/mL injection, Inject 20 Units into the skin 2 (two) times daily., Disp: , Rfl: 12    lisinopriL (PRINIVIL,ZESTRIL) 20 MG tablet, Take 1 tablet (20 mg total) by mouth once daily., Disp: 90 tablet, Rfl: 1    lovastatin (MEVACOR) 40 MG tablet, Take 1 tablet (40 mg total) by mouth every evening., Disp: 90 tablet, Rfl: 1    megestroL (MEGACE) 20 MG Tab, Take 1 tablet (20 mg total) by mouth once daily Take 1 tablet daily., Disp: 90 tablet, Rfl: 1    mirtazapine (REMERON) 30 MG tablet, Take 1 tablet (30 mg total) by mouth every evening., Disp: 30 tablet, Rfl: 11    multivitamin with minerals tablet, Take 1 tablet by mouth once daily., Disp: 90 tablet, Rfl: 1    oxyCODONE (ROXICODONE) 5 MG immediate release tablet, Take 1 tablet (5 mg total) by mouth every 6 (six) hours as needed for Pain., Disp: , Rfl: 0    pantoprazole (PROTONIX) 40 MG tablet, TAKE ONE TABLET TWICE A DAY, Disp: 180 tablet, Rfl: 1    polyethylene glycol 3350 (MIRALAX ORAL), Take 17 g by mouth., Disp: , Rfl:     sodium bicarbonate 650 MG tablet, TAKE TWO TABLETS 3 TIMES DAILY., Disp: 180 tablet, Rfl: 10    Facility-Administered Medications Ordered in Other Encounters:     acetaminophen tablet 650 mg, 650 mg, Oral, Q6H PRN, Jerri Pinto MD    amLODIPine tablet 10 mg, 10 mg, Oral, Daily, Margarita Krishna PA-C, 10 mg at 06/09/23 0840    aspirin tablet 325 mg, 325 mg, Oral, Daily, Margarita Krishna PA-C, 325 mg at 06/09/23 0840    atorvastatin tablet 10 mg, 10 mg, Oral, Daily, Margarita Krishna PA-C, 10 mg at 06/09/23 0840    dextrose 10% bolus 125 mL 125 mL, 12.5 g, Intravenous, PRN, Jerri Pinto MD    dextrose 10% bolus 125 mL 125 mL, 12.5 g, Intravenous, PRN, DIANE HullCNP-BC    dextrose 10% bolus 125 mL 125 mL, 12.5 g, Intravenous, PRN, Daria Kim MD    dextrose 10% bolus 250 mL 250 mL, 25 g, Intravenous, PRN, Jerri Pinto MD    dextrose 10% bolus 250 mL 250 mL, 25 g, Intravenous, PRN,  Linda Goncalves, [facility]    dextrose 10% bolus 250 mL 250 mL, 25 g, Intravenous, PRN, Daria Kim MD    enoxaparin injection 30 mg, 30 mg, Subcutaneous, BID, Cesar Stokes MD, 30 mg at 06/09/23 0840    folic acid tablet 1,000 mcg, 1,000 mcg, Oral, Daily, AUDELIA Rouse-C, 1,000 mcg at 06/09/23 0840    gabapentin capsule 300 mg, 300 mg, Oral, TID, Jerri Pinto MD, 300 mg at 06/09/23 0840    glucose chewable tablet 16 g, 16 g, Oral, PRN, Jerri Pinto MD    glucose chewable tablet 16 g, 16 g, Oral, PRN, Daria Kim MD    glucose chewable tablet 24 g, 24 g, Oral, PRN, Jerri Pinto MD    glucose chewable tablet 24 g, 24 g, Oral, PRN, Linda Goncalves, [facility]    glucose chewable tablet 24 g, 24 g, Oral, PRN, Daria Kim MD    insulin aspart U-100 injection 0-15 Units, 0-15 Units, Subcutaneous, QID (AC + HS) PRN, Daria Kim MD, 10 Units at 06/08/23 2200    insulin detemir U-100 injection 20 Units, 20 Units, Subcutaneous, BID, Cesar Stokes MD, 20 Units at 06/09/23 0841    iopamidoL (ISOVUE-370) injection 100 mL, 100 mL, Intravenous, ONCE PRN, Lea Westfall, NICOLETTE    lisinopriL tablet 20 mg, 20 mg, Oral, Daily, BHAVIN RouseC, 20 mg at 06/09/23 0840    magnesium oxide tablet 800 mg, 800 mg, Oral, PRN, Jerri Pinto MD, 800 mg at 06/07/23 0357    magnesium oxide tablet 800 mg, 800 mg, Oral, PRN, Jerri Pinto MD    megestroL tablet 20 mg, 20 mg, Oral, Daily, AUDELIA Rouse-C, 20 mg at 06/09/23 0841    mirtazapine tablet 30 mg, 30 mg, Oral, QHS, Margarita Krishna PA-C, 30 mg at 06/08/23 2037    morphine injection 2 mg, 2 mg, Intravenous, Q4H PRN, Jerri Pinto MD    oxyCODONE immediate release tablet 5 mg, 5 mg, Oral, Q6H PRN, Jerri Pinto MD, 5 mg at 06/08/23 2041    oxyCODONE immediate release tablet Tab 10 mg, 10 mg, Oral, Q6H PRN, Jerri Pinto MD    pantoprazole EC tablet 40 mg, 40 mg, Oral, BID, Margarita Krishna PA-C, 40 mg at 06/09/23 0840    polyethylene glycol packet 17 g, 17 g, Oral,  "Daily, Linda Goncalves, AGACNP-BC, 17 g at 06/09/23 0840    potassium bicarbonate disintegrating tablet 35 mEq, 35 mEq, Oral, PRN, Jerri Pinto MD    potassium bicarbonate disintegrating tablet 50 mEq, 50 mEq, Oral, PRN, Jerri Pinto MD    potassium bicarbonate disintegrating tablet 60 mEq, 60 mEq, Oral, PRN, Jerri Pinto MD    potassium, sodium phosphates 280-160-250 mg packet 2 packet, 2 packet, Oral, PRN, Jerri Pinto MD, 2 packet at 06/07/23 0358    potassium, sodium phosphates 280-160-250 mg packet 2 packet, 2 packet, Oral, PRN, Jerri Pinto MD    potassium, sodium phosphates 280-160-250 mg packet 2 packet, 2 packet, Oral, PRN, Jerri Pinto MD    sodium chloride 0.9% flush 10 mL, 10 mL, Intravenous, PRJerri MONET MD     Review of Systems   Complete 12-point review of symptoms negative except for what's mentioned in HPI     Objective:     /72   Pulse 97   Temp 97.5 °F (36.4 °C) (Oral)   Resp 18   Ht 4' 11.02" (1.499 m)   Wt 44.3 kg (97 lb 10.6 oz)   SpO2 (!) 94%   Breastfeeding No   BMI 19.71 kg/m²     Physical Exam  Constitutional:       Appearance: Normal appearance.   HENT:      Head: Normocephalic.      Mouth/Throat:      Mouth: Mucous membranes are moist.   Eyes:      Pupils: Pupils are equal, round, and reactive to light.   Cardiovascular:      Rate and Rhythm: Normal rate and regular rhythm.      Heart sounds: Normal heart sounds.   Pulmonary:      Effort: Pulmonary effort is normal.      Breath sounds: Normal breath sounds.   Abdominal:      General: Bowel sounds are normal.      Palpations: Abdomen is soft.   Musculoskeletal:      Cervical back: Neck supple.      Comments:  muscle atrophy   Skin:     General: Skin is warm and dry.   Neurological:      Mental Status: She is alert and oriented to person, place, and time.      Motor: Weakness present.   Psychiatric:         Mood and Affect: Mood normal.         Behavior: Behavior normal.         Thought Content: Thought content " normal.         Judgment: Judgment normal.   *MD performed and documented physical examination       Lines/Drains/Airways       Drain  Duration             Female External Urinary Catheter 06/06/23 1845 2 days              Peripheral Intravenous Line  Duration                  Peripheral IV - Single Lumen 06/06/23 1405 20 G Left Antecubital 2 days                    Labs  Admission on 06/06/2023, Discharged on 06/09/2023   Component Date Value Ref Range Status    Sodium Level 06/06/2023 137  136 - 145 mmol/L Final    Potassium Level 06/06/2023 4.2  3.5 - 5.1 mmol/L Final    Chloride 06/06/2023 106  98 - 107 mmol/L Final    Carbon Dioxide 06/06/2023 22 (L)  23 - 31 mmol/L Final    Glucose Level 06/06/2023 209 (H)  82 - 115 mg/dL Final    Blood Urea Nitrogen 06/06/2023 13.0  9.8 - 20.1 mg/dL Final    Creatinine 06/06/2023 0.89  0.55 - 1.02 mg/dL Final    Calcium Level Total 06/06/2023 9.9  8.4 - 10.2 mg/dL Final    Protein Total 06/06/2023 8.0 (H)  5.8 - 7.6 gm/dL Final    Albumin Level 06/06/2023 3.8  3.4 - 4.8 g/dL Final    Globulin 06/06/2023 4.2 (H)  2.4 - 3.5 gm/dL Final    Albumin/Globulin Ratio 06/06/2023 0.9 (L)  1.1 - 2.0 ratio Final    Bilirubin Total 06/06/2023 0.3  <=1.5 mg/dL Final    Alkaline Phosphatase 06/06/2023 77  40 - 150 unit/L Final    Alanine Aminotransferase 06/06/2023 24  0 - 55 unit/L Final    Aspartate Aminotransferase 06/06/2023 19  5 - 34 unit/L Final    eGFR 06/06/2023 >60  mls/min/1.73/m2 Final    PTT 06/06/2023 26.3  23.2 - 33.7 seconds Final    For Minimal Heparin Infusion, the goal aPTT 64-85 seconds corresponds to an anti-Xa of 0.3-0.5.    For Low Intensity and High Intensity Heparin, the goal aPTT  seconds corresponds to an anti-Xa of 0.3-0.7    PT 06/06/2023 13.5  12.5 - 14.5 seconds Final    INR 06/06/2023 1.04  0.00 - 1.30 Final    WBC 06/06/2023 8.27  4.50 - 11.50 x10(3)/mcL Final    RBC 06/06/2023 4.38  4.20 - 5.40 x10(6)/mcL Final    Hgb 06/06/2023 13.8  12.0 - 16.0 g/dL  Final    Hct 06/06/2023 40.9  37.0 - 47.0 % Final    MCV 06/06/2023 93.4  80.0 - 94.0 fL Final    MCH 06/06/2023 31.5 (H)  27.0 - 31.0 pg Final    MCHC 06/06/2023 33.7  33.0 - 36.0 g/dL Final    RDW 06/06/2023 12.8  11.5 - 17.0 % Final    Platelet 06/06/2023 217  130 - 400 x10(3)/mcL Final    MPV 06/06/2023 10.2  7.4 - 10.4 fL Final    Neut % 06/06/2023 76.8  % Final    Lymph % 06/06/2023 15.7  % Final    Mono % 06/06/2023 5.9  % Final    Eos % 06/06/2023 0.6  % Final    Basophil % 06/06/2023 0.4  % Final    Lymph # 06/06/2023 1.30  0.6 - 4.6 x10(3)/mcL Final    Neut # 06/06/2023 6.35  2.1 - 9.2 x10(3)/mcL Final    Mono # 06/06/2023 0.49  0.1 - 1.3 x10(3)/mcL Final    Eos # 06/06/2023 0.05  0 - 0.9 x10(3)/mcL Final    Baso # 06/06/2023 0.03  <=0.2 x10(3)/mcL Final    IG# 06/06/2023 0.05 (H)  0 - 0.04 x10(3)/mcL Final    IG% 06/06/2023 0.6  % Final    NRBC% 06/06/2023 0.0  % Final    Hemoglobin A1c 06/06/2023 10.5 (H)  <=7.0 % Final    Estimated Average Glucose 06/06/2023 254.7  mg/dL Final    POCT Glucose 06/06/2023 341 (H)  70 - 110 mg/dL Final    POCT Glucose 06/07/2023 299 (H)  70 - 110 mg/dL Final    Sodium Level 06/07/2023 137  136 - 145 mmol/L Final    Potassium Level 06/07/2023 3.8  3.5 - 5.1 mmol/L Final    Chloride 06/07/2023 107  98 - 107 mmol/L Final    Carbon Dioxide 06/07/2023 19 (L)  23 - 31 mmol/L Final    Glucose Level 06/07/2023 283 (H)  82 - 115 mg/dL Final    Blood Urea Nitrogen 06/07/2023 11.0  9.8 - 20.1 mg/dL Final    Creatinine 06/07/2023 0.82  0.55 - 1.02 mg/dL Final    BUN/Creatinine Ratio 06/07/2023 13   Final    Calcium Level Total 06/07/2023 9.1  8.4 - 10.2 mg/dL Final    Anion Gap 06/07/2023 11.0  mEq/L Final    eGFR 06/07/2023 >60  mls/min/1.73/m2 Final    Magnesium Level 06/07/2023 1.60  1.60 - 2.60 mg/dL Final    Phosphorus Level 06/07/2023 2.7  2.3 - 4.7 mg/dL Final    WBC 06/07/2023 7.19  4.50 - 11.50 x10(3)/mcL Final    RBC 06/07/2023 3.96 (L)  4.20 - 5.40 x10(6)/mcL Final    Hgb  06/07/2023 12.3  12.0 - 16.0 g/dL Final    Hct 06/07/2023 36.8 (L)  37.0 - 47.0 % Final    MCV 06/07/2023 92.9  80.0 - 94.0 fL Final    MCH 06/07/2023 31.1 (H)  27.0 - 31.0 pg Final    MCHC 06/07/2023 33.4  33.0 - 36.0 g/dL Final    RDW 06/07/2023 12.9  11.5 - 17.0 % Final    Platelet 06/07/2023 197  130 - 400 x10(3)/mcL Final    MPV 06/07/2023 11.0 (H)  7.4 - 10.4 fL Final    Neut % 06/07/2023 61.8  % Final    Lymph % 06/07/2023 28.7  % Final    Mono % 06/07/2023 8.1  % Final    Eos % 06/07/2023 0.7  % Final    Basophil % 06/07/2023 0.4  % Final    Lymph # 06/07/2023 2.06  0.6 - 4.6 x10(3)/mcL Final    Neut # 06/07/2023 4.45  2.1 - 9.2 x10(3)/mcL Final    Mono # 06/07/2023 0.58  0.1 - 1.3 x10(3)/mcL Final    Eos # 06/07/2023 0.05  0 - 0.9 x10(3)/mcL Final    Baso # 06/07/2023 0.03  <=0.2 x10(3)/mcL Final    IG# 06/07/2023 0.02  0 - 0.04 x10(3)/mcL Final    IG% 06/07/2023 0.3  % Final    NRBC% 06/07/2023 0.0  % Final    POCT Glucose 06/07/2023 346 (H)  70 - 110 mg/dL Final    POCT Glucose 06/07/2023 339 (H)  70 - 110 mg/dL Final    POCT Glucose 06/07/2023 492 (HH)  70 - 110 mg/dL Final    POCT Glucose 06/07/2023 454 (HH)  70 - 110 mg/dL Final    C-Reactive Protein 06/08/2023 52.70 (H)  <5.00 mg/L Final    Prealbumin 06/08/2023 20.4  14.0 - 37.0 mg/dL Final    Sodium Level 06/08/2023 133 (L)  136 - 145 mmol/L Final    Potassium Level 06/08/2023 4.0  3.5 - 5.1 mmol/L Final    Chloride 06/08/2023 103  98 - 107 mmol/L Final    Carbon Dioxide 06/08/2023 21 (L)  23 - 31 mmol/L Final    Glucose Level 06/08/2023 346 (H)  82 - 115 mg/dL Final    Blood Urea Nitrogen 06/08/2023 9.6 (L)  9.8 - 20.1 mg/dL Final    Creatinine 06/08/2023 0.84  0.55 - 1.02 mg/dL Final    Calcium Level Total 06/08/2023 8.5  8.4 - 10.2 mg/dL Final    Protein Total 06/08/2023 6.2  5.8 - 7.6 gm/dL Final    Albumin Level 06/08/2023 3.1 (L)  3.4 - 4.8 g/dL Final    Globulin 06/08/2023 3.1  2.4 - 3.5 gm/dL Final    Albumin/Globulin Ratio 06/08/2023 1.0  (L)  1.1 - 2.0 ratio Final    Bilirubin Total 06/08/2023 0.2  <=1.5 mg/dL Final    Alkaline Phosphatase 06/08/2023 62  40 - 150 unit/L Final    Alanine Aminotransferase 06/08/2023 15  0 - 55 unit/L Final    Aspartate Aminotransferase 06/08/2023 10  5 - 34 unit/L Final    eGFR 06/08/2023 >60  mls/min/1.73/m2 Final    Phosphorus Level 06/08/2023 3.0  2.3 - 4.7 mg/dL Final    Magnesium Level 06/08/2023 1.80  1.60 - 2.60 mg/dL Final    WBC 06/08/2023 6.14  4.50 - 11.50 x10(3)/mcL Final    RBC 06/08/2023 3.74 (L)  4.20 - 5.40 x10(6)/mcL Final    Hgb 06/08/2023 11.7 (L)  12.0 - 16.0 g/dL Final    Hct 06/08/2023 35.7 (L)  37.0 - 47.0 % Final    MCV 06/08/2023 95.5 (H)  80.0 - 94.0 fL Final    MCH 06/08/2023 31.3 (H)  27.0 - 31.0 pg Final    MCHC 06/08/2023 32.8 (L)  33.0 - 36.0 g/dL Final    RDW 06/08/2023 13.2  11.5 - 17.0 % Final    Platelet 06/08/2023 191  130 - 400 x10(3)/mcL Final    MPV 06/08/2023 10.2  7.4 - 10.4 fL Final    Neut % 06/08/2023 64.1  % Final    Lymph % 06/08/2023 24.4  % Final    Mono % 06/08/2023 8.1  % Final    Eos % 06/08/2023 2.4  % Final    Basophil % 06/08/2023 0.8  % Final    Lymph # 06/08/2023 1.50  0.6 - 4.6 x10(3)/mcL Final    Neut # 06/08/2023 3.93  2.1 - 9.2 x10(3)/mcL Final    Mono # 06/08/2023 0.50  0.1 - 1.3 x10(3)/mcL Final    Eos # 06/08/2023 0.15  0 - 0.9 x10(3)/mcL Final    Baso # 06/08/2023 0.05  <=0.2 x10(3)/mcL Final    IG# 06/08/2023 0.01  0 - 0.04 x10(3)/mcL Final    IG% 06/08/2023 0.2  % Final    NRBC% 06/08/2023 0.0  % Final    POCT Glucose 06/08/2023 312 (H)  70 - 110 mg/dL Final    POCT Glucose 06/08/2023 400 (H)  70 - 110 mg/dL Final    POCT Glucose 06/08/2023 330 (H)  70 - 110 mg/dL Final    POCT Glucose 06/08/2023 388 (H)  70 - 110 mg/dL Final    Magnesium Level 06/09/2023 1.80  1.60 - 2.60 mg/dL Final    Phosphorus Level 06/09/2023 2.3  2.3 - 4.7 mg/dL Final    Sodium Level 06/09/2023 137  136 - 145 mmol/L Final    Potassium Level 06/09/2023 4.4  3.5 - 5.1 mmol/L Final     Chloride 06/09/2023 103  98 - 107 mmol/L Final    Carbon Dioxide 06/09/2023 24  23 - 31 mmol/L Final    Glucose Level 06/09/2023 245 (H)  82 - 115 mg/dL Final    Blood Urea Nitrogen 06/09/2023 9.5 (L)  9.8 - 20.1 mg/dL Final    Creatinine 06/09/2023 0.73  0.55 - 1.02 mg/dL Final    Calcium Level Total 06/09/2023 8.9  8.4 - 10.2 mg/dL Final    Protein Total 06/09/2023 6.1  5.8 - 7.6 gm/dL Final    Albumin Level 06/09/2023 2.9 (L)  3.4 - 4.8 g/dL Final    Globulin 06/09/2023 3.2  2.4 - 3.5 gm/dL Final    Albumin/Globulin Ratio 06/09/2023 0.9 (L)  1.1 - 2.0 ratio Final    Bilirubin Total 06/09/2023 0.3  <=1.5 mg/dL Final    Alkaline Phosphatase 06/09/2023 59  40 - 150 unit/L Final    Alanine Aminotransferase 06/09/2023 12  0 - 55 unit/L Final    Aspartate Aminotransferase 06/09/2023 13  5 - 34 unit/L Final    eGFR 06/09/2023 >60  mls/min/1.73/m2 Final    WBC 06/09/2023 6.40  4.50 - 11.50 x10(3)/mcL Final    RBC 06/09/2023 3.59 (L)  4.20 - 5.40 x10(6)/mcL Final    Hgb 06/09/2023 11.3 (L)  12.0 - 16.0 g/dL Final    Hct 06/09/2023 34.5 (L)  37.0 - 47.0 % Final    MCV 06/09/2023 96.1 (H)  80.0 - 94.0 fL Final    MCH 06/09/2023 31.5 (H)  27.0 - 31.0 pg Final    MCHC 06/09/2023 32.8 (L)  33.0 - 36.0 g/dL Final    RDW 06/09/2023 13.2  11.5 - 17.0 % Final    Platelet 06/09/2023 132  130 - 400 x10(3)/mcL Final    MPV 06/09/2023 11.6 (H)  7.4 - 10.4 fL Final    Neut % 06/09/2023 60.7  % Final    Lymph % 06/09/2023 25.2  % Final    Mono % 06/09/2023 9.1  % Final    Eos % 06/09/2023 4.1  % Final    Basophil % 06/09/2023 0.6  % Final    Lymph # 06/09/2023 1.61  0.6 - 4.6 x10(3)/mcL Final    Neut # 06/09/2023 3.89  2.1 - 9.2 x10(3)/mcL Final    Mono # 06/09/2023 0.58  0.1 - 1.3 x10(3)/mcL Final    Eos # 06/09/2023 0.26  0 - 0.9 x10(3)/mcL Final    Baso # 06/09/2023 0.04  <=0.2 x10(3)/mcL Final    IG# 06/09/2023 0.02  0 - 0.04 x10(3)/mcL Final    IG% 06/09/2023 0.3  % Final    NRBC% 06/09/2023 0.0  % Final    POCT Glucose  06/09/2023 127 (H)  70 - 110 mg/dL Final     Radiology  CT head without contrast on 06/06/2023 at 3:00 p.m., IMPRESSION: No acute intracranial abnormality identified.  Findings of chronic microvascular ischemic disease.  Radiology  CT abdomen/pelvis with contrast on 6/6, IMPRESSION: No definite acute intra-abdominal or intrathoracic abnormality.  There are acute left-sided rib fractures as above.  Evaluation for additional fractures is limited by motion artifact.  Soft tissue density at the left sacrum as would be seen with decubitus ulcer is again noted.    Assessment/Plan:     87 y.o. AAF admitted on 6/9/2023    Debility   - 2/2 frequent falls with left 4th-7th rib fractures  - Consult physiatry for rehab and pain management  - PT/OT/RT/ST to eval and treat     Multiple rib fractures  - left 4-7th rib fractures  - continue    DuoNebs q.8 hours    Percocet 5 mg/325 mg q.6 hours p.r.n.    Lidoderm patch to left chest daily    Gabapentin 300 mg t.i.d.  - encourage incentive spirometer q.1 hour while awake    Proximal transverse colon adenocarcinoma  - s/p laparoscopic/robotic right hemicolectomy on 7/29/2021  - Biopsy significant for adenocarcinoma-no metastasis  - to follow up with oncology    Moderate protein calorie malnutrition  - obtain albumin in am  - poor appetite  - registered dietitian following  - encourage oral nutrition and hydration  - initiate    Megace 400 mg b.i.d. x5 days (initiated 6/9)     History of bilateral ischemic CVA  - 6/2020  - continue    Lipitor 10 mg daily    Aspirin 325 mg daily    CAD  - s/p MI  - denies recent chest pain or discomfort  - continue    Lisinopril 20 mg daily    Lipitor 10 mg daily    Aspirin 325 mg daily  - ECG and Nitro PRN  - not on Plavix or BB  - continue cardiac diet  - to follow-up with cardiology outpatient      HTN  - at goal!!  - continue    Lisinopril 20 mg daily    Norvasc 10 mg daily    Hydralazine 10 mg every 2 hours as needed for BP > 160/90    Labetalol  10 mg every 2 hours as needed  - Low-sodium diet     Iron deficiency anemia  - asymptomatic  - H/H stable   - continue    Folic acid 1 mg daily  - no evidence of active bleeds  - will closely monitor and transfuse if needed     History of vasovagal syncope  - s/p Linq device  - to follow up with cardiology outpatient    Left carotid stenosis  - Left ICA > 85%  - continue    Aspirin 325 mg daily    Lipitor 10 mg daily  - to follow-up outpatient with vascular surgery    Poorly controlled DM type II  - HgA1c 10.5 on 6/6/2023  - continue    Levemir 20 units b.i.d.    ISS   - CBGs AC/HS    HLD  - FLP at goal!!  - continue    Lipitor 10 mg daily    Constipation  - last BM on 6/5  - initiate    Dulcolax suppository x1 now   - continue  Colace 100 mg BID   Miralax 17 gm BID PRN    Unstageable sacral/left buttocks decubitus ulcer  - current  - duo derm applied  - turn q2h  - wound care following    MEDICAL PLAN:  - Admit to [facility]  - Medical reconciliation completed and included in the electronic health record  - Draw admit labs including CBC, CMP, and Prealbumin  - Maintain weightbearing status as ordered  - Monitor postoperative surgical incision changing dressing daily  - Teach skin protection and wound prevention techniques  - Initiate fall precaution  - Initiate bowel training program  - Initiate bladder training as indicated  - Pain management  - IS q2 hours while awake    THERAPEUTIC PLAN:  - Physical therapy 60-90 minutes 5 to week to increase functional mobility, maintain weightbearing precautions, increase lower extremity restrengthening, increase overall activity tolerance, teach balance and safety measures as well as fall precautions, make equipment and orthotic recommendations, be involved in family training and discharge planning, monitor pain levels and vital signs during therapy adjusting treatment accordingly  - Occupational therapy 60-90 minutes 5 times a  week to increase functional independence with activities of daily living, maintain weightbearing precautions, teach use of adaptive equipment, increase upper extremity restrengthening, increase overall activity tolerance, teach balance and safety measures as well as fall precautions, make equipment and orthotic recommendations, be involved in family training and discharge planning, monitor pain levels and vital signs during therapy adjusting treatment accordingly  - Speech and language pathology will do an in-depth evaluation of patient's cognition, phonation, and swallowing. They will initiate therapy 30- 60 minutes 5 times a week as indicated  - Recreational therapy will incorporate all patient's functional abilities  into recreational activities that will require visual, spatial, and perceptual abilities; gross and fine motor coordination skills; the cognition to follow multistep commands; dynamic and static balance and reaching abilities. They will also incorporate animal assisted therapy into their weekly program  - Rehabilitation nursing will act as patient family physician liaison. They will integrate patient's functional abilities, after discussions with therapist, into everyday activities with the CNA's,  LPN's and RNs that will include but are not limited to dressing, bathing, feeding, grooming, toileting, transfers, hygiene etc. They will administer medications watching for wanted as well as unwanted effects. They will initiate DVT/VTE prophylaxis as ordered. Will monitor vital signs, lab values, and pain levels, making appropriate physician notification. They will monitor skin integrity including postop incision, making daily dressing changes. They will teach skin protection and wound prevention techniques. They will initiate bowel training They will initiate bladder training as indicated. They will initiate fall precautions  - Rehabilitation counseling/case management will act as patient and family  liaison. They will set up family conferences, family training, aid in discharge planning, and aid in the procurement of assistive devices  - Patient will have 24 hour availability to physiatric care  - Patient will have nutritional services involved, monitoring oral input and visceral protein stores. They will make dietary and supplement recommendations and follow-up as necessary  - Patient will have weekly interdisciplinary team conferences  - Patient will have initial family conference and follow up conferences as indicated  - Patient will have family training prior to discharge     Estimated length of stay - 14-17 days  Anticipated discharge destination - Home with   Medical status - stabilizing postoperatively; will need to monitor pain levels, postoperative skin integrity, watch for evidence of deep vein thrombosis, monitor postoperative H&H, monitor vital signs closely.  Medical prognosis - Good for post-op healing and functional improvement  Previous functional Status:  Independent  Present Functional Status:  Mod/max assist    VTE Prophylaxis:  Lovenox 30 mg b.i.d.  COVID-19 testing:  Unknown  COVID-19 vaccination status:  Vaccinated (Moderna):  02/05/2021, 03/05/2021, 10/29/2021, and 06/04/2022    POA: Alhaji Hess (son) [phone]  Living will: No  Contacts: Alhaji Hess (son) [phone]     Elder Hess (son) [phone]     Raegan Stewart (sister) [phone]    CODE STATUS: Full Code  Internal Medicine (attending): Troy Guerin MD  Physiatry (consulting):  Avi Melchor MD    OUTPATIENT PROVIDERS  PCP: Anant Mejia MD  Cardiology: Henry Cifuentes MD  Vascular surgery: Kip Shah MD  General surgery: Justo Henry MD  Gastroenterology:  Kip Forbes MD    DISPOSITION: Condition stable. Tolerated transfer.  Recent labs and imaging reviewed.  Rehab admission orders initiated.  Med reconciliation completed.  Consult physiatry for rehab and pain management.  PT/OT/RT/ST to eval and treat.  Obtain  admission lab work in the morning.  Initiate scheduled nebs q.6 hours.  Initiate suppository x1 now.  Repeat CXR.    PHYSICIAN ATTESTATION: I have been involved in the patient's rehabilitation prescreening process and I have now completed the post admission physician evaluation. Patient is in need of, appropriate for, and should tolerate the above outlined inpatient rehabilitation plan. I believe this is necessary to reach maximal postoperative healing and maximal functional abilities. I do not believe this would be possible in a timely fashion in a less intensive setting      Jesus Sandy NP conducted independent physical examination and assisted with medical documentation.    Total time spent on this encounter including chart review and direct MD + NP 1-on-1 patient interaction: 92 minutes   Over 50% of this time was spent in counseling and coordination of care

## 2023-06-09 NOTE — PLAN OF CARE
Problem: Occupational Therapy  Goal: Occupational Therapy Goal  Description: ADLs:  Pt to perform grooming tasks with set up and clean up  Pt to perform UB dressing with set up and clean up   Pt to perform LB dressing with mod A with use of AD   Pt to perform putting on/off footwear task with min A and AD  Pt to perform toileting with mod a using RW and BSC  Pt to perform bathing with mod a using TTB    Functional Transfers:  Pt to perform toilet transfers with CGA and RW  Pt to perform a tub transfer with mod a and TTB    Outcome: Ongoing, Progressing

## 2023-06-09 NOTE — PHYSICIAN QUERY
"PT Name: Lita Hess  MR #: [id number]    DOCUMENTATION CLARIFICATION     CDS/: Albino Toussaint, RN, BSN   Contact information: [email or web address]     This form is a permanent document in the medical record.     Query Date: 2023    By submitting this query, we are merely seeking further clarification of documentation.. Please utilize your independent clinical judgment when addressing the question(s) below.    The medical record contains the following:   Indicators  Supporting Clinical Findings Location in Medical Record    Energy Intake     X Weight Loss Weight Loss (Malnutrition): greater than 5% in 1 month Registered Dietitian PN 2023     X Fat Loss Subcutaneous Fat (Malnutrition):   mild depletion    Orbital Region (Subcutaneous Fat Loss): mild depletion    Upper Arm Region (Subcutaneous Fat Loss): mild depletion   Registered Dietitian PN 2023     X Muscle Loss Muscle Mass (Malnutrition):   severe depletion    Hindu Region (Muscle Loss): severe depletion    Clavicle Bone Region (Muscle Loss): severe depletion    Dorsal Hand (Muscle Loss): severe depletion   Registered Dietitian PN 2023      Edema/Fluid Accumulation      Reduced  Strength (by dynamometer)     X Weight, BMI, Usual Body Weight Anthropometrics     Height: 4' 11.02" (149.9 cm)   Last Weight: 44.3 kg (97 lb 10.6 oz) (23 09)       BMI (Calculated): 19.7    BMI Classification: underweight (BMI less than 22 if >65 years of age)    Ideal Body Weight (IBW), Female: 95.1 lb  % Ideal Body Weight, Female (lb): 102.69 %  Usual Body Weight (UBW), k kg  % Usual Body Weight: 88.79    % Weight Change From Usual Weight: -11.4 %  Usual Weight Provided By: patient     Wt Readings from Last 5 Encounters:   23 44.3 kg (97 lb 10.6 oz)   23 49.9 kg (110 lb)   23 49.9 kg (110 lb)   22 48.5 kg (107 lb)   09/15/22 48.5 kg (107 lb)      Weight Change(s) Since Admission:  Admit Weight: 44.3 kg (97 lb 10.6 oz) " (06/06/23 6835)      Registered Dietitian PN 6/7/2023      Delayed Wound Healing     X Registered Dietician Diagnosis Nutrition Assessment      Malnutrition Assessment/Nutrition-Focused Physical Exam     Malnutrition Context: chronic illness    Malnutrition Level: severe          Nutrition Diagnosis      PES: Malnutrition related to chronic illness as evidenced by muscle wasting, fat loss, wt loss. (new)     Registered Dietitian PN 6/7/2023                      Registered Dietitian PN 6/7/2023   X Acute or Chronic Illness PMHx of two CVA, MI, hypertension, and remote history of colon cancer s/p colectomy who presented today after a fall from standing. Found to have acute fractures of left ribs 4,5,6, and 7.   Trauma Surgery H&P 6/6/2023      Social or Environmental Circumstances     X Treatment Nutrition Recommendation/Prescription      Continue current diet as tolerated.  Add Boost Very High Calorie (provides 530 kcal, 22 g protein per serving) TID.  Continue megace per MD.              Interventions/Goals      Intervention(s): general/healthful diet, commercial beverage, prescription medication, and collaboration with other providers  Goal: Meet greater than 75% of nutritional needs by follow-up. (new)     Monitoring & Evaluation      Dietitian will monitor energy intake.  Nutrition Risk/Follow-Up: moderate (follow-up in 3-5 days)     Registered Dietitian PN 6/7/2023                      Registered Dietitian PN 6/7/2023     X Other Her son states that she has been eating less, had decreased cognition    History of Loss of appetite              General: Well developed, well nourished, no acute distress            Current Outpatient Medications on File Prior to Encounter   Medication Sig    megestroL (MEGACE) 20 MG Tab Take 1 tablet (20 mg total) by mouth once daily Take 1 tablet daily.             Appetite/Oral Intake: fair/25-50% of meals  Factors Affecting Nutritional Intake: decreased  appetite            Comments  6/7/23: Noted MST indicates unsure wt loss PTA. Pt confirmed UBW, EMR wt indicates wt loss. Pt stated last wt ~2 weeks ago (stated she is weighed by home health at home.) Drinks 1 ONS/day at home. Offered ONS here and encouraged increasing to at least 2/day at home and here. Pt agreeable. Stated appetite good at home but noted MD notes indicate decreased appetite per son. Discussed with RN, will add pt flavor pref and higher kcal version instead of DM version of ONS in order to provide more kcal since pt usually only drinks 1/day.    Trauma Surgery H&P 6/6/2023                  Trauma Surgery H&P 6/6/2023              Trauma Surgery H&P 6/6/2023                      Registered Dietitian PN 6/7/2023              Registered Dietitian PN 6/7/2023       Academy of Nutrition and Dietetics (Academy) and the American Society for Parenteral and Enteral Nutrition (A.S.P.E.N.) Clinical Characteristics to support Malnutrition   Malnutrition in the Context of Acute Illness or Injury Malnutrition in the Context of Chronic Illness or Injury Malnutrition in the Context of Social or Environmental Circumstances   Malnutrition Level Moderate Severe Moderate Severe   Moderate   Severe   Energy Intake <75%                   >7 days <50%                 >5 days <75%           >1 month <75%                      >1 month   <75% for >3 months   <50% for >1 month   Weight Loss   1-2% in 1 week >2% in 1 week 5% in 1 month >5% in 1 month 5% in 1 month >5% in 1 month    5% in 1 month >5% in 1 month 7.5% in 3 months >7.5% in 3 months 7.5% in 3 months >7.5% in 3 months    7.5% in 3 months >7.5% in 3 months 10% in 6 months >10% in 6 months 10% in 6 months >10% in 6 months        20% in 1 year                    >20% in 1 year                                                                  20% in 1 year                            >20% in 1 year                                                  Subcutaneous Fat Loss  Mild  Moderate  Mild  Severe    Mild   Severe   Muscle Loss Mild  Moderate  Mild  Severe    Mild   Severe   Edema/Fluid Accumulation Mild Moderate to severe  Mild  Severe   Mild   Severe   Reduced  Strength         (based on standards supplied by  of dynamometer) N/A Measurably reduced N/A Measurably reduced N/A Measurably reduced     Criteria for mild malnutrition is defined as 1 characteristic outlined above within the established moderate or severe parameters.  A minimum of 2 out of the 6 characteristics noted above are recommended for a diagnosis of moderate or severe malnutrition.  Chronic illness/injury is a disease/condition lasting 3 months or longer.    The noted clinical guidelines are only system guidelines and do not replace the providers clinical judgment.      Provider, please specify diagnosis or diagnoses associated with above clinical findings:      [  ] Severe Malnutrition - a minimum of 2 of the 6 severe malnutrition characteristics noted above      [  ] Moderate Malnutrition - a minimum of 2 of the 6 moderate malnutrition characteristics noted above      [  X] Mild Malnutrition - 1 characteristic outlined above within the established moderate or severe parameters     [  ] Malnutrition, Unspecified degree     [  ] Underweight     [  ] Other Nutritional Diagnosis (please specify): __________________     [  ] Clinically Undetermined     Please document in your progress notes daily for the duration of treatment until resolved and  include in your discharge summary.      References:      BENJAMIN Escobar, PhD, RD, Austen LONGORIA P., PhD, RN, RACHELE Salas MD, PhD, Jessi SHARP A., MS, RD, [facility], MANOJ Mantilla, MS, RD, The Academy Malnutrition Work Group, The A.S.P.E.N. Board of Directors. (2012). Consensus Statement: Academy of Nutrition and Dietetics and American Society for Parenteral and Enteral Nutrition: Characteristics Recommended for the Identification and Documentation of Adult  Malnutrition (Undernutrition). Journal of Parenteral and Enteral Nutrition, 36(3), 275-283. doi:10.1177/6757610252915008     Form No. [id number]

## 2023-06-09 NOTE — DISCHARGE SUMMARY
DISCHARGE SUMMARY    Admit Date: 6/6/2023  Discharge Date: 6/9/2023  Admitting Physician: Scott Mcdonnell Jr., MD  Consulting Physicians(s): none    Admission HPI:   Pt is an 88 y/o F with PMHx of two CVA, MI, hypertension, and remote history of colon cancer s/p colectomy who presented today after a fall from standing. Her son states that she was at home cooking in the kitchen when she accidentally walked backwards into her walker behind her and fell, landing on her left side. Her son states that she has been eating less, had decreased cognition and decreased ability to walk with her rolling walker in the past few weeks and has had 3-4 falls at home just in the last month. She was admitted to [facility] about 2 weeks ago after a fall. 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 Imaging today concerning for acute fractures of ribs 4,5,6, and 7 on the left side. She is hemodynamically stable and states that pain is controlled at this time.    Hospital Course:   Pt was admitted on 6/6 following a ground level fall at home, in which she sustained multiple left sided rib fractures. She was admitted to the trauma ICU for closer monitoring of her respiratory status. She was stepped down to the trauma floor on 6/7, and was participating with PT/OT, tolerating a regular diet, and had adequate pain control with oral pain meds. By 6/8 she was medically stable for discharge to a skilled nursing facility; however, family requested that she be sent to rehab. Referrals were sent and on 6/9, pt was accepted to [facility] inpatient rehab. She was deemed suitable for discharge.    Procedures Performed: none    Discharge Condition: good    Disposition: Another Rehab Facility    Follow-Up Plan:    Follow-up Information       [facility] Follow up.    Why: As needed regarding rib fractures  Contact information:  [address] [place] [id number] [phone]               Andrey DOTY  MD Rina. Schedule an appointment as soon as possible for a visit in 1 week(s).    Specialty: Internal Medicine  Contact information:  [address] [place] [phone]                            Discharge Instructions:   Activity: as tolerated  Diet: regular  Wound Care: n/a    Discharge Medications:     Medication List        START taking these medications      gabapentin 300 MG capsule  Commonly known as: NEURONTIN  Take 1 capsule (300 mg total) by mouth 3 (three) times daily.     insulin aspart U-100 100 unit/mL injection  Commonly known as: NovoLOG  Inject 0-15 Units into the skin before meals and at bedtime as needed for High Blood Sugar.     insulin detemir U-100 100 unit/mL injection  Commonly known as: Levemir  Inject 20 Units into the skin 2 (two) times daily.     oxyCODONE 5 MG immediate release tablet  Commonly known as: ROXICODONE  Take 1 tablet (5 mg total) by mouth every 6 (six) hours as needed for Pain.            CHANGE how you take these medications      mirtazapine 30 MG tablet  Commonly known as: REMERON  Take 1 tablet (30 mg total) by mouth every evening.  What changed:   medication strength  additional instructions            CONTINUE taking these medications      amLODIPine 10 MG tablet  Commonly known as: NORVASC  Take 1 tablet (10 mg total) by mouth once daily.     aspirin 325 MG tablet  Take 1 tablet (325 mg total) by mouth once daily.     cyanocobalamin (vitamin B-12) 500 mcg Lozg     ergocalciferol 50,000 unit Cap  Commonly known as: ERGOCALCIFEROL  Take 1 capsule (50,000 Units total) by mouth every 7 days.     ferrous sulfate 325 mg (65 mg iron) Tab tablet  Commonly known as: FEOSOL  Take 1 tablet (325 mg total) by mouth once daily. TAKE ONE TABLET TWICE A DAY     fluticasone propionate 50 mcg/actuation nasal spray  Commonly known as: FLONASE     folic acid 1 MG tablet  Commonly known as: FOLVITE  Take 1 tablet (1,000 mcg total) by mouth once daily.     lisinopriL  20 MG tablet  Commonly known as: PRINIVIL,ZESTRIL  Take 1 tablet (20 mg total) by mouth once daily.     lovastatin 40 MG tablet  Commonly known as: MEVACOR  Take 1 tablet (40 mg total) by mouth every evening.     megestroL 20 MG Tab  Commonly known as: MEGACE  Take 1 tablet (20 mg total) by mouth once daily Take 1 tablet daily.     MIRALAX ORAL     multivitamin with minerals tablet  Take 1 tablet by mouth once daily.     pantoprazole 40 MG tablet  Commonly known as: PROTONIX  TAKE ONE TABLET TWICE A DAY     sodium bicarbonate 650 MG tablet  TAKE TWO TABLETS 3 TIMES DAILY.            STOP taking these medications      glyBURIDE 5 MG tablet  Commonly known as: DIABETA     metFORMIN 500 MG tablet  Commonly known as: GLUCOPHAGE     traMADoL 50 mg tablet  Commonly known as: ULTRAM     UNABLE TO FIND               Where to Get Your Medications        Information about where to get these medications is not yet available    Ask your nurse or doctor about these medications  gabapentin 300 MG capsule  insulin aspart U-100 100 unit/mL injection  insulin detemir U-100 100 unit/mL injection  mirtazapine 30 MG tablet  oxyCODONE 5 MG immediate release tablet        Jerri Pinto MD   [employer] General Surgery, PGY-1

## 2023-06-09 NOTE — NURSING
Report given to BATSHEVA Gutierrez. Dyllan, patient's son notified of discharge and updated on plan of care. VS stable, no complaints of pain or evident signs of distress. Awaiting transport via [facility].

## 2023-06-09 NOTE — CONSULTS
Inpatient Nutrition Assessment    Admit Date: 6/9/2023   Total duration of encounter: 1 day     Nutrition Recommendation/Prescription     Continue current diet as tolerated.  2.   Add Boost GC BID (190 kcal, 16 g pro/svg)   3.   Continue megace per MD as feasible   4.   Medical management of blood sugars- may need to adj insulin as feasible  5.   Medically management of constipation - encourage H2O intake     Communication of Recommendations: reviewed with nurse and reviewed with patient    Nutrition Assessment     Malnutrition Assessment/Nutrition-Focused Physical Exam    Malnutrition Context: chronic illness  Malnutrition Level: severe  Energy Intake (Malnutrition): other (see comments) (Does not meet criteria)  Weight Loss (Malnutrition): greater than 5% in 1 month  Orbital Region (Subcutaneous Fat Loss): mild depletion  Upper Arm Region (Subcutaneous Fat Loss): mild depletion  Muscle Mass (Malnutrition): severe depletion  Yazidi Region (Muscle Loss): severe depletion  Clavicle Bone Region (Muscle Loss): severe depletion  Dorsal Hand (Muscle Loss): severe depletion    Fluid Accumulation (Malnutrition): other (see comments) (Does not meet criteria)    A minimum of two characteristics is recommended for diagnosis of either severe or non-severe malnutrition.    Chart Review    Reason Seen: physician consult for New Rehab Admit     Malnutrition Screening Tool Results                Diagnosis:  fall from standing    Relevant Medical History: CVA, MI, hypertension, and remote history of colon cancer s/p colectomy     Nutrition-Related Medications:  megace, statin, folic acid, SSI, Insulin Determir 20 units BID, lisinopril  Calorie Containing IV Medications: no significant kcals from medications at this time    Nutrition-Related Labs:  6/9: 24 hr x CBGs (127-400)  6/7 Glu 283, Hgb A1C 10.5    Diet/PN Order: Diet diabetic  Oral Supplement Order: none  Tube Feeding Order: not applicable  Appetite/Oral Intake: good/%  "of meals  Factors Affecting Nutritional Intake: decreased appetite  Food/Worship/Cultural Preferences:  Strawberry ONS  Food Allergies: none reported       Wound(s):   none noted    Comments    6/9: Pt new admit from [facility].  Observed pt eating very good lunch during rounds (~100% shrimp etouffee), and working on sides. Pt tells me has been very hungry at meals.  Noted per RD note (6/7) - see below, pt with significant wt loss/decreased appetite at home prior to hospitalization. Started on megace - continued at rehab admit. Significant wt loss verified per EMR (~11% wt loss in x 1 month) from UBW reported of 50 kg. Observed obvious signs of malnutrition - fat/muscle wasting. Was receiving Boost VHC at [facility]. Noted pt blood sugars uncontrolled (24 hr x CBGS (127 - 400). Will transition to diabetic supplement Boost GC at this time - pt likes chocolate flavor. Encouraged intake. Pt denies N/V/D/C or chewing/swallowing difficulties. Uncertain of LBM. Did not note any stools documented per EMR in past few days.     RD note from [facility] prior to Rehab Admit:   6/7/23: Noted MST indicates unsure wt loss PTA. Pt confirmed UBW, EMR wt indicates wt loss. Pt stated last wt ~2 weeks ago (stated she is weighed by home health at home.) Drinks 1 ONS/day at home. Offered ONS here and encouraged increasing to at least 2/day at home and here. Pt agreeable. Stated appetite good at home but noted MD notes indicate decreased appetite per son. Discussed with RN, will add pt flavor pref and higher kcal version instead of DM version of ONS in order to provide more kcal since pt usually only drinks 1/day.     Anthropometrics    Height: 4' 11.02" (149.9 cm) Height Method: Estimated  Last Weight: 44.3 kg (97 lb 10.6 oz) (06/09/23 1410) Weight Method: Estimated  BMI (Calculated): 19.7  BMI Classification: underweight (BMI less than 22 if >65 years of age)     Ideal Body Weight (IBW), Female: 95.1 lb     % Ideal Body Weight, " Female (lb): 102.69 %    Usual Body Weight (UBW), k kg  % Usual Body Weight: 88.79  % Weight Change From Usual Weight: -11.4 %  Usual Weight Provided By: patient    Wt Readings from Last 5 Encounters:   23 44.3 kg (97 lb 10.6 oz)   23 44.3 kg (97 lb 10.6 oz)   23 49.9 kg (110 lb)   23 49.9 kg (110 lb)   22 48.5 kg (107 lb)     Weight Change(s) Since Admission:  Admit Weight: 44.3 kg (97 lb 10.6 oz) (23 1410)    Estimated Needs    Weight Used For Calorie Calculations: 44.3 kg (97 lb 10.6 oz)  Energy Calorie Requirements (kcal): 1519kcal (1.3 stress factor +500kcal for wt gain)  Energy Need Method: [place]-St Jeor  Weight Used For Protein Calculations: 44.3 kg (97 lb 10.6 oz)  Protein Requirements: 62-71gm (1.4-1.6g/kg)  Fluid Requirements (mL): 1519ml (1ml/kcal)     Temp (24hrs), Av.3 °F (36.8 °C), Min:98.1 °F (36.7 °C), Max:98.5 °F (36.9 °C)      Enteral Nutrition    Patient not receiving enteral nutrition at this time.    Parenteral Nutrition    Patient not receiving parenteral nutrition support at this time.    Evaluation of Received Nutrient Intake    Calories: not meeting estimated needs  Protein: not meeting estimated needs    Patient Education    Not applicable.    Nutrition Diagnosis     PES: Malnutrition related to chronic illness as evidenced by muscle wasting, fat loss, wt loss. (new)    Interventions/Goals     Intervention(s): general/healthful diet, commercial beverage, prescription medication, and collaboration with other providers  Goal: Meet greater than 75% of nutritional needs by follow-up. (new)    Monitoring & Evaluation     Dietitian will monitor energy intake.  Nutrition Risk/Follow-Up: moderate (follow-up in 3-5 days)   Please consult if re-assessment needed sooner.

## 2023-06-09 NOTE — CONSULTS
Chief Complaint  Debility; Left rib fractures 2/2 Fall    Reason for Consultation  Physiatry    History of Present Illness  Admit MD: Troy Guerin MD  Consult Physiatry: Avi Melchor MD  HPI: 86 yo BF with PMH of CVA x 2, MI with loop recorder, HTN, GERD, hip replacement in past, and remote hx colon cancer s/p colectomy presented to the ED at [facility] on 6/6/23 after falling at home. Patient complained of left rib pain and left hip pain. CT head showed no acute intracranial abnormality identified, and chronic microvascular ischemic disease. CT cervical spine showed no acute findings. CT of chest/abdomen/ pelvis showed acute fractures of the left 4th, 5th, 6th, and 7th ribs, scoliotic curvature of the spine with no acute fracture identified; and no fracture of right ribs identified. Hip XR showed no acute fractures. Patient was admitted to ICU for close respiratory monitoring, encouraged to use IS, started duonebs, and CPT ordered. On 6/7, PT eval completed with deficits noted. On 6/8, OT eval completed with deficits noted. Labs showed low BUN of 9.6, low Na of 133, low H&H of 11.7 & 35.7, elevated CBG of 346, and low albumin of 3.1. On Lovenox for DVT prophylaxis. On 6/9, CBG remains elevated. Patient is AAOx4.  Participating with therapy. Functional status includes minimal-moderate assist needed for bed mobility, minimal assist for transfer with RW, minimal assist for walking about 30 ft with RW, purwick catheter for toileting, moderate-max assist for lower body dressing. Patient was evaluated, accepted, and admitted to inpatient rehab to improve functional status. Transferred to [facility] on 6/9 without incident.      6/10: Seen in patient room, seated WC with OT. Reports good sleep and appetite. Bowels moved last night without issue. States that she is having pain to left side (ribs) and LLE from hip to toes. Described mostly as muscle pain and spasm. Denies tingling. Noted dorsiflexion and plantar flexion. OT  relays patient hip hiking and not WB on LLE during session. Initiate Robaxin 500mg BID and monitor for hypotension. Unable to dose 0.5 tablet or 250mg per Pharmacy. Nursing relays patient tolerated Percocet without side effects. VSSAF.          Review of Systems  Barriers to Discharge:  Social: Completed her master's degree. She has no  history. 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 She had a volunteer come in to help her around the house and for errands every now and then. Per son, the family is arranging for her care after she leaves rehab.  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 She does not drive. She has a medic alert. Dyllan does yard work.    Residence:  Lives alone in [place] in a single-story home with 1 threshold step to enter the residence. She has a tub/shower combination with a tub transfer bench and HHS, no grab bars. She has an elevated toilet with grab bars.    DME: RW, HHS, grab bars near toilet, rollator, tub transfer bench, and wheelchair.    Psychiatric: Per son, pt. Recently complained of depression. Pt. Has a history of anxiety and takes medication for this. She has no substance abuse history    Depression/Anxiety: Noted Remeron 30mg qPM in Home medication. Restart. Consider Cymbalta for additional coverage and pain.      mirtazapine tablet 30 mg after dinner  ALPRAZolam tablet 0.25 mg TID PRN Anxiety  Pain: left side-ribs, LLE  gabapentin capsule 300 mg TID  acetaminophen tablet 650 mg q4h PRN mild pain  oxyCODONE-acetaminophen 5-325  "mg 1 tablet q6h PRN mod pain  Bowels/Bladder: last BM 6/9 per patient    Appetite: "good"  megestroL 400 mg/10 mL (10 mL) suspension 400 mg BID     Sleep: good         Physical Exam  General: well-developed, thin/frail appearing, in no acute distress  Eye: EOMI, clear conjunctiva, eyelids normal  HENT: normocephalic, oropharynx and nasal mucosal surfaces moist  Neck: full range of motion, supple  Respiratory: equal chest rise, no SOB, no audible wheeze  Cardiovascular: regular rate and rhythm, no edema  Gastrointestinal: soft, non-tender, non-distended   Musculoskeletal: decreased ROM/strength to LLE; generalized weakness  Integumentary: no rashes or skin lesions present, right heel/sacral pressure wounds  Neurologic: cranial nerves intact, no signs of peripheral neurological deficit, motor/sensory function intact  *MD performed and documented physical examination         Labs:   Latest Reference Range & Units 06/10/23 05:12   WBC 4.50 - 11.50 x10(3)/mcL 6.92   RBC 4.20 - 5.40 x10(6)/mcL 3.58 (L)   Hemoglobin 12.0 - 16.0 g/dL 11.1 (L)   Hematocrit 37.0 - 47.0 % 33.8 (L)   MCV 80.0 - 94.0 fL 94.4 (H)   MCH 27.0 - 31.0 pg 31.0   MCHC 33.0 - 36.0 g/dL 32.8 (L)   RDW 11.5 - 17.0 % 13.0   Platelets 130 - 400 x10(3)/mcL 187   MPV 7.4 - 10.4 fL 10.4   Neut % % 65.3   LYMPH % % 21.0   Mono % % 8.1   Eosinophil % % 4.6   Basophil % % 0.7   Immature Granulocytes % 0.3   Neut # 2.1 - 9.2 x10(3)/mcL 4.52   Lymph # 0.6 - 4.6 x10(3)/mcL 1.45   Mono # 0.1 - 1.3 x10(3)/mcL 0.56   Eos # 0 - 0.9 x10(3)/mcL 0.32   Baso # <=0.2 x10(3)/mcL 0.05   Immature Grans (Abs) 0 - 0.04 x10(3)/mcL 0.02   nRBC % 0.0   Iron 50 - 170 ug/dL 55   TIBC 250 - 450 ug/dL 208 (L)   Iron Binding Capacity Unsaturated 70 - 310 ug/dL 153   Transferrin mg/dL 183   Iron Saturation 20 - 50 % 26   Sodium 136 - 145 mmol/L 134 (L)   Potassium 3.5 - 5.1 mmol/L 4.5   Chloride 98 - 107 mmol/L 100   CO2 23 - 31 mmol/L 25   BUN 9.8 - 20.1 mg/dL 13.0   Creatinine 0.55 - " 1.02 mg/dL 0.66   eGFR mls/min/1.73/m2 >60   Glucose 82 - 115 mg/dL 192 (H)   Calcium 8.4 - 10.2 mg/dL 9.3   Phosphorus 2.3 - 4.7 mg/dL 3.3   Magnesium 1.60 - 2.60 mg/dL 1.80   Alkaline Phosphatase 40 - 150 unit/L 56   PROTEIN TOTAL 5.8 - 7.6 gm/dL 6.4   Albumin 3.4 - 4.8 g/dL 2.8 (L)   Albumin/Globulin Ratio 1.1 - 2.0 ratio 0.8 (L)   Prealbumin 14.0 - 37.0 mg/dL 15.8   BILIRUBIN TOTAL <=1.5 mg/dL 0.3   AST 5 - 34 unit/L 14   ALT 0 - 55 unit/L 13   Globulin, Total 2.4 - 3.5 gm/dL 3.6 (H)   (L): Data is abnormally low  (H): Data is abnormally high              Diagnostics:  XR RIBS MIN 3 VIEWS W/ PA CHEST LEFT  Impression:  Nondisplaced fractures of the left 5th, 6th and 7th ribs laterally.  Date:                                            06/06/2023  Time:                                           13:38      CT CHEST ABDOMEN PELVIS WITH CONTRAST   SKELETAL: Acute fractures of the 4th 5th 6th and 7th ribs.  There is scoliotic curvature of the spine with no acute fracture identified.  No fracture of the right ribs identified, however limited by motion artifact severe scoliotic curvature of the spine.  Impression:  No definite acute intra-abdominal or intrathoracic abnormality.  There are acute left-sided rib fractures as above.  Evaluation for additional fractures is limited by motion artifact.  Soft tissue density at the left sacrum as would be seen with decubitus ulcer is again noted.  Date:                                            06/06/2023  Time:                                           15:39      CT CERVICAL SPINE WITHOUT CONTRAST  Impression:  No acute fracture identified  Date:                                            06/06/2023  Time:                                           15:03      CT HEAD WITHOUT CONTRAST  Impression:  No acute intracranial abnormality identified.  Findings of chronic microvascular ischemic disease.  Date:                                            06/06/2023  Time:                                            15:00    XR HIP WITH PELVIS WHEN PERFORMED, 2 OR 3 VIEWS LEFT; XR FEMUR 2 VIEW LEFT  FINDINGS:  BONE: The bones appear demineralized.  No fracture. No dislocation.  There are postsurgical changes of right hip arthroplasty.  Femoral stem is incompletely imaged.  Hardware appears engaged and intact.  Evaluation of the sacrum is limited due to overlying bowel.  SOFT TISSUES: Femoral and popliteal calcific atherosclerosis.  Impression:  No acute osseous abnormality.  The bones are demineralized.  Date:                                            06/06/2023  Time:                                           13:35          Assessment/Plan  Hospital   Fall   Rib fracture   Debility     Non-Hospital   Chronic back pain (Chronic)   Mixed hyperlipidemia (Chronic)   Hypertension (Chronic)   Type 2 diabetes mellitus without complication (Chronic)   Gastroesophageal reflux disease without esophagitis (Chronic)   Iron deficiency anemia secondary to inadequate dietary iron intake (Chronic)   CVA (cerebral vascular accident) (Chronic)   Loss of appetite (Chronic)   Vitamin D deficiency (Chronic)   Frailty (Chronic)   Arteriosclerosis of coronary artery   At risk of pressure injury of skin   Hiatal hernia   Scoliosis   Stenosis of carotid artery   Severe malnutrition   Colon cancer       Wounds: right heel/sacral pressure wounds  Precautions: fall risk  Bracing/AD: RW  Swallowing: Diabetic Diet  Function: Tolerating therapy. Continue PT/OT  VTE Prophylaxis:   enoxaparin injection 30 mg SubQ q12h  Code Status: FULL CODE   Discharge: Lives alone in [place] in a single-story home with 1 threshold step to enter the residence. Completed her master's degree. She has no  history. 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 She had a volunteer come in to help her around the house and for errands every now and then. Per son, the family is arranging for her care after  she leaves rehab.  Children: (2). Date pending.     6/9/23  I have evaluated the patient on initial IRF admit. I have been involved in rehab prescreen. I have reviewed records.I have reviewed admit orders.I have discussed case with IM team.  I find the patient appropriate for, in need of and should tolerate an aggressive IRF program with good potential for functional improvement.  I am in agreement with initial Plan of Care  I have been involved in the creation of this initial PM&R consult with Kyleigh Calvo NP, conducted additional independent physical examination and assisted with medical documentation.

## 2023-06-09 NOTE — PLAN OF CARE
Lita Hess age 87 *[place]     Diagnoses: Fall over walker with L4-7 rib fractures. Pt. Has a past medical history of CVA x 2, MI, HTN, and remote history of colon cancer s/p colectomy. *See chart for full and complete medical history*     Hx mental health/substance abuse: Per son Dyllan, maura. Recently complained of depression. Pt. Has a history of anxiety and takes medication for this. She has no substance abuse history     Is there evidence of an acute change in mental status from the patients baseline? No     Insurance: Medicare Part A&B/ [facility]     Education//Work Hx: Pt. Completed her master's degree. She has no  history. 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 She had a volunteer come in to help her around the house and for errands every now and then. Per Dyllan, the family is arranging for her care after she leaves rehab.      Children: (2)/Friends/Family:  1) Dyllan Hess, son ([phone]) 2) Raegan Stewart, sister ([phone])      Power of  (yes-Dyllan Hess)/Living Will (no)     Prior Level of Fx: Independent ADLs, uses RW for mobility, does some cooking, and light cleaning. She does not drive. She has a medic alert. Dyllan does yard work.      Residence: PtIan Lives alone in [place] in a single-story home with 1 threshold step to enter the residence. She has a tub/shower combination with a tub transfer bench and HHS, no grab bars. She has an elevated toilet with grab bars.      DME: RW, HHS, grab bars near toilet, rollator, tub transfer bench, and wheelchair. Dyllan would like to use any DME company other than [employer]     HH/OP TX: Pt. Has a history of [facility]. Currently with [facility] for HH and would like to continue to use [facility].      FOC obtained for [facility] for Home Health.     Pharmacy: [facility] / (has prescription drug coverage)      MD(s): PCP: Dr. Anant Mejia ([phone] Schedule an appointment as soon as  possible for a visit in 1 week; [facility] ([phone]) Follow-up as needed regarding rib fractures. Cardiologist: Dr. Cifuentes (pt. Has a loop recorder); GI: Dr. Kip Forbes. Batool Mijares, pt. Hasn't seen him yet. She was scheduled for an appointment but then fell and came to the hospital.

## 2023-06-09 NOTE — CONSULTS
[facility] - Rehab Inpatient Services  Inpatient Rehab Prescreen    PATIENT INFORMATION     Assessment date/time: 6/9/23 0930    Name: Lita Hess Phone: [phone]   Address:   [place] [address] SSN:    YOB: 1935 Age: 87 y.o. Gender: female   Race: Black or    Marital Status:    Advance Directives: Full code     COVERAGE INFORMATION     Patient Medicare #:  [id number]    Primary Insurance Type: Medicare / Part A&B effective 10/1/99 Secondary Insurance Type: [place] of [place] /    Policy #:   Policy #:  [id number]   Insurance contact name/number:  Insurance contact name/number:    Authorization #:  Authorization #:    Verified Coverage: Insurance Carrier   Pending Coverage:    Prescription Coverage:  Insurance details/comments:      PHYSICIAN/REFERRAL INFORMATION     Primary Care Physician: Andrey Flynn MD Attending Physician: Troy Guerin MD   Consulting physician/specialist:  Referring Physician:    Referring facility:  Referring contact name/phone:    Physician details/comments:      CONTACT INFORMATION   Extended Emergency Contact Information  Primary Emergency Contact: Dyllan Hess  Mobile Phone: [phone]  Relation: Son  Preferred language: English   needed? No  Secondary Emergency Contact: PatRaegan  Mobile Phone: [phone]  Relation: Sister  Preferred language: English   needed? No    PRIOR LIVING SITUATION    Patient lived alone in a single story home with 1 threshold step to enter     Bedroom location/setup: single story    Bathroom location/setup: Tub/shower combo with transfer bench and hand-held shower, elevated commode seat with railing   Equipment at home: rolling walker, rollator, w/c   Prior device use:  rolling walker      PRIOR LEVEL OF FUNCTION     Did a helper need to assist with the following activities prior to the current illness, exacerbation, or injury?   Self-care:  No,  independent   Indoor mobility:  No, independent    Stairs: unknown    Functional Cognition: Yes, son and home health helped manage medications and finances   Comments: Patient was modified independent for mobility with use of rolling walker, and ADLs.    Caregivers providing assistance: Son and patient's sister helped when needed    Pre-hospital home care service: Yes  Name of Agency: [facility]   Home/personal responsibilities: cooks, cleans, personal ADLs, mobility   Pre-hospital vocational category: Retired for age   Pre-hospital vocational effort: Retired for age   Occupation/profession:  Return to work/school plan:    Educational history:    Hobbies/leisure activities:  Resources used prior to admission:    Available resources:  Resource information comments:      REHABILITATION DIAGNOSIS     History of present illness: This is an 87 year old female with PMH of CVA, MI with loop recorder, HTN, GERD, hip replacement in past, colon cancer who presented to the ED at [facility] on 6/6/23 after falling at home. Patient complained of left rib pain and left hip pain. CT head showed no acute intracranial abnormality identified, and chronic microvascular ischemic disease. CT cervical spine showed no acute findings. CT of chest/abdomen/ pelvis showed acute fractures of the left 4th, 5th, 6th, and 7th ribs, scoliotic curvature of the spine with no acute fracture identified; and no fracture of right ribs identified. Hip XR showed no acute fractures. Patient was admitted to ICU for close respiratory monitoring, encouraged to use IS, pulmonary toilet, started duonebs, and CPT ordered. On 6/7, PT eval completed with deficits noted. On 6/8, OT eval completed with deficits noted. Labs showed low BUN of 9.6, low Na of 133, low H&H of 11.7 & 35.7, elevated CBG of 346, and low albumin of 3.1. On Lovenox for DVT prophylaxis. On 6/9, CBG remains elevated. Prior to hospitalization, patient was living alone in a single story home with 1 threshold  step to enter, has tub/shower combo with transfer bench, and elevated commode seat with railing. Patient was modified independent with use of RW for mobility and all ADLs. Patient was still cooking simple meals and cleaning. Family would assist with finances, transportation, and medications. Patient was active with [facility] health. Currently, patient is AAOx4; minimal-moderate assist for bed mobility, minimal assist for transfer with RW, minimal assist for walking about 30 ft with RW, purwick catheter for toileting, moderate-max assist for lower body dressing. Pt. Requires acute inpatient rehab admission with 24-hour nursing and active physician oversight to monitor and manage acute medical comorbid conditions, labs, pain, and functional deficits.  Patient/family will also require teaching and integration of improving functional skills into daily living.  She will also require an individualized, interdisciplinary approach to her care, receiving PT, OT services 3 hours per day, 5 days per week.    Required care cannot be provided at a lower level of care. Patient is anticipated to require approximately 7-10 days LOS with expected discharge home with son with HH   Impairment group (IGC):   Other Orthopaedic 08.9 Etiologic diagnosis/description: Fall with multiple left rib fractures 4th- 7th   Date of onset:  6/6/23 Date of surgery: NA   Allergies: Patient has no known allergies.   Comorbid condition: Hypertension, Myocardial infarction, and Stroke, anemia, Diabetes type 2, GERD   Medical/functional conditions requiring inpatient rehabilitation: This patient requires medical management/24-hour nursing of complex   comorbidities ( Fall with multiple left rib fractures 4th- 7th,  Hypertension, Myocardial infarction, and Stroke, anemia, Diabetes type 2, GERD), labs, medications (see medications list), pain, sleep hygiene, anticoagulation, nutrition, hydration, neurological, pulmonary, cardiac status, and preventive  healthcare.      This patient requires intense therapy and an integrated,   interdisciplinary approach to address safety, impaired mobility,   impaired ADL's (dressing, toileting, grooming, showering), pulmonary insufficiency, bowel/bladder problems, preventive healthcare, medication management, integration of improving functional skills into daily living, and home caregiver   support and training.   Risk for medical/clinical complications: This patient is at risk for the following complications: DVT/PE, pneumonia,   malnutrition, neurological decline, respiratory insufficiency,   worsening activity intolerance, complications from anticoagulation,   Infection, skin breakdown, inadequate sleep, recurring stroke, and constipation.     SPECIAL REHABILITATION NEEDS     IV: 20G Left AC Preferred Language: English         Peripheral IV - Single Lumen 06/06/23 1405 20 G Left Antecubital (Active)   Site Assessment Clean;Dry;Intact;No redness;No swelling 06/09/23 0400   Extremity Assessment Distal to IV No abnormal discoloration;No redness;No swelling;No warmth 06/08/23 2000   Line Status Saline locked 06/09/23 0400   Dressing Status Clean;Dry 06/09/23 0400   Dressing Intervention Integrity maintained 06/09/23 0400          PRECAUTIONS     Safety/fall precautions: High fall risk     PAST MEDICAL, SOCIAL, FAMILY HISTORY     Pertinent past medical history:   Past Medical History:   Diagnosis Date    Chronic back pain 10/26/2022    Colon cancer     CVA (cerebral vascular accident)     Gastroesophageal reflux disease without esophagitis 10/26/2022    Hypertension     Iron deficiency anemia secondary to inadequate dietary iron intake 10/26/2022    Loss of appetite     Mixed hyperlipidemia 10/26/2022    Stroke     Type 2 diabetes mellitus without complication 10/26/2022    Vitamin D deficiency       Has the patient had two or more falls in the past year or any fall with injury in the past year: Yes   Has the patient had major  "surgery during the 100 days prior to admission: No   Family Medical History: No family history on file.   Substance use history:   Social History     Substance and Sexual Activity   Alcohol Use Never     Social History     Tobacco Use   Smoking Status Never   Smokeless Tobacco Never     Social History     Substance and Sexual Activity   Drug Use Never      VITALS   BP:  119/61     Temp: 98.5 °F (36.9 °C)     HR: 107     Resp: (!) 24     SpO2: (!) 94 %     Height: 4' 11.02" (1.499 m)     Weight: 44.3 kg (97 lb 10.6 oz)     BMI: 19.7          MED/LABS/DIAGNOSITICS   No current facility-administered medications for this encounter.     No current outpatient medications on file.     Facility-Administered Medications Ordered in Other Encounters   Medication Dose Route Frequency Provider Last Rate Last Admin    acetaminophen tablet 650 mg  650 mg Oral Q6H PRN Jerri Pinto MD        amLODIPine tablet 10 mg  10 mg Oral Daily AUDELIA Rouse-C   10 mg at 06/09/23 0840    aspirin tablet 325 mg  325 mg Oral Daily AUDELIA Rouse-C   325 mg at 06/09/23 0840    atorvastatin tablet 10 mg  10 mg Oral Daily Margarita Krishna PA-C   10 mg at 06/09/23 0840    dextrose 10% bolus 125 mL 125 mL  12.5 g Intravenous PRN Jerri Pinto MD        dextrose 10% bolus 125 mL 125 mL  12.5 g Intravenous PRN DOMINIQUE Hull-BC        dextrose 10% bolus 125 mL 125 mL  12.5 g Intravenous PRN Daria Kim MD        dextrose 10% bolus 250 mL 250 mL  25 g Intravenous PRN Jerri Pinto MD        dextrose 10% bolus 250 mL 250 mL  25 g Intravenous PRN MAMTA uHllP-BC        dextrose 10% bolus 250 mL 250 mL  25 g Intravenous PRN Daria Kim MD        enoxaparin injection 30 mg  30 mg Subcutaneous BID Cesar Stokes MD   30 mg at 06/09/23 0840    folic acid tablet 1,000 mcg  1,000 mcg Oral Daily UADELIA Rouse-C   1,000 mcg at 06/09/23 0840    gabapentin capsule 300 mg  300 mg Oral TID Jerri Pinto MD   300 mg at 06/09/23 0840    " glucose chewable tablet 16 g  16 g Oral PRN Jerri Pinto MD        glucose chewable tablet 16 g  16 g Oral PRN Daria Kim MD        glucose chewable tablet 24 g  24 g Oral PRN Jerri Pinto MD        glucose chewable tablet 24 g  24 g Oral PRN ROXANN Hull        glucose chewable tablet 24 g  24 g Oral PRN Daria Kim MD        insulin aspart U-100 injection 0-15 Units  0-15 Units Subcutaneous QID (AC + HS) PRN Daria Kim MD   10 Units at 06/08/23 2200    insulin detemir U-100 injection 20 Units  20 Units Subcutaneous BID Cesar Stokes MD   20 Units at 06/09/23 0841    iopamidoL (ISOVUE-370) injection 100 mL  100 mL Intravenous ONCE PRN Lea Westfall NP        lisinopriL tablet 20 mg  20 mg Oral Daily Margarita Krishna PA-C   20 mg at 06/09/23 0840    magnesium oxide tablet 800 mg  800 mg Oral PRN Jerri Pinto MD   800 mg at 06/07/23 0357    magnesium oxide tablet 800 mg  800 mg Oral PRN Jerri Pinto MD        megestroL tablet 20 mg  20 mg Oral Daily Margarita Krishna PA-C   20 mg at 06/09/23 0841    mirtazapine tablet 30 mg  30 mg Oral QHS Margarita Krishna PA-C   30 mg at 06/08/23 2037    morphine injection 2 mg  2 mg Intravenous Q4H PRCHIKI Pinto MD        oxyCODONE immediate release tablet 5 mg  5 mg Oral Q6H PRN Jerri Pinto MD   5 mg at 06/08/23 2041    oxyCODONE immediate release tablet Tab 10 mg  10 mg Oral Q6H PRN Jerri Pinto MD        pantoprazole EC tablet 40 mg  40 mg Oral BID Margarita Krishna PA-C   40 mg at 06/09/23 0840    polyethylene glycol packet 17 g  17 g Oral Daily ROXANN Hull   17 g at 06/09/23 0840    potassium bicarbonate disintegrating tablet 35 mEq  35 mEq Oral PRN Jerri Pinto MD        potassium bicarbonate disintegrating tablet 50 mEq  50 mEq Oral PRN Jerri Pinto MD        potassium bicarbonate disintegrating tablet 60 mEq  60 mEq Oral PRN Jerri Pinto MD        potassium, sodium phosphates 280-160-250 mg packet 2 packet  2 packet Oral PRN  Jerri Pinto MD   2 packet at 06/07/23 0358    potassium, sodium phosphates 280-160-250 mg packet 2 packet  2 packet Oral PRN Jerri Pinto MD        potassium, sodium phosphates 280-160-250 mg packet 2 packet  2 packet Oral PRN Jerri Pinto MD        sodium chloride 0.9% flush 10 mL  10 mL Intravenous PRN Jerri Pinto MD          Pertinent lab results:   Lab Results   Component Value Date    WBC 6.40 06/09/2023    HGB 11.3 (L) 06/09/2023    HCT 34.5 (L) 06/09/2023     06/09/2023     06/09/2023    K 4.4 06/09/2023    BUN 9.5 (L) 06/09/2023    CO2 24 06/09/2023    CL 97 10/21/2022      Pertinent diagnostic studies:    Additional labs and diagnostic studies required prior to admission:      QI: GG Care Tool     QI: GG Care Tool  Therapy Evaluation   Swallowing/  Nutritional Status   Diet/Feeding/  Swallowing Setup/ supervision   Eating       Oral Hygiene   Grooming Setup/ supervision while seated   Toileting Hygiene       Shower/Bathe   Bathing    Upper Body Dressing   Dressing Upper Not tested   Lower Body Dressing   Dressing Lower Moderate-max assist    Putting On/Taking Off Footwear       Toilet Transfer   Toileting Total with purwick    Bladder Continence Status   Bladder     Bowel Continence Status   Bowel     Roll Left and Right   Bed Mobility Scooting: minimum assistance  Supine to Sit: moderate assistance   Sit to Lying       Lying to Sitting on Side of Bed       Sit to Stand       Chair/Bed-to- Chair   Transfers Sit to Stand:  minimum assistance with rolling walker  Bed to Chair: minimum assistance with  rolling walker  using  Step Transfer     Car Transfer       Walk 10 Feet   Equipment      Walk 50 Feet with Two Turns   Balance    Walk 150 Feet   Endurance poor   Walk 10 Feet on Uneven Surfaces   Gait Pt ambulated 30' w/ RW, min A for safety. Pt demo'd forward flexed posture, poor endurance.   Picking up an Object       Wheel 50 Feet with Two Turns   Wheelchair    Wheel 150 Feet       1 Step  (Curb)   Stairs    4 Steps       12 Steps       Expression of Ideas and Wants   Communication independent   Understanding  Verbal and Non-Verbal Content       Cognitive Patterns   Cognition independent      Safety Precautions       Lower Extremity      Strength LLE Strength: 4-/5  RLE Strength: 4-/5      ROM RLE ROM: WFL  LLE ROM: WFL      Upper Extremity      Strength   Right Upper Extremity: WFL  -       Left Upper Extremity: unable to test 2/2 rib pain      ROM  Right Upper Extremity: WFL  -       Left Upper Extremity: WFL except shoulder flexion limited by rib pain; able to achieve 70-90 degrees     Care Score Value Definitions  6: Independent. [place] provides no assistance with tasks. A device may or may not have been used.  5: Set-up or clean-up assistance. [place] sets up or cleans up, but does not assist with tasks. [place] may have assisted prior to or following the activity.  4: Supervision or touching assistance. [place] provides verbal cues or touching/steadying or contact guard assistance. Assistance may be provided throughout the activity or intermittently.  3: Partial/moderate assistance. [place] does less than half the effort. [place] lifts, holds, or supports trunk or limbs, but provides less than half the effort.  2: Substantial/maximal assistance. [place] does more than half the effort. [place] lifts or holds trunk or limbs, and provides more than half the effort.  1: Dependent. [place] does all of the effort, or the assistance of two or more helpers is required for the patient to complete the activity.  Care Score Activity Not Attempted Value Definitions  7: Patient refused.  9: Not applicable. Not attempted and the patient did not perform this activity prior to the current illness, exacerbation, or injury.  10: Not attempted due to environmental limitations (e.g., lack of equipment, weather constraints).  88: Not attempted due to medical condition or safety concerns.    DISCHARGE GOALS/ANTICPATED  INTERVENTIONS/SERVICES     Expected Level of Improvement for Safe Discharge: Patient/caregivers anticipate discharge home at or near baseline level of functioning and/or decreased burden of care.   Patient/Family/Caregiver Goals: Patient and caregiver desire to increase current level of functioning to modified independence for mobility and all ADLs so patient is able to discharge home safely    Required Treatments/Services: Rehabilitation Nursing, Respiratory Therapy, Dietitian/nutrition, Social , and Case Management   Required Therapy Therapy Type Min/Day Days/Week Duration of Therapy   Physical Therapy 90 5 7-10 days    Occupational Therapy 90 5 7-10 days    Speech and Language Pathology      Prosthetic/Orthotics      Recreational Therapy      Anticipated Services upon Discharge:  home health    Additional rehabilitation needs:  none    Expected Discharge Destination:  home with home health    Barriers to Discharge: None   Discharge Support: Patient has a caregiver available, Discharge plan has been verified with patient's caregiver, and Caregiver is in agreement with the discharge plan   Patient/Family/Caregiver Orientation: Patient/family/caregiver oriented and agreeable to inpatient rehabilitation plan   Estimated Length of Stay: 7-10 days    Projected Admission Date: 6/9/23   Medical Prognosis: good   Physicians Review and Admission Determination:   I have discussed patient with Nurse Liaison. I have reviewed the prescreen. Patient is in need of, appropriate for and should tolerate and benefit from an aggressive IRF stay.

## 2023-06-09 NOTE — PT/OT/SLP PROGRESS
Occupational Therapy   Treatment    Name: Lita Hess  MRN: [id number]  Admitting Diagnosis:  Fall       Recommendations:     Discharge Recommendations: rehabilitation facility  Discharge Equipment Recommendations:  to be determined by next level of care  Barriers to discharge:       Assessment:     Lita Hess is a 87 y.o. female with a medical diagnosis of Fall.      Rehab Prognosis:  Fair; patient would benefit from acute skilled OT services to address these deficits and reach maximum level of function.       Plan:     Patient to be seen 5 x/week to address the above listed problems via self-care/home management, therapeutic activities, therapeutic exercises  Plan of Care Expires: 07/08/23  Plan of Care Reviewed with: patient    Subjective     Pain/Comfort:       Objective:     Communicated with: RN prior to session.  Patient found HOB elevated with   upon OT entry to room.    General Precautions: Standard, fall    Orthopedic Precautions:   Braces:    Vital Signs: Blood Pressure: 108/64  HR: 103  Sp02: 94     Occupational Performance:     Bed Mobility:    Patient completed Supine to Sit with moderate assistance     Functional Mobility/Transfers:  Patient completed Sit <> Stand Transfer with minimum assistance  with  rolling walker and kyphotic posture   Patient mobilizing from EOB to in room sink requiring Min A .    Activities of Daily Living:  Grooming: stand by assistance brushing teeth at sink      Patient Education:  Patient provided with verbal education regarding OT role/goals/POC.  Understanding was verbalized.      Patient left up in chair with all lines intact and call button in reach    GOALS:   Multidisciplinary Problems       Occupational Therapy Goals          Problem: Occupational Therapy    Goal Priority Disciplines Outcome Interventions   Occupational Therapy Goal     OT, PT/OT Ongoing, Progressing    Description: STGs to be met in 2 weeks:    Patient will increase functional independence with ADLs  by performing:    LE Dressing with Modified [place].  Grooming while standing at sink with Modified [place].  Toileting from toilet with Modified [place] for hygiene and clothing management.   Toilet transfer to toilet with Modified [place].  Increased L UE ROM to 3/5 for increased independence in self care tasks.                         Time Tracking:     OT Date of Treatment: 06/09/23  OT Start Time: 0938  OT Stop Time: 1016  OT Total Time (min): 38 min    Billable Minutes:Self Care/Home Management 2  Therapeutic Activity 1          Number of GRICELDA visits since last OT visit: 0    6/9/2023

## 2023-06-09 NOTE — PROGRESS NOTES
Acute Care Surgery   Progress Note  Admit Date: 6/6/2023  HD#3  POD#* No surgery found *    Subjective:   Interval history:  NAEON. AF, VSS. Pt denies any issue at this time. Tolerating diet, voiding, pain controlled with oral pain meds.    Home Meds:  Current Outpatient Medications   Medication Instructions    amLODIPine (NORVASC) 10 mg, Oral, Daily    aspirin 325 mg, Oral, Daily    cyanocobalamin (vitamin B-12) 500 mcg, Oral    ergocalciferol (ERGOCALCIFEROL) 50,000 Units, Oral, Every 7 days    ferrous sulfate (FEOSOL) 325 mg, Oral, Daily, TAKE ONE TABLET TWICE A DAY    fluticasone propionate (FLONASE) 50 mcg/actuation nasal spray   See Instructions, USE 2 SPRAYS IN EACH NOSTRIL TWICE DAILY, # 16 gm, 10 Refill(s), Pharmacy: [facility], 157.5, cm, Height/Length Dosing, 08/10/21 15:22:00 CDT, 39.5, kg, Weight Dosing, 10/14/21 14:47:00 CDT    folic acid (FOLVITE) 1,000 mcg, Oral, Daily    glyBURIDE (DIABETA) 5 MG tablet TAKE ONE TABLET TWICE A DAY    lisinopriL (PRINIVIL,ZESTRIL) 20 mg, Oral, Daily    lovastatin (MEVACOR) 40 mg, Oral, Nightly    megestroL (MEGACE) 20 mg, Oral, Daily, Take 1 tablet daily    metFORMIN (GLUCOPHAGE) 500 mg, Oral, Nightly    mirtazapine (REMERON) 30 mg, Oral, Nightly, TAKE TWO TABLETS AT BEDTIME    multivitamin with minerals tablet 1 tablet, Oral, Daily    pantoprazole (PROTONIX) 40 MG tablet TAKE ONE TABLET TWICE A DAY    polyethylene glycol 3350 (MIRALAX ORAL) 17 g, Oral    sodium bicarbonate 650 MG tablet TAKE TWO TABLETS 3 TIMES DAILY.    traMADoL (ULTRAM) 50 mg tablet TAKE ONE TABLET BY MOUTH EVERY 8 HOURS    UNABLE TO FIND   Shoe lift, See Instructions, 1 shoe lift for right foot, left leg longer than right, # 1 EA, 0 Refill(s)      Scheduled Meds:   amLODIPine  10 mg Oral Daily    aspirin  325 mg Oral Daily    atorvastatin  10 mg Oral Daily    enoxparin  30 mg Subcutaneous BID    folic acid  1,000 mcg Oral Daily    gabapentin  300 mg Oral TID    insulin detemir  "U-100  20 Units Subcutaneous BID    lisinopriL  20 mg Oral Daily    megestroL  20 mg Oral Daily    mirtazapine  30 mg Oral QHS    pantoprazole  40 mg Oral BID    polyethylene glycol  17 g Oral Daily     Continuous Infusions:  PRN Meds:acetaminophen, dextrose 10%, dextrose 10%, dextrose 10%, dextrose 10%, dextrose 10%, dextrose 10%, glucose, glucose, glucose, glucose, glucose, insulin aspart U-100, iopamidoL, magnesium oxide, magnesium oxide, morphine, oxyCODONE, oxyCODONE, potassium bicarbonate, potassium bicarbonate, potassium bicarbonate, potassium, sodium phosphates, potassium, sodium phosphates, potassium, sodium phosphates, sodium chloride 0.9%     Objective:     VITAL SIGNS: 24 HR MIN & MAX LAST   Temp  Min: 98.1 °F (36.7 °C)  Max: 98.5 °F (36.9 °C)  98.5 °F (36.9 °C)   BP  Min: 93/50  Max: 146/66  119/61    Pulse  Min: 90  Max: 119  107    Resp  Min: 15  Max: 25  (!) 24    SpO2  Min: 92 %  Max: 98 %  (!) 94 %      HT: 4' 11.02" (149.9 cm)  WT: 44.3 kg (97 lb 10.6 oz)  BMI: 19.7     Intake/output:  Intake/Output - Last 3 Shifts         06/07 0700  06/08 0659 06/08 0700  06/09 0659 06/09 0700  06/10 0659    Urine (mL/kg/hr) 850 (0.8) 400 (0.4)     Total Output 850 400     Net -850 -400            Urine Occurrence 1 x              Intake/Output Summary (Last 24 hours) at 6/9/2023 0912  Last data filed at 6/8/2023 2000  Gross per 24 hour   Intake --   Output 400 ml   Net -400 ml           Lines/drains/airway:       Peripheral IV - Single Lumen 06/06/23 1405 20 G Left Antecubital (Active)   Site Assessment Clean;Dry;Intact;No redness;No swelling 06/08/23 0600   Extremity Assessment Distal to IV No abnormal discoloration;No redness;No swelling;No warmth 06/08/23 0600   Line Status Saline locked 06/08/23 0600   Dressing Status Clean;Dry;Intact 06/08/23 0600   Dressing Intervention Integrity maintained 06/08/23 0600   Number of days: 1       Female External Urinary Catheter 06/06/23 1845 (Active)   Skin no redness;no " breakdown 06/07/23 2000   Tolerance no signs/symptoms of discomfort 06/07/23 2000   Suction Continuous suction at 70 mmHg 06/07/23 2000   Date of last wick change 06/07/23 06/07/23 2000   Time of last wick change 2200 06/07/23 2200   Output (mL) 350 mL 06/08/23 0000   Number of days: 1       Physical examination:  Gen: NAD, AAOx3, answering questions appropriately  HEENT: normocephalic, atraumatic  CV: RRR  Resp: NWOB on room air  Abd: S/NT/ND  Ext: moving all extremities spontaneously and purposefully  Neuro: CN II-XII grossly intact    Labs:  Renal:  Recent Labs     06/06/23  1403 06/07/23  0130 06/08/23 0117 06/09/23  0122   BUN 13.0 11.0 9.6* 9.5*   CREATININE 0.89 0.82 0.84 0.73     No results for input(s): LACTIC in the last 72 hours.  FENGI:  Recent Labs     06/06/23  1403 06/07/23  0130 06/08/23  0117 06/09/23  0122    137 133* 137   K 4.2 3.8 4.0 4.4   CO2 22* 19* 21* 24   CALCIUM 9.9 9.1 8.5 8.9   MG  --  1.60 1.80 1.80   PHOS  --  2.7 3.0 2.3   ALBUMIN 3.8  --  3.1* 2.9*   BILITOT 0.3  --  0.2 0.3   AST 19  --  10 13   ALKPHOS 77  --  62 59   ALT 24  --  15 12     Heme:  Recent Labs     06/06/23  1403 06/07/23  0130 06/08/23 0117 06/09/23  0122   HGB 13.8 12.3 11.7* 11.3*   HCT 40.9 36.8* 35.7* 34.5*    197 191 132   PTT 26.3  --   --   --    INR 1.04  --   --   --      ID:  Recent Labs     06/06/23  1403 06/07/23  0130 06/08/23 0117 06/09/23  0122   WBC 8.27 7.19 6.14 6.40     CBG:  Recent Labs     06/06/23  1403 06/07/23  0130 06/08/23  0117 06/09/23  0122   GLUCOSE 209* 283* 346* 245*      Cardiovascular:  No results for input(s): TROPONINI, CKTOTAL, CKMB, BNP in the last 168 hours.  ABG:  No results for input(s): PH, PO2, PCO2, HCO3, BE in the last 168 hours.   I have reviewed all pertinent lab results within the past 24 hours.    Imaging:  CT Head Without Contrast   Final Result      No acute intracranial abnormality identified.  Findings of chronic microvascular ischemic disease.          Electronically signed by: Alen Tripp   Date:    06/06/2023   Time:    15:00      CT Cervical Spine Without Contrast   Final Result      No acute fracture identified         Electronically signed by: Alen Tripp   Date:    06/06/2023   Time:    15:03      CT Chest Abdomen Pelvis With Contrast (xpd)   Final Result      No definite acute intra-abdominal or intrathoracic abnormality.  There are acute left-sided rib fractures as above.  Evaluation for additional fractures is limited by motion artifact.  Soft tissue density at the left sacrum as would be seen with decubitus ulcer is again noted.         Electronically signed by: Alen Tripp   Date:    06/06/2023   Time:    15:39      X-Ray Femur Ap/Lat Left   Final Result      No acute osseous abnormality.      The bones are demineralized.         Electronically signed by: Nohemi Soliz   Date:    06/06/2023   Time:    13:35      XR Ribs Min 3 views w/PA Chest Left   Final Result      Nondisplaced fractures of the left 5th, 6th and 7th ribs laterally.         Electronically signed by: Nohemi Soliz   Date:    06/06/2023   Time:    13:38      X-Ray Hip 2 or 3 views Left (with Pelvis when performed)   Final Result      No acute osseous abnormality.      The bones are demineralized.         Electronically signed by: Nohemi Soliz   Date:    06/06/2023   Time:    13:35         I have reviewed all pertinent imaging results/findings within the past 24 hours.    Micro/Path/Other:  Microbiology Results (last 7 days)       ** No results found for the last 168 hours. **           Specimen (168h ago, onward)      None             Assessment & Plan:   88 y/o F with PMHx of two CVA, MI, hypertension, and remote history of colon cancer s/p colectomy who presented today after a fall from standing. Found to have acute fractures of left ribs 4,5,6, and 7.    - Diabetic diet  - PT/OT  - Encourage IS/OOB  - Pain control  - Lovenox (adjusted for low BMI)  - IS  - Pt  does not have family available to assist her at home 24/1, PT recommending SNF placement  - Family prefers rehab, CM sent out referrals    Jerri Pinto MD  [employer] General Surgery PGY-1

## 2023-06-10 LAB
ALBUMIN SERPL-MCNC: 2.8 G/DL (ref 3.4–4.8)
ALBUMIN/GLOB SERPL: 0.8 RATIO (ref 1.1–2)
ALP SERPL-CCNC: 56 UNIT/L (ref 40–150)
ALT SERPL-CCNC: 13 UNIT/L (ref 0–55)
AST SERPL-CCNC: 14 UNIT/L (ref 5–34)
BASOPHILS # BLD AUTO: 0.05 X10(3)/MCL
BASOPHILS NFR BLD AUTO: 0.7 %
BILIRUBIN DIRECT+TOT PNL SERPL-MCNC: 0.3 MG/DL
BUN SERPL-MCNC: 13 MG/DL (ref 9.8–20.1)
CALCIUM SERPL-MCNC: 9.3 MG/DL (ref 8.4–10.2)
CHLORIDE SERPL-SCNC: 100 MMOL/L (ref 98–107)
CO2 SERPL-SCNC: 25 MMOL/L (ref 23–31)
CREAT SERPL-MCNC: 0.66 MG/DL (ref 0.55–1.02)
EOSINOPHIL # BLD AUTO: 0.32 X10(3)/MCL (ref 0–0.9)
EOSINOPHIL NFR BLD AUTO: 4.6 %
ERYTHROCYTE [DISTWIDTH] IN BLOOD BY AUTOMATED COUNT: 13 % (ref 11.5–17)
FERRITIN SERPL-MCNC: 726.85 NG/ML (ref 4.63–204)
GFR SERPLBLD CREATININE-BSD FMLA CKD-EPI: >60 MLS/MIN/1.73/M2
GLOBULIN SER-MCNC: 3.6 GM/DL (ref 2.4–3.5)
GLUCOSE SERPL-MCNC: 192 MG/DL (ref 82–115)
HCT VFR BLD AUTO: 33.8 % (ref 37–47)
HGB BLD-MCNC: 11.1 G/DL (ref 12–16)
IMM GRANULOCYTES # BLD AUTO: 0.02 X10(3)/MCL (ref 0–0.04)
IMM GRANULOCYTES NFR BLD AUTO: 0.3 %
IRON SATN MFR SERPL: 26 % (ref 20–50)
IRON SERPL-MCNC: 55 UG/DL (ref 50–170)
LYMPHOCYTES # BLD AUTO: 1.45 X10(3)/MCL (ref 0.6–4.6)
LYMPHOCYTES NFR BLD AUTO: 21 %
MAGNESIUM SERPL-MCNC: 1.8 MG/DL (ref 1.6–2.6)
MCH RBC QN AUTO: 31 PG (ref 27–31)
MCHC RBC AUTO-ENTMCNC: 32.8 G/DL (ref 33–36)
MCV RBC AUTO: 94.4 FL (ref 80–94)
MONOCYTES # BLD AUTO: 0.56 X10(3)/MCL (ref 0.1–1.3)
MONOCYTES NFR BLD AUTO: 8.1 %
NEUTROPHILS # BLD AUTO: 4.52 X10(3)/MCL (ref 2.1–9.2)
NEUTROPHILS NFR BLD AUTO: 65.3 %
NRBC BLD AUTO-RTO: 0 %
PHOSPHATE SERPL-MCNC: 3.3 MG/DL (ref 2.3–4.7)
PLATELET # BLD AUTO: 187 X10(3)/MCL (ref 130–400)
PMV BLD AUTO: 10.4 FL (ref 7.4–10.4)
POCT GLUCOSE: 178 MG/DL (ref 70–110)
POCT GLUCOSE: 297 MG/DL (ref 70–110)
POCT GLUCOSE: 364 MG/DL (ref 70–110)
POCT GLUCOSE: 365 MG/DL (ref 70–110)
POTASSIUM SERPL-SCNC: 4.5 MMOL/L (ref 3.5–5.1)
PREALB SERPL-MCNC: 15.8 MG/DL (ref 14–37)
PROT SERPL-MCNC: 6.4 GM/DL (ref 5.8–7.6)
RBC # BLD AUTO: 3.58 X10(6)/MCL (ref 4.2–5.4)
SODIUM SERPL-SCNC: 134 MMOL/L (ref 136–145)
TIBC SERPL-MCNC: 153 UG/DL (ref 70–310)
TIBC SERPL-MCNC: 208 UG/DL (ref 250–450)
TRANSFERRIN SERPL-MCNC: 183 MG/DL
WBC # SPEC AUTO: 6.92 X10(3)/MCL (ref 4.5–11.5)

## 2023-06-10 PROCEDURE — 25000003 PHARM REV CODE 250: Performed by: NURSE PRACTITIONER

## 2023-06-10 PROCEDURE — 97162 PT EVAL MOD COMPLEX 30 MIN: CPT

## 2023-06-10 PROCEDURE — 97535 SELF CARE MNGMENT TRAINING: CPT

## 2023-06-10 PROCEDURE — 83550 IRON BINDING TEST: CPT | Performed by: NURSE PRACTITIONER

## 2023-06-10 PROCEDURE — 11800000 HC REHAB PRIVATE ROOM

## 2023-06-10 PROCEDURE — 97116 GAIT TRAINING THERAPY: CPT

## 2023-06-10 PROCEDURE — 63600175 PHARM REV CODE 636 W HCPCS: Performed by: NURSE PRACTITIONER

## 2023-06-10 PROCEDURE — 94799 UNLISTED PULMONARY SVC/PX: CPT

## 2023-06-10 PROCEDURE — 80053 COMPREHEN METABOLIC PANEL: CPT | Performed by: NURSE PRACTITIONER

## 2023-06-10 PROCEDURE — 85025 COMPLETE CBC W/AUTO DIFF WBC: CPT | Performed by: NURSE PRACTITIONER

## 2023-06-10 PROCEDURE — 84134 ASSAY OF PREALBUMIN: CPT | Performed by: NURSE PRACTITIONER

## 2023-06-10 PROCEDURE — 51798 US URINE CAPACITY MEASURE: CPT

## 2023-06-10 PROCEDURE — 97530 THERAPEUTIC ACTIVITIES: CPT

## 2023-06-10 PROCEDURE — 82728 ASSAY OF FERRITIN: CPT | Performed by: NURSE PRACTITIONER

## 2023-06-10 PROCEDURE — 84100 ASSAY OF PHOSPHORUS: CPT | Performed by: NURSE PRACTITIONER

## 2023-06-10 PROCEDURE — 83735 ASSAY OF MAGNESIUM: CPT | Performed by: NURSE PRACTITIONER

## 2023-06-10 PROCEDURE — S0179 MEGESTROL 20 MG: HCPCS | Performed by: NURSE PRACTITIONER

## 2023-06-10 RX ORDER — OXYCODONE HYDROCHLORIDE 5 MG/1
5 TABLET ORAL EVERY 6 HOURS PRN
Status: DISCONTINUED | OUTPATIENT
Start: 2023-06-10 | End: 2023-06-13

## 2023-06-10 RX ORDER — ACETAMINOPHEN 325 MG/1
650 TABLET ORAL 3 TIMES DAILY
Status: DISCONTINUED | OUTPATIENT
Start: 2023-06-10 | End: 2023-06-13

## 2023-06-10 RX ORDER — MIRTAZAPINE 15 MG/1
30 TABLET, FILM COATED ORAL
Status: DISCONTINUED | OUTPATIENT
Start: 2023-06-10 | End: 2023-06-23 | Stop reason: HOSPADM

## 2023-06-10 RX ORDER — METHOCARBAMOL 500 MG/1
500 TABLET, FILM COATED ORAL 2 TIMES DAILY
Status: DISCONTINUED | OUTPATIENT
Start: 2023-06-10 | End: 2023-06-12

## 2023-06-10 RX ADMIN — DOCUSATE SODIUM 100 MG: 100 CAPSULE, LIQUID FILLED ORAL at 09:06

## 2023-06-10 RX ADMIN — ENOXAPARIN SODIUM 30 MG: 30 INJECTION SUBCUTANEOUS at 12:06

## 2023-06-10 RX ADMIN — METHOCARBAMOL 500 MG: 500 TABLET ORAL at 12:06

## 2023-06-10 RX ADMIN — MEGESTROL ACETATE 400 MG: 40 SUSPENSION ORAL at 09:06

## 2023-06-10 RX ADMIN — AMLODIPINE BESYLATE 10 MG: 5 TABLET ORAL at 09:06

## 2023-06-10 RX ADMIN — GABAPENTIN 300 MG: 300 CAPSULE ORAL at 03:06

## 2023-06-10 RX ADMIN — MEGESTROL ACETATE 400 MG: 40 SUSPENSION ORAL at 08:06

## 2023-06-10 RX ADMIN — METHOCARBAMOL 500 MG: 500 TABLET ORAL at 08:06

## 2023-06-10 RX ADMIN — INSULIN ASPART 2 UNITS: 100 INJECTION, SOLUTION INTRAVENOUS; SUBCUTANEOUS at 05:06

## 2023-06-10 RX ADMIN — LIDOCAINE 5% 1 PATCH: 700 PATCH TOPICAL at 05:06

## 2023-06-10 RX ADMIN — INSULIN ASPART 10 UNITS: 100 INJECTION, SOLUTION INTRAVENOUS; SUBCUTANEOUS at 12:06

## 2023-06-10 RX ADMIN — LISINOPRIL 20 MG: 10 TABLET ORAL at 09:06

## 2023-06-10 RX ADMIN — GABAPENTIN 300 MG: 300 CAPSULE ORAL at 05:06

## 2023-06-10 RX ADMIN — FOLIC ACID 1000 MCG: 1 TABLET ORAL at 09:06

## 2023-06-10 RX ADMIN — ATORVASTATIN CALCIUM 10 MG: 10 TABLET, FILM COATED ORAL at 09:06

## 2023-06-10 RX ADMIN — INSULIN ASPART 5 UNITS: 100 INJECTION, SOLUTION INTRAVENOUS; SUBCUTANEOUS at 08:06

## 2023-06-10 RX ADMIN — ENOXAPARIN SODIUM 30 MG: 30 INJECTION SUBCUTANEOUS at 09:06

## 2023-06-10 RX ADMIN — ASPIRIN 325 MG: 325 TABLET, FILM COATED ORAL at 09:06

## 2023-06-10 RX ADMIN — INSULIN DETEMIR 20 UNITS: 100 INJECTION, SOLUTION SUBCUTANEOUS at 08:06

## 2023-06-10 RX ADMIN — MIRTAZAPINE 30 MG: 15 TABLET, FILM COATED ORAL at 05:06

## 2023-06-10 RX ADMIN — GABAPENTIN 300 MG: 300 CAPSULE ORAL at 10:06

## 2023-06-10 RX ADMIN — INSULIN DETEMIR 20 UNITS: 100 INJECTION, SOLUTION SUBCUTANEOUS at 09:06

## 2023-06-10 RX ADMIN — ACETAMINOPHEN 650 MG: 325 TABLET ORAL at 03:06

## 2023-06-10 RX ADMIN — INSULIN ASPART 10 UNITS: 100 INJECTION, SOLUTION INTRAVENOUS; SUBCUTANEOUS at 03:06

## 2023-06-10 RX ADMIN — DOCUSATE SODIUM 100 MG: 100 CAPSULE, LIQUID FILLED ORAL at 08:06

## 2023-06-10 RX ADMIN — OXYCODONE HYDROCHLORIDE AND ACETAMINOPHEN 1 TABLET: 5; 325 TABLET ORAL at 08:06

## 2023-06-10 RX ADMIN — ACETAMINOPHEN 650 MG: 325 TABLET ORAL at 10:06

## 2023-06-10 NOTE — PLAN OF CARE
Problem: Diabetes Comorbidity  Goal: Blood Glucose Level Within Targeted Range  Outcome: Ongoing, Progressing     Problem: Rehabilitation (IRF) Plan of Care  Goal: Plan of Care Review  Outcome: Ongoing, Progressing  Goal: Patient-Specific Goal (Individualized)  Outcome: Ongoing, Progressing  Goal: Absence of New-Onset Illness or Injury  Outcome: Ongoing, Progressing  Goal: Optimal Comfort and Wellbeing  Outcome: Ongoing, Progressing  Goal: Readiness for Transition of Care  Outcome: Ongoing, Progressing     Problem: Fall Injury Risk  Goal: Absence of Fall and Fall-Related Injury  Outcome: Ongoing, Progressing     Problem: Skin Injury Risk Increased  Goal: Skin Health and Integrity  Outcome: Ongoing, Progressing     Problem: Impaired Wound Healing  Goal: Optimal Wound Healing  Outcome: Ongoing, Progressing

## 2023-06-10 NOTE — PLAN OF CARE
Problem: Diabetes Comorbidity  Goal: Blood Glucose Level Within Targeted Range  Outcome: Ongoing, Progressing  Intervention: Monitor and Manage Glycemia  Flowsheets (Taken 6/9/2023 2343)  Glycemic Management: blood glucose monitored     Problem: Fall Injury Risk  Goal: Absence of Fall and Fall-Related Injury  Outcome: Ongoing, Progressing  Intervention: Promote Injury-Free Environment  Flowsheets (Taken 6/9/2023 2343)  Safety Promotion/Fall Prevention:   assistive device/personal item within reach   bed alarm set   gait belt with ambulation   medications reviewed   side rails raised x 2   nonskid shoes/socks when out of bed     Problem: Skin Injury Risk Increased  Goal: Skin Health and Integrity  Outcome: Ongoing, Progressing  Intervention: Optimize Skin Protection  Flowsheets (Taken 6/9/2023 2343)  Pressure Reduction Techniques: frequent weight shift encouraged

## 2023-06-10 NOTE — PT/OT/SLP PROGRESS
Occupational Therapy Inpatient Rehab Treatment    Name: Lita Hess  MRN: [id number]    Assessment:  Lita Hess is a 87 y.o. female admitted with a medical diagnosis of Debility.  She presents with the following impairments/functional limitations: weakness, impaired endurance, impaired self care skills, impaired functional mobility, gait instability, impaired balance, impaired cognition, decreased coordination, decreased safety awareness, pain, impaired coordination.    General Precautions: Standard, fall     Orthopedic Precautions:N/A     Braces: N/A    Rehab Prognosis: Good; patient would benefit from acute skilled OT services to address these deficits and reach maximum level of function.      History:     Past Medical History:   Diagnosis Date    Chronic back pain 10/26/2022    Colon cancer     CVA (cerebral vascular accident)     Gastroesophageal reflux disease without esophagitis 10/26/2022    Hypertension     Iron deficiency anemia secondary to inadequate dietary iron intake 10/26/2022    Loss of appetite     Mixed hyperlipidemia 10/26/2022    Stroke     Type 2 diabetes mellitus without complication 10/26/2022    Vitamin D deficiency        Past Surgical History:   Procedure Laterality Date    COLECTOMY      COLONOSCOPY      ESOPHAGOGASTRODUODENOSCOPY      gastro biopsy      HIP REPLACEMENT ARTHROPLASTY      SCLEROTHERAPY WITH ULTRASOUND GUIDANCE      TRANSESOPHAGEAL ECHOCARDIOGRAPHY         Subjective     Orientation: Identifies self and Patient was oriented to Name, , and Place. Patient was not oriented to Year.     Chief Complaint: L rib & leg pain (Nsg & NP notified)    Patient with command following intact and safety awareness mildly impaired.     Vitals   Vitals at Rest  /71   HR 88   O2 Sat 95%   Pain 8/10 for L ribs & L leg (NP & Nsg notified. Nsg administered pain medication during session.)      Vitals With Activity  /81   HR 90   O2 Sat 97%   Pain 8/10 for L ribs & L leg       Respiratory Status: Room air    Patients cultural, spiritual, Congregation conflicts given the current situation: no       Objective:     Patient found supine with peripheral IV  upon OT entry to room.    Mobility   Patient completed:  Supine to Sit with maximal assistance  Sit to Stand Transfer with Mod A with progression to Min A with increased time given and Mod-Max verbal cues for proper technique with rolling walker  Stand to Sit Transfer with Mod A with progression to Min A with increased time given and Mod-Max verbal cues for proper technique with rolling walker  Bed to Chair Transfer using Step Transfer technique with moderate assistance with rolling walker  Toilet Transfer Step Transfer technique with moderate assistance with  rolling walker and bedside commode    Functional Mobility  Patient did not perform functional ambulation with RW during OT session due to pain.     ADLs   Current Status   Eating 5 - set-up to open some of the packets & containers   Oral Hygiene 5 - performed in seated position in w/c    Shower, Bathe Self 3   Upper Body Dressing 3   Lower Body Dressing 3 - Patient would benefit from the use of LE dressing AE to increase independence.    Toileting Hygiene 3 - +void urine    Toilet Transfer 3 - stand-step t/f to<>from [facility] with RW   Putting On, Taking Off Footwear 1 - Patient would benefit from the use of LE dressing AE to increased independence.      Limiting Factors for ADLs: motor, endurance, balance, weakness, coordination, cognition, safety awareness, and pain     Patient required increased time for all task completion during ADL session. With increased time and verbal cues give for technique, patient performed with increased independence.     Patient was limited during session due to pain, but patient was willing and motivated to participate.     LifeStyle Change and Education:             Patient left up in chair with call button in reach and CNA notified.     Education  provided: Roles and goals of OT, ADLs, transfer training, bed mobility, body mechanics, sequencing, safety precautions, fall prevention, and home safety    Multidisciplinary Problems       Occupational Therapy Goals          Problem: Occupational Therapy    Goal Priority Disciplines Outcome Interventions   Occupational Therapy Goal     OT, PT/OT Ongoing, Progressing    Description: ADLs:  Pt to perform grooming tasks with set up and clean up  Pt to perform UB dressing with set up and clean up   Pt to perform LB dressing with set up and clean up with use of AD   Pt to perform putting on/off footwear task with set up and clean up and AD  Pt to perform toileting with mod a using RW and BSC  Pt to perform bathing with mod a using TTB    Functional Transfers:  Pt to perform toilet transfers with CGA and RW  Pt to perform a tub transfer with mod a and TTB                           Time Tracking     OT Received On: 06/10/23  Time In 0730     Time Out 0900  Total Time 90 min  Therapy Time: OT Individual: 90  Missed Time:    Missed Time Reason:      Billable Minutes: Self Care/Home Management 90    06/10/2023

## 2023-06-10 NOTE — PT/OT/SLP EVAL
Physical Therapy Rehab Evaluation    Patient Name:  Lita Hess   MRN:  [id number]    Recommendations:     Discharge Recommendations:  nursing facility, skilled   Discharge Equipment Recommendations: bedside commode, walker, rolling, wheelchair   Barriers to discharge: Decreased caregiver support    Assessment:     Lita Hess is a 87 y.o. female admitted with a medical diagnosis of Debility.  She presents with the following impairments/functional limitations:  weakness, impaired endurance, impaired functional mobility, impaired balance, decreased lower extremity function, pain .    Rehab Diagnosis:  Debility    Recent Surgery: * No surgery found *      General Precautions: Standard, fall     Orthopedic Precautions:       Braces:      Rehab Prognosis: Good; patient would benefit from acute skilled PT services to address these deficits and reach maximum level of function.      History:     Past Medical History:   Diagnosis Date    Chronic back pain 10/26/2022    Colon cancer     CVA (cerebral vascular accident)     Gastroesophageal reflux disease without esophagitis 10/26/2022    Hypertension     Iron deficiency anemia secondary to inadequate dietary iron intake 10/26/2022    Loss of appetite     Mixed hyperlipidemia 10/26/2022    Stroke     Type 2 diabetes mellitus without complication 10/26/2022    Vitamin D deficiency        Past Surgical History:   Procedure Laterality Date    COLECTOMY      COLONOSCOPY      ESOPHAGOGASTRODUODENOSCOPY      gastro biopsy      HIP REPLACEMENT ARTHROPLASTY      SCLEROTHERAPY WITH ULTRASOUND GUIDANCE      TRANSESOPHAGEAL ECHOCARDIOGRAPHY         Subjective     Chief Complaint: L Rib pain   Patient/Family Comments/goals: to get back home    Patients cultural, spiritual, [employer] conflicts given the current situation: no       Living Environment  People in Home: alone  Living Arrangements: house  Home Accessibility: wheelchair accessible  Home Layout: Able to live on 1st  floor  Equipment Currently Used at Home: walker, rolling, shower chair    Prior Level of Function  Ambulation Skills: needs device    Equipment used at home: RW  .  DME owned (not currently used): rolling walker.          Objective:     Communicated with pt prior to session.  Patient found supine with    upon PT entry to room.    Vitals   Vitals at Rest  /73   HR 97   O2 Sat     Pain Pain Rating 1: 7/10  Pain Addressed 1: Distraction, Reposition     Vitals With Activity  /73   HR 97   O2 Sat     Pain Pain Rating 1: 7/10  Pain Addressed 1: Distraction, Reposition     Respiratory Status: Room air    Exams  Sensation:          -       WNL  RLE ROM:          -       WFL  RLE Strength:          -       Deficits,     Comment   Hip Flexion 3-    Hip Extension 3    Hip Abduction 3+    Knee Flexion 3    Knee Extension 3+    Ankle Dorsiflexion 3+    Ankle Plantarflexion 3+      LLE ROM:          -       WFL, except limited by pain due to fall.   LLE Strength:          -       Deficits,     Comment   Hip Flexion 3-    Hip Extension 3    Hip Abduction 3    Knee Flexion 3    Knee Extension 3    Ankle Dorsiflexion 3    Ankle Plantarflexion 3        Functional Mobility  Bed Mobility:     Rolling Left:  Partial/moderate assistance   Rolling Right: Partial/moderate assistance   Scooting: Partial/moderate assistance   Bridging: Partial/moderate assistance   Supine to Sit: Substantial/maximal assistance   Sit to Supine: Substantial/maximal assistance   Transfers:     Sit to Stand: Supervision or touching assistance  with rolling walker  Bed to Chair: Partial/moderate assistance  with rolling walker  using  Stand Pivot  Gait: Pt ambulates 60' with RW with Supervision or touching assistance .   Impairments contributing to gait deviations include impaired balance, impaired coordination, impaired motor control, pain, impaired postural control, and decreased strength.    GGs   Admit Current   Status Goal   Functional Area: Care  Score: Care Score: Care Score:   Roll Left and Right 3 3 Independent   Sit to Lying 2 2 Set-up/clean-up   Lying to Sitting on Side of Bed 3 3 Independent   Sit to Stand 3 3 Independent   Chair/Bed-to-Chair Transfer 3 3 Independent   Car Transfer 7 7     Walk 10 Feet 4 4 Independent   Walk 50 Feet with Two Turns 4 4 Independent   Walk 150 Feet 7 7     Walk 10 Feet Uneven Surface 7 7     1 Step (Curb) 7 7     4 Steps 9 9     12 Steps 9 9     Picking Up Object 7 7     Wheel 50 Feet with Two Turns 9 9     Wheel 150 Feet 9 9       Therapeutic Activities and Exercises:  Bed mobility, sit to stands, transfers, gait    Activity Tolerance: Good    Patient left supine with call button in reach.    Education Provided: roles and goals of PT/PTA, transfer training, bed mob, gait training, safety awareness, body mechanics, assistive device, and fall prevention    Expected compliance: High compliance    GOALS:   Multidisciplinary Problems       Physical Therapy Goals          Problem: Physical Therapy    Goal Priority Disciplines Outcome Goal Variances Interventions   Physical Therapy Goal     PT, PT/OT      Description: Bed Mobility:  Roll L and R Philip  Sit to supine will be Philip  Supine to sit will be Philip    Transfers:  Sit to stand supervision  Bed to Chair t/f will be supervision  Car t/f will be supervision    Mobility:  Pt will ambulate 150' CGA with RW  Pt will ambulate 15' CGA with RW over uneven terrain  Pt will climb one step CGA with RW                        Plan:     During this hospitalization, patient to be seen 5 x/week to address the identified rehab impairments via gait training, therapeutic activities, therapeutic exercises, neuromuscular re-education and progress toward the following goals:    Plan of Care Expires:  06/16/23  PT Next Visit Date:    Plan of Care reviewed with: patient      Additional Infomation:           Time Tracking:     Therapy Time   PT Received On: 06/10/23  PT Start Time: 1030  PT Stop  Time: 1115  PT Total Time (min): 45 min  PT Individual: 45  Missed Time: 45 Minutes  Time Missed due to: Pain, Patient unwilling to participate    Billable Minutes: Evaluation 15, Gait Training 10, and Therapeutic Activity 20    06/10/2023

## 2023-06-11 LAB
POCT GLUCOSE: 241 MG/DL (ref 70–110)
POCT GLUCOSE: 243 MG/DL (ref 70–110)
POCT GLUCOSE: 260 MG/DL (ref 70–110)

## 2023-06-11 PROCEDURE — 11800000 HC REHAB PRIVATE ROOM

## 2023-06-11 PROCEDURE — 99233 PR SUBSEQUENT HOSPITAL CARE,LEVL III: ICD-10-PCS | Mod: ,,, | Performed by: NURSE PRACTITIONER

## 2023-06-11 PROCEDURE — 99233 SBSQ HOSP IP/OBS HIGH 50: CPT | Mod: ,,, | Performed by: NURSE PRACTITIONER

## 2023-06-11 PROCEDURE — 63600175 PHARM REV CODE 636 W HCPCS: Performed by: NURSE PRACTITIONER

## 2023-06-11 PROCEDURE — 25000003 PHARM REV CODE 250: Performed by: NURSE PRACTITIONER

## 2023-06-11 PROCEDURE — 94761 N-INVAS EAR/PLS OXIMETRY MLT: CPT

## 2023-06-11 PROCEDURE — 94799 UNLISTED PULMONARY SVC/PX: CPT

## 2023-06-11 PROCEDURE — S0179 MEGESTROL 20 MG: HCPCS | Performed by: NURSE PRACTITIONER

## 2023-06-11 RX ORDER — METFORMIN HYDROCHLORIDE 500 MG/1
500 TABLET ORAL
Status: DISCONTINUED | OUTPATIENT
Start: 2023-06-11 | End: 2023-06-23 | Stop reason: HOSPADM

## 2023-06-11 RX ADMIN — ASPIRIN 325 MG: 325 TABLET, FILM COATED ORAL at 08:06

## 2023-06-11 RX ADMIN — LIDOCAINE 5% 1 PATCH: 700 PATCH TOPICAL at 05:06

## 2023-06-11 RX ADMIN — ACETAMINOPHEN 650 MG: 325 TABLET ORAL at 10:06

## 2023-06-11 RX ADMIN — METFORMIN HYDROCHLORIDE 500 MG: 500 TABLET, FILM COATED ORAL at 05:06

## 2023-06-11 RX ADMIN — MIRTAZAPINE 30 MG: 15 TABLET, FILM COATED ORAL at 05:06

## 2023-06-11 RX ADMIN — METHOCARBAMOL 500 MG: 500 TABLET ORAL at 08:06

## 2023-06-11 RX ADMIN — FOLIC ACID 1000 MCG: 1 TABLET ORAL at 08:06

## 2023-06-11 RX ADMIN — GABAPENTIN 300 MG: 300 CAPSULE ORAL at 02:06

## 2023-06-11 RX ADMIN — INSULIN ASPART 6 UNITS: 100 INJECTION, SOLUTION INTRAVENOUS; SUBCUTANEOUS at 04:06

## 2023-06-11 RX ADMIN — MEGESTROL ACETATE 400 MG: 40 SUSPENSION ORAL at 08:06

## 2023-06-11 RX ADMIN — ATORVASTATIN CALCIUM 10 MG: 10 TABLET, FILM COATED ORAL at 08:06

## 2023-06-11 RX ADMIN — GABAPENTIN 300 MG: 300 CAPSULE ORAL at 10:06

## 2023-06-11 RX ADMIN — ENOXAPARIN SODIUM 30 MG: 30 INJECTION SUBCUTANEOUS at 08:06

## 2023-06-11 RX ADMIN — ACETAMINOPHEN 650 MG: 325 TABLET ORAL at 08:06

## 2023-06-11 RX ADMIN — INSULIN ASPART 4 UNITS: 100 INJECTION, SOLUTION INTRAVENOUS; SUBCUTANEOUS at 11:06

## 2023-06-11 RX ADMIN — ACETAMINOPHEN 650 MG: 325 TABLET ORAL at 05:06

## 2023-06-11 RX ADMIN — INSULIN DETEMIR 20 UNITS: 100 INJECTION, SOLUTION SUBCUTANEOUS at 08:06

## 2023-06-11 RX ADMIN — OXYCODONE HYDROCHLORIDE 5 MG: 5 TABLET ORAL at 08:06

## 2023-06-11 RX ADMIN — INSULIN ASPART 4 UNITS: 100 INJECTION, SOLUTION INTRAVENOUS; SUBCUTANEOUS at 05:06

## 2023-06-11 RX ADMIN — AMLODIPINE BESYLATE 10 MG: 5 TABLET ORAL at 08:06

## 2023-06-11 RX ADMIN — ACETAMINOPHEN 650 MG: 325 TABLET ORAL at 02:06

## 2023-06-11 RX ADMIN — LISINOPRIL 20 MG: 10 TABLET ORAL at 08:06

## 2023-06-11 RX ADMIN — INSULIN ASPART 2 UNITS: 100 INJECTION, SOLUTION INTRAVENOUS; SUBCUTANEOUS at 08:06

## 2023-06-11 RX ADMIN — DOCUSATE SODIUM 100 MG: 100 CAPSULE, LIQUID FILLED ORAL at 08:06

## 2023-06-11 RX ADMIN — ENOXAPARIN SODIUM 30 MG: 30 INJECTION SUBCUTANEOUS at 09:06

## 2023-06-11 RX ADMIN — GABAPENTIN 300 MG: 300 CAPSULE ORAL at 05:06

## 2023-06-11 NOTE — PROGRESS NOTES
Subjective  HPI: 88 yo BF with PMH of CVA x 2, MI with loop recorder, HTN, GERD, hip replacement in past, and remote hx colon cancer s/p colectomy presented to the ED at [facility] on 6/6/23 after falling at home. Patient complained of left rib pain and left hip pain. CT head showed no acute intracranial abnormality identified, and chronic microvascular ischemic disease. CT cervical spine showed no acute findings. CT of chest/abdomen/ pelvis showed acute fractures of the left 4th, 5th, 6th, and 7th ribs, scoliotic curvature of the spine with no acute fracture identified; and no fracture of right ribs identified. Hip XR showed no acute fractures. Patient was admitted to ICU for close respiratory monitoring, encouraged to use IS, started duonebs, and CPT ordered. On 6/7, PT eval completed with deficits noted. On 6/8, OT eval completed with deficits noted. Labs showed low BUN of 9.6, low Na of 133, low H&H of 11.7 & 35.7, elevated CBG of 346, and low albumin of 3.1. On Lovenox for DVT prophylaxis. On 6/9, CBG remains elevated. Patient is AAOx4.  Participating with therapy. Functional status includes minimal-moderate assist needed for bed mobility, minimal assist for transfer with RW, minimal assist for walking about 30 ft with RW, purwick catheter for toileting, moderate-max assist for lower body dressing. Patient was evaluated, accepted, and admitted to inpatient rehab to improve functional status. Transferred to [facility] on 6/9 without incident.      6/11: Seen in patient room, lying in bed and resting comfortably. Reports good sleep. States that she is not ready for breakfast. Denies complaints. Family relayed that patient takes metformin at home and sugars are better managed. Restart home Metformin 500mg qEvening. VSSAF with noted SBP elevation noted prior to morning medication. Recheck.          Review of Systems  Psychiatric: Per son, pt. Recently complained of depression. Pt. Has a history of anxiety and takes  "medication for this. She has no substance abuse history    Depression/Anxiety: Noted Remeron 30mg qPM in Home medication. Restart. Consider Cymbalta for additional coverage and pain.      mirtazapine tablet 30 mg after dinner  ALPRAZolam tablet 0.25 mg TID PRN Anxiety  Pain: left side-ribs, LLE  gabapentin capsule 300 mg TID  acetaminophen tablet 650 mg q4h PRN mild pain  oxyCODONE-acetaminophen 5-325 mg 1 tablet q6h PRN mod pain  Bowels/Bladder: last BM 6/9 per patient    Appetite: "good"  megestroL 400 mg/10 mL (10 mL) suspension 400 mg BID     Sleep: good         Physical Exam  General: well-developed, thin/frail appearing, in no acute distress  Respiratory: equal chest rise, no SOB, no audible wheeze  Cardiovascular: regular rate and rhythm, no edema  Gastrointestinal: soft, non-tender, non-distended   Musculoskeletal: decreased ROM/strength to LLE; generalized weakness  Integumentary: no rashes or skin lesions present, right heel/sacral pressure wounds  Neurologic: cranial nerves intact, no signs of peripheral neurological deficit, motor/sensory function intact                Assessment/Plan  Hospital   Fall   Rib fracture   Debility     Non-Hospital   Chronic back pain (Chronic)   Mixed hyperlipidemia (Chronic)   Hypertension (Chronic)   Type 2 diabetes mellitus without complication (Chronic)   Gastroesophageal reflux disease without esophagitis (Chronic)   Iron deficiency anemia secondary to inadequate dietary iron intake (Chronic)   CVA (cerebral vascular accident) (Chronic)   Loss of appetite (Chronic)   Vitamin D deficiency (Chronic)   Frailty (Chronic)   Arteriosclerosis of coronary artery   At risk of pressure injury of skin   Hiatal hernia   Scoliosis   Stenosis of carotid artery   Severe malnutrition   Colon cancer       Wounds: right heel/sacral pressure wounds  Precautions: fall risk  Bracing/AD: RW  Swallowing: Diabetic Diet  Function: Tolerating therapy. Continue PT/OT  VTE Prophylaxis: "   enoxaparin injection 30 mg SubQ q12h  Code Status: FULL CODE   Discharge: Lives alone in [place] in a single-story home with 1 threshold step to enter the residence. Completed her master's degree. She has no  history. 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 She had a volunteer come in to help her around the house and for errands every now and then. Per son, the family is arranging for her care after she leaves rehab.  Children: (2). Date pending.

## 2023-06-11 NOTE — PLAN OF CARE
Problem: Fall Injury Risk  Goal: Absence of Fall and Fall-Related Injury  Outcome: Ongoing, Progressing  Intervention: Promote Injury-Free Environment  Flowsheets (Taken 6/11/2023 0056)  Safety Promotion/Fall Prevention:   assistive device/personal item within reach   Fall Risk signage in place   lighting adjusted   nonskid shoes/socks when out of bed   side rails raised x 3   instructed to call staff for mobility

## 2023-06-12 LAB
ALBUMIN SERPL-MCNC: 2.8 G/DL (ref 3.4–4.8)
ALBUMIN/GLOB SERPL: 0.7 RATIO (ref 1.1–2)
ALP SERPL-CCNC: 55 UNIT/L (ref 40–150)
ALT SERPL-CCNC: 17 UNIT/L (ref 0–55)
AST SERPL-CCNC: 17 UNIT/L (ref 5–34)
BASOPHILS # BLD AUTO: 0.03 X10(3)/MCL
BASOPHILS NFR BLD AUTO: 0.5 %
BILIRUBIN DIRECT+TOT PNL SERPL-MCNC: 0.2 MG/DL
BUN SERPL-MCNC: 23.2 MG/DL (ref 9.8–20.1)
CALCIUM SERPL-MCNC: 9.5 MG/DL (ref 8.4–10.2)
CHLORIDE SERPL-SCNC: 102 MMOL/L (ref 98–107)
CO2 SERPL-SCNC: 24 MMOL/L (ref 23–31)
CREAT SERPL-MCNC: 0.68 MG/DL (ref 0.55–1.02)
EOSINOPHIL # BLD AUTO: 0.11 X10(3)/MCL (ref 0–0.9)
EOSINOPHIL NFR BLD AUTO: 1.8 %
ERYTHROCYTE [DISTWIDTH] IN BLOOD BY AUTOMATED COUNT: 12.8 % (ref 11.5–17)
GFR SERPLBLD CREATININE-BSD FMLA CKD-EPI: >60 MLS/MIN/1.73/M2
GLOBULIN SER-MCNC: 4 GM/DL (ref 2.4–3.5)
GLUCOSE SERPL-MCNC: 206 MG/DL (ref 82–115)
HCT VFR BLD AUTO: 36.4 % (ref 37–47)
HGB BLD-MCNC: 12 G/DL (ref 12–16)
IMM GRANULOCYTES # BLD AUTO: 0.01 X10(3)/MCL (ref 0–0.04)
IMM GRANULOCYTES NFR BLD AUTO: 0.2 %
LYMPHOCYTES # BLD AUTO: 1.44 X10(3)/MCL (ref 0.6–4.6)
LYMPHOCYTES NFR BLD AUTO: 23.5 %
MAGNESIUM SERPL-MCNC: 1.9 MG/DL (ref 1.6–2.6)
MCH RBC QN AUTO: 31.3 PG (ref 27–31)
MCHC RBC AUTO-ENTMCNC: 33 G/DL (ref 33–36)
MCV RBC AUTO: 95 FL (ref 80–94)
MONOCYTES # BLD AUTO: 0.57 X10(3)/MCL (ref 0.1–1.3)
MONOCYTES NFR BLD AUTO: 9.3 %
NEUTROPHILS # BLD AUTO: 3.97 X10(3)/MCL (ref 2.1–9.2)
NEUTROPHILS NFR BLD AUTO: 64.7 %
NRBC BLD AUTO-RTO: 0 %
PHOSPHATE SERPL-MCNC: 3.3 MG/DL (ref 2.3–4.7)
PLATELET # BLD AUTO: 216 X10(3)/MCL (ref 130–400)
PMV BLD AUTO: 10.4 FL (ref 7.4–10.4)
POCT GLUCOSE: 180 MG/DL (ref 70–110)
POCT GLUCOSE: 232 MG/DL (ref 70–110)
POCT GLUCOSE: 249 MG/DL (ref 70–110)
POCT GLUCOSE: 254 MG/DL (ref 70–110)
POCT GLUCOSE: 256 MG/DL (ref 70–110)
POCT GLUCOSE: 354 MG/DL (ref 70–110)
POCT GLUCOSE: 361 MG/DL (ref 70–110)
POTASSIUM SERPL-SCNC: 4.5 MMOL/L (ref 3.5–5.1)
PREALB SERPL-MCNC: 18.8 MG/DL (ref 14–37)
PROT SERPL-MCNC: 6.8 GM/DL (ref 5.8–7.6)
RBC # BLD AUTO: 3.83 X10(6)/MCL (ref 4.2–5.4)
SODIUM SERPL-SCNC: 133 MMOL/L (ref 136–145)
WBC # SPEC AUTO: 6.13 X10(3)/MCL (ref 4.5–11.5)

## 2023-06-12 PROCEDURE — 97116 GAIT TRAINING THERAPY: CPT | Mod: CQ

## 2023-06-12 PROCEDURE — 63600175 PHARM REV CODE 636 W HCPCS: Performed by: NURSE PRACTITIONER

## 2023-06-12 PROCEDURE — 84134 ASSAY OF PREALBUMIN: CPT | Performed by: NURSE PRACTITIONER

## 2023-06-12 PROCEDURE — 83735 ASSAY OF MAGNESIUM: CPT | Performed by: NURSE PRACTITIONER

## 2023-06-12 PROCEDURE — 99233 PR SUBSEQUENT HOSPITAL CARE,LEVL III: ICD-10-PCS | Mod: ,,, | Performed by: NURSE PRACTITIONER

## 2023-06-12 PROCEDURE — 25000003 PHARM REV CODE 250: Performed by: NURSE PRACTITIONER

## 2023-06-12 PROCEDURE — 84100 ASSAY OF PHOSPHORUS: CPT | Performed by: NURSE PRACTITIONER

## 2023-06-12 PROCEDURE — 97535 SELF CARE MNGMENT TRAINING: CPT

## 2023-06-12 PROCEDURE — 11800000 HC REHAB PRIVATE ROOM

## 2023-06-12 PROCEDURE — S0179 MEGESTROL 20 MG: HCPCS | Performed by: NURSE PRACTITIONER

## 2023-06-12 PROCEDURE — 97110 THERAPEUTIC EXERCISES: CPT

## 2023-06-12 PROCEDURE — 97530 THERAPEUTIC ACTIVITIES: CPT

## 2023-06-12 PROCEDURE — 97110 THERAPEUTIC EXERCISES: CPT | Mod: CQ

## 2023-06-12 PROCEDURE — 80053 COMPREHEN METABOLIC PANEL: CPT | Performed by: NURSE PRACTITIONER

## 2023-06-12 PROCEDURE — 99233 SBSQ HOSP IP/OBS HIGH 50: CPT | Mod: ,,, | Performed by: NURSE PRACTITIONER

## 2023-06-12 PROCEDURE — 94799 UNLISTED PULMONARY SVC/PX: CPT

## 2023-06-12 PROCEDURE — 85025 COMPLETE CBC W/AUTO DIFF WBC: CPT | Performed by: NURSE PRACTITIONER

## 2023-06-12 PROCEDURE — 97530 THERAPEUTIC ACTIVITIES: CPT | Mod: CQ

## 2023-06-12 RX ORDER — METHOCARBAMOL 500 MG/1
500 TABLET, FILM COATED ORAL 3 TIMES DAILY
Status: DISCONTINUED | OUTPATIENT
Start: 2023-06-12 | End: 2023-06-13

## 2023-06-12 RX ORDER — POLYETHYLENE GLYCOL 3350 17 G/17G
17 POWDER, FOR SOLUTION ORAL ONCE
Status: COMPLETED | OUTPATIENT
Start: 2023-06-12 | End: 2023-06-12

## 2023-06-12 RX ADMIN — MEGESTROL ACETATE 400 MG: 40 SUSPENSION ORAL at 08:06

## 2023-06-12 RX ADMIN — ENOXAPARIN SODIUM 30 MG: 30 INJECTION SUBCUTANEOUS at 08:06

## 2023-06-12 RX ADMIN — LIDOCAINE 5% 1 PATCH: 700 PATCH TOPICAL at 05:06

## 2023-06-12 RX ADMIN — ACETAMINOPHEN 650 MG: 325 TABLET ORAL at 05:06

## 2023-06-12 RX ADMIN — INSULIN DETEMIR 20 UNITS: 100 INJECTION, SOLUTION SUBCUTANEOUS at 08:06

## 2023-06-12 RX ADMIN — SITAGLIPTIN 50 MG: 50 TABLET, FILM COATED ORAL at 12:06

## 2023-06-12 RX ADMIN — INSULIN ASPART 6 UNITS: 100 INJECTION, SOLUTION INTRAVENOUS; SUBCUTANEOUS at 05:06

## 2023-06-12 RX ADMIN — OXYCODONE HYDROCHLORIDE 5 MG: 5 TABLET ORAL at 08:06

## 2023-06-12 RX ADMIN — METHOCARBAMOL 500 MG: 500 TABLET ORAL at 08:06

## 2023-06-12 RX ADMIN — INSULIN ASPART 3 UNITS: 100 INJECTION, SOLUTION INTRAVENOUS; SUBCUTANEOUS at 09:06

## 2023-06-12 RX ADMIN — DOCUSATE SODIUM 100 MG: 100 CAPSULE, LIQUID FILLED ORAL at 08:06

## 2023-06-12 RX ADMIN — POLYETHYLENE GLYCOL 3350 17 G: 17 POWDER, FOR SOLUTION ORAL at 05:06

## 2023-06-12 RX ADMIN — METFORMIN HYDROCHLORIDE 500 MG: 500 TABLET, FILM COATED ORAL at 05:06

## 2023-06-12 RX ADMIN — METHOCARBAMOL 500 MG: 500 TABLET ORAL at 01:06

## 2023-06-12 RX ADMIN — INSULIN ASPART 4 UNITS: 100 INJECTION, SOLUTION INTRAVENOUS; SUBCUTANEOUS at 12:06

## 2023-06-12 RX ADMIN — MIRTAZAPINE 30 MG: 15 TABLET, FILM COATED ORAL at 05:06

## 2023-06-12 RX ADMIN — OXYCODONE HYDROCHLORIDE 5 MG: 5 TABLET ORAL at 05:06

## 2023-06-12 RX ADMIN — GABAPENTIN 300 MG: 300 CAPSULE ORAL at 01:06

## 2023-06-12 RX ADMIN — LISINOPRIL 20 MG: 10 TABLET ORAL at 08:06

## 2023-06-12 RX ADMIN — ENOXAPARIN SODIUM 30 MG: 30 INJECTION SUBCUTANEOUS at 12:06

## 2023-06-12 RX ADMIN — ASPIRIN 325 MG: 325 TABLET, FILM COATED ORAL at 08:06

## 2023-06-12 RX ADMIN — AMLODIPINE BESYLATE 10 MG: 5 TABLET ORAL at 08:06

## 2023-06-12 RX ADMIN — ACETAMINOPHEN 650 MG: 325 TABLET ORAL at 08:06

## 2023-06-12 RX ADMIN — INSULIN ASPART 2 UNITS: 100 INJECTION, SOLUTION INTRAVENOUS; SUBCUTANEOUS at 05:06

## 2023-06-12 RX ADMIN — FOLIC ACID 1000 MCG: 1 TABLET ORAL at 08:06

## 2023-06-12 RX ADMIN — ACETAMINOPHEN 650 MG: 325 TABLET ORAL at 01:06

## 2023-06-12 RX ADMIN — ALPRAZOLAM 0.25 MG: 0.25 TABLET ORAL at 12:06

## 2023-06-12 RX ADMIN — ATORVASTATIN CALCIUM 10 MG: 10 TABLET, FILM COATED ORAL at 08:06

## 2023-06-12 RX ADMIN — GABAPENTIN 300 MG: 300 CAPSULE ORAL at 08:06

## 2023-06-12 RX ADMIN — GABAPENTIN 300 MG: 300 CAPSULE ORAL at 05:06

## 2023-06-12 NOTE — PLAN OF CARE
"Admission PHQ-2 completed (score=0 negative).    Discussed my role in discharge planning and CM.    Pt revealed that both of her children, Dyllan and Jd, lives and work in [facility].  Her son hires a neighbor/friend to "check in" on patient 3-4 times daily.  Pt's sister is reportedly well and lives in [place] off [facility].  "

## 2023-06-12 NOTE — PROGRESS NOTES
[facility] - Rehab Inpatient Services  Wound Care    Patient Name:  Lita Hess   MRN:  [id number]  Date: 6/12/2023  Diagnosis: Debility    History:     Past Medical History:   Diagnosis Date    Chronic back pain 10/26/2022    Colon cancer     CVA (cerebral vascular accident)     Gastroesophageal reflux disease without esophagitis 10/26/2022    Hypertension     Iron deficiency anemia secondary to inadequate dietary iron intake 10/26/2022    Loss of appetite     Mixed hyperlipidemia 10/26/2022    Stroke     Type 2 diabetes mellitus without complication 10/26/2022    Vitamin D deficiency          Precautions:     Allergies as of 06/09/2023    (No Known Allergies)       [facility] Assessment Details/Treatment      06/12/23 1126        Altered Skin Integrity 06/09/23 1300  Sacral spine Partial thickness tissue loss. Shallow open ulcer with a red or pink wound bed, without slough. Intact or Open/Ruptured Serum-filled blister.   Date First Assessed/Time First Assessed: 06/09/23 1300   Altered Skin Integrity Present on Admission - Did Patient arrive to the hospital with altered skin?: yes  Side: (c)   Location: Sacral spine  Description of Altered Skin Integrity: (c) Partial t...   Description of Altered Skin Integrity Partial thickness tissue loss. Shallow open ulcer with a red or pink wound bed, without slough. Intact or Open/Ruptured Serum-filled blister.   Drainage Amount None   Appearance Pink   Tissue loss description Partial thickness   Red (%), Wound Tissue Color 100 %   Wound Length (cm) 3 cm   Wound Width (cm) 5 cm   Wound Depth (cm) 0.1 cm   Wound Volume (cm^3) 1.5 cm^3   Wound Surface Area (cm^2) 15 cm^2   Care Cleansed with:;Antimicrobial agent   Dressing Applied;Hydrocolloid   Dressing Change Due 06/19/23     Sacral area has a 3 x 5 area of small pink openings. Discussed turning q 2 hours.    Right heel has a SDTI measuring 1.5 x 1.5. Heel boots applied and explained to patient the importance of  off loading.   Will order turn q 2 and bilateral heel boots. Wedge and boots are in room.  06/12/2023

## 2023-06-12 NOTE — PT/OT/SLP PROGRESS
Physical Therapy Inpatient Rehab Treatment    Patient Name:  Lita Hess   MRN:  [id number]    Recommendations:     Discharge Recommendations:  nursing facility, skilled   Discharge Equipment Recommendations: bedside commode, walker, rolling, wheelchair   Barriers to discharge: Decreased caregiver support    Assessment:     Lita Hess is a 87 y.o. female admitted with a medical diagnosis of Debility.  She presents with the following impairments/functional limitations:  weakness, impaired endurance, impaired self care skills, impaired functional mobility, gait instability, impaired balance, decreased lower extremity function, decreased safety awareness, pain, decreased ROM .    Rehab Diagnosis:  debility     General Precautions: Standard, fall     Orthopedic Precautions:N/A     Braces: N/A    Rehab Prognosis: Fair; patient would benefit from acute skilled PT services to address these deficits and reach maximum level of function.      History:     Past Medical History:   Diagnosis Date    Chronic back pain 10/26/2022    Colon cancer     CVA (cerebral vascular accident)     Gastroesophageal reflux disease without esophagitis 10/26/2022    Hypertension     Iron deficiency anemia secondary to inadequate dietary iron intake 10/26/2022    Loss of appetite     Mixed hyperlipidemia 10/26/2022    Stroke     Type 2 diabetes mellitus without complication 10/26/2022    Vitamin D deficiency        Past Surgical History:   Procedure Laterality Date    COLECTOMY      COLONOSCOPY      ESOPHAGOGASTRODUODENOSCOPY      gastro biopsy      HIP REPLACEMENT ARTHROPLASTY      SCLEROTHERAPY WITH ULTRASOUND GUIDANCE      TRANSESOPHAGEAL ECHOCARDIOGRAPHY         Subjective     Chief Complaint: buttocks pain     Respiratory Status: Room air    Patients cultural, spiritual, Temple conflicts given the current situation: no      Objective:     Communicated with nursing  prior to session.  Patient found up in chair with Other (comments) (in wc  seen after OT)  upon PT entry to room.    Pt is Oriented x3 and Alert and Cooperative.    Vitals   Vitals at Rest  BP    HR    O2 Sat    Pain      Vitals With Activity  BP    HR    O2 Sat    Pain        Functional Mobility:   Transfers:     Sit to Stand: touching a with vc for begin transition to stand with rolling walker and increased time slow transition  Gait: Pt ambulates 160ft much increased time for increased distance  with RW with touching a slow rachel .   Pt stood for wound care nurse to assess buttocks area and dress wound      Current   Status  Discharge   Goal   Functional Area: Care Score:    Roll Left and Right   Independent   Sit to Lying   Set-up/clean-up   Lying to Sitting on Side of Bed   Independent   Sit to Stand 3 Independent   Chair/Bed-to-Chair Transfer 3 Independent   Car Transfer       Walk 10 Feet 4 Independent   Walk 50 Feet with Two Turns 4 Independent   Walk 150 Feet       Walk 10 Feet Uneven Surface       1 Step (Curb)       4 Steps       12 Steps       Picking Up Object       Wheel 50 Feet with Two Turns       Wheel 150 Feet           Therapeutic Activities and Exercises:  Pt performed seated HEP 2 sets 15reps with 1 1/2# wts BLE rest breaks required HEP reviewed     Activity Tolerance: Good    Patient left up in chair with  OT  present and pt yahaira tx requires increased time to begin activity/follow commands slow transition  .    Education provided: transfer training, gait training, balance training, safety awareness, assistive device, strengthening exercises, and fall prevention    Expected compliance: High compliance    GOALS:   Multidisciplinary Problems       Physical Therapy Goals          Problem: Physical Therapy    Goal Priority Disciplines Outcome Goal Variances Interventions   Physical Therapy Goal     PT, PT/OT Ongoing, Progressing     Description: Bed Mobility:  Roll L and R Philip  Sit to supine will be Philip  Supine to sit will be Philip    Transfers:  Sit to stand  supervision  Bed to Chair t/f will be supervision  Car t/f will be supervision    Mobility:  Pt will ambulate 150' CGA with RW  Pt will ambulate 15' CGA with RW over uneven terrain  Pt will climb one step CGA with RW                        Plan:     During this hospitalization, patient to be seen 5 x/week to address the identified rehab impairments via gait training, therapeutic activities, therapeutic exercises, neuromuscular re-education and progress toward the following goals:    Plan of Care Expires:  06/16/23  PT Next Visit Date:    Plan of Care reviewed with: patient    Additional Information:         Time Tracking:     Therapy Time  PT Received On: 06/12/23  PT Start Time: 1000  PT Stop Time: 1100  PT Total Time (min): 60 min   PT Individual: 60  Missed Time:    Time Missed due to:      Billable Minutes: Gait Training 30min, Therapeutic Activity 15min, and Therapeutic Exercise 15min    06/12/2023

## 2023-06-12 NOTE — PROGRESS NOTES
"   06/12/23 1300   Rec Therapy Time Calculation   Date of Treatment 06/12/23   Rec Start Time 1300   Rec Stop Time 1330   Rec Total Time (min) 30 min   Time   Treatment time 2 units   Charges   $Therapeutic Exercise 2 units   Precautions   General Precautions fall   Orthopedic Precautions  N/A   Braces N/A   Pain/Comfort   Pain Rating 1 no pain   OTHER   Treatment Diagnosis Fall over walker, L4-7 rib fx, s/p CVA x2, MI, HTN, colon CA s/p colectomy   Rehab identified problem list/impairments weakness;impaired endurance;impaired functional mobility;gait instability;decreased coordination;decreased lower extremity function;decreased safety awareness;decreased upper extremity function   Values/Beliefs/Spiritual Care   Spiritual, Cultural Beliefs, Episcopalian Practices, Values that Affect Care no   Prior Living/ADLs   Admit Date 06/09/23   People in Home alone   Living Arrangements house   Patient responsibilites: Financial management;Health and wellness;Laundry;Leisure/play/hobbies;Meal preparation;Retired;Social participation   Number of Children 2   Occupation Retired:  /   Previous Hand Domiance Right   Current Hand Dominance Right   Overall Level of Functioning   Activity Tolerance Min A   Dynamic Sitting Balance/Reaching Min A   Dynamic Standing Balance/Reaching Min A   Right UE Coodination/Dexterity Standby Assist   Left UE Coordination/Dexterity Min A   Problem Solving/Sequencing Skills Standby Assist   Memory Recall Standby Assist   R/L Neglect/Inattention Does not occur   Attention Span Standby Assist   Social Interaction Mod Indep   Patient Goals   Patient Goal 1 "To get strong and start walking the right way."   Recreational Therapy Short Term Goals   Short Term Goal 1 Will increase activity tolerance to supervision   Short Term Goal 1 Progression Initiated   Short Term Goal 2 Will increase sit to stand to supervision   Short Term Goal 2 Progression Met   Short Term Goal 3 Will " improve dynamic standing balance/reaching to supervision   Short Term Goal 3 Progression Initiated   Recreational Therapy Long Term Goals   Long Term Goal 1 Will increase standing tolerance to 5 minutes   Long Term Goal 1 Progression Met   Long Term Goal 2 Will improve dynamic standing balance/reaching to setup   Long Term Goal 2 Progression Initiated   Plan   Patient to be seen Daily   Planned Duration 2 weeks   Treatments Planned Balance training;Cognitive training;Coordination;Energy conservation training;Fine motor;Safety education   Treatment plan/goals estblished with Patient/Caregiver Yes

## 2023-06-12 NOTE — PT/OT/SLP EVAL
Recreational Therapy Evaluation      Date of Treatment: 06/12/23  Start Time: 1300  Stop Time: 1330  Total Time: 30 min  Missed Time:     Assessment      Lita Hess is a 87 y.o. female admitted with a medical diagnosis of Debility.  She presents with the following impairments/functional limitations:  weakness, impaired endurance, impaired functional mobility, gait instability, decreased coordination, decreased lower extremity function, decreased safety awareness, decreased upper extremity function .    Rehab Diagnosis:     Recent Surgery:     General Precautions: Standard, fall     Orthopedic Precautions:N/A     Braces: N/A    Rehab Prognosis: Fair; patient would benefit from acute skilled Recreational Therapy services to address these deficits and reach maximum level of function.      Impairments: Balance deficits, Cognitive deficits, Coordination deficits, Decreased knowledge of condition, Endurance deficits, Mobility deficits, Safety awareness deficits, and Strength deficits  Rehab Potential: Good  Treatment Recommendations: Continue with Skill TR Service to facilitate functional independence and address impairments/limitations   Treatment Diagnosis: Fall over walker, L4-7 rib fx, s/p CVA x2, MI, HTN, colon CA s/p colectomy  Orientation: Identifies self  Affect/Behavior: Cooperative and Flat  Safety/Judgement: intact   Basic Command Following: intact  Spiritual Cultural: no        History     Past Medical History:   Diagnosis Date    Chronic back pain 10/26/2022    Colon cancer     CVA (cerebral vascular accident)     Gastroesophageal reflux disease without esophagitis 10/26/2022    Hypertension     Iron deficiency anemia secondary to inadequate dietary iron intake 10/26/2022    Loss of appetite     Mixed hyperlipidemia 10/26/2022    Stroke     Type 2 diabetes mellitus without complication 10/26/2022    Vitamin D deficiency        Past Surgical History:   Procedure Laterality Date    COLECTOMY      COLONOSCOPY    "   ESOPHAGOGASTRODUODENOSCOPY      gastro biopsy      HIP REPLACEMENT ARTHROPLASTY      SCLEROTHERAPY WITH ULTRASOUND GUIDANCE      TRANSESOPHAGEAL ECHOCARDIOGRAPHY         Home Environment     Admit Date: 06/09/23  Living Situation  People in Home: alone  Lives in: house  Patients Responsibilities: Financial management, Health and wellness, Laundry, Leisure/play/hobbies, Meal preparation, Retired, Social participation  Number of Children: 2  Occupation:Retired:  /    Instrumental Activities of Daily Living     Previous Hand Dominance: Right Current Hand Dominance: Right     Other iADL Information:        Cognitive Skills Building         Cognitive Observation Activity Assist Position Equipment Response            Comment:      Dynamic Activities      Activity Assist Position Equipment Response   Activity 1 Ring Toss Game moderate assistance Bedside Rubber rings fair   Comment: Me with pt at bedside.  Supine to sit was mod as was sit to supine. Dynamic sitting balance/reaching was min. Sitting tolerance was 10 minutes.  RUE coordination was supervision.  LUE coordination was min.  Brightened up when her son and granddaughter entered room.        Fine Motor Activities      Activity Assist Position Equipment Response           Comment:        Goals     Patient Goals  Patient Goal 1: "To get strong and start walking the right way."    Short Term Goals    Goal  Goal Status   Will increase activity tolerance to supervision Initiated   Will increase sit to stand to supervision Initiated   Will improve dynamic standing balance/reaching to supervision Initiated           Long Term Goals    Goal Goal Status   Will increase standing tolerance to 5 minutes Met   Will improve dynamic standing balance/reaching to setup Initiated                     Plan       Patient to be seen: Daily  Duration: 2 weeks  Treatments planned: Balance training, Cognitive training, Coordination, Energy conservation " training, Fine motor, Safety education  Treatment plan/goals established with Patient/Caregiver: Yes

## 2023-06-12 NOTE — PT/OT/SLP PROGRESS
Physical Therapy Inpatient Rehab Treatment    Patient Name:  Lita Hess   MRN:  [id number]    Recommendations:     Discharge Recommendations:  nursing facility, skilled   Discharge Equipment Recommendations: bedside commode, walker, rolling, wheelchair   Barriers to discharge:  impaired functional mobility    Assessment:     Lita Hess is a 87 y.o. female admitted with a medical diagnosis of Debility.  She presents with the following impairments/functional limitations:  weakness, impaired endurance, impaired self care skills, impaired functional mobility, decreased lower extremity function, gait instability, decreased safety awareness, pain .    Rehab Diagnosis:      General Precautions: Standard, fall     Orthopedic Precautions:      Braces:      Rehab Prognosis: Good; patient would benefit from acute skilled PT services to address these deficits and reach maximum level of function.      History:     Past Medical History:   Diagnosis Date    Chronic back pain 10/26/2022    Colon cancer     CVA (cerebral vascular accident)     Gastroesophageal reflux disease without esophagitis 10/26/2022    Hypertension     Iron deficiency anemia secondary to inadequate dietary iron intake 10/26/2022    Loss of appetite     Mixed hyperlipidemia 10/26/2022    Stroke     Type 2 diabetes mellitus without complication 10/26/2022    Vitamin D deficiency        Past Surgical History:   Procedure Laterality Date    COLECTOMY      COLONOSCOPY      ESOPHAGOGASTRODUODENOSCOPY      gastro biopsy      HIP REPLACEMENT ARTHROPLASTY      SCLEROTHERAPY WITH ULTRASOUND GUIDANCE      TRANSESOPHAGEAL ECHOCARDIOGRAPHY         Subjective     Chief Complaint: L flank and hip pain, fatigue    Respiratory Status: Room air    Patients cultural, spiritual, Protestant conflicts given the current situation: no      Objective:     Communicated with pt prior to session.  Patient found up in chair with    upon PT entry to room. Very slouched in chair with pt  about to slide out     Pt is Oriented x3 and Alert and Cooperative.    Vitals   5/10 pain reported in L flank and L hip with mobility, 0/10 pain at rest.      Functional Mobility:      Current   Status  Discharge   Goal   Functional Area: Care Score:    Roll Left and Right   Independent   Sit to Lying   Set-up/clean-up   Lying to Sitting on Side of Bed   Independent   Sit to Stand 3  Min A with RW Independent   Chair/Bed-to-Chair Transfer 3  Min A with RW Independent   Car Transfer       Walk 10 Feet 4 Independent   Walk 50 Feet with Two Turns 4  Pt amb ~60' CGA with RW. Very slow gait speed with small B step sizes and height, as well as slightly forward flexed posture.  Independent   Walk 150 Feet       Walk 10 Feet Uneven Surface       1 Step (Curb)       4 Steps       12 Steps       Picking Up Object       Wheel 50 Feet with Two Turns       Wheel 150 Feet           Therapeutic Activities and Exercises:  Seated therex at edge of PT mat, 3x15 B marches and LAQs. Attempted B hip abduction but pt exhibited increased difficulty understanding and performing exercise despite multiple attempts at education, so not completed.    W/c modifications made while performing seated therex to facilitate decreased slouching while sitting, as well as provide improved pressure relief and comfort.    Activity Tolerance: Fair    Patient left up in chair with call button in reach and chair alarm on.    Education provided: roles and goals of PT/PTA, transfer training, gait training, safety awareness, body mechanics, assistive device, wheelchair management, strengthening exercises, and fall prevention    Expected compliance: Moderate compliance    Plan:     During this hospitalization, patient to be seen 5 x/week to address the identified rehab impairments via gait training, therapeutic activities, therapeutic exercises, neuromuscular re-education and progress toward the following goals:    GOALS:   Multidisciplinary Problems        Physical Therapy Goals          Problem: Physical Therapy    Goal Priority Disciplines Outcome Goal Variances Interventions   Physical Therapy Goal     PT, PT/OT Ongoing, Progressing     Description: Bed Mobility:  Roll L and R Philip  Sit to supine will be Philip  Supine to sit will be Philip    Transfers:  Sit to stand supervision  Bed to Chair t/f will be supervision  Car t/f will be supervision    Mobility:  Pt will ambulate 150' CGA with RW  Pt will ambulate 15' CGA with RW over uneven terrain  Pt will climb one step CGA with RW                        Plan of Care Expires:  06/16/23  PT Next Visit Date:    Plan of Care reviewed with: patient    Additional Information:     Very slow speed with all activities/interventions, increased time required for all tasks. Encouragement needed for participation.    Time Tracking:     Therapy Time  PT Start Time: 0830  PT Stop Time: 0930  PT Total Time (min): 60 min   PT Individual: 60  Missed Time:    Time Missed due to:      Billable Minutes: Gait Training 15 min, Therapeutic Activity 15 min, and Therapeutic Exercise 30 min    06/12/2023

## 2023-06-12 NOTE — PT/OT/SLP PROGRESS
"Occupational Therapy Inpatient Rehab Treatment    Name: Lita Hess  MRN: [id number]    Assessment:  Lita Hess is a 87 y.o. female admitted with a medical diagnosis of Debility.  She presents with the following impairments/functional limitations:  weakness, impaired endurance, impaired self care skills, impaired functional mobility, decreased lower extremity function, pain, decreased ROM, impaired skin .    General Precautions: Standard, fall   Anastasia, wound care RN asked that Pt. Not be left sitting up in w/c 2* wound on buttocks.   Orthopedic Precautions:Full weight bearing     Braces: N/A    Rehab Prognosis: Good; patient would benefit from acute skilled OT services to address these deficits and reach maximum level of function.      History:     Past Medical History:   Diagnosis Date    Chronic back pain 10/26/2022    Colon cancer     CVA (cerebral vascular accident)     Gastroesophageal reflux disease without esophagitis 10/26/2022    Hypertension     Iron deficiency anemia secondary to inadequate dietary iron intake 10/26/2022    Loss of appetite     Mixed hyperlipidemia 10/26/2022    Stroke     Type 2 diabetes mellitus without complication 10/26/2022    Vitamin D deficiency        Past Surgical History:   Procedure Laterality Date    COLECTOMY      COLONOSCOPY      ESOPHAGOGASTRODUODENOSCOPY      gastro biopsy      HIP REPLACEMENT ARTHROPLASTY      SCLEROTHERAPY WITH ULTRASOUND GUIDANCE      TRANSESOPHAGEAL ECHOCARDIOGRAPHY         Subjective     Orientation: Identifies self    Chief Complaint: "My L side is hurting".     Patient/Family Comments/goals: "I hope to walk on my driveway again"     Respiratory Status: Room air    Patients cultural, spiritual, Hinduism conflicts given the current situation: no       Objective:     Patient found up in chair with    upon OT entry to room. Pt. In recliner in effort to unweight buttocks.     Mobility   Patient completed:  Sit to Stand Transfer with minimum assistance " with rolling walker  Stand to Sit Transfer with minimum assistance with rolling walker  Bed to Chair Transfer using Stand Pivot technique with moderate assistance with rolling walker  Toilet Transfer Stand Pivot technique with moderate assistance with  rolling walker and bedside commode  Pt. Requested Pur-wick after being offered BSC. OT educated Pt. Benefits of using BSC. Pt. C + void (urine).   Functional Mobility  Pt. Performed several sit <>stand c one UE AROM/FM as contralateral support on RW .     ADLs   Current Status   Eating 5   Oral Hygiene     Shower, Bathe Self     Upper Body Dressing     Lower Body Dressing     Toileting Hygiene 3   Toilet Transfer 3   Putting On, Taking Off Footwear       Limiting Factors for ADLs: motor, endurance, limited ROM, weakness, and pain         Therapeutic Activities  Pt. Tolerated static standing via RW c R UE AROM /FM c graded clips x's 7 min then a game of Connect 4 c one rest break; standing at tabletop.    Therapeutic Exercise  Pt. C R UE performed 2 min intervals x's 3 on UBE at 1.5 milner.             LifeStyle Change and Education:             Patient left HOB elevated with call button in reach.     Education provided: ADLs, transfer training, body mechanics, safety precautions, and home safety    Multidisciplinary Problems       Occupational Therapy Goals          Problem: Occupational Therapy    Goal Priority Disciplines Outcome Interventions   Occupational Therapy Goal     OT, PT/OT Ongoing, Progressing    Description: ADLs:  Pt to perform grooming tasks with set up and clean up  Pt to perform UB dressing with set up and clean up   Pt to perform LB dressing with set up and clean up with use of AD   Pt to perform putting on/off footwear task with set up and clean up and AD  Pt to perform toileting with mod a using RW and BSC  Pt to perform bathing with mod a using TTB    Functional Transfers:  Pt to perform toilet transfers with CGA and RW  Pt to perform a tub  transfer with mod a and TTB                           Time Tracking     OT Received On: 06/12/23  Time In 1100     Time Out 1200  Total Time 60 min  Therapy Time: OT Individual: 60  Missed Time:    Missed Time Reason:      Billable Minutes: Self Care/Home Management 15, Therapeutic Activity 30, and Therapeutic Exercise 15    06/12/2023

## 2023-06-12 NOTE — PROGRESS NOTES
[facility] ([facility])  Rehab Progress Note    Patient Name: Lita Hess  MRN: [id number]  Age: 87 y.o. Sex: female  : 1935  Hospital Length of Stay: 3 days  Date of Service: 2023   Chief Complaint: Debility 2/2 frequent falls with left 4th-7th rib fractures    Subjective:     Basic Information  Admit Information: 87-year-old  female presented to [facility] ED on 2023 complaining of left side pain after accidentally falling backwards over walker.  PMH significant for CVA x2, CAD s/p MI, HTN, carotid artery stenosis, remote history of colon cancer s/p colectomy.  Workup significant for left 4th through 7th rib fractures.  Limited mobility 2/2 pain.  Tolerated transfer to [facility] inpatient rehab unit on  without incident.  Today's Information: No acute events overnight.  Mobilizing with therapy.  Reports good sleep and appetite.  Last BM .  Vital signs at goal with no recorded fevers.  CBC stable.  CMP unremarkable.  Sodium 133 (from 134).  Moderate glycemic control.  No new imaging today.    Review of patient's allergies indicates:  No Known Allergies     Current Facility-Administered Medications:     acetaminophen tablet 650 mg, 650 mg, Oral, Q4H PRN, Vishal Stephensonart, FNP, 650 mg at 23 2041    acetaminophen tablet 650 mg, 650 mg, Oral, TID, Annette Calvo, FNP, 650 mg at 23 1333    ALPRAZolam tablet 0.25 mg, 0.25 mg, Oral, TID PRN, Vishal Stephensonart, FNP, 0.25 mg at 23 1209    amLODIPine tablet 10 mg, 10 mg, Oral, Daily, Vishal MAN Du, FNP, 10 mg at 23 0821    aspirin tablet 325 mg, 325 mg, Oral, Daily, Vishal A Du, FNP, 325 mg at 23 0821    atorvastatin tablet 10 mg, 10 mg, Oral, Daily, Vishal A Du, FNP, 10 mg at 23 0821    benzonatate capsule 100 mg, 100 mg, Oral, TID PRN, Vishal Sandy, FNP    bisacodyL suppository 10 mg, 10 mg, Rectal, Daily PRN, ALIRIO Arriaga     dextrose 10% bolus 125 mL 125 mL, 12.5 g, Intravenous, PRN, Vishal MAN Du, FNP    dextrose 10% bolus 250 mL 250 mL, 25 g, Intravenous, PRN, Vishal A Du, FNP    docusate sodium capsule 100 mg, 100 mg, Oral, BID, Vishal A Du, FNP, 100 mg at 06/12/23 0820    enoxaparin injection 30 mg, 30 mg, Subcutaneous, Q12H (treatment, non-standard time), Vishal A Du, FNP, 30 mg at 06/12/23 1209    folic acid tablet 1,000 mcg, 1,000 mcg, Oral, Daily, Vishal A Du, FNP, 1,000 mcg at 06/12/23 0821    gabapentin capsule 300 mg, 300 mg, Oral, TID, Vishal A Du, FNP, 300 mg at 06/12/23 1334    glucagon (human recombinant) injection 1 mg, 1 mg, Intramuscular, PRN, Vishal A Du, FNP    glucose chewable tablet 16 g, 16 g, Oral, PRN, Vishal A Du, FNP    glucose chewable tablet 24 g, 24 g, Oral, PRN, Vishal A Du, FNP    hydrALAZINE injection 10 mg, 10 mg, Intravenous, Q4H PRN, Vishal A Du, FNP    hydrOXYzine pamoate capsule 50 mg, 50 mg, Oral, Nightly PRN, Vishal A Du, FNP    insulin aspart U-100 injection 1-10 Units, 1-10 Units, Subcutaneous, QID (AC + HS) PRN, Vishal A Du, FNP, 4 Units at 06/12/23 1222    insulin detemir U-100 injection 20 Units, 20 Units, Subcutaneous, BID, Vishal A Du, FNP, 20 Units at 06/12/23 0821    labetalol 20 mg/4 mL (5 mg/mL) IV syring, 10 mg, Intravenous, Q4H PRN, Vishal A Du, FNP    LIDOcaine 5 % patch 1 patch, 1 patch, Transdermal, Q24H, Annette Calvo, FNP, 1 patch at 06/12/23 0538    lisinopriL tablet 20 mg, 20 mg, Oral, Daily, Vishal Sandy, FNP, 20 mg at 06/12/23 0821    megestroL 400 mg/10 mL (10 mL) suspension 400 mg, 400 mg, Oral, BID, Vishal Sandy, FNP, 400 mg at 06/12/23 0820    metFORMIN tablet 500 mg, 500 mg, Oral, Daily before evening meal, Annette Devrieste, FNP, 500 mg at 06/11/23 1736    methocarbamoL tablet 500 mg, 500 mg, Oral, TID, Annette MAN Lubna, FNP, 500 mg at 06/12/23  "1334    metoprolol injection 10 mg, 10 mg, Intravenous, Q2H PRN, Vishal MAN Du, FNP    mirtazapine tablet 30 mg, 30 mg, Oral, After dinner, Annette Devrieste, FNP, 30 mg at 06/11/23 1736    nitroGLYCERIN SL tablet 0.4 mg, 0.4 mg, Sublingual, Q5 Min PRN, Vishal MAN Du, FNP    ondansetron disintegrating tablet 4 mg, 4 mg, Oral, Q6H PRN, Vishal MAGDA Du, FNP    oxyCODONE immediate release tablet 5 mg, 5 mg, Oral, Q6H PRN, Annette Devrieste, FNP, 5 mg at 06/12/23 0821    polyethylene glycol packet 17 g, 17 g, Oral, BID PRN, Vishal MAGDA Du, FNP    promethazine tablet 25 mg, 25 mg, Oral, Q6H PRN, Vishal MAN Du, FNP    SITagliptin phosphate tablet 50 mg, 50 mg, Oral, Daily, Vishal MAN Du, FNP, 50 mg at 06/12/23 1209     Review of Systems   Complete 12-point review of symptoms negative except for what's mentioned in HPI     Objective:     /65   Pulse 77   Temp 98.2 °F (36.8 °C) (Oral)   Resp 16   Ht 4' 11.02" (1.499 m)   Wt 48.1 kg (106 lb 0.7 oz)   SpO2 100%   Breastfeeding No   BMI 21.41 kg/m²        Physical Exam  Constitutional:       Appearance: Normal appearance.   HENT:      Head: Normocephalic.      Mouth/Throat:      Mouth: Mucous membranes are moist.   Eyes:      Pupils: Pupils are equal, round, and reactive to light.   Cardiovascular:      Rate and Rhythm: Normal rate and regular rhythm.      Heart sounds: Normal heart sounds.   Pulmonary:      Effort: Pulmonary effort is normal.      Breath sounds: Normal breath sounds.   Abdominal:      General: Bowel sounds are normal.      Palpations: Abdomen is soft.   Musculoskeletal:      Cervical back: Neck supple.      Comments: muscle atrophy   Skin:     General: Skin is warm and dry.   Neurological:      Mental Status: She is alert and oriented to person, place, and time.      Motor: Weakness present.   Psychiatric:         Mood and Affect: Mood normal.         Behavior: Behavior normal.         Thought Content: Thought " content normal.         Judgment: Judgment normal.   *MD performed and documented physical examination       Lines/Drains/Airways       None                   Labs  Admission on 06/09/2023   Component Date Value Ref Range Status    POCT Glucose 06/09/2023 324 (H)  70 - 110 mg/dL Final    POCT Glucose 06/09/2023 279 (H)  70 - 110 mg/dL Final    Sodium Level 06/10/2023 134 (L)  136 - 145 mmol/L Final    Potassium Level 06/10/2023 4.5  3.5 - 5.1 mmol/L Final    Chloride 06/10/2023 100  98 - 107 mmol/L Final    Carbon Dioxide 06/10/2023 25  23 - 31 mmol/L Final    Glucose Level 06/10/2023 192 (H)  82 - 115 mg/dL Final    Blood Urea Nitrogen 06/10/2023 13.0  9.8 - 20.1 mg/dL Final    Creatinine 06/10/2023 0.66  0.55 - 1.02 mg/dL Final    Calcium Level Total 06/10/2023 9.3  8.4 - 10.2 mg/dL Final    Protein Total 06/10/2023 6.4  5.8 - 7.6 gm/dL Final    Albumin Level 06/10/2023 2.8 (L)  3.4 - 4.8 g/dL Final    Globulin 06/10/2023 3.6 (H)  2.4 - 3.5 gm/dL Final    Albumin/Globulin Ratio 06/10/2023 0.8 (L)  1.1 - 2.0 ratio Final    Bilirubin Total 06/10/2023 0.3  <=1.5 mg/dL Final    Alkaline Phosphatase 06/10/2023 56  40 - 150 unit/L Final    Alanine Aminotransferase 06/10/2023 13  0 - 55 unit/L Final    Aspartate Aminotransferase 06/10/2023 14  5 - 34 unit/L Final    eGFR 06/10/2023 >60  mls/min/1.73/m2 Final    Magnesium Level 06/10/2023 1.80  1.60 - 2.60 mg/dL Final    Phosphorus Level 06/10/2023 3.3  2.3 - 4.7 mg/dL Final    Prealbumin 06/10/2023 15.8  14.0 - 37.0 mg/dL Final    Ferritin Level 06/10/2023 726.85 (H)  4.63 - 204.00 ng/mL Final    Iron Binding Capacity Unsaturated 06/10/2023 153  70 - 310 ug/dL Final    Iron Level 06/10/2023 55  50 - 170 ug/dL Final    Transferrin 06/10/2023 183  mg/dL Final    Iron Binding Capacity Total 06/10/2023 208 (L)  250 - 450 ug/dL Final    Iron Saturation 06/10/2023 26  20 - 50 % Final    WBC 06/10/2023 6.92  4.50 - 11.50 x10(3)/mcL Final    RBC 06/10/2023 3.58 (L)  4.20 - 5.40  x10(6)/mcL Final    Hgb 06/10/2023 11.1 (L)  12.0 - 16.0 g/dL Final    Hct 06/10/2023 33.8 (L)  37.0 - 47.0 % Final    MCV 06/10/2023 94.4 (H)  80.0 - 94.0 fL Final    MCH 06/10/2023 31.0  27.0 - 31.0 pg Final    MCHC 06/10/2023 32.8 (L)  33.0 - 36.0 g/dL Final    RDW 06/10/2023 13.0  11.5 - 17.0 % Final    Platelet 06/10/2023 187  130 - 400 x10(3)/mcL Final    MPV 06/10/2023 10.4  7.4 - 10.4 fL Final    Neut % 06/10/2023 65.3  % Final    Lymph % 06/10/2023 21.0  % Final    Mono % 06/10/2023 8.1  % Final    Eos % 06/10/2023 4.6  % Final    Basophil % 06/10/2023 0.7  % Final    Lymph # 06/10/2023 1.45  0.6 - 4.6 x10(3)/mcL Final    Neut # 06/10/2023 4.52  2.1 - 9.2 x10(3)/mcL Final    Mono # 06/10/2023 0.56  0.1 - 1.3 x10(3)/mcL Final    Eos # 06/10/2023 0.32  0 - 0.9 x10(3)/mcL Final    Baso # 06/10/2023 0.05  <=0.2 x10(3)/mcL Final    IG# 06/10/2023 0.02  0 - 0.04 x10(3)/mcL Final    IG% 06/10/2023 0.3  % Final    NRBC% 06/10/2023 0.0  % Final    POCT Glucose 06/10/2023 178 (H)  70 - 110 mg/dL Final    POCT Glucose 06/10/2023 365 (H)  70 - 110 mg/dL Final    POCT Glucose 06/10/2023 297 (H)  70 - 110 mg/dL Final    POCT Glucose 06/10/2023 364 (H)  70 - 110 mg/dL Final    POCT Glucose 06/11/2023 243 (H)  70 - 110 mg/dL Final    POCT Glucose 06/11/2023 241 (H)  70 - 110 mg/dL Final    POCT Glucose 06/11/2023 260 (H)  70 - 110 mg/dL Final    Prealbumin 06/12/2023 18.8  14.0 - 37.0 mg/dL Final    Sodium Level 06/12/2023 133 (L)  136 - 145 mmol/L Final    Potassium Level 06/12/2023 4.5  3.5 - 5.1 mmol/L Final    Chloride 06/12/2023 102  98 - 107 mmol/L Final    Carbon Dioxide 06/12/2023 24  23 - 31 mmol/L Final    Glucose Level 06/12/2023 206 (H)  82 - 115 mg/dL Final    Blood Urea Nitrogen 06/12/2023 23.2 (H)  9.8 - 20.1 mg/dL Final    Creatinine 06/12/2023 0.68  0.55 - 1.02 mg/dL Final    Calcium Level Total 06/12/2023 9.5  8.4 - 10.2 mg/dL Final    Protein Total 06/12/2023 6.8  5.8 - 7.6 gm/dL Final    Albumin Level  06/12/2023 2.8 (L)  3.4 - 4.8 g/dL Final    Globulin 06/12/2023 4.0 (H)  2.4 - 3.5 gm/dL Final    Albumin/Globulin Ratio 06/12/2023 0.7 (L)  1.1 - 2.0 ratio Final    Bilirubin Total 06/12/2023 0.2  <=1.5 mg/dL Final    Alkaline Phosphatase 06/12/2023 55  40 - 150 unit/L Final    Alanine Aminotransferase 06/12/2023 17  0 - 55 unit/L Final    Aspartate Aminotransferase 06/12/2023 17  5 - 34 unit/L Final    eGFR 06/12/2023 >60  mls/min/1.73/m2 Final    Magnesium Level 06/12/2023 1.90  1.60 - 2.60 mg/dL Final    Phosphorus Level 06/12/2023 3.3  2.3 - 4.7 mg/dL Final    WBC 06/12/2023 6.13  4.50 - 11.50 x10(3)/mcL Final    RBC 06/12/2023 3.83 (L)  4.20 - 5.40 x10(6)/mcL Final    Hgb 06/12/2023 12.0  12.0 - 16.0 g/dL Final    Hct 06/12/2023 36.4 (L)  37.0 - 47.0 % Final    MCV 06/12/2023 95.0 (H)  80.0 - 94.0 fL Final    MCH 06/12/2023 31.3 (H)  27.0 - 31.0 pg Final    MCHC 06/12/2023 33.0  33.0 - 36.0 g/dL Final    RDW 06/12/2023 12.8  11.5 - 17.0 % Final    Platelet 06/12/2023 216  130 - 400 x10(3)/mcL Final    MPV 06/12/2023 10.4  7.4 - 10.4 fL Final    Neut % 06/12/2023 64.7  % Final    Lymph % 06/12/2023 23.5  % Final    Mono % 06/12/2023 9.3  % Final    Eos % 06/12/2023 1.8  % Final    Basophil % 06/12/2023 0.5  % Final    Lymph # 06/12/2023 1.44  0.6 - 4.6 x10(3)/mcL Final    Neut # 06/12/2023 3.97  2.1 - 9.2 x10(3)/mcL Final    Mono # 06/12/2023 0.57  0.1 - 1.3 x10(3)/mcL Final    Eos # 06/12/2023 0.11  0 - 0.9 x10(3)/mcL Final    Baso # 06/12/2023 0.03  <=0.2 x10(3)/mcL Final    IG# 06/12/2023 0.01  0 - 0.04 x10(3)/mcL Final    IG% 06/12/2023 0.2  % Final    NRBC% 06/12/2023 0.0  % Final    POCT Glucose 06/11/2023 232 (H)  70 - 110 mg/dL Final    POCT Glucose 06/12/2023 180 (H)  70 - 110 mg/dL Final    POCT Glucose 06/09/2023 354 (H)  70 - 110 mg/dL Final    POCT Glucose 06/09/2023 361 (H)  70 - 110 mg/dL Final    POCT Glucose 06/12/2023 249 (H)  70 - 110 mg/dL Final     Radiology  CT head without contrast on  06/06/2023 at 3:00 p.m., IMPRESSION: No acute intracranial abnormality identified.  Findings of chronic microvascular ischemic disease.  Radiology  CT abdomen/pelvis with contrast on 6/6, IMPRESSION: No definite acute intra-abdominal or intrathoracic abnormality.  There are acute left-sided rib fractures as above.  Evaluation for additional fractures is limited by motion artifact.  Soft tissue density at the left sacrum as would be seen with decubitus ulcer is again noted.    Assessment/Plan:     87 y.o. AAF admitted on 6/9/2023    Debility   - 2/2 frequent falls with left 4th-7th rib fractures  - defer to physiatry for rehab and pain management  - PT/OT/RT following     Multiple rib fractures  - left 4-7th rib fractures  - continue                DuoNebs q.8 hours                Percocet 5 mg/325 mg q.6 hours p.r.n.                Lidoderm patch to left chest daily                Gabapentin 300 mg t.i.d.  - encourage incentive spirometer q.1 hour while awake     Proximal transverse colon adenocarcinoma  - s/p laparoscopic/robotic right hemicolectomy on 7/29/2021  - Biopsy significant for adenocarcinoma-no metastasis  - to follow up with oncology     Moderate protein calorie malnutrition  - albumin 2.8/prealbumin 18.8-trending up  - registered dietitian following  - encourage oral nutrition and hydration  - continue                Megace 400 mg b.i.d. x5 days (initiated 6/9)      History of bilateral ischemic CVA  - 6/2020  - continue                Lipitor 10 mg daily                Aspirin 325 mg daily     CAD  - s/p MI  - denies recent chest pain or discomfort  - continue                Lisinopril 20 mg daily                Lipitor 10 mg daily                Aspirin 325 mg daily  - ECG and Nitro PRN  - not on Plavix or BB  - continue cardiac diet  - to follow-up with cardiology outpatient        HTN  - at goal!!  - continue                Lisinopril 20 mg daily                Norvasc 10 mg daily                 Hydralazine 10 mg every 2 hours as needed for BP > 160/90                Labetalol 10 mg every 2 hours as needed  - Low-sodium diet      Iron deficiency anemia  - asymptomatic  - H/H stable   - continue                Folic acid 1 mg daily  - no evidence of active bleeds  - will closely monitor and transfuse if needed      History of vasovagal syncope  - s/p Linq device  - to follow up with cardiology outpatient     Left carotid stenosis  - Left ICA > 85%  - continue                Aspirin 325 mg daily                Lipitor 10 mg daily  - to follow-up outpatient with vascular surgery     Poorly controlled DM type II  - HgA1c 10.5 on 6/6/2023  - initiate     Januvia 50 mg daily (initiated 6/12)  - continue     Metformin 500 mg daily (initiated 6/11)                Levemir 20 units b.i.d.                ISS   - CBGs AC/HS     HLD  - FLP at goal!!  - continue                Lipitor 10 mg daily     Constipation  - last BM on 6/9  - initiate    MiraLax 17 g x 1 dose now  - continue  Colace 100 mg BID   Miralax 17 gm BID PRN     Unstageable sacral/left buttocks decubitus ulcer  - stable  - duo derm applied  - turn q2h  - wound care following     VTE Prophylaxis:  Lovenox 30 mg b.i.d.  COVID-19 testing:  Unknown  COVID-19 vaccination status:  Vaccinated (Moderna):  02/05/2021, 03/05/2021, 10/29/2021, and 06/04/2022     POA: Alhaji Hess (son) [phone]  Living will: No  Contacts: Alhaji Hess (son) [phone]                 Elder Hess (son) [phone]                 Raegan Stewart (sister) [phone]     CODE STATUS: Full Code  Internal Medicine (attending): Troy Guerin MD  Physiatry (consulting):  Avi Melchor MD     OUTPATIENT PROVIDERS  PCP: Anant Mejia MD  Cardiology: Henry Cifuentes MD  Vascular surgery: Kip Shah MD  General surgery: Justo Henry MD  Gastroenterology:  Kip Forbes MD     DISPOSITION:  Sleep hygiene and appetite at goal.  Last BM 6/9.  Vital signs at goal with no recorded fevers.   Cbc and CMP overall unremarkable.  Moderate glycemic control.  Initiate Januvia 50 mg daily.  Continue aggressive mobilization as tolerated.  Monitor closely.  Notify of acute changes     Jesus Sandy NP conducted independent physical examination and assisted with medical documentation.     Total time spent on this encounter including chart review and direct MD + NP 1-on-1 patient interaction: 51 minutes   Over 50% of this time was spent in counseling and coordination of care

## 2023-06-12 NOTE — PROGRESS NOTES
Dos 6/12/23  Patient seen in PT gym today  Participation and progress toward goals noted  Continues working on strength, mobility, balance and increasing endurance  Progress and problems discussed with patient and therapists  Questions answered  Chart reviewed and discussed with Dr srivastava and medicine team as well as Kyleigh Calvo, my FNP  Continue present management   Subjective  HPI: 88 yo BF with PMH of CVA x 2, MI with loop recorder, HTN, GERD, hip replacement in past, and remote hx colon cancer s/p colectomy presented to the ED at [facility] on 6/6/23 after falling at home. Patient complained of left rib pain and left hip pain. CT head showed no acute intracranial abnormality identified, and chronic microvascular ischemic disease. CT cervical spine showed no acute findings. CT of chest/abdomen/ pelvis showed acute fractures of the left 4th, 5th, 6th, and 7th ribs, scoliotic curvature of the spine with no acute fracture identified; and no fracture of right ribs identified. Hip XR showed no acute fractures. Patient was admitted to ICU for close respiratory monitoring, encouraged to use IS, started duonebs, and CPT ordered. On 6/7, PT eval completed with deficits noted. On 6/8, OT eval completed with deficits noted. Labs showed low BUN of 9.6, low Na of 133, low H&H of 11.7 & 35.7, elevated CBG of 346, and low albumin of 3.1. On Lovenox for DVT prophylaxis. On 6/9, CBG remains elevated. Patient is AAOx4.  Participating with therapy. Functional status includes minimal-moderate assist needed for bed mobility, minimal assist for transfer with RW, minimal assist for walking about 30 ft with RW, purwick catheter for toileting, moderate-max assist for lower body dressing. Patient was evaluated, accepted, and admitted to inpatient rehab to improve functional status. Transferred to [facility] on 6/9 without incident.      6/12: Seen with PT, sliding down in WC. States that she is hurting from her wound on her bottom, and trying to  "support her neck. PT changes out WC with a high back chair. Wound care asks that she be placed back in bed between session to help pressure sore heal. Tolerating muscle relaxer and asking for something more for pain. Increase frequency to TID. VSSAF.        Review of Systems  Psychiatric: Per son, pt. Recently complained of depression. Pt. Has a history of anxiety and takes medication for this. She has no substance abuse history    Depression/Anxiety: Noted Remeron 30mg qPM in Home medication. Restart. Consider Cymbalta for additional coverage and pain.      mirtazapine tablet 30 mg after dinner  ALPRAZolam tablet 0.25 mg TID PRN Anxiety  Pain: left side-ribs, LLE  gabapentin capsule 300 mg TID  acetaminophen tablet 650 mg q4h PRN mild pain  oxyCODONE-acetaminophen 5-325 mg 1 tablet q6h PRN mod pain  Bowels/Bladder: last BM 6/9 per patient    Appetite: "good"  megestroL 400 mg/10 mL (10 mL) suspension 400 mg BID     Sleep: good         Physical Exam  General: well-developed, thin/frail appearing, in no acute distress  Respiratory: equal chest rise, no SOB, no audible wheeze  Cardiovascular: regular rate and rhythm, no edema  Gastrointestinal: soft, non-tender, non-distended   Musculoskeletal: decreased ROM/strength to LLE; generalized weakness  Integumentary: no rashes or skin lesions present, right heel/sacral pressure wounds  Neurologic: cranial nerves intact, no signs of peripheral neurological deficit, motor/sensory function intact  *MD performed and documented physical examination                Labs:   Latest Reference Range & Units 06/12/23 05:18   WBC 4.50 - 11.50 x10(3)/mcL 6.13   RBC 4.20 - 5.40 x10(6)/mcL 3.83 (L)   Hemoglobin 12.0 - 16.0 g/dL 12.0   Hematocrit 37.0 - 47.0 % 36.4 (L)   MCV 80.0 - 94.0 fL 95.0 (H)   MCH 27.0 - 31.0 pg 31.3 (H)   MCHC 33.0 - 36.0 g/dL 33.0   RDW 11.5 - 17.0 % 12.8   Platelets 130 - 400 x10(3)/mcL 216   MPV 7.4 - 10.4 fL 10.4   Neut % % 64.7   LYMPH % % 23.5   Mono % % 9.3 "   Eosinophil % % 1.8   Basophil % % 0.5   Immature Granulocytes % 0.2   Neut # 2.1 - 9.2 x10(3)/mcL 3.97   Lymph # 0.6 - 4.6 x10(3)/mcL 1.44   Mono # 0.1 - 1.3 x10(3)/mcL 0.57   Eos # 0 - 0.9 x10(3)/mcL 0.11   Baso # <=0.2 x10(3)/mcL 0.03   Immature Grans (Abs) 0 - 0.04 x10(3)/mcL 0.01   nRBC % 0.0   Sodium 136 - 145 mmol/L 133 (L)   Potassium 3.5 - 5.1 mmol/L 4.5   Chloride 98 - 107 mmol/L 102   CO2 23 - 31 mmol/L 24   BUN 9.8 - 20.1 mg/dL 23.2 (H)   Creatinine 0.55 - 1.02 mg/dL 0.68   eGFR mls/min/1.73/m2 >60   Glucose 82 - 115 mg/dL 206 (H)   Calcium 8.4 - 10.2 mg/dL 9.5   Phosphorus 2.3 - 4.7 mg/dL 3.3   Magnesium 1.60 - 2.60 mg/dL 1.90   Alkaline Phosphatase 40 - 150 unit/L 55   PROTEIN TOTAL 5.8 - 7.6 gm/dL 6.8   Albumin 3.4 - 4.8 g/dL 2.8 (L)   Albumin/Globulin Ratio 1.1 - 2.0 ratio 0.7 (L)   Prealbumin 14.0 - 37.0 mg/dL 18.8   BILIRUBIN TOTAL <=1.5 mg/dL 0.2   AST 5 - 34 unit/L 17   ALT 0 - 55 unit/L 17   Globulin, Total 2.4 - 3.5 gm/dL 4.0 (H)   (L): Data is abnormally low  (H): Data is abnormally high          Assessment/Plan  Hospital   Fall   Rib fracture   Debility     Non-Hospital   Chronic back pain (Chronic)   Mixed hyperlipidemia (Chronic)   Hypertension (Chronic)   Type 2 diabetes mellitus without complication (Chronic)   Gastroesophageal reflux disease without esophagitis (Chronic)   Iron deficiency anemia secondary to inadequate dietary iron intake (Chronic)   CVA (cerebral vascular accident) (Chronic)   Loss of appetite (Chronic)   Vitamin D deficiency (Chronic)   Frailty (Chronic)   Arteriosclerosis of coronary artery   At risk of pressure injury of skin   Hiatal hernia   Scoliosis   Stenosis of carotid artery   Severe malnutrition   Colon cancer       Wounds: right heel/sacral pressure wounds  Precautions: fall risk  Bracing/AD: RW  Swallowing: Diabetic Diet  Function: Tolerating therapy. Continue PT/OT  VTE Prophylaxis:   enoxaparin injection 30 mg SubQ q12h  Code Status: FULL CODE    Discharge: Lives alone in [place] in a single-story home with 1 threshold step to enter the residence. Completed her master's degree. She has no  history. 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 She had a volunteer come in to help her around the house and for errands every now and then. Per son, the family is arranging for her care after she leaves rehab.  Children: (2). Date pending.                     Annette Calvo NP, conducted additional independent physical examination and assisted with medical documentation.

## 2023-06-13 LAB
POCT GLUCOSE: 137 MG/DL (ref 70–110)
POCT GLUCOSE: 215 MG/DL (ref 70–110)

## 2023-06-13 PROCEDURE — 97116 GAIT TRAINING THERAPY: CPT | Mod: CQ

## 2023-06-13 PROCEDURE — 97530 THERAPEUTIC ACTIVITIES: CPT | Mod: CQ

## 2023-06-13 PROCEDURE — 97535 SELF CARE MNGMENT TRAINING: CPT

## 2023-06-13 PROCEDURE — 97110 THERAPEUTIC EXERCISES: CPT | Mod: CQ

## 2023-06-13 PROCEDURE — 99900035 HC TECH TIME PER 15 MIN (STAT)

## 2023-06-13 PROCEDURE — 25000003 PHARM REV CODE 250: Performed by: NURSE PRACTITIONER

## 2023-06-13 PROCEDURE — 99233 SBSQ HOSP IP/OBS HIGH 50: CPT | Mod: ,,, | Performed by: NURSE PRACTITIONER

## 2023-06-13 PROCEDURE — 11800000 HC REHAB PRIVATE ROOM

## 2023-06-13 PROCEDURE — 97530 THERAPEUTIC ACTIVITIES: CPT

## 2023-06-13 PROCEDURE — 94761 N-INVAS EAR/PLS OXIMETRY MLT: CPT

## 2023-06-13 PROCEDURE — S0179 MEGESTROL 20 MG: HCPCS | Performed by: NURSE PRACTITIONER

## 2023-06-13 PROCEDURE — 94799 UNLISTED PULMONARY SVC/PX: CPT

## 2023-06-13 PROCEDURE — 63600175 PHARM REV CODE 636 W HCPCS: Performed by: NURSE PRACTITIONER

## 2023-06-13 PROCEDURE — 99233 PR SUBSEQUENT HOSPITAL CARE,LEVL III: ICD-10-PCS | Mod: ,,, | Performed by: NURSE PRACTITIONER

## 2023-06-13 RX ORDER — METHOCARBAMOL 500 MG/1
500 TABLET, FILM COATED ORAL EVERY 6 HOURS
Status: DISCONTINUED | OUTPATIENT
Start: 2023-06-13 | End: 2023-06-23 | Stop reason: HOSPADM

## 2023-06-13 RX ORDER — DULOXETIN HYDROCHLORIDE 30 MG/1
30 CAPSULE, DELAYED RELEASE ORAL DAILY
Status: DISCONTINUED | OUTPATIENT
Start: 2023-06-14 | End: 2023-06-23 | Stop reason: HOSPADM

## 2023-06-13 RX ORDER — OXYCODONE HYDROCHLORIDE 5 MG/1
5 TABLET ORAL EVERY 6 HOURS PRN
Status: DISCONTINUED | OUTPATIENT
Start: 2023-06-13 | End: 2023-06-23 | Stop reason: HOSPADM

## 2023-06-13 RX ORDER — ACETAMINOPHEN 325 MG/1
650 TABLET ORAL EVERY 6 HOURS
Status: DISCONTINUED | OUTPATIENT
Start: 2023-06-13 | End: 2023-06-23 | Stop reason: HOSPADM

## 2023-06-13 RX ORDER — GABAPENTIN 300 MG/1
300 CAPSULE ORAL EVERY 6 HOURS
Status: DISCONTINUED | OUTPATIENT
Start: 2023-06-13 | End: 2023-06-16

## 2023-06-13 RX ORDER — TRAMADOL HYDROCHLORIDE 50 MG/1
50 TABLET ORAL EVERY 6 HOURS PRN
Status: DISCONTINUED | OUTPATIENT
Start: 2023-06-13 | End: 2023-06-16

## 2023-06-13 RX ADMIN — MEGESTROL ACETATE 400 MG: 40 SUSPENSION ORAL at 08:06

## 2023-06-13 RX ADMIN — METHOCARBAMOL 500 MG: 500 TABLET ORAL at 12:06

## 2023-06-13 RX ADMIN — ENOXAPARIN SODIUM 30 MG: 30 INJECTION SUBCUTANEOUS at 08:06

## 2023-06-13 RX ADMIN — GABAPENTIN 300 MG: 300 CAPSULE ORAL at 05:06

## 2023-06-13 RX ADMIN — ATORVASTATIN CALCIUM 10 MG: 10 TABLET, FILM COATED ORAL at 08:06

## 2023-06-13 RX ADMIN — ACETAMINOPHEN 325MG 650 MG: 325 TABLET ORAL at 12:06

## 2023-06-13 RX ADMIN — LISINOPRIL 20 MG: 10 TABLET ORAL at 08:06

## 2023-06-13 RX ADMIN — ASPIRIN 325 MG: 325 TABLET, FILM COATED ORAL at 08:06

## 2023-06-13 RX ADMIN — OXYCODONE HYDROCHLORIDE 5 MG: 5 TABLET ORAL at 08:06

## 2023-06-13 RX ADMIN — DOCUSATE SODIUM 100 MG: 100 CAPSULE, LIQUID FILLED ORAL at 08:06

## 2023-06-13 RX ADMIN — ACETAMINOPHEN 650 MG: 325 TABLET ORAL at 05:06

## 2023-06-13 RX ADMIN — LIDOCAINE 5% 1 PATCH: 700 PATCH TOPICAL at 05:06

## 2023-06-13 RX ADMIN — INSULIN ASPART 2 UNITS: 100 INJECTION, SOLUTION INTRAVENOUS; SUBCUTANEOUS at 05:06

## 2023-06-13 RX ADMIN — INSULIN DETEMIR 20 UNITS: 100 INJECTION, SOLUTION SUBCUTANEOUS at 08:06

## 2023-06-13 RX ADMIN — INSULIN ASPART 4 UNITS: 100 INJECTION, SOLUTION INTRAVENOUS; SUBCUTANEOUS at 12:06

## 2023-06-13 RX ADMIN — SITAGLIPTIN 50 MG: 50 TABLET, FILM COATED ORAL at 08:06

## 2023-06-13 RX ADMIN — MEGESTROL ACETATE 400 MG: 40 SUSPENSION ORAL at 09:06

## 2023-06-13 RX ADMIN — FOLIC ACID 1000 MCG: 1 TABLET ORAL at 08:06

## 2023-06-13 RX ADMIN — METHOCARBAMOL 500 MG: 500 TABLET ORAL at 05:06

## 2023-06-13 RX ADMIN — INSULIN DETEMIR 20 UNITS: 100 INJECTION, SOLUTION SUBCUTANEOUS at 09:06

## 2023-06-13 RX ADMIN — AMLODIPINE BESYLATE 10 MG: 5 TABLET ORAL at 08:06

## 2023-06-13 RX ADMIN — ACETAMINOPHEN 325MG 650 MG: 325 TABLET ORAL at 05:06

## 2023-06-13 RX ADMIN — MIRTAZAPINE 30 MG: 15 TABLET, FILM COATED ORAL at 05:06

## 2023-06-13 RX ADMIN — ENOXAPARIN SODIUM 30 MG: 30 INJECTION SUBCUTANEOUS at 09:06

## 2023-06-13 RX ADMIN — GABAPENTIN 300 MG: 300 CAPSULE ORAL at 12:06

## 2023-06-13 RX ADMIN — METFORMIN HYDROCHLORIDE 500 MG: 500 TABLET, FILM COATED ORAL at 05:06

## 2023-06-13 RX ADMIN — INSULIN ASPART 4 UNITS: 100 INJECTION, SOLUTION INTRAVENOUS; SUBCUTANEOUS at 09:06

## 2023-06-13 NOTE — PROGRESS NOTES
[facility] ([facility])  Rehab Progress Note    Patient Name: Lita Hess  MRN: [id number]  Age: 87 y.o. Sex: female  : 1935  Hospital Length of Stay: 4 days  Date of Service: 2023   Chief Complaint: Debility 2/2 frequent falls with left 4th-7th rib fractures    Subjective:     Basic Information  Admit Information: 87-year-old  female presented to [facility] ED on 2023 complaining of left side pain after accidentally falling backwards over walker.  PMH significant for CVA x2, CAD s/p MI, HTN, carotid artery stenosis, remote history of colon cancer s/p colectomy.  Workup significant for left 4th through 7th rib fractures.  Limited mobility 2/2 pain.  Tolerated transfer to [facility] inpatient rehab unit on  without incident.  Today's Information: No acute events overnight.  Sitting in chair working with therapy.  Reports good sleep and appetite.  Last BM .  Vital signs at goal with no recent recorded fevers.  No labs or imaging today.  CBG is moderately controlled.    Review of patient's allergies indicates:  No Known Allergies     Current Facility-Administered Medications:     acetaminophen tablet 650 mg, 650 mg, Oral, Q4H PRN, Vishal MAN Du, FNP, 650 mg at 23 204    acetaminophen tablet 650 mg, 650 mg, Oral, TID, Annette Devrieste, FNP, 650 mg at 23 0552    ALPRAZolam tablet 0.25 mg, 0.25 mg, Oral, TID PRN, Vishal Parisiehart, FNP, 0.25 mg at 23 1209    amLODIPine tablet 10 mg, 10 mg, Oral, Daily, Vishal A Du, FNP, 10 mg at 23 0833    aspirin tablet 325 mg, 325 mg, Oral, Daily, Vishal A Du, FNP, 325 mg at 23 0833    atorvastatin tablet 10 mg, 10 mg, Oral, Daily, Vishal A Du, FNP, 10 mg at 23 0832    benzonatate capsule 100 mg, 100 mg, Oral, TID PRN, Vishal Stephensonart, FNP    bisacodyL suppository 10 mg, 10 mg, Rectal, Daily PRN, Vishal Sandy, ALIRIO    dextrose 10% bolus 125 mL 125  mL, 12.5 g, Intravenous, PRN, Vishal MAN Du, FNP    dextrose 10% bolus 250 mL 250 mL, 25 g, Intravenous, PRN, Vishal A Du, FNP    docusate sodium capsule 100 mg, 100 mg, Oral, BID, Vishal A Du, FNP, 100 mg at 06/13/23 0833    enoxaparin injection 30 mg, 30 mg, Subcutaneous, Q12H (treatment, non-standard time), Vishal A Du, FNP, 30 mg at 06/13/23 0838    folic acid tablet 1,000 mcg, 1,000 mcg, Oral, Daily, Vishal A Du, FNP, 1,000 mcg at 06/13/23 0832    gabapentin capsule 300 mg, 300 mg, Oral, TID, Vishal A Du, FNP, 300 mg at 06/13/23 0552    glucagon (human recombinant) injection 1 mg, 1 mg, Intramuscular, PRN, Vishal A Du, FNP    glucose chewable tablet 16 g, 16 g, Oral, PRN, Vishal A Du, FNP    glucose chewable tablet 24 g, 24 g, Oral, PRN, Vishal A Du, FNP    hydrALAZINE injection 10 mg, 10 mg, Intravenous, Q4H PRN, Vishal A Du, FNP    hydrOXYzine pamoate capsule 50 mg, 50 mg, Oral, Nightly PRN, Vishal A Du, FNP    insulin aspart U-100 injection 1-10 Units, 1-10 Units, Subcutaneous, QID (AC + HS) PRN, Vishal A Du, FNP, 3 Units at 06/12/23 2105    insulin detemir U-100 injection 20 Units, 20 Units, Subcutaneous, BID, Vishal A Du, FNP, 20 Units at 06/13/23 0840    labetalol 20 mg/4 mL (5 mg/mL) IV syring, 10 mg, Intravenous, Q4H PRN, Vishal A Du, FNP    LIDOcaine 5 % patch 1 patch, 1 patch, Transdermal, Q24H, Annette Calvo, FNP, 1 patch at 06/13/23 0552    lisinopriL tablet 20 mg, 20 mg, Oral, Daily, Vishal Sandy, FNP, 20 mg at 06/13/23 0832    megestroL 400 mg/10 mL (10 mL) suspension 400 mg, 400 mg, Oral, BID, Vishal Sandy, FNP, 400 mg at 06/13/23 0833    metFORMIN tablet 500 mg, 500 mg, Oral, Daily before evening meal, Annette Devrieste, FNP, 500 mg at 06/12/23 1712    methocarbamoL tablet 500 mg, 500 mg, Oral, TID, Annette Devrieste, FNP, 500 mg at 06/13/23 0552    metoprolol injection 10  "mg, 10 mg, Intravenous, Q2H PRN, Vishal Parisiehart, FNP    mirtazapine tablet 30 mg, 30 mg, Oral, After dinner, Annette Devrieste, FNP, 30 mg at 06/12/23 1712    nitroGLYCERIN SL tablet 0.4 mg, 0.4 mg, Sublingual, Q5 Min PRN, Vishal MAN Du, FNP    ondansetron disintegrating tablet 4 mg, 4 mg, Oral, Q6H PRN, Vishal A Du, FNP    oxyCODONE immediate release tablet 5 mg, 5 mg, Oral, Q6H PRN, Annette Devrieste, FNP, 5 mg at 06/13/23 0838    polyethylene glycol packet 17 g, 17 g, Oral, BID PRN, Vishal MAN Du, FNP    promethazine tablet 25 mg, 25 mg, Oral, Q6H PRN, Vishal MAN Du, FNP    SITagliptin phosphate tablet 50 mg, 50 mg, Oral, Daily, Vishal MAN Du, FNP, 50 mg at 06/13/23 0833     Review of Systems   Complete 12-point review of symptoms negative except for what's mentioned in HPI     Objective:     /61   Pulse 81   Temp 98 °F (36.7 °C) (Oral)   Resp 18   Ht 4' 11.02" (1.499 m)   Wt 48.1 kg (106 lb 0.7 oz)   SpO2 95%   Breastfeeding No   BMI 21.41 kg/m²        Physical Exam  Constitutional:       Appearance: Normal appearance.   HENT:      Head: Normocephalic.      Mouth/Throat:      Mouth: Mucous membranes are moist.   Eyes:      Pupils: Pupils are equal, round, and reactive to light.   Cardiovascular:      Rate and Rhythm: Normal rate and regular rhythm.      Heart sounds: Normal heart sounds.   Pulmonary:      Effort: Pulmonary effort is normal.      Breath sounds: Normal breath sounds.   Abdominal:      General: Bowel sounds are normal.      Palpations: Abdomen is soft.   Musculoskeletal:      Cervical back: Neck supple.      Comments: muscle atrophy   Skin:     General: Skin is warm and dry.   Neurological:      Mental Status: She is alert and oriented to person, place, and time.      Motor: Weakness present.   Psychiatric:         Mood and Affect: Mood normal.         Behavior: Behavior normal.         Thought Content: Thought content normal.         Judgment: " Judgment normal.   *MD performed and documented physical examination       Lines/Drains/Airways       None                   Labs  Recent Results (from the past 24 hour(s))   POCT glucose    Collection Time: 06/12/23 11:15 AM   Result Value Ref Range    POCT Glucose 249 (H) 70 - 110 mg/dL   POCT glucose    Collection Time: 06/12/23  4:50 PM   Result Value Ref Range    POCT Glucose 256 (H) 70 - 110 mg/dL   POCT glucose    Collection Time: 06/12/23  8:47 PM   Result Value Ref Range    POCT Glucose 254 (H) 70 - 110 mg/dL       Radiology  CT head without contrast on 06/06/2023 at 3:00 p.m., IMPRESSION: No acute intracranial abnormality identified.  Findings of chronic microvascular ischemic disease.  Radiology  CT abdomen/pelvis with contrast on 6/6, IMPRESSION: No definite acute intra-abdominal or intrathoracic abnormality.  There are acute left-sided rib fractures as above.  Evaluation for additional fractures is limited by motion artifact.  Soft tissue density at the left sacrum as would be seen with decubitus ulcer is again noted.    Assessment/Plan:     87 y.o. AAF admitted on 6/9/2023    Debility   - 2/2 frequent falls with left 4th-7th rib fractures  - defer to physiatry for rehab and pain management  - PT/OT/RT following     Multiple rib fractures  - left 4-7th rib fractures  - continue                DuoNebs q.8 hours                Percocet 5 mg/325 mg q.6 hours p.r.n.                Lidoderm patch to left chest daily                Gabapentin 300 mg t.i.d.  - encourage incentive spirometer q.1 hour while awake     Proximal transverse colon adenocarcinoma  - s/p laparoscopic/robotic right hemicolectomy on 7/29/2021  - Biopsy significant for adenocarcinoma-no metastasis  - to follow up with oncology     Moderate protein calorie malnutrition  - albumin 2.8/prealbumin 18.8-trending up  - registered dietitian following  - encourage oral nutrition and hydration  - continue                Megace 400 mg b.i.d. x5  days (initiated 6/9)      History of bilateral ischemic CVA  - 6/2020  - continue                Lipitor 10 mg daily                Aspirin 325 mg daily     CAD  - s/p MI  - denies recent chest pain or discomfort  - continue                Lisinopril 20 mg daily                Lipitor 10 mg daily                Aspirin 325 mg daily  - ECG and Nitro PRN  - not on Plavix or BB  - continue cardiac diet  - to follow-up with cardiology outpatient        HTN  - at goal!!  - continue                Lisinopril 20 mg daily                Norvasc 10 mg daily                Hydralazine 10 mg every 2 hours as needed for BP > 160/90                Labetalol 10 mg every 2 hours as needed  - Low-sodium diet      Iron deficiency anemia  - asymptomatic  - H/H stable   - continue                Folic acid 1 mg daily  - no evidence of active bleeds  - will closely monitor and transfuse if needed      History of vasovagal syncope  - s/p Linq device  - to follow up with cardiology outpatient     Left carotid stenosis  - Left ICA > 85%  - continue                Aspirin 325 mg daily                Lipitor 10 mg daily  - to follow-up outpatient with vascular surgery     Poorly controlled DM type II  - HgA1c 10.5 on 6/6/2023  - continue   Januvia 50 mg daily (initiated 6/12)  Metformin 500 mg daily (initiated 6/11)                Levemir 20 units b.i.d.                ISS   - CBGs AC/HS     HLD  - FLP at goal!!  - continue                Lipitor 10 mg daily     Constipation  - stable  - continue  Colace 100 mg BID   Miralax 17 gm BID PRN     Unstageable sacral/left buttocks decubitus ulcer  - stable  - duo derm applied  - turn q2h  - wound care following     VTE Prophylaxis:  Lovenox 30 mg b.i.d.  COVID-19 testing:  Unknown  COVID-19 vaccination status:  Vaccinated (Moderna):  02/05/2021, 03/05/2021, 10/29/2021, and 06/04/2022     POA: Alhaji Hess (son) [phone]  Living will: No  Contacts: Alhaji Hess (son) [phone]                  Elder Hess (son) [phone]                 Raegan Stewart (sister) [phone]     CODE STATUS: Full Code  Internal Medicine (attending): Troy Guerin MD  Physiatry (consulting):  Avi Melchor MD     OUTPATIENT PROVIDERS  PCP: Anant Mejia MD  Cardiology: Henry Cifuentes MD  Vascular surgery: Kip Shah MD  General surgery: Justo Henry MD  Gastroenterology:  Kip Forbes MD     DISPOSITION:  Vital signs at goal.  No labs today.  CBG is moderately controlled.  Monitor closely.  Added Januvia on 06/12.  Bowel management, sleep hygiene, and appetite at goal.  Continues to progress well with therapies.  Continue aggressive mobilization as tolerated.  Monitor closely.  Notify of acute changes.  Staffing Today documented below.    Staffing 6/13: Incontinent of bowel and bladder. Good appetite. RT and PT: overall mod assist. OT: overall mod assist. Needs more time to increase endurance. Projected discharge 6/20.     Kaity Arndt NP conducted independent physical examination and assisted with medical documentation.     Total time spent on this encounter including chart review and direct MD + NP 1-on-1 patient interaction: 51 minutes   Over 50% of this time was spent in counseling and coordination of care

## 2023-06-13 NOTE — PT/OT/SLP PROGRESS
Physical Therapy Inpatient Rehab Treatment    Patient Name:  Lita Hess   MRN:  [id number]    Recommendations:     Discharge Recommendations:  nursing facility, skilled   Discharge Equipment Recommendations: bedside commode, walker, rolling, wheelchair   Barriers to discharge: Decreased caregiver support    Assessment:     Lita Hess is a 87 y.o. female admitted with a medical diagnosis of Debility.  She presents with the following impairments/functional limitations:  weakness, impaired endurance, impaired self care skills, impaired functional mobility, gait instability, impaired balance, decreased lower extremity function, pain, decreased safety awareness, decreased ROM, impaired muscle length .    Rehab Diagnosis:  debility     General Precautions: Standard, fall     Orthopedic Precautions:N/A, Full weight bearing     Braces: N/A    Rehab Prognosis: Fair; patient would benefit from acute skilled PT services to address these deficits and reach maximum level of function.      History:     Past Medical History:   Diagnosis Date    Chronic back pain 10/26/2022    Colon cancer     CVA (cerebral vascular accident)     Gastroesophageal reflux disease without esophagitis 10/26/2022    Hypertension     Iron deficiency anemia secondary to inadequate dietary iron intake 10/26/2022    Loss of appetite     Mixed hyperlipidemia 10/26/2022    Stroke     Type 2 diabetes mellitus without complication 10/26/2022    Vitamin D deficiency        Past Surgical History:   Procedure Laterality Date    COLECTOMY      COLONOSCOPY      ESOPHAGOGASTRODUODENOSCOPY      gastro biopsy      HIP REPLACEMENT ARTHROPLASTY      SCLEROTHERAPY WITH ULTRASOUND GUIDANCE      TRANSESOPHAGEAL ECHOCARDIOGRAPHY         Subjective     Chief Complaint: sleepy     Respiratory Status: Room air    Patients cultural, spiritual, Tenriism conflicts given the current situation: no      Objective:     Communicated with nursing  prior to session.  Patient found up  in chair with Other (comments) (in wc seen after Pt TX)  upon PT entry to room.    Pt is Oriented x3 and  falling asleep when sitting still in wc  and [employer].    Functional Mobility:   Transfers:     Sit to Stand: touching a with much increased time for decrease assist 2 sets 5times  with rolling walker  Gait: Pt ambulates 80ft then 60ft much increased time slow rachel with noted decrease distance from yesterday tx  with RW with sba with increased time occasional vc for proximity within rw and encouragement to cont. Pt fatigued quickly today .     Therapeutic Activities and Exercises:  Pt performed seated HEP with 1 1/2 # wts BLE one set 15reps increased time required vc to stay awake   In pm tx time pt performed supine HEP 2 sets 15reps with min a with BLE slr with rest breaks and vc/tc for cont./stay awake with cold pack to left rib area to decrease pain     Activity Tolerance: Fair    Patient left up in chair with call button in reach, chair alarm on, and wc seat belt on pt yahaira tx noted decrease tolerance today fatigued quickly pt very sleepy . Pt in wc awaiting OT tx  .    Education provided: transfer training, gait training, balance training, safety awareness, assistive device, strengthening exercises, and fall prevention    Expected compliance: High compliance    GOALS:   Multidisciplinary Problems       Physical Therapy Goals          Problem: Physical Therapy    Goal Priority Disciplines Outcome Goal Variances Interventions   Physical Therapy Goal     PT, PT/OT Ongoing, Progressing     Description: Bed Mobility:  Roll L and R Philip  Sit to supine will be Philip  Supine to sit will be Philip    Transfers:  Sit to stand supervision  Bed to Chair t/f will be supervision  Car t/f will be supervision    Mobility:  Pt will ambulate 150' CGA with RW  Pt will ambulate 15' CGA with RW over uneven terrain  Pt will climb one step CGA with RW                        Plan:     During this hospitalization, patient to be  seen 5 x/week to address the identified rehab impairments via gait training, therapeutic activities, therapeutic exercises, neuromuscular re-education and progress toward the following goals:    Plan of Care Expires:  06/16/23  PT Next Visit Date:    Plan of Care reviewed with: patient    Additional Information:         Time Tracking:     Therapy Time  PT Received On: 06/13/23  PT Start Time: 0930  PT Stop Time: 1030  PT Total Time (min): 60 min + 30 min = 90min total  ( pt seen [id number] and [id number])  PT Individual: 60min + 30min = 90min total tx time   Missed Time:    Time Missed due to:      Billable Minutes: Gait Training 30min, Therapeutic Activity 15min, and Therapeutic Exercise 45min    06/13/2023

## 2023-06-13 NOTE — PROGRESS NOTES
Dos 6/13/23  Patient seen and evaluated in room today.  Therapy and progress discussed with patient  Reviewed present barriers to progress  Reviewed chart  Discussed with [facility] medicine team, nursing staff as well as my NP, Kyleigh Calvo  Agree with present POC   Subjective  HPI: 88 yo BF with PMH of CVA x 2, MI with loop recorder, HTN, GERD, hip replacement in past, and remote hx colon cancer s/p colectomy presented to the ED at [facility] on 6/6/23 after falling at home. Patient complained of left rib pain and left hip pain. CT head showed no acute intracranial abnormality identified, and chronic microvascular ischemic disease. CT cervical spine showed no acute findings. CT of chest/abdomen/ pelvis showed acute fractures of the left 4th, 5th, 6th, and 7th ribs, scoliotic curvature of the spine with no acute fracture identified; and no fracture of right ribs identified. Hip XR showed no acute fractures. Patient was admitted to ICU for close respiratory monitoring, encouraged to use IS, started duonebs, and CPT ordered. On 6/7, PT eval completed with deficits noted. On 6/8, OT eval completed with deficits noted. Labs showed low BUN of 9.6, low Na of 133, low H&H of 11.7 & 35.7, elevated CBG of 346, and low albumin of 3.1. On Lovenox for DVT prophylaxis. On 6/9, CBG remains elevated. Patient is AAOx4.  Participating with therapy. Functional status includes minimal-moderate assist needed for bed mobility, minimal assist for transfer with RW, minimal assist for walking about 30 ft with RW, purwick catheter for toileting, moderate-max assist for lower body dressing. Patient was evaluated, accepted, and admitted to inpatient rehab to improve functional status. Transferred to [facility] on 6/9 without incident.      6/13: Seen with PT/RT, standing w/RW and playing washer toss. Complaints of pain. Seen again with PT, sitting in WC and falling asleep. States that she had a pain pill and it is making her sleepy. Reports good  "sleep overnight although she was woken up early. Tells me that she typically likes to sleep later in the mornings. Initiate Tramadol for moderate pain and change Oxycodone to PRN severe pain.  Increase scheduled medications for better control. Participating in therapy. VSSAF.        Review of Systems  Psychiatric: Per son, pt. Recently complained of depression. Pt. Has a history of anxiety and takes medication for this. She has no substance abuse history    Depression/Anxiety: Noted Remeron 30mg qPM in Home medication. Restart. Consider Cymbalta for additional coverage and pain.      mirtazapine tablet 30 mg after dinner  ALPRAZolam tablet 0.25 mg TID PRN Anxiety  DULoxetine DR capsule 30 mg qd, start 6/14  Pain: left side-ribs, LLE  DULoxetine DR capsule 30 mg qd, start 6/14  gabapentin capsule 300 mg TID. Increase q6h  acetaminophen tablet 650 mg TID. q6h  methocarbamoL tablet 500 mg TID. q6h  traMADoL tablet 50 mg q6h PRN mod pain  oxyCODONE 5 mg 1 tablet q6h PRN severe pain  Bowels/Bladder: last BM 6/13  Appetite: "good"  megestroL 400 mg/10 mL (10 mL) suspension 400 mg BID     Sleep: good         Physical Exam  General: well-developed, thin/frail appearing, in no acute distress  Respiratory: equal chest rise, no SOB, no audible wheeze  Cardiovascular: regular rate and rhythm, no edema  Gastrointestinal: soft, non-tender, non-distended   Musculoskeletal: decreased ROM/strength to LLE; generalized weakness  Integumentary: no rashes or skin lesions present, right heel/sacral pressure wounds  Neurologic: cranial nerves intact, no signs of peripheral neurological deficit, motor/sensory function intact  *MD performed and documented physical examination                  Assessment/Plan  Hospital   Fall   Rib fracture   Debility     Non-Hospital   Chronic back pain (Chronic)   Mixed hyperlipidemia (Chronic)   Hypertension (Chronic)   Type 2 diabetes mellitus without complication (Chronic)   Gastroesophageal reflux " disease without esophagitis (Chronic)   Iron deficiency anemia secondary to inadequate dietary iron intake (Chronic)   CVA (cerebral vascular accident) (Chronic)   Loss of appetite (Chronic)   Vitamin D deficiency (Chronic)   Frailty (Chronic)   Arteriosclerosis of coronary artery   At risk of pressure injury of skin   Hiatal hernia   Scoliosis   Stenosis of carotid artery   Severe malnutrition   Colon cancer       Wounds: right heel/sacral pressure wounds  Precautions: fall risk  Bracing/AD: RW  Swallowing: Diabetic Diet  Function: Tolerating therapy. Continue PT/OT  VTE Prophylaxis:   enoxaparin injection 30 mg SubQ q12h  Code Status: FULL CODE   Discharge: Lives alone in [place] in a single-story home with 1 threshold step to enter the residence. Completed her master's degree. She has no  history. 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 She had a volunteer come in to help her around the house and for errands every now and then. Per son, the family is arranging for her care after she leaves rehab.  Children: (2). Date 6/20 Tuesday.                     Annette Calvo NP, conducted additional independent physical examination and assisted with medical documentation.

## 2023-06-13 NOTE — PT/OT/SLP PROGRESS
Physical Therapy Inpatient Rehab Treatment    Patient Name:  Lita Hess   MRN:  [id number]    Recommendations:     Discharge Recommendations:  nursing facility, skilled   Discharge Equipment Recommendations: bedside commode, walker, rolling, wheelchair   Barriers to discharge:  impaired functional mobility, pain    Assessment:     Lita Hess is a 87 y.o. female admitted with a medical diagnosis of Debility.  She presents with the following impairments/functional limitations:  weakness, impaired endurance, impaired self care skills, impaired functional mobility, decreased lower extremity function, gait instability, decreased safety awareness, pain.    Rehab Diagnosis:      General Precautions: Standard, fall     Orthopedic Precautions:N/A, Full weight bearing     Braces: N/A    Rehab Prognosis: Poor; patient would benefit from acute skilled PT services to address these deficits and reach maximum level of function.      History:     Past Medical History:   Diagnosis Date    Chronic back pain 10/26/2022    Colon cancer     CVA (cerebral vascular accident)     Gastroesophageal reflux disease without esophagitis 10/26/2022    Hypertension     Iron deficiency anemia secondary to inadequate dietary iron intake 10/26/2022    Loss of appetite     Mixed hyperlipidemia 10/26/2022    Stroke     Type 2 diabetes mellitus without complication 10/26/2022    Vitamin D deficiency        Past Surgical History:   Procedure Laterality Date    COLECTOMY      COLONOSCOPY      ESOPHAGOGASTRODUODENOSCOPY      gastro biopsy      HIP REPLACEMENT ARTHROPLASTY      SCLEROTHERAPY WITH ULTRASOUND GUIDANCE      TRANSESOPHAGEAL ECHOCARDIOGRAPHY         Subjective     Chief Complaint: L flank pain, fatigue    Respiratory Status: Room air    Patients cultural, spiritual, Cheondoism conflicts given the current situation: no      Objective:     Communicated with pt prior to session.  Patient found supine with    upon PT entry to room.    Pt is Oriented  x3 and Alert and Cooperative.    Vitals   8/10 pain reported in L flank throughout session      Functional Mobility:      Current   Status  Discharge   Goal   Functional Area: Care Score:    Roll Left and Right   Independent   Sit to Lying   Set-up/clean-up   Lying to Sitting on Side of Bed 2  Max A required despite PT providing significant amount of increased time to complete, raising HOB, and providing education/VC for technique.  Independent   Sit to Stand 3  Min-mod A needed with use of RW d/t increased pain. Increased time required for completion Independent   Chair/Bed-to-Chair Transfer 3 Independent   Car Transfer       Walk 10 Feet   Independent   Walk 50 Feet with Two Turns   Independent   Walk 150 Feet       Walk 10 Feet Uneven Surface       1 Step (Curb)       4 Steps       12 Steps       Picking Up Object       Wheel 50 Feet with Two Turns       Wheel 150 Feet           Therapeutic Activities and Exercises:  Hygiene d/t incontinent bladder void during bed mobility. PT assisted CNA with lower body dressing and hygiene as appropriate. Increased time required.    Co-treat with CTRS Ramandeep: washer toss activity. Performed in sitting and standing positions with use of alternating UE. Pt required to reach for washers at varying heights and distances on both ipsilateral and contralateral sides, facilitating improved UE strengthening and trunk control.    Activity Tolerance: Poor    Patient left up in chair with  PTA Leesa present.    Education provided: roles and goals of PT/PTA, transfer training, bed mob, safety awareness, body mechanics, assistive device, strengthening exercises, and fall prevention    Expected compliance: Low compliance    Plan:     During this hospitalization, patient to be seen 5 x/week to address the identified rehab impairments via gait training, therapeutic activities, therapeutic exercises, neuromuscular re-education and progress toward the following goals:    GOALS:   Multidisciplinary  Problems       Physical Therapy Goals          Problem: Physical Therapy    Goal Priority Disciplines Outcome Goal Variances Interventions   Physical Therapy Goal     PT, PT/OT Ongoing, Progressing     Description: Bed Mobility:  Roll L and R Philip  Sit to supine will be Philip  Supine to sit will be Philip    Transfers:  Sit to stand supervision  Bed to Chair t/f will be supervision  Car t/f will be supervision    Mobility:  Pt will ambulate 150' CGA with RW  Pt will ambulate 15' CGA with RW over uneven terrain  Pt will climb one step CGA with RW                        Plan of Care Expires:  06/16/23  PT Next Visit Date:    Plan of Care reviewed with: patient    Additional Information:     Co-treat with BRY Sabillon [phone]    Time Tracking:     Therapy Time  PT Start Time: 0830  PT Stop Time: 0930  PT Total Time (min): 60 min   PT Individual: 60  Missed Time:    Time Missed due to:      Billable Minutes: Therapeutic Activity 30 min and Therapeutic Exercise 30 min    06/13/2023

## 2023-06-13 NOTE — PLAN OF CARE
Met with pt then called son Dyllan ([phone]) to discuss team conference updates and plans for discharge on 6/20.  Dyllan said that he expected pt's rehab stay to be a lot longer and that she would be able to return to her home and walk to the bathroom on her own, etc.  Explained our concerns about pt living alone at this time.  He clarified that pt's Faith friend (on her own/unpaid) has been checking on pt routinely but that he does not expect that to continue. They have no other caregivers lined up and do not sound likely to be able to afford to hire caregivers in the home.  He reminded me that pt went to SNF twice following her strokes.  She went to [facility] around 3-4 years ago and liked everything but the food there.  Dyllan is worried about [facility]'s recent reviews (which are abysmal according to him) so he is open to other facilities but will let me know tomorrow once a final decision has been made.      Also of note is that the patient had Meals on Wheels in the past and shares with me that she would not give those meals to a dog.      Also of note is that Dyllan reports that pt's other son, Jd, is not involved in coordination of care of patient, so Dyllan is doing so on his own.

## 2023-06-13 NOTE — PROGRESS NOTES
06/13/23 0901   Rec Therapy Time Calculation   Date of Treatment 06/13/23   Rec Start Time 0901   Rec Stop Time 0930   Rec Total Time (min) 29 min   Time   Treatment time 2 units   Charges   $Therapeutic Activity 1unit   Precautions   General Precautions fall   Orthopedic Precautions  N/A   Braces N/A   Pain/Comfort   Pain Rating 1 no pain   OTHER   Rehab identified problem list/impairments weakness;impaired endurance;impaired functional mobility;gait instability;impaired cognition;decreased coordination;decreased upper extremity function;decreased lower extremity function;decreased safety awareness   Values/Beliefs/Spiritual Care   Spiritual, Cultural Beliefs, Denominational Practices, Values that Affect Care no   Overall Level of Functioning   Activity Tolerance Standby Assist   Dynamic Sitting Balance/Reaching Min A   Dynamic Standing Balance/Reaching Min A   Right UE Coodination/Dexterity Min A   Left UE Coordination/Dexterity Standby Assist   Problem Solving/Sequencing Skills Min A   Memory Recall Min A   R/L Neglect/Inattention Does not occur   Attention Span Standby Assist   Social Interaction Standby Assist   Recreational Therapy Short Term Goals   Short Term Goal 1 Progression Progressing   Short Term Goal 2 Progression Progressing   Short Term Goal 3 Progression Progressing   Recreational Therapy Long Term Goals   Long Term Goal 1 Progression Progressing   Long Term Goal 2 Progression Progressing   Plan   Patient to be seen Daily   Planned Duration 2 weeks   Treatments Planned Cognitive training;Coordination;Energy conservation training;Fine motor;Safety education   Treatment plan/goals estblished with Patient/Caregiver Yes

## 2023-06-13 NOTE — PT/OT/SLP PROGRESS
Recreational Therapy Treatment    Date of Treatment: 06/13/23  Start Time: 0901  Stop Time: 0930  Total Time: 29 min  Missed Time:     General Precautions: fall  Ortho Precautions: Full weight bearing  Braces: N/A    Vitals   Vitals at Rest  BP    HR    O2 Sat    Pain      Vitals With Activity  BP    HR    O2 Sat    Pain        Treatment     Cognitive Skills Building   Cognitive Observation Activity Assist Position Equipment Response            Comment:          Dynamic Activities   Activity Assist Position Equipment Response   Activity 1 Washer toss supervision and contact guard assistance Standing Rolling walker and Metal washers fair   Comment: Sit to stand was supervision. Dynamic standing balance/reaching was contact guard. Standing tolerance was 5 minutes. RUE coordination was contact guard.  LUE coordination was supervision. Memory recall was min. C/o rib discomfort       Fine Motor Activities   Activity Assist Position Equipment Response           Comment:          Additional Info: Pt progress, plan of care and discharge plans were discussed during staffing at 0930    This was a co-treat with PT    Goals     Short Term Goals    Goal  Goal Status   Will increase activity tolerance to supervision Progressing   Will increase sit to stand to supervision Progressing   Will improve dynamic standing balance/reaching to supervision Progressing           Long Term Goals    Goal Goal Status   Will increase standing tolerance to 5 minutes Progressing   Will improve dynamic standing balance/reaching to setup Progressing

## 2023-06-13 NOTE — PT/OT/SLP PROGRESS
"Occupational Therapy Inpatient Rehab Treatment    Name: Lita Hess  MRN: [id number]    Assessment:  Lita Hess is a 87 y.o. female admitted with a medical diagnosis of Debility.  She presents with the following impairments/functional limitations:  weakness, impaired endurance, impaired self care skills, impaired functional mobility, decreased lower extremity function, pain, decreased ROM .    General Precautions: Standard, fall     Orthopedic Precautions:Full weight bearing     Braces: N/A    Rehab Prognosis: Fair; patient would benefit from acute skilled OT services to address these deficits and reach maximum level of function.      History:     Past Medical History:   Diagnosis Date    Chronic back pain 10/26/2022    Colon cancer     CVA (cerebral vascular accident)     Gastroesophageal reflux disease without esophagitis 10/26/2022    Hypertension     Iron deficiency anemia secondary to inadequate dietary iron intake 10/26/2022    Loss of appetite     Mixed hyperlipidemia 10/26/2022    Stroke     Type 2 diabetes mellitus without complication 10/26/2022    Vitamin D deficiency        Past Surgical History:   Procedure Laterality Date    COLECTOMY      COLONOSCOPY      ESOPHAGOGASTRODUODENOSCOPY      gastro biopsy      HIP REPLACEMENT ARTHROPLASTY      SCLEROTHERAPY WITH ULTRASOUND GUIDANCE      TRANSESOPHAGEAL ECHOCARDIOGRAPHY         Subjective     Orientation: Oriented x4    Chief Complaint: "My ribs are hurting"     Patient/Family Comments/goals: "I have people in and out all the time"     Respiratory Status: Room air    Patients cultural, spiritual, Worship conflicts given the current situation: no       Objective:     Patient found up in chair with    upon OT entry to room.    Mobility   Patient completed:  Sit to Supine with moderate assistance  Sit to Stand Transfer with minimum assistance with rolling walker  Stand to Sit Transfer with contact guard assistance with rolling walker    Functional " Mobility  Pt. Made a few steps 2 x's towards countertop in kitchen and towards sink in room to wash hands.     ADLs   Current Status   Eating 5   Oral Hygiene     Shower, Bathe Self     Upper Body Dressing     Lower Body Dressing     Toileting Hygiene     Toilet Transfer     Putting On, Taking Off Footwear       Limiting Factors for ADLs: motor, limited ROM, weakness, and pain     IADLs: Pt. Stood at kitchen counter and opened drawer, retrieved kitchen utensils and stated what she would use each for.     Therapeutic Activities  Pt. Performed reaching/grasping to R and retrieved large pegs; placed in foam pegboard placed anteriorly. Pt. Unable to follow color coded pattern. Pt. Tolerated 2 x's 5 min static standing for tabletop game of Connect 4while wearing 1/2 # wrist weights.         Additional Treatments: Pt. Transferred to EOB>supine and set up for lunch.     LifeStyle Change and Education:             Patient left HOB elevated with call button in reach and Rowdy RN present.     Education provided: transfer training, body mechanics, sequencing, safety precautions, and home safety    Multidisciplinary Problems       Occupational Therapy Goals          Problem: Occupational Therapy    Goal Priority Disciplines Outcome Interventions   Occupational Therapy Goal     OT, PT/OT Ongoing, Progressing    Description: ADLs:  Pt to perform grooming tasks with set up and clean up  Pt to perform UB dressing with set up and clean up   Pt to perform LB dressing with set up and clean up with use of AD   Pt to perform putting on/off footwear task with set up and clean up and AD  Pt to perform toileting with mod a using RW and BSC  Pt to perform bathing with mod a using TTB    Functional Transfers:  Pt to perform toilet transfers with CGA and RW  Pt to perform a tub transfer with mod a and TTB                           Time Tracking     OT Received On: 06/13/23  Time In 1100     Time Out 1210  Total Time 70 min  Therapy Time:  OT Individual: 70  Missed Time:    Missed Time Reason:      Billable Minutes: Self Care/Home Management 30 and Therapeutic Activity 40    06/13/2023

## 2023-06-14 LAB
POCT GLUCOSE: 136 MG/DL (ref 70–110)
POCT GLUCOSE: 147 MG/DL (ref 70–110)
POCT GLUCOSE: 167 MG/DL (ref 70–110)
POCT GLUCOSE: 181 MG/DL (ref 70–110)
POCT GLUCOSE: 247 MG/DL (ref 70–110)
POCT GLUCOSE: 253 MG/DL (ref 70–110)
POCT GLUCOSE: 335 MG/DL (ref 70–110)

## 2023-06-14 PROCEDURE — 25000003 PHARM REV CODE 250: Performed by: NURSE PRACTITIONER

## 2023-06-14 PROCEDURE — 63600175 PHARM REV CODE 636 W HCPCS: Performed by: NURSE PRACTITIONER

## 2023-06-14 PROCEDURE — 99233 PR SUBSEQUENT HOSPITAL CARE,LEVL III: ICD-10-PCS | Mod: ,,, | Performed by: NURSE PRACTITIONER

## 2023-06-14 PROCEDURE — 11800000 HC REHAB PRIVATE ROOM

## 2023-06-14 PROCEDURE — 97110 THERAPEUTIC EXERCISES: CPT | Mod: CQ

## 2023-06-14 PROCEDURE — 97116 GAIT TRAINING THERAPY: CPT | Mod: CQ

## 2023-06-14 PROCEDURE — 97530 THERAPEUTIC ACTIVITIES: CPT

## 2023-06-14 PROCEDURE — S0179 MEGESTROL 20 MG: HCPCS | Performed by: NURSE PRACTITIONER

## 2023-06-14 PROCEDURE — 97116 GAIT TRAINING THERAPY: CPT

## 2023-06-14 PROCEDURE — 94799 UNLISTED PULMONARY SVC/PX: CPT

## 2023-06-14 PROCEDURE — 97110 THERAPEUTIC EXERCISES: CPT

## 2023-06-14 PROCEDURE — 99233 SBSQ HOSP IP/OBS HIGH 50: CPT | Mod: ,,, | Performed by: NURSE PRACTITIONER

## 2023-06-14 PROCEDURE — 97535 SELF CARE MNGMENT TRAINING: CPT

## 2023-06-14 RX ADMIN — METFORMIN HYDROCHLORIDE 500 MG: 500 TABLET, FILM COATED ORAL at 05:06

## 2023-06-14 RX ADMIN — INSULIN ASPART 2 UNITS: 100 INJECTION, SOLUTION INTRAVENOUS; SUBCUTANEOUS at 12:06

## 2023-06-14 RX ADMIN — ENOXAPARIN SODIUM 30 MG: 30 INJECTION SUBCUTANEOUS at 08:06

## 2023-06-14 RX ADMIN — MEGESTROL ACETATE 400 MG: 40 SUSPENSION ORAL at 08:06

## 2023-06-14 RX ADMIN — GABAPENTIN 300 MG: 300 CAPSULE ORAL at 11:06

## 2023-06-14 RX ADMIN — ACETAMINOPHEN 325MG 650 MG: 325 TABLET ORAL at 10:06

## 2023-06-14 RX ADMIN — METHOCARBAMOL 500 MG: 500 TABLET ORAL at 05:06

## 2023-06-14 RX ADMIN — ONDANSETRON 4 MG: 4 TABLET, ORALLY DISINTEGRATING ORAL at 12:06

## 2023-06-14 RX ADMIN — GABAPENTIN 300 MG: 300 CAPSULE ORAL at 05:06

## 2023-06-14 RX ADMIN — ACETAMINOPHEN 325MG 650 MG: 325 TABLET ORAL at 05:06

## 2023-06-14 RX ADMIN — ATORVASTATIN CALCIUM 10 MG: 10 TABLET, FILM COATED ORAL at 08:06

## 2023-06-14 RX ADMIN — ACETAMINOPHEN 325MG 650 MG: 325 TABLET ORAL at 12:06

## 2023-06-14 RX ADMIN — METHOCARBAMOL 500 MG: 500 TABLET ORAL at 12:06

## 2023-06-14 RX ADMIN — METHOCARBAMOL 500 MG: 500 TABLET ORAL at 11:06

## 2023-06-14 RX ADMIN — TRAMADOL HYDROCHLORIDE 50 MG: 50 TABLET, COATED ORAL at 08:06

## 2023-06-14 RX ADMIN — DOCUSATE SODIUM 100 MG: 100 CAPSULE, LIQUID FILLED ORAL at 08:06

## 2023-06-14 RX ADMIN — LIDOCAINE 5% 1 PATCH: 700 PATCH TOPICAL at 05:06

## 2023-06-14 RX ADMIN — AMLODIPINE BESYLATE 10 MG: 5 TABLET ORAL at 08:06

## 2023-06-14 RX ADMIN — GABAPENTIN 300 MG: 300 CAPSULE ORAL at 10:06

## 2023-06-14 RX ADMIN — SITAGLIPTIN 50 MG: 50 TABLET, FILM COATED ORAL at 08:06

## 2023-06-14 RX ADMIN — LISINOPRIL 20 MG: 10 TABLET ORAL at 08:06

## 2023-06-14 RX ADMIN — DULOXETINE HYDROCHLORIDE 30 MG: 30 CAPSULE, DELAYED RELEASE ORAL at 08:06

## 2023-06-14 RX ADMIN — INSULIN ASPART 3 UNITS: 100 INJECTION, SOLUTION INTRAVENOUS; SUBCUTANEOUS at 09:06

## 2023-06-14 RX ADMIN — INSULIN DETEMIR 20 UNITS: 100 INJECTION, SOLUTION SUBCUTANEOUS at 08:06

## 2023-06-14 RX ADMIN — GABAPENTIN 300 MG: 300 CAPSULE ORAL at 12:06

## 2023-06-14 RX ADMIN — FOLIC ACID 1000 MCG: 1 TABLET ORAL at 08:06

## 2023-06-14 RX ADMIN — INSULIN DETEMIR 22 UNITS: 100 INJECTION, SOLUTION SUBCUTANEOUS at 08:06

## 2023-06-14 RX ADMIN — MIRTAZAPINE 30 MG: 15 TABLET, FILM COATED ORAL at 05:06

## 2023-06-14 RX ADMIN — ASPIRIN 325 MG: 325 TABLET, FILM COATED ORAL at 08:06

## 2023-06-14 NOTE — PROGRESS NOTES
06/14/23 1130   Rec Therapy Time Calculation   Date of Treatment 06/14/23   Rec Start Time 1130   Rec Stop Time 1200   Rec Total Time (min) 30 min   Time   Treatment time 2 units   Charges   $Therapeutic Exercise 2 units   Precautions   General Precautions fall   Orthopedic Precautions  N/A   Braces N/A   Pain/Comfort   Pain Rating 1 5/10   Location - Side 1 Left   Location - Orientation 1 upper   Location 1 rib(s)   Pain Addressed 1 Reposition   OTHER   Rehab identified problem list/impairments weakness;impaired endurance;impaired functional mobility;gait instability;decreased coordination;decreased upper extremity function;decreased lower extremity function;decreased safety awareness;pain   Values/Beliefs/Spiritual Care   Spiritual, Cultural Beliefs, Hoahaoism Practices, Values that Affect Care no   Overall Level of Functioning   Activity Tolerance Standby Assist   Dynamic Sitting Balance/Reaching Min A   Dynamic Standing Balance/Reaching Standby Assist   Right UE Coodination/Dexterity Mod Indep   Left UE Coordination/Dexterity Does not occur   Problem Solving/Sequencing Skills Standby Assist   Memory Recall Min A   R/L Neglect/Inattention Does not occur   Attention Span Standby Assist   Social Interaction Mod Indep   Recreational Therapy Short Term Goals   Short Term Goal 1 Progression Met   Short Term Goal 2 Progression Progressing   Short Term Goal 3 Progression Progressing   Recreational Therapy Long Term Goals   Long Term Goal 1 Progression Progressing   Long Term Goal 2 Progression Progressing   Plan   Patient to be seen Daily   Planned Duration 1 week   Treatments Planned Coordination;Energy conservation training;Fine motor;Safety education   Treatment plan/goals estblished with Patient/Caregiver Yes

## 2023-06-14 NOTE — PT/OT/SLP PROGRESS
"Occupational Therapy Inpatient Rehab Treatment    Name: Lita Hess  MRN: [id number]    Assessment:  Lita Hess is a 87 y.o. female admitted with a medical diagnosis of Debility.  She presents with the following impairments/functional limitations:  weakness, impaired endurance, impaired self care skills, impaired functional mobility, gait instability, impaired balance, decreased coordination, decreased upper extremity function, decreased lower extremity function, decreased safety awareness, pain, decreased ROM, impaired coordination .    General Precautions: Standard, fall     Orthopedic Precautions:Full weight bearing     Braces: N/A    Rehab Prognosis: Fair; patient would benefit from acute skilled OT services to address these deficits and reach maximum level of function.      History:     Past Medical History:   Diagnosis Date    Chronic back pain 10/26/2022    Colon cancer     CVA (cerebral vascular accident)     Gastroesophageal reflux disease without esophagitis 10/26/2022    Hypertension     Iron deficiency anemia secondary to inadequate dietary iron intake 10/26/2022    Loss of appetite     Mixed hyperlipidemia 10/26/2022    Stroke     Type 2 diabetes mellitus without complication 10/26/2022    Vitamin D deficiency        Past Surgical History:   Procedure Laterality Date    COLECTOMY      COLONOSCOPY      ESOPHAGOGASTRODUODENOSCOPY      gastro biopsy      HIP REPLACEMENT ARTHROPLASTY      SCLEROTHERAPY WITH ULTRASOUND GUIDANCE      TRANSESOPHAGEAL ECHOCARDIOGRAPHY         Subjective     Orientation: Oriented x4    Chief Complaint: "My ribs hurt on my left side."    Patient/Family Comments/goals: To gain independence    Vitals     Vitals With Activity  /78        Respiratory Status: Room air    Patients cultural, spiritual, Taoist conflicts given the current situation: no       Objective:     Patient found ambulatory in room/guillaume \upon OT entry to room.    Mobility   Patient completed:  Sit to " Stand Transfer with contact guard assistance with rolling walker  Stand to Sit Transfer with contact guard assistance with rolling walker  Toilet Transfer Step Transfer technique with contact guard assistance with  rolling walker    Functional Mobility  Pt ambulated in kitchen with CGA and RW to make popcorn.     ADLs   Current Status   Toileting Hygiene 1-2 people required to perform mohsen hygiene and keep the pt balanced   Toilet Transfer 4     - Pt identified she was feeling overheated and nauseous. OT Gisel put a cold wash cloth around her neck. Pt then identified that she had made a bm in her diaper and requested to use the toilet. Pt. C incontinent void (BM).     Limiting Factors for ADLs: motor, psychsocial, endurance, limited ROM, balance, weakness, coordination, cognition, safety awareness, and pain     IADLs:   - Pt performed the steps of making a bag of popcorn in the microwave. Pt required several verbal cues for sequencing of the activity. Pt stood at the counter with CGA and her RW to put the popcorn in the microwave and press start. Pt sat in w/c for a break while the popcorn was in the microwave.     Therapeutic Activities  - Pt participated in an activity to practice opening containers to simulate setting up a meal. The containers included a ziplock bag, a tupperware container, and a pill bottle. The pt was instructed to open each container, take out the contents in the container, put the contents back into the container, and close the container. Pt required verbal cues for sequencing. Pt was independent with each container.       Patient left ambulatory in room/guillaume with  RT Ramandeep present.     Education provided: Roles and goals of OT, ADLs, transfer training, wheelchair precautions, sequencing, safety precautions, and fall prevention    Multidisciplinary Problems       Occupational Therapy Goals          Problem: Occupational Therapy    Goal Priority Disciplines Outcome Interventions   Occupational  Therapy Goal     OT, PT/OT Ongoing, Progressing    Description: ADLs:  Pt to perform grooming tasks with set up and clean up  Pt to perform UB dressing with set up and clean up   Pt to perform LB dressing with set up and clean up with use of AD   Pt to perform putting on/off footwear task with set up and clean up and AD  Pt to perform toileting with mod a using RW and BSC  Pt to perform bathing with mod a using TTB    Functional Transfers:  Pt to perform toilet transfers with CGA and RW  Pt to perform a tub transfer with mod a and TTB                           Time Tracking     OT Received On: 06/14/23  Time In 1030     Time Out 1130  Total Time 60 min  Therapy Time: OT Individual: 60  Missed Time:    Missed Time Reason:      Billable Minutes: Self Care/Home Management 20 min and Therapeutic Activity 40 min    06/14/2023

## 2023-06-14 NOTE — PLAN OF CARE
Completed SMS/PASSR for SNF placement.  Will call in LOCET once therapy minutes are collected from tx mgr.

## 2023-06-14 NOTE — PLAN OF CARE
Called in LOCET to [facility] and faxed PASSR to [facility].  Will await Form 142 approval.    Still awaiting call back from son Dyllan re: decision re: SNF facility.

## 2023-06-14 NOTE — PT/OT/SLP PROGRESS
Physical Therapy Inpatient Rehab Treatment    Patient Name:  Lita Hess   MRN:  [id number]    Recommendations:     Discharge Recommendations:  nursing facility, skilled   Discharge Equipment Recommendations: bedside commode, walker, rolling, wheelchair   Barriers to discharge:  impaired functional mobility    Assessment:     Lita Hess is a 87 y.o. female admitted with a medical diagnosis of Debility.  She presents with the following impairments/functional limitations:  weakness, impaired endurance, impaired self care skills, impaired functional mobility, decreased lower extremity function, gait instability, decreased safety awareness, pain .    Rehab Diagnosis:      General Precautions: Standard, fall     Orthopedic Precautions:N/A, Full weight bearing     Braces: N/A    Rehab Prognosis: Poor; patient would benefit from acute skilled PT services to address these deficits and reach maximum level of function.      History:     Past Medical History:   Diagnosis Date    Chronic back pain 10/26/2022    Colon cancer     CVA (cerebral vascular accident)     Gastroesophageal reflux disease without esophagitis 10/26/2022    Hypertension     Iron deficiency anemia secondary to inadequate dietary iron intake 10/26/2022    Loss of appetite     Mixed hyperlipidemia 10/26/2022    Stroke     Type 2 diabetes mellitus without complication 10/26/2022    Vitamin D deficiency        Past Surgical History:   Procedure Laterality Date    COLECTOMY      COLONOSCOPY      ESOPHAGOGASTRODUODENOSCOPY      gastro biopsy      HIP REPLACEMENT ARTHROPLASTY      SCLEROTHERAPY WITH ULTRASOUND GUIDANCE      TRANSESOPHAGEAL ECHOCARDIOGRAPHY         Subjective     Chief Complaint: L flank pain, heart palpitations (NP aware), fatigue.    Respiratory Status: Room air    Patients cultural, spiritual, Presybeterian conflicts given the current situation: no      Objective:     Communicated with pt prior to session.  Patient found up in chair with    upon PT  entry to room.    Pt is Alert and Cooperative.    Vitals   8/10 L flank pain      Functional Mobility:      Current   Status  Discharge   Goal   Functional Area: Care Score:    Roll Left and Right   Independent   Sit to Lying   Set-up/clean-up   Lying to Sitting on Side of Bed 3  Increased time needed with HOB elevated and use of bed rail. Physical assist needed for sitting upright. Independent   Sit to Stand 3  With RW, increased time needed Independent   Chair/Bed-to-Chair Transfer 3  With RW, increased time needed Independent   Car Transfer       Walk 10 Feet 4 Independent   Walk 50 Feet with Two Turns 4  Pt amb 55' + 45' + 74' CGA with RW. Very slow gait speed, forward flexed posture, small B step sizes and height, occasional short standing breaks throughout with extended seated breaks between bouts. Independent   Walk 150 Feet       Walk 10 Feet Uneven Surface       1 Step (Curb)       4 Steps       12 Steps       Picking Up Object       Wheel 50 Feet with Two Turns       Wheel 150 Feet           Activity Tolerance: Poor    Patient left up in chair with  OT Gisel present.    Education provided: roles and goals of PT/PTA, transfer training, bed mob, gait training, safety awareness, body mechanics, assistive device, and fall prevention    Expected compliance: Low compliance    Plan:     During this hospitalization, patient to be seen 5 x/week to address the identified rehab impairments via gait training, therapeutic activities, therapeutic exercises, neuromuscular re-education and progress toward the following goals:    GOALS:   Multidisciplinary Problems       Physical Therapy Goals          Problem: Physical Therapy    Goal Priority Disciplines Outcome Goal Variances Interventions   Physical Therapy Goal     PT, PT/OT Ongoing, Progressing     Description: Bed Mobility:  Roll L and R [place]  Sit to supine will be Philip  Supine to sit will be Philip    Transfers:  Sit to stand supervision  Bed to Chair t/f will be  "supervision  Car t/f will be supervision    Mobility:  Pt will ambulate 150' CGA with RW  Pt will ambulate 15' CGA with RW over uneven terrain  Pt will climb one step CGA with RW                        Plan of Care Expires:  06/16/23  PT Next Visit Date:    Plan of Care reviewed with: patient    Additional Information:     Pt scheduled for PT 3059-2248. Upon entry to room at 0900, pt c/o heart palpitations stating "This happens sometimes. I just need to lay here with my hand on chest and do nothing for awhile." NICOLETTE Arizmendi notified and STAT EKG ordered with NP requesting PT hold until EKG completed. 30 minutes missed d/t medical necessity. Pt seen for the rest of scheduled PT session ([id number]) after completion of EKG and pt being cleared to participate with PT.    Time Tracking:     Therapy Time  PT Start Time: 0900  PT Stop Time: 1030  PT Total Time (min): 90 min   PT Individual: 60  Missed Time: 30 Minutes  Time Missed due to: Testing/imaging (xray/CT/MRI), MD hold (Comment) (STAT EKG; Hold per NP)    Billable Minutes: Gait Training 30 min and Therapeutic Activity 30 min    06/14/2023  "

## 2023-06-14 NOTE — PROGRESS NOTES
Dos 6/14/23  Patient seen and evaluated in OT today  Continues to participate and make progress toward goals  Working on ADL's and IADL's  Discussed with patient and OT  Reviewed chart and discussed with nursing, Dr Guerin's primary medicine team, as well as Kyleigh Calvo, my NP  Agree with present POC   Subjective  HPI: 86 yo BF with PMH of CVA x 2, MI with loop recorder, HTN, GERD, hip replacement in past, and remote hx colon cancer s/p colectomy presented to the ED at [facility] on 6/6/23 after falling at home. Patient complained of left rib pain and left hip pain. CT head showed no acute intracranial abnormality identified, and chronic microvascular ischemic disease. CT cervical spine showed no acute findings. CT of chest/abdomen/ pelvis showed acute fractures of the left 4th, 5th, 6th, and 7th ribs, scoliotic curvature of the spine with no acute fracture identified; and no fracture of right ribs identified. Hip XR showed no acute fractures. Patient was admitted to ICU for close respiratory monitoring, encouraged to use IS, started duonebs, and CPT ordered. On 6/7, PT eval completed with deficits noted. On 6/8, OT eval completed with deficits noted. Labs showed low BUN of 9.6, low Na of 133, low H&H of 11.7 & 35.7, elevated CBG of 346, and low albumin of 3.1. On Lovenox for DVT prophylaxis. On 6/9, CBG remains elevated. Patient is AAOx4.  Participating with therapy. Functional status includes minimal-moderate assist needed for bed mobility, minimal assist for transfer with RW, minimal assist for walking about 30 ft with RW, purwick catheter for toileting, moderate-max assist for lower body dressing. Patient was evaluated, accepted, and admitted to inpatient rehab to improve functional status. Transferred to [facility] on 6/9 without incident.      6/14: Seen with PT/RT in patient room, lying in bed. States that she feels like she is having heart palpitations. Also c/o rib pain and rates 7-8/10. EKG shows normal sinus rhythm.  "Nursing administered Tramadol and Lidoderm patch in place. Amb w/RW with slow gait speed. States that pain medication is helping. Tolerating therapy with encouragement. VSSAF.        Review of Systems  Psychiatric: Per son, pt. Recently complained of depression. Pt. Has a history of anxiety and takes medication for this. She has no substance abuse history    Depression/Anxiety: Noted Remeron 30mg qPM in Home medication. Restart. Consider Cymbalta for additional coverage and pain.      mirtazapine tablet 30 mg after dinner  ALPRAZolam tablet 0.25 mg TID PRN Anxiety  DULoxetine DR capsule 30 mg qd, start 6/14  Pain: left side-ribs, LLE  DULoxetine DR capsule 30 mg qd, start 6/14  gabapentin capsule 300 mg TID. Increase q6h  acetaminophen tablet 650 mg TID. q6h  methocarbamoL tablet 500 mg TID. q6h  traMADoL tablet 50 mg q6h PRN mod pain  oxyCODONE 5 mg 1 tablet q6h PRN severe pain  Bowels/Bladder: last BM 6/13 x 2  Appetite: "good"  megestroL 400 mg/10 mL (10 mL) suspension 400 mg BID     Sleep: good         Physical Exam  General: well-developed, thin/frail appearing, in no acute distress  Respiratory: equal chest rise, no SOB, no audible wheeze  Cardiovascular: regular rate and rhythm, no edema  Gastrointestinal: soft, non-tender, non-distended   Musculoskeletal: decreased ROM/strength to LLE; generalized weakness  Integumentary: no rashes or skin lesions present, right heel/sacral pressure wounds  Neurologic: cranial nerves intact, no signs of peripheral neurological deficit, motor/sensory function intact  *MD performed and documented physical examination                  Assessment/Plan  Hospital   Fall   Rib fracture   Debility     Non-Hospital   Chronic back pain (Chronic)   Mixed hyperlipidemia (Chronic)   Hypertension (Chronic)   Type 2 diabetes mellitus without complication (Chronic)   Gastroesophageal reflux disease without esophagitis (Chronic)   Iron deficiency anemia secondary to inadequate dietary iron " intake (Chronic)   CVA (cerebral vascular accident) (Chronic)   Loss of appetite (Chronic)   Vitamin D deficiency (Chronic)   Frailty (Chronic)   Arteriosclerosis of coronary artery   At risk of pressure injury of skin   Hiatal hernia   Scoliosis   Stenosis of carotid artery   Severe malnutrition   Colon cancer       Wounds: right heel/sacral pressure wounds  Precautions: fall risk  Bracing/AD: RW  Swallowing: Diabetic Diet  Function: Tolerating therapy. Continue PT/OT  VTE Prophylaxis:   enoxaparin injection 30 mg SubQ q12h  Code Status: FULL CODE   Discharge: Lives alone in [place] in a single-story home with 1 threshold step to enter the residence. Completed her master's degree. She has no  history. 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 She had a volunteer come in to help her around the house and for errands every now and then. Per son, the family is arranging for her care after she leaves rehab.  Children: (2). Date 6/20 Tuesday.                     Annette Calvo NP, conducted additional independent physical examination and assisted with medical documentation.

## 2023-06-14 NOTE — PT/OT/SLP PROGRESS
Physical Therapy Inpatient Rehab Treatment    Patient Name:  Lita Hess   MRN:  [id number]    Recommendations:     Discharge Recommendations:  nursing facility, skilled   Discharge Equipment Recommendations: bedside commode, walker, rolling, wheelchair   Barriers to discharge: Decreased caregiver support    Assessment:     Lita Hess is a 87 y.o. female admitted with a medical diagnosis of Debility.  She presents with the following impairments/functional limitations:  weakness, impaired endurance, impaired self care skills, impaired functional mobility, gait instability, impaired balance, decreased lower extremity function, pain, decreased safety awareness, decreased ROM, impaired muscle length del.    Rehab Diagnosis:  debility     General Precautions: Standard, fall     Orthopedic Precautions:N/A     Braces: N/A    Rehab Prognosis: Good; patient would benefit from acute skilled PT services to address these deficits and reach maximum level of function.      History:     Past Medical History:   Diagnosis Date    Chronic back pain 10/26/2022    Colon cancer     CVA (cerebral vascular accident)     Gastroesophageal reflux disease without esophagitis 10/26/2022    Hypertension     Iron deficiency anemia secondary to inadequate dietary iron intake 10/26/2022    Loss of appetite     Mixed hyperlipidemia 10/26/2022    Stroke     Type 2 diabetes mellitus without complication 10/26/2022    Vitamin D deficiency        Past Surgical History:   Procedure Laterality Date    COLECTOMY      COLONOSCOPY      ESOPHAGOGASTRODUODENOSCOPY      gastro biopsy      HIP REPLACEMENT ARTHROPLASTY      SCLEROTHERAPY WITH ULTRASOUND GUIDANCE      TRANSESOPHAGEAL ECHOCARDIOGRAPHY         Subjective     Chief Complaint: stomach ache with meds given     Respiratory Status: Room air    Patients cultural, spiritual, Gnosticist conflicts given the current situation: no      Objective:     Communicated with nursing  prior to session.  Patient found  up in chair with Other (comments) (pt in recliner)  upon PT entry to room.    Pt is Oriented x3 and Alert and Cooperative.    Functional Mobility:   Bed Mobility:     Sit to Supine: mod a with BLE increased time use of bed rails   Transfers:     Sit to Stand: Supervision or touching assistance  with rolling walker  Bed to Chair: Supervision or touching assistance  with rolling walker  using  Step Transfer and increased time slow transition   Gait: Pt ambulates 225ft in 32min then 200ft in 30min  with RW with supervision with much increased time slow rachel and vc for proximity within rw for safety .      Current   Status  Discharge   Goal   Functional Area: Care Score:    Roll Left and Right   Independent   Sit to Lying 3  Mod a with BLE  Set-up/clean-up   Lying to Sitting on Side of Bed 3 Independent   Sit to Stand 4  Sba with use of rw  Independent   Chair/Bed-to-Chair Transfer 4  Use of rw sba  Independent   Car Transfer       Walk 10 Feet 4  Use of rw sba  Independent   Walk 50 Feet with Two Turns 4  Use of rw sba vc for proximity within rw  Independent   Walk 150 Feet 4  Use of rw sba vc for proximity within rw      Walk 10 Feet Uneven Surface       1 Step (Curb)       4 Steps       12 Steps       Picking Up Object       Wheel 50 Feet with Two Turns       Wheel 150 Feet           Therapeutic Activities and Exercises:  Pt performed seated HEP 2 sets 15reps with 2# wts BLE rest breaks with c/o stomach ache at times vc for recall of exercises     Activity Tolerance: Good    Patient left HOB elevated with call button in reach, bed alarm on, and pt yahaira tx well much improved with gt distance today from yesterday and this am tx time distances pt distracted during ambulation required increased time for increased distance  .    Education provided: transfer training, bed mob, gait training, balance training, safety awareness, assistive device, strengthening exercises, and fall prevention    Expected compliance: High  compliance    GOALS:   Multidisciplinary Problems       Physical Therapy Goals          Problem: Physical Therapy    Goal Priority Disciplines Outcome Goal Variances Interventions   Physical Therapy Goal     PT, PT/OT Ongoing, Progressing     Description: Bed Mobility:  Roll L and R Philip  Sit to supine will be Philip  Supine to sit will be Philip    Transfers:  Sit to stand supervision  Bed to Chair t/f will be supervision  Car t/f will be supervision    Mobility:  Pt will ambulate 150' CGA with RW  Pt will ambulate 15' CGA with RW over uneven terrain  Pt will climb one step CGA with RW                        Plan:     During this hospitalization, patient to be seen 5 x/week to address the identified rehab impairments via gait training, therapeutic activities, therapeutic exercises, neuromuscular re-education and progress toward the following goals:    Plan of Care Expires:  06/16/23  PT Next Visit Date:    Plan of Care reviewed with: patient    Additional Information:         Time Tracking:     Therapy Time  PT Received On: 06/14/23  PT Start Time: 1300  PT Stop Time: 1430  PT Total Time (min): 90 min   PT Individual: 90  Missed Time:    Time Missed due to:      Billable Minutes: Gait Training 60min and Therapeutic Exercise 30min    06/14/2023

## 2023-06-14 NOTE — PLAN OF CARE
Problem: Diabetes Comorbidity  Goal: Blood Glucose Level Within Targeted Range  Outcome: Ongoing, Progressing  Intervention: Monitor and Manage Glycemia  Flowsheets (Taken 6/14/2023 0235)  Glycemic Management:   blood glucose monitored   supplemental insulin given     Problem: Fall Injury Risk  Goal: Absence of Fall and Fall-Related Injury  Outcome: Ongoing, Progressing  Intervention: Promote Injury-Free Environment  Flowsheets (Taken 6/14/2023 0235)  Safety Promotion/Fall Prevention:   assistive device/personal item within reach   Fall Risk signage in place   lighting adjusted   nonskid shoes/socks when out of bed   side rails raised x 3   instructed to call staff for mobility

## 2023-06-14 NOTE — PT/OT/SLP PROGRESS
Recreational Therapy Treatment    Date of Treatment: 06/14/23  Start Time: 1130  Stop Time: 1200  Total Time: 30 min  Missed Time:    General Precautions: fall  Ortho Precautions: N/A  Braces: N/A    Vitals   Vitals at Rest  BP    HR    O2 Sat    Pain 5/10     Vitals With Activity  BP    HR    O2 Sat    Pain 5/10       Treatment     Cognitive Skills Building   Cognitive Observation Activity Assist Position Equipment Response            Comment:          Dynamic Activities   Activity Assist Position Equipment Response   Activity 1 Bocce ball contact guard assistance Standing, sitting Rolling walker and Bocce balls fair   Comment: Sit to stand was contact guard as was dynamic standing balance/reaching. Standing tolerance was 3 minutes.  RUE coordination was supervision. Sat for last part of Therapy. Dynamic sitting balance/reaching was contact guard.  C/o rib pain.  Verbal cues needed to continue with participation.        Fine Motor Activities   Activity Assist Position Equipment Response           Comment:          Additional Info: Assisted with cleanup after incontinent episode    Goals     Short Term Goals    Goal  Goal Status   Will increase activity tolerance to supervision Met   Will increase sit to stand to supervision Progressing   Will improve dynamic standing balance/reaching to supervision Progressing           Long Term Goals    Goal Goal Status   Will increase standing tolerance to 5 minutes Progressing   Will improve dynamic standing balance/reaching to setup Progressing

## 2023-06-14 NOTE — PROGRESS NOTES
[facility] ([facility])  Rehab Progress Note    Patient Name: Lita Hess  MRN: [id number]  Age: 87 y.o. Sex: female  : 1935  Hospital Length of Stay: 5 days  Date of Service: 2023   Chief Complaint: Debility 2/2 frequent falls with left 4th-7th rib fractures    Subjective:     Basic Information  Admit Information: 87-year-old  female presented to [facility] ED on 2023 complaining of left side pain after accidentally falling backwards over walker.  PMH significant for CVA x2, CAD s/p MI, HTN, carotid artery stenosis, remote history of colon cancer s/p colectomy.  Workup significant for left 4th through 7th rib fractures.  Limited mobility 2/2 pain.  Tolerated transfer to [facility] inpatient rehab unit on  without incident.  Today's Information: No acute events overnight.  Sitting in chair working with therapy.  Reports good sleep and appetite.  Last BM .  Vital signs at goal with no recent recorded fevers.  No labs or imaging today.  CBG is moderately controlled.    Review of patient's allergies indicates:  No Known Allergies     Current Facility-Administered Medications:     acetaminophen tablet 650 mg, 650 mg, Oral, Q6H, Annette Calvo, FNP, 650 mg at 23 1059    ALPRAZolam tablet 0.25 mg, 0.25 mg, Oral, TID PRN, Vishal MAN Du, FNP, 0.25 mg at 23 1209    amLODIPine tablet 10 mg, 10 mg, Oral, Daily, Vishal A Du, FNP, 10 mg at 23 0831    aspirin tablet 325 mg, 325 mg, Oral, Daily, Vishal A Du, FNP, 325 mg at 23 0831    atorvastatin tablet 10 mg, 10 mg, Oral, Daily, Vishal A Du, FNP, 10 mg at 23 0831    benzonatate capsule 100 mg, 100 mg, Oral, TID PRN, Vishal Sandy, FNP    bisacodyL suppository 10 mg, 10 mg, Rectal, Daily PRN, Vishal Sandy, FNP    dextrose 10% bolus 125 mL 125 mL, 12.5 g, Intravenous, PRN, Vishal Sandy, FNP    dextrose 10% bolus 250 mL 250 mL, 25 g,  Intravenous, PRN, Vishal Parisiehart, FNP    docusate sodium capsule 100 mg, 100 mg, Oral, BID, Vishal MAGDA Du, FNP, 100 mg at 06/14/23 0831    DULoxetine DR capsule 30 mg, 30 mg, Oral, Daily, Annette Devrieste, FNP, 30 mg at 06/14/23 0831    enoxaparin injection 30 mg, 30 mg, Subcutaneous, Q12H (treatment, non-standard time), Vishal Parisiehart, FNP, 30 mg at 06/14/23 0842    folic acid tablet 1,000 mcg, 1,000 mcg, Oral, Daily, Vishal A Du, FNP, 1,000 mcg at 06/14/23 0831    gabapentin capsule 300 mg, 300 mg, Oral, Q6H, Annette Devrieste, FNP, 300 mg at 06/14/23 1059    glucagon (human recombinant) injection 1 mg, 1 mg, Intramuscular, PRN, Vishal Parisiehart, FNP    glucose chewable tablet 16 g, 16 g, Oral, PRN, Vishal Parisiehart, FNP    glucose chewable tablet 24 g, 24 g, Oral, PRN, Vishal Parisiehart, FNP    hydrALAZINE injection 10 mg, 10 mg, Intravenous, Q4H PRN, Vishal Parisiehart, FNP    hydrOXYzine pamoate capsule 50 mg, 50 mg, Oral, Nightly PRN, Vishal Parisiehart, FNP    insulin aspart U-100 injection 1-10 Units, 1-10 Units, Subcutaneous, QID (AC + HS) PRN, Vishal Parisiehart, FNP, 2 Units at 06/14/23 1220    insulin detemir U-100 injection 22 Units, 22 Units, Subcutaneous, BID, Kaity Arndt, FNP    labetalol 20 mg/4 mL (5 mg/mL) IV syring, 10 mg, Intravenous, Q4H PRN, Vishal Parisiehart, FNP    LIDOcaine 5 % patch 1 patch, 1 patch, Transdermal, Q24H, Annette Devrieste, FNP, 1 patch at 06/14/23 0531    lisinopriL tablet 20 mg, 20 mg, Oral, Daily, Vishal Parisiehart, FNP, 20 mg at 06/14/23 0831    metFORMIN tablet 500 mg, 500 mg, Oral, Daily before evening meal, Annette Devrieste, FNP, 500 mg at 06/13/23 1701    methocarbamoL tablet 500 mg, 500 mg, Oral, Q6H, Annette Devrieste, FNP, 500 mg at 06/14/23 1100    metoprolol injection 10 mg, 10 mg, Intravenous, Q2H PRN, Vishal Sandy, FNP    mirtazapine tablet 30 mg, 30 mg, Oral, After dinner, Annette Devrieste, FNP, 30 mg at 06/13/23  "1701    nitroGLYCERIN SL tablet 0.4 mg, 0.4 mg, Sublingual, Q5 Min PRN, Vishal Parisiehart, FNP    ondansetron disintegrating tablet 4 mg, 4 mg, Oral, Q6H PRN, Vishal MAN Du, FNP, 4 mg at 06/14/23 1224    oxyCODONE immediate release tablet 5 mg, 5 mg, Oral, Q6H PRN, Annette A Woodman, FNP    polyethylene glycol packet 17 g, 17 g, Oral, BID PRN, Vihsal MAGDA Du, FNP    promethazine tablet 25 mg, 25 mg, Oral, Q6H PRN, Vishal MAGDA Du, FNP    SITagliptin phosphate tablet 50 mg, 50 mg, Oral, Daily, Vishal MAN Du, FNP, 50 mg at 06/14/23 0831    traMADoL tablet 50 mg, 50 mg, Oral, Q6H PRN, Annette MAN Lubna, FNP, 50 mg at 06/14/23 0831     Review of Systems   Complete 12-point review of symptoms negative except for what's mentioned in HPI     Objective:     /78   Pulse 108   Temp 98 °F (36.7 °C) (Oral)   Resp 18   Ht 4' 11.02" (1.499 m)   Wt 48.1 kg (106 lb 0.7 oz)   SpO2 98%   Breastfeeding No   BMI 21.41 kg/m²        Physical Exam  Constitutional:       Appearance: Normal appearance.   HENT:      Head: Normocephalic.      Mouth/Throat:      Mouth: Mucous membranes are moist.   Eyes:      Pupils: Pupils are equal, round, and reactive to light.   Cardiovascular:      Rate and Rhythm: Normal rate and regular rhythm.      Heart sounds: Normal heart sounds.   Pulmonary:      Effort: Pulmonary effort is normal.      Breath sounds: Normal breath sounds.   Abdominal:      General: Bowel sounds are normal.      Palpations: Abdomen is soft.   Musculoskeletal:      Cervical back: Neck supple.      Comments: muscle atrophy   Skin:     General: Skin is warm and dry.   Neurological:      Mental Status: She is alert and oriented to person, place, and time.      Motor: Weakness present.   Psychiatric:         Mood and Affect: Mood normal.         Behavior: Behavior normal.         Thought Content: Thought content normal.         Judgment: Judgment normal.   *MD performed and documented physical " examination       Lines/Drains/Airways       None                   Labs  Recent Results (from the past 24 hour(s))   POCT glucose    Collection Time: 06/13/23  4:39 PM   Result Value Ref Range    POCT Glucose 167 (H) 70 - 110 mg/dL   POCT glucose    Collection Time: 06/13/23  9:21 PM   Result Value Ref Range    POCT Glucose 335 (H) 70 - 110 mg/dL       Radiology  CT head without contrast on 06/06/2023 at 3:00 p.m., IMPRESSION: No acute intracranial abnormality identified.  Findings of chronic microvascular ischemic disease.  Radiology  CT abdomen/pelvis with contrast on 6/6, IMPRESSION: No definite acute intra-abdominal or intrathoracic abnormality.  There are acute left-sided rib fractures as above.  Evaluation for additional fractures is limited by motion artifact.  Soft tissue density at the left sacrum as would be seen with decubitus ulcer is again noted.    Assessment/Plan:     87 y.o. AAF admitted on 6/9/2023    Debility   - 2/2 frequent falls with left 4th-7th rib fractures  - defer to physiatry for rehab and pain management  - PT/OT/RT following     Multiple rib fractures  - left 4-7th rib fractures  - continue                DuoNebs q.8 hours                Percocet 5 mg/325 mg q.6 hours p.r.n.                Lidoderm patch to left chest daily                Gabapentin 300 mg t.i.d.  - encourage incentive spirometer q.1 hour while awake     Proximal transverse colon adenocarcinoma  - s/p laparoscopic/robotic right hemicolectomy on 7/29/2021  - Biopsy significant for adenocarcinoma-no metastasis  - to follow up with oncology     Moderate protein calorie malnutrition  - albumin 2.8/prealbumin 18.8-trending up  - registered dietitian following  - encourage oral nutrition and hydration  - continue                Megace 400 mg b.i.d. x5 days (initiated 6/9)      History of bilateral ischemic CVA  - 6/2020  - continue                Lipitor 10 mg daily                Aspirin 325 mg daily     CAD  - s/p MI  -  denies recent chest pain or discomfort  - continue                Lisinopril 20 mg daily                Lipitor 10 mg daily                Aspirin 325 mg daily  - ECG and Nitro PRN  - not on Plavix or BB  - continue cardiac diet  - to follow-up with cardiology outpatient        HTN  - at goal!!  - continue                Lisinopril 20 mg daily                Norvasc 10 mg daily                Hydralazine 10 mg every 2 hours as needed for BP > 160/90                Labetalol 10 mg every 2 hours as needed  - Low-sodium diet      Iron deficiency anemia  - asymptomatic  - H/H stable   - continue                Folic acid 1 mg daily  - no evidence of active bleeds  - will closely monitor and transfuse if needed      History of vasovagal syncope  - s/p Linq device  - to follow up with cardiology outpatient     Left carotid stenosis  - Left ICA > 85%  - continue                Aspirin 325 mg daily                Lipitor 10 mg daily  - to follow-up outpatient with vascular surgery     Poorly controlled DM type II  - HgA1c 10.5 on 6/6/2023  - continue   Januvia 50 mg daily (initiated 6/12)  Metformin 500 mg daily (initiated 6/11)                Levemir 22 units b.i.d. (increased 6/14)                ISS   - CBGs AC/HS     HLD  - FLP at goal!!  - continue                Lipitor 10 mg daily     Constipation  - stable  - continue  Colace 100 mg BID   Miralax 17 gm BID PRN     Unstageable sacral/left buttocks decubitus ulcer  - stable  - duo derm applied  - turn q2h  - wound care following     VTE Prophylaxis:  Lovenox 30 mg b.i.d.  COVID-19 testing:  Unknown  COVID-19 vaccination status:  Vaccinated (Moderna):  02/05/2021, 03/05/2021, 10/29/2021, and 06/04/2022     POA: Alhaji Hess (son) [phone]  Living will: No  Contacts: Alhaji Hess (son) [phone]                 Elder Hess (son) [phone]                 Raegan Stewart (sister) [phone]     CODE STATUS: Full Code  Internal Medicine (attending): Troy Ponce  MD Truong  Physiatry (consulting):  Avi Melchor MD     OUTPATIENT PROVIDERS  PCP: Anant Mejia MD  Cardiology: Henry Cifuentes MD  Vascular surgery: iKp Shah MD  General surgery: Justo Henry MD  Gastroenterology:  Kip Forbes MD     DISPOSITION:  Vital signs at goal.  No labs today.  CBG is moderately controlled.  Increase insulin Levemir to 22 units b.i.d..  Monitor closely.  Bowel management, sleep hygiene, and appetite at goal.  Continues to progress well with therapies.  Continue aggressive mobilization as tolerated.  Monitor closely.  Notify of acute changes.      Staffing 6/13: Incontinent of bowel and bladder. Good appetite. RT and PT: overall mod assist. OT: overall mod assist. Needs more time to increase endurance. Projected discharge 6/20.     Kaity Arndt NP conducted independent physical examination and assisted with medical documentation.

## 2023-06-15 LAB
POCT GLUCOSE: 132 MG/DL (ref 70–110)
POCT GLUCOSE: 139 MG/DL (ref 70–110)
POCT GLUCOSE: 232 MG/DL (ref 70–110)

## 2023-06-15 PROCEDURE — 97110 THERAPEUTIC EXERCISES: CPT

## 2023-06-15 PROCEDURE — 97168 OT RE-EVAL EST PLAN CARE: CPT

## 2023-06-15 PROCEDURE — 97530 THERAPEUTIC ACTIVITIES: CPT

## 2023-06-15 PROCEDURE — 63600175 PHARM REV CODE 636 W HCPCS: Performed by: NURSE PRACTITIONER

## 2023-06-15 PROCEDURE — 94799 UNLISTED PULMONARY SVC/PX: CPT

## 2023-06-15 PROCEDURE — 97164 PT RE-EVAL EST PLAN CARE: CPT

## 2023-06-15 PROCEDURE — 99233 SBSQ HOSP IP/OBS HIGH 50: CPT | Mod: ,,, | Performed by: NURSE PRACTITIONER

## 2023-06-15 PROCEDURE — 25000003 PHARM REV CODE 250: Performed by: NURSE PRACTITIONER

## 2023-06-15 PROCEDURE — 97530 THERAPEUTIC ACTIVITIES: CPT | Mod: CQ

## 2023-06-15 PROCEDURE — 97116 GAIT TRAINING THERAPY: CPT

## 2023-06-15 PROCEDURE — 94761 N-INVAS EAR/PLS OXIMETRY MLT: CPT

## 2023-06-15 PROCEDURE — 99900035 HC TECH TIME PER 15 MIN (STAT)

## 2023-06-15 PROCEDURE — 11800000 HC REHAB PRIVATE ROOM

## 2023-06-15 PROCEDURE — 97535 SELF CARE MNGMENT TRAINING: CPT

## 2023-06-15 PROCEDURE — 97542 WHEELCHAIR MNGMENT TRAINING: CPT

## 2023-06-15 PROCEDURE — 99233 PR SUBSEQUENT HOSPITAL CARE,LEVL III: ICD-10-PCS | Mod: ,,, | Performed by: NURSE PRACTITIONER

## 2023-06-15 RX ADMIN — INSULIN DETEMIR 22 UNITS: 100 INJECTION, SOLUTION SUBCUTANEOUS at 08:06

## 2023-06-15 RX ADMIN — DULOXETINE HYDROCHLORIDE 30 MG: 30 CAPSULE, DELAYED RELEASE ORAL at 08:06

## 2023-06-15 RX ADMIN — ACETAMINOPHEN 325MG 650 MG: 325 TABLET ORAL at 12:06

## 2023-06-15 RX ADMIN — LISINOPRIL 20 MG: 10 TABLET ORAL at 08:06

## 2023-06-15 RX ADMIN — TRAMADOL HYDROCHLORIDE 50 MG: 50 TABLET, COATED ORAL at 09:06

## 2023-06-15 RX ADMIN — METHOCARBAMOL 500 MG: 500 TABLET ORAL at 12:06

## 2023-06-15 RX ADMIN — GABAPENTIN 300 MG: 300 CAPSULE ORAL at 05:06

## 2023-06-15 RX ADMIN — ATORVASTATIN CALCIUM 10 MG: 10 TABLET, FILM COATED ORAL at 08:06

## 2023-06-15 RX ADMIN — ASPIRIN 325 MG: 325 TABLET, FILM COATED ORAL at 08:06

## 2023-06-15 RX ADMIN — ENOXAPARIN SODIUM 30 MG: 30 INJECTION SUBCUTANEOUS at 08:06

## 2023-06-15 RX ADMIN — FOLIC ACID 1000 MCG: 1 TABLET ORAL at 08:06

## 2023-06-15 RX ADMIN — METFORMIN HYDROCHLORIDE 500 MG: 500 TABLET, FILM COATED ORAL at 05:06

## 2023-06-15 RX ADMIN — GABAPENTIN 300 MG: 300 CAPSULE ORAL at 12:06

## 2023-06-15 RX ADMIN — METHOCARBAMOL 500 MG: 500 TABLET ORAL at 05:06

## 2023-06-15 RX ADMIN — DOCUSATE SODIUM 100 MG: 100 CAPSULE, LIQUID FILLED ORAL at 08:06

## 2023-06-15 RX ADMIN — SITAGLIPTIN 50 MG: 50 TABLET, FILM COATED ORAL at 08:06

## 2023-06-15 RX ADMIN — ACETAMINOPHEN 325MG 650 MG: 325 TABLET ORAL at 05:06

## 2023-06-15 RX ADMIN — AMLODIPINE BESYLATE 10 MG: 5 TABLET ORAL at 08:06

## 2023-06-15 RX ADMIN — ACETAMINOPHEN 325MG 650 MG: 325 TABLET ORAL at 06:06

## 2023-06-15 RX ADMIN — MIRTAZAPINE 30 MG: 15 TABLET, FILM COATED ORAL at 05:06

## 2023-06-15 RX ADMIN — INSULIN ASPART 4 UNITS: 100 INJECTION, SOLUTION INTRAVENOUS; SUBCUTANEOUS at 04:06

## 2023-06-15 RX ADMIN — LIDOCAINE 5% 1 PATCH: 700 PATCH TOPICAL at 05:06

## 2023-06-15 NOTE — PROGRESS NOTES
[facility] - Rehab Inpatient Services  Wound Care    Patient Name:  Lita Hess   MRN:  [id number]  Date: 6/15/2023  Diagnosis: Debility    History:     Past Medical History:   Diagnosis Date    Chronic back pain 10/26/2022    Colon cancer     CVA (cerebral vascular accident)     Gastroesophageal reflux disease without esophagitis 10/26/2022    Hypertension     Iron deficiency anemia secondary to inadequate dietary iron intake 10/26/2022    Loss of appetite     Mixed hyperlipidemia 10/26/2022    Stroke     Type 2 diabetes mellitus without complication 10/26/2022    Vitamin D deficiency          Precautions:     Allergies as of 06/09/2023    (No Known Allergies)       [facility] Assessment Details/Treatment        06/15/23 1329        Altered Skin Integrity 06/09/23 1300  Sacral spine Partial thickness tissue loss. Shallow open ulcer with a red or pink wound bed, without slough. Intact or Open/Ruptured Serum-filled blister.   Date First Assessed/Time First Assessed: 06/09/23 1300   Altered Skin Integrity Present on Admission - Did Patient arrive to the hospital with altered skin?: yes  Side: (c)   Location: Sacral spine  Description of Altered Skin Integrity: (c) Partial t...   Description of Altered Skin Integrity Partial thickness tissue loss. Shallow open ulcer with a red or pink wound bed, without slough. Intact or Open/Ruptured Serum-filled blister.   Drainage Amount None   Appearance Pink   Tissue loss description Partial thickness   Red (%), Wound Tissue Color 100 %   Wound Length (cm) 1 cm   Wound Width (cm) 1 cm   Wound Depth (cm) 0.1 cm   Wound Volume (cm^3) 0.1 cm^3   Wound Surface Area (cm^2) 1 cm^2   Care Cleansed with:;Antimicrobial agent;Applied:   Dressing Hydrocolloid;Foam   Dressing Change Due 06/20/23        Altered Skin Integrity 06/09/23 1300 Right Heel   Date First Assessed/Time First Assessed: 06/09/23 1300   Side: Right  Location: Heel   Description of Altered Skin Integrity Purple  or maroon localized area of discolored intact skin or non-intact skin or a blood-filled blister.   Drainage Amount None   Appearance Maroon;Intact   Periwound Area Dry   Wound Length (cm) 1.5 cm   Wound Width (cm) 1.5 cm   Wound Surface Area (cm^2) 2.25 cm^2   Care   (off loading)   Off Loading   (heel boots)     Right buttock still has a 1 x 1 open area that is painful to touch. Applied hydrocolloid and foam for cushioning due to pain.  Right heel SDTI is unchanged. Applied heel boots and will continue to offload. 06/15/2023

## 2023-06-15 NOTE — PLAN OF CARE
Problem: Diabetes Comorbidity  Goal: Blood Glucose Level Within Targeted Range  Outcome: Ongoing, Progressing  Intervention: Monitor and Manage Glycemia  Flowsheets (Taken 6/14/2023 2045)  Glycemic Management: blood glucose monitored     Problem: Fall Injury Risk  Goal: Absence of Fall and Fall-Related Injury  Outcome: Ongoing, Progressing  Intervention: Identify and Manage Contributors  Flowsheets (Taken 6/14/2023 2045)  Medication Review/Management: medications reviewed  Intervention: Promote Injury-Free Environment  Flowsheets (Taken 6/14/2023 2045)  Safety Promotion/Fall Prevention:   assistive device/personal item within reach   bed alarm set   side rails raised x 2   medications reviewed   nonskid shoes/socks when out of bed     Problem: Skin Injury Risk Increased  Goal: Skin Health and Integrity  Outcome: Ongoing, Progressing  Intervention: Optimize Skin Protection  Flowsheets (Taken 6/14/2023 2045)  Pressure Reduction Techniques:   frequent weight shift encouraged   heels elevated off bed  Skin Protection:   hydrocolloids used   incontinence pads utilized   skin sealant/moisture barrier applied  Intervention: Promote and Optimize Oral Intake  Flowsheets (Taken 6/14/2023 2045)  Oral Nutrition Promotion: physical activity promoted

## 2023-06-15 NOTE — PLAN OF CARE
Received Form 142 approval from [facility].  Forwarded to Rufino with [facility] via [employer].    Rufino did convey acceptance of pt on their SNF unit for 6/20.  Will relay to patient.  Rufino will reach out to son Dyllan to discuss details.

## 2023-06-15 NOTE — PT/OT/SLP PROGRESS
Recreational Therapy Treatment    Date of Treatment: 06/15/23  Start Time: 0901  Stop Time: 0930  Total Time: 29 min  Missed Time:     General Precautions: fall  Ortho Precautions: N/A  Braces: N/A    Vitals   Vitals at Rest  BP    HR    O2 Sat    Pain 7/10     Vitals With Activity  BP    HR    O2 Sat    Pain 7/10       Treatment     Cognitive Skills Building   Cognitive Observation Activity Assist Position Equipment Response            Comment:          Dynamic Activities   Activity Assist Position Equipment Response   Activity 1 Golf supervision Standing Rolling walker and Golf balls, golf club good   Comment: Sit to stand was supervision as was dynamic standing balance/reaching.  Standing tolerance was 5 minutes. RUE coordination was supervision.  Hand over hand assist needed for motor planning of RUE.  Memory remains supervision.  More interactive this AM. 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

## 2023-06-15 NOTE — PROGRESS NOTES
06/15/23 0901   Rec Therapy Time Calculation   Date of Treatment 06/15/23   Rec Start Time 0901   Rec Stop Time 0930   Rec Total Time (min) 29 min   Time   Treatment time 2 units   Charges   $Therapeutic Activity 1unit   Precautions   General Precautions fall   Orthopedic Precautions  N/A   Braces N/A   OTHER   Rehab identified problem list/impairments weakness;impaired endurance;impaired functional mobility;gait instability;impaired cognition;decreased coordination;decreased lower extremity function;decreased safety awareness;pain   Values/Beliefs/Spiritual Care   Spiritual, Cultural Beliefs, Confucianist Practices, Values that Affect Care no   Overall Level of Functioning   Activity Tolerance Mod Indep   Dynamic Sitting Balance/Reaching Does not occur   Dynamic Standing Balance/Reaching Standby Assist   Right UE Coodination/Dexterity Standby Assist   Left UE Coordination/Dexterity Mod Indep   Problem Solving/Sequencing Skills Standby Assist   Memory Recall Standby Assist   R/L Neglect/Inattention Does not occur   Attention Span Standby Assist   Social Interaction Independent   Recreational Therapy Short Term Goals   Short Term Goal 1 Progression Met   Short Term Goal 2 Progression Met   Short Term Goal 3 Progression Met   Recreational Therapy Long Term Goals   Long Term Goal 1 Progression Met   Long Term Goal 2 Progression Progressing   Plan   Patient to be seen Daily   Planned Duration 1 week   Treatments Planned Cognitive training;Coordination;Energy conservation training;Safety education   Treatment plan/goals estblished with Patient/Caregiver Yes

## 2023-06-15 NOTE — PROGRESS NOTES
Subjective  HPI: 88 yo BF with PMH of CVA x 2, MI with loop recorder, HTN, GERD, hip replacement in past, and remote hx colon cancer s/p colectomy presented to the ED at [facility] on 6/6/23 after falling at home. Patient complained of left rib pain and left hip pain. CT head showed no acute intracranial abnormality identified, and chronic microvascular ischemic disease. CT cervical spine showed no acute findings. CT of chest/abdomen/ pelvis showed acute fractures of the left 4th, 5th, 6th, and 7th ribs, scoliotic curvature of the spine with no acute fracture identified; and no fracture of right ribs identified. Hip XR showed no acute fractures. Patient was admitted to ICU for close respiratory monitoring, encouraged to use IS, started duonebs, and CPT ordered. On 6/7, PT eval completed with deficits noted. On 6/8, OT eval completed with deficits noted. Labs showed low BUN of 9.6, low Na of 133, low H&H of 11.7 & 35.7, elevated CBG of 346, and low albumin of 3.1. On Lovenox for DVT prophylaxis. On 6/9, CBG remains elevated. Patient is AAOx4.  Participating with therapy. Functional status includes minimal-moderate assist needed for bed mobility, minimal assist for transfer with RW, minimal assist for walking about 30 ft with RW, purwick catheter for toileting, moderate-max assist for lower body dressing. Patient was evaluated, accepted, and admitted to inpatient rehab to improve functional status. Transferred to [facility] on 6/9 without incident.      6/15: Seen with PT/RT, seated in WC with pain complaints. Nursing brings Tramadol. Patient standing w/RW and playing mini golf. VC for hand placement and technique. 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 Participating with therapy with staff encouragement. VSSAF.         Review of Systems  Psychiatric: Per son, pt. Recently complained of depression. Pt. Has a history of anxiety and takes medication for this. She has no substance abuse history    Depression/Anxiety: Noted  "Remeron 30mg qPM in Home medication. Restart. Consider Cymbalta for additional coverage and pain.      mirtazapine tablet 30 mg after dinner  ALPRAZolam tablet 0.25 mg TID PRN Anxiety  DULoxetine DR capsule 30 mg qd, start 6/14  Pain: left side-ribs, LLE  DULoxetine DR capsule 30 mg qd, start 6/14  gabapentin capsule 300 mg TID. Increase q6h  acetaminophen tablet 650 mg TID. q6h  methocarbamoL tablet 500 mg TID. q6h  traMADoL tablet 50 mg q6h PRN mod pain  oxyCODONE 5 mg 1 tablet q6h PRN severe pain  Bowels/Bladder: last BM 6/14  Appetite: "good"  megestroL 400 mg/10 mL (10 mL) suspension 400 mg BID     Sleep: good         Physical Exam  General: well-developed, thin/frail appearing, in no acute distress  Respiratory: equal chest rise, no SOB, no audible wheeze  Cardiovascular: regular rate and rhythm, no edema  Gastrointestinal: soft, non-tender, non-distended   Musculoskeletal: decreased ROM/strength to LLE; generalized weakness  Integumentary: no rashes or skin lesions present, right heel/sacral pressure wounds  Neurologic: cranial nerves intact, no signs of peripheral neurological deficit, motor/sensory function intact            Assessment/Plan  Hospital   Fall   Rib fracture   Debility     Non-Hospital   Chronic back pain (Chronic)   Mixed hyperlipidemia (Chronic)   Hypertension (Chronic)   Type 2 diabetes mellitus without complication (Chronic)   Gastroesophageal reflux disease without esophagitis (Chronic)   Iron deficiency anemia secondary to inadequate dietary iron intake (Chronic)   CVA (cerebral vascular accident) (Chronic)   Loss of appetite (Chronic)   Vitamin D deficiency (Chronic)   Frailty (Chronic)   Arteriosclerosis of coronary artery   At risk of pressure injury of skin   Hiatal hernia   Scoliosis   Stenosis of carotid artery   Severe malnutrition   Colon cancer       Wounds: right heel/sacral pressure wounds  Precautions: fall risk  Bracing/AD: RW  Swallowing: Diabetic Diet  Function: Tolerating " therapy. Continue PT/OT  VTE Prophylaxis:   enoxaparin injection 30 mg SubQ q12h  Code Status: FULL CODE   Discharge: Lives alone in [place] in a single-story home with 1 threshold step to enter the residence. Completed her master's degree. She has no  history. 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 She had a volunteer come in to help her around the house and for errands every now and then. Per son, the family is arranging for her care after she leaves rehab.  Children: (2). Date 6/20 Tuesday.

## 2023-06-15 NOTE — PT/OT/SLP PROGRESS
Physical Therapy Inpatient Rehab Treatment    Patient Name:  Lita Hess   MRN:  [id number]    Recommendations:     Discharge Recommendations:  nursing facility, skilled   Discharge Equipment Recommendations: bedside commode, walker, rolling, wheelchair   Barriers to discharge: Decreased caregiver support    Assessment:     Lita Hess is a 87 y.o. female admitted with a medical diagnosis of Debility.  She presents with the following impairments/functional limitations:  weakness, impaired endurance, impaired self care skills, impaired functional mobility, gait instability, impaired balance, decreased safety awareness, pain, decreased lower extremity function, decreased upper extremity function, impaired cognition, decreased ROM .    Rehab Diagnosis:  debility    General Precautions: Standard, fall     Orthopedic Precautions:N/A     Braces: N/A    Rehab Prognosis: Fair; patient would benefit from acute skilled PT services to address these deficits and reach maximum level of function.      History:     Past Medical History:   Diagnosis Date    Chronic back pain 10/26/2022    Colon cancer     CVA (cerebral vascular accident)     Gastroesophageal reflux disease without esophagitis 10/26/2022    Hypertension     Iron deficiency anemia secondary to inadequate dietary iron intake 10/26/2022    Loss of appetite     Mixed hyperlipidemia 10/26/2022    Stroke     Type 2 diabetes mellitus without complication 10/26/2022    Vitamin D deficiency        Past Surgical History:   Procedure Laterality Date    COLECTOMY      COLONOSCOPY      ESOPHAGOGASTRODUODENOSCOPY      gastro biopsy      HIP REPLACEMENT ARTHROPLASTY      SCLEROTHERAPY WITH ULTRASOUND GUIDANCE      TRANSESOPHAGEAL ECHOCARDIOGRAPHY         Subjective     Chief Complaint: pain and sleepy     Respiratory Status: Room air    Patients cultural, spiritual, Restoration conflicts given the current situation: no      Objective:     Communicated with nursing  prior to session.   Patient found HOB elevated with Other (comments) (in bed)  upon PT entry to room.    Pt is Oriented x3 and Alert.    Vitals   Vitals at Rest  /65   HR 87   O2 Sat    Pain      Vitals With Activity  BP    HR    O2 Sat    Pain        Functional Mobility:   Bed Mobility:     Supine to Sit: sba with vc and increased time use of bed rail   Transfers:     Sit to Stand: min a on first few sit<>stands then sba increased time slow transition with rolling walker and vc for safety in am and pm tx times   Toilet Transfer: Supervision or touching assistance  with  rolling walker  using  Step Transfer and increased time vc for safety post void and bm 2 times with min a with sitting balance on toilet pt leaning to left increased time required assist with toileting hygiene and managing pullup   Wheelchair Propulsion:  Pt propelled Standard wheelchair x 2 trials 50 feet on Level tile with  Bilateral upper extremity with mod a with vc for cont propelling wc encouragement .   Bed mob sba use of bed rail increased time for decrease assist   Rolling to right use of bed rail for wound care to assess buttocks dressing and heels      Current   Status  Discharge   Goal   Functional Area: Care Score:    Roll Left and Right   Independent   Sit to Lying   Set-up/clean-up   Lying to Sitting on Side of Bed   Independent   Sit to Stand   Independent   Chair/Bed-to-Chair Transfer   Independent   Car Transfer       Walk 10 Feet   Independent   Walk 50 Feet with Two Turns   Independent   Walk 150 Feet       Walk 10 Feet Uneven Surface       1 Step (Curb)       4 Steps       12 Steps       Picking Up Object       Wheel 50 Feet with Two Turns       Wheel 150 Feet           Therapeutic Activities and Exercises:  In am tx --Dynamic standing balance with activity golf with rtue to improve strength and standing balance required seated rest breaks use of rw   In pm tx time -- standing for wound care nurse to assess/change buttocks dressing   Activity  Tolerance: Fair    Patient left up in chair with chair alarm on and pt in PT gym awaiting PT for next therapy pt yahaira tx noted increase assist with activities today pt inconsistent with assist levels from day to day . Requiring more assist today with activities from yesterday pm tx  pt requiring assist of one person with use of rw at this time with increased time for decrease assist  . In pm tx time pt returned to bed in right side lying position with B heel protectors on and pillow b/t BLE for pressure relief     Education provided: transfer training, bed mob, balance training, safety awareness, assistive device, strengthening exercises, and fall prevention    Expected compliance: High compliance    GOALS:   Multidisciplinary Problems       Physical Therapy Goals          Problem: Physical Therapy    Goal Priority Disciplines Outcome Goal Variances Interventions   Physical Therapy Goal     PT, PT/OT Ongoing, Progressing     Description: Bed Mobility:  Roll L and R Philip  Sit to supine will be Philip  Supine to sit will be Philip    Transfers:  Sit to stand supervision  Bed to Chair t/f will be supervision  Car t/f will be supervision    Mobility:  Pt will ambulate 150' CGA with RW  Pt will ambulate 15' CGA with RW over uneven terrain  Pt will climb one step CGA with RW                        Plan:     During this hospitalization, patient to be seen 5 x/week to address the identified rehab impairments via gait training, therapeutic activities, therapeutic exercises, neuromuscular re-education and progress toward the following goals:    Plan of Care Expires:  06/16/23  PT Next Visit Date:    Plan of Care reviewed with: patient    Additional Information:         Time Tracking:     Therapy Time  PT Received On: 06/15/23  PT Start Time: 0830  PT Stop Time: 0930  PT Total Time (min): 60 min +30min = 90min total   ( pt seen 0923-9412 and  2518-3606)   PT Individual: 60min + 30min = 90min total tx time   Missed Time:    Time  Missed due to:      Billable Minutes: Therapeutic Activity 60min and Therapeutic Exercise 30min    06/15/2023

## 2023-06-15 NOTE — PT/OT/SLP RE-EVAL
"Occupational Therapy Inpatient Rehab Re-Evaluation    Name: Lita Hess  MRN: [id number]    Recommendations:     Discharge Recommendations:  nursing facility, skilled   Discharge Equipment Recommendations: walker, rolling, wheelchair (tub transfer bench)   Barriers to discharge: None    Assessment:  Lita Hess is a 87 y.o. female admitted with a medical diagnosis of Debility.  She presents with the following impairments/functional limitations:  weakness, impaired endurance, impaired self care skills, impaired functional mobility, gait instability, impaired balance, decreased coordination, decreased upper extremity function, decreased lower extremity function, decreased safety awareness, pain, decreased ROM, impaired coordination .    General Precautions: Standard, fall     Orthopedic Precautions:N/A     Braces: N/A    Rehab Prognosis: Fair; patient would benefit from acute skilled OT services to address these deficits and reach maximum level of function.      History:     Past Medical History:   Diagnosis Date    Chronic back pain 10/26/2022    Colon cancer     CVA (cerebral vascular accident)     Gastroesophageal reflux disease without esophagitis 10/26/2022    Hypertension     Iron deficiency anemia secondary to inadequate dietary iron intake 10/26/2022    Loss of appetite     Mixed hyperlipidemia 10/26/2022    Stroke     Type 2 diabetes mellitus without complication 10/26/2022    Vitamin D deficiency        Past Surgical History:   Procedure Laterality Date    COLECTOMY      COLONOSCOPY      ESOPHAGOGASTRODUODENOSCOPY      gastro biopsy      HIP REPLACEMENT ARTHROPLASTY      SCLEROTHERAPY WITH ULTRASOUND GUIDANCE      TRANSESOPHAGEAL ECHOCARDIOGRAPHY         Subjective     Orientation: Oriented x4    Chief Complaint: "My ribs hurt and there's pain going down my left leg."    Respiratory Status: Room air    Living Environment   Living Environment  People in Home: alone  Living Arrangements: house  Home " Accessibility: wheelchair accessible  Home Layout: Able to live on 1st floor  Equipment Currently Used at Home: walker, rolling, shower chair  Shower Setup: Tub/Shower combo    Prior Level of Function  BADL: Independent    IADL: Independent    Equipment used at home: walker, rolling, shower chair.  DME owned (not currently used): none.      Upon discharge, patient will have assistance from her son.    Objective:     Patient found up in chair with PT Deshawn upon OT entry to room. Pt expressed that she was very tired and wanted to take a break. OT Hyacinth suggested we give a 30 minute break and come back to do 30 min of therapy and the pt agreed.     Mobility   Patient completed:  Sit to Stand Transfer with contact guard assistance with rolling walker  Stand to Sit Transfer with contact guard assistance with rolling walker  Toilet Transfer Step Transfer technique with stand by assistance with  rolling walker and bedside commode  Tub Transfer Step Transfer technique with stand by assistance with rolling walker and TTB    ADLs   Current Status   Eating 5   Toilet Transfer 4   Putting On, Taking Off Footwear 5 pt requires assistance with donning sock onto the sock aide.    Pt refused to take a shower or sponge bath. OT made a note to re-schedule ADL for tomorrow, June 16th.    Limiting Factors for ADLs: motor, psychsocial, endurance, limited ROM, balance, weakness, coordination, cognition, safety awareness, and pain      Exams     ROM:          -       WFL    Hand Dominance: Right    ROM Hand  Left Hand: WFL  Right Hand: WFL    Strength  Overall Strength:          -       WFL      Sensation  Left:          -       WNL  Right:          -       WNL    Coordination:      -       Intact    Tone  Left: WNL  Right: WNL    Visual/Perceptual  Intact    Cognition:   WFL    Balance    Sitting  Sitting Surface: w/c  Static: Bilateral UE Extremity Support, Good  Dynamic: Bilateral UE extremity support, Good    Standing  Static:  Bilateral UE Extremity Support, Good (-)  Dynamic: Bilateral UE extremity support, Fair (+)      LifeStyle Change and Education     Patient left up in chair with call button in reach.    Education provided: Roles and goals of OT, ADLs, transfer training, assistive device, sequencing, safety precautions, fall prevention, equipment recommendations, and home safety    Multidisciplinary Problems       Occupational Therapy Goals          Problem: Occupational Therapy    Goal Priority Disciplines Outcome Interventions   Occupational Therapy Goal     OT, PT/OT Ongoing, Progressing    Description: ADLs:  Pt to perform grooming tasks with set up and clean up. Ongoing/progressing  Pt to perform UB dressing with set up and clean up. Ongoing/progressing   Pt to perform LB dressing with set up and clean up with use of AD. Ongoing/progressing  Pt to perform putting on/off footwear task with set up and clean up and AD. MET  Pt to perform putting on/off footwear task with independence and AD  Pt to perform toileting with mod a using RW and BSC. Ongoing/progressing  Pt to perform bathing with mod a using TTB. Ongoing/progressing    Functional Transfers:  Pt to perform toilet transfers with CGA and RW. MET  Pt to perform toilet transfer with set up and clean up and RW  Pt to perform a tub transfer with mod a and TTB. MET  Pt to perform a tub transfer with SBA and TTB.                            Plan     During this hospitalization, patient to be seen 5 x/week to address the identified rehab impairments via self-care/home management, community/work re-entry, therapeutic activities, therapeutic exercises, wheelchair management/training and progress toward the following goals:    Plan of Care Expires:  06/21/23    Time Tracking     OT Received On: 06/15/23  Time In 1100     Time Out 1200  Total Time 60 min  Therapy Time: OT Individual: 30  Missed Time: 30 Minutes  Missed Time Reason: Patient fatigue    Billable Minutes: Re-eval 15 min  and Self Care/Home Management 15 min    06/15/2023

## 2023-06-15 NOTE — PLAN OF CARE
Problem: Diabetes Comorbidity  Goal: Blood Glucose Level Within Targeted Range  Outcome: Ongoing, Progressing  Intervention: Monitor and Manage Glycemia  Flowsheets (Taken 6/15/2023 0806)  Glycemic Management:   blood glucose monitored   oral hydration promoted     Problem: Rehabilitation (IRF) Plan of Care  Goal: Plan of Care Review  Outcome: Ongoing, Progressing  Goal: Patient-Specific Goal (Individualized)  Outcome: Ongoing, Progressing  Goal: Absence of New-Onset Illness or Injury  Outcome: Ongoing, Progressing  Intervention: Prevent Fall and Fall Injury  Flowsheets (Taken 6/15/2023 0806)  Safety Promotion/Fall Prevention:   assistive device/personal item within reach   gait belt with ambulation   lighting adjusted   medications reviewed   nonskid shoes/socks when out of bed   side rails raised x 2   instructed to call staff for mobility  Intervention: Prevent Skin Injury  Flowsheets (Taken 6/15/2023 0806)  Body Position:   sitting up in bed   weight shifting  Skin Protection: incontinence pads utilized  Goal: Optimal Comfort and Wellbeing  Outcome: Ongoing, Progressing  Goal: Readiness for Transition of Care  Outcome: Ongoing, Progressing     Problem: Fall Injury Risk  Goal: Absence of Fall and Fall-Related Injury  Outcome: Ongoing, Progressing     Problem: Skin Injury Risk Increased  Goal: Skin Health and Integrity  Outcome: Ongoing, Progressing     Problem: Impaired Wound Healing  Goal: Optimal Wound Healing  Outcome: Ongoing, Progressing

## 2023-06-15 NOTE — PT/OT/SLP RE-EVAL
"Physical Therapy Rehab Re-Evaluation    Patient Name:  Lita Hess   MRN:  [id number]    Recommendations:     Discharge Recommendations:  nursing facility, skilled   Discharge Equipment Recommendations: bedside commode, walker, rolling, wheelchair   Barriers to discharge:  impaired functional mobility, pain    Assessment:     Lita Hess is a 87 y.o. female admitted with a medical diagnosis of Debility.  She presents with the following impairments/functional limitations:  weakness, impaired endurance, impaired self care skills, impaired functional mobility, decreased lower extremity function, gait instability, decreased safety awareness, pain .    Rehab Diagnosis:      General Precautions: Standard, fall     Orthopedic Precautions: N/A     Braces: N/A    Rehab Prognosis: Poor; patient would benefit from acute skilled PT services to address these deficits and reach maximum level of function.      History:     Past Medical History:   Diagnosis Date    Chronic back pain 10/26/2022    Colon cancer     CVA (cerebral vascular accident)     Gastroesophageal reflux disease without esophagitis 10/26/2022    Hypertension     Iron deficiency anemia secondary to inadequate dietary iron intake 10/26/2022    Loss of appetite     Mixed hyperlipidemia 10/26/2022    Stroke     Type 2 diabetes mellitus without complication 10/26/2022    Vitamin D deficiency        Past Surgical History:   Procedure Laterality Date    COLECTOMY      COLONOSCOPY      ESOPHAGOGASTRODUODENOSCOPY      gastro biopsy      HIP REPLACEMENT ARTHROPLASTY      SCLEROTHERAPY WITH ULTRASOUND GUIDANCE      TRANSESOPHAGEAL ECHOCARDIOGRAPHY         Subjective     Chief Complaint: L flank pain, fatigue, "I want to go to bed"    Patients cultural, spiritual, Episcopalian conflicts given the current situation:         Living Environment  People in Home: alone  Living Arrangements: house  Home Accessibility: wheelchair accessible  Home Layout: Able to live on 1st " floor  Equipment Currently Used at Home: walker, rolling, shower chair    Prior Level of Function  Ambulation Skills: needs device    Objective:     Communicated with pt prior to session.  Patient found up in chair with    upon PT entry to room.    Vitals   Pain Pain Rating 1: 8/10  Location - Side 1: Left  Location 1: flank  Pain Addressed 1: Pre-medicate for activity, Reposition, Cessation of Activity, Nurse notified  Pain Rating Post-Intervention 1: 7/10     Respiratory Status: Room air    Functional Mobility   Admit Current   Status Goal   Functional Area: Care Score: Care Score: Care Score:   Roll Left and Right 3 4  With bed rails Independent   Sit to Lying 2 3  Partial A for B LE, with bed rails, HOB flat. Increased time needed for completion with increased independence. Set-up/clean-up   Lying to Sitting on Side of Bed 3 4  With bed rails, HOB flat. Increased time needed for completion increased independence. Independent   Sit to Stand 3 4  With RW Independent   Chair/Bed-to-Chair Transfer 3 4  With RW Independent   Car Transfer 7      Walk 10 Feet 4 4 Independent   Walk 50 Feet with Two Turns 4 4  Pt amb 55' + 130' CGA with RW. Very slow gait speed, encouragement needed for increased gait distance. Small B step sizes and height with forward flexed posture throughout. Significant amount of increased time needed. Limited by fatigue and pain. Independent   Walk 150 Feet 7      Walk 10 Feet Uneven Surface 7      1 Step (Curb) 7      4 Steps 9      12 Steps 9      Picking Up Object 7      Wheel 50 Feet with Two Turns 9 4     Wheel 150 Feet 9 3  Pt manually propelled w/c 100'. Mostly SBA provided with occasional min A needed as distance and fatigue increased. Very slow speed, increased time needed for completion.       Therapeutic Activities and Exercises:  Seated therex: 3x15 B marches, LAQs, and PF/DF with 1.5 lbs on B LE. Increased time needed for completion, with seated rest breaks between sets. Frequent VC  and TC needed for improved ROM/technique of each exercise.    Activity Tolerance: Poor    Patient left up in chair with call button in reach and OT Hyacinth present.    Education Provided: roles and goals of PT/PTA, transfer training, bed mob, gait training, safety awareness, body mechanics, assistive device, wheelchair management, strengthening exercises, and fall prevention    Expected compliance: Low compliance      Plan:     During this hospitalization, patient to be seen 5 x/week to address the identified rehab impairments via gait training, therapeutic activities, therapeutic exercises, neuromuscular re-education and progress toward the following goals:    GOALS:   Multidisciplinary Problems       Physical Therapy Goals          Problem: Physical Therapy    Goal Priority Disciplines Outcome Goal Variances Interventions   Physical Therapy Goal     PT, PT/OT Ongoing, Progressing     Description: Bed Mobility:  MET - Roll L and R Philip  MET - Sit to supine will be Philip  MET - Supine to sit will be Philip  Roll left and right independently  Sit to supine transfer with setup/clean-up assist  Supine to sit transfer with setup/clean-up assist    Transfers:  Sit to stand supervision  Bed to Chair t/f will be supervision  Car t/f will be supervision    Mobility:  Pt will ambulate 150' CGA with RW  Pt will ambulate 15' CGA with RW over uneven terrain  Pt will climb one step CGA with RW                        Plan of Care Expires:  06/16/23  PT Next Visit Date: 06/21/23  Plan of Care reviewed with: patient      Additional Infomation:           Time Tracking:     Therapy Time   PT Start Time: 0930  PT Stop Time: 1100  PT Total Time (min): 90 min  PT Individual: 90  Missed Time:    Time Missed due to:      Billable Minutes: Re-eval 30 min, Gait Training 15 min, Therapeutic Exercise 30 min, and Train/Wheelchair Management 15 min    06/15/2023

## 2023-06-15 NOTE — PLAN OF CARE
Spoke with pt re: SNF placement, and she mentioned [facility].  Called son, Dyllan ([phone]), and confirmed with him his/her desire for me to send the referral to [facility].  FOC on chart.    Seding SMS/PASSR/clinicals now via [employer].  Will send Form 142 when received from [facility].    Later received confirmation from Rufino with [facility] that the referral had been received and that she will get back in touch with decision ASAP.

## 2023-06-15 NOTE — PROGRESS NOTES
Inpatient Nutrition Assessment    Admit Date: 6/9/2023   Total duration of encounter: 6 days     Nutrition Recommendation/Prescription     Continue current diet as tolerated.  2.   Continue Boost GC BID (190 kcal, 16 g pro/svg)   3.   Medical management of blood sugars- may need to adj insulin as feasible  4.   Medically management of constipation - encourage H2O intake     Communication of Recommendations: reviewed with nurse and reviewed with patient    Nutrition Assessment     Malnutrition Assessment/Nutrition-Focused Physical Exam    Malnutrition Context: chronic illness  Malnutrition Level: severe  Energy Intake (Malnutrition): other (see comments) (Does not meet criteria)  Weight Loss (Malnutrition): greater than 5% in 1 month  Orbital Region (Subcutaneous Fat Loss): mild depletion  Upper Arm Region (Subcutaneous Fat Loss): mild depletion  Muscle Mass (Malnutrition): severe depletion  Rastafarian Region (Muscle Loss): severe depletion  Clavicle Bone Region (Muscle Loss): severe depletion  Dorsal Hand (Muscle Loss): severe depletion    Fluid Accumulation (Malnutrition): other (see comments) (Does not meet criteria)    A minimum of two characteristics is recommended for diagnosis of either severe or non-severe malnutrition.    Chart Review    Reason Seen: physician consult for New Rehab Admit     Malnutrition Screening Tool Results   Have you recently lost weight without trying?: No  Have you been eating poorly because of a decreased appetite?: No   MST Score: 0     Diagnosis:  fall from standing    Relevant Medical History: CVA, MI, hypertension, and remote history of colon cancer s/p colectomy     Nutrition-Related Medications:  megace, statin, folic acid, SSI, Insulin Determir 20 units BID, lisinopril  Calorie Containing IV Medications: no significant kcals from medications at this time    Nutrition-Related Labs:  6/15: No new labs   6/9: 24 hr x CBGs (127-400)  6/7 Glu 283, Hgb A1C 10.5    Diet/PN Order: Diet  diabetic  Oral Supplement Order: none  Tube Feeding Order: not applicable  Appetite/Oral Intake: good/% of meals  Factors Affecting Nutritional Intake: decreased appetite  Food/Adventist/Cultural Preferences:  Strawberry ONS  Food Allergies: none reported    Skin Integrity: cracked  Wound(s):      Altered Skin Integrity 06/09/23 1300  Sacral spine Partial thickness tissue loss. Shallow open ulcer with a red or pink wound bed, without slough. Intact or Open/Ruptured Serum-filled blister.-Tissue loss description: Partial thickness none noted    Comments    6/15: Noted megace d/c.  Pt continues to report good appetite, eating ~75 - 100% meals. Observed eating good lunch today, drinking Boost. No new wt.     6/9: Pt new admit from [facility].  Observed pt eating very good lunch during rounds (~100% shrimp etouffee), and working on sides. Pt tells me has been very hungry at meals.  Noted per RD note (6/7) - see below, pt with significant wt loss/decreased appetite at home prior to hospitalization. Started on megace - continued at rehab admit. Significant wt loss verified per EMR (~11% wt loss in x 1 month) from UBW reported of 50 kg. Observed obvious signs of malnutrition - fat/muscle wasting. Was receiving Boost VHC at [facility]. Noted pt blood sugars uncontrolled (24 hr x CBGS (127 - 400). Will transition to diabetic supplement Boost GC at this time - pt likes chocolate flavor. Encouraged intake. Pt denies N/V/D/C or chewing/swallowing difficulties. Uncertain of LBM. Did not note any stools documented per EMR in past few days.     RD note from [facility] prior to Rehab Admit:   6/7/23: Noted MST indicates unsure wt loss PTA. Pt confirmed UBW, EMR wt indicates wt loss. Pt stated last wt ~2 weeks ago (stated she is weighed by home health at home.) Drinks 1 ONS/day at home. Offered ONS here and encouraged increasing to at least 2/day at home and here. Pt agreeable. Stated appetite good at home but noted MD notes  "indicate decreased appetite per son. Discussed with RN, will add pt flavor pref and higher kcal version instead of DM version of ONS in order to provide more kcal since pt usually only drinks 1/day.     Anthropometrics    Height: 4' 11.02" (149.9 cm) Height Method: Estimated  Last Weight: 48.1 kg (106 lb 0.7 oz) (06/10/23 0525) Weight Method: Bed Scale  BMI (Calculated): 21.4  BMI Classification: underweight (BMI less than 22 if >65 years of age)     Ideal Body Weight (IBW), Female: 95.1 lb     % Ideal Body Weight, Female (lb): 102.69 %    Usual Body Weight (UBW), k kg  % Usual Body Weight: 88.79  % Weight Change From Usual Weight: -11.4 %  Usual Weight Provided By: patient    Wt Readings from Last 5 Encounters:   06/10/23 48.1 kg (106 lb 0.7 oz)   23 44.3 kg (97 lb 10.6 oz)   23 49.9 kg (110 lb)   23 49.9 kg (110 lb)   22 48.5 kg (107 lb)     Weight Change(s) Since Admission:  Admit Weight: 45.7 kg (100 lb 12 oz) (23 1245) vs 48.1 kg on (6/10) - noted wt discrepency    Estimated Needs    Weight Used For Calorie Calculations: 44.3 kg (97 lb 10.6 oz)  Energy Calorie Requirements (kcal): 1519kcal (1.3 stress factor +500kcal for wt gain)  Energy Need Method: Cameron- Jeor  Weight Used For Protein Calculations: 44.3 kg (97 lb 10.6 oz)  Protein Requirements: 62-71gm (1.4-1.6g/kg)  Fluid Requirements (mL): 1519ml (1ml/kcal)     Temp (24hrs), Av.1 °F (36.7 °C), Min:98.1 °F (36.7 °C), Max:98.1 °F (36.7 °C)      Enteral Nutrition    Patient not receiving enteral nutrition at this time.    Parenteral Nutrition    Patient not receiving parenteral nutrition support at this time.    Evaluation of Received Nutrient Intake    Calories: meeting estimated needs  Protein: meeting estimated needs    Patient Education    Not applicable.    Nutrition Diagnosis     PES: Malnutrition related to chronic illness as evidenced by muscle wasting, fat loss, wt loss. (improving)    Interventions/Goals "     Intervention(s): general/healthful diet, commercial beverage, prescription medication, and collaboration with other providers  Goal: Meet greater than 75% of nutritional needs by follow-up. (goal met)    Monitoring & Evaluation     Dietitian will monitor energy intake.  Nutrition Risk/Follow-Up: moderate (follow-up in 3-5 days)   Please consult if re-assessment needed sooner.

## 2023-06-15 NOTE — PLAN OF CARE
Problem: Occupational Therapy  Goal: Occupational Therapy Goal  Description: ADLs:  Pt to perform grooming tasks with set up and clean up. Ongoing/progressing  Pt to perform UB dressing with set up and clean up. Ongoing/progressing   Pt to perform LB dressing with set up and clean up with use of AD. Ongoing/progressing  Pt to perform putting on/off footwear task with set up and clean up and AD. MET  Pt to perform putting on/off footwear task with independence and AD  Pt to perform toileting with mod a using RW and BSC. Ongoing/progressing  Pt to perform bathing with mod a using TTB. Ongoing/progressing    Functional Transfers:  Pt to perform toilet transfers with CGA and RW. MET  Pt to perform toilet transfer with set up and clean up and RW  Pt to perform a tub transfer with mod a and TTB. MET  Pt to perform a tub transfer with SBA and TTB.       Outcome: Ongoing, Progressing

## 2023-06-15 NOTE — PROGRESS NOTES
[facility] ([facility])  Rehab Progress Note    Patient Name: Lita Hess  MRN: [id number]  Age: 87 y.o. Sex: female  : 1935  Hospital Length of Stay: 6 days  Date of Service: 6/15/2023   Chief Complaint: Debility 2/2 frequent falls with left 4th-7th rib fractures    Subjective:     Basic Information  Admit Information: 87-year-old  female presented to [facility] ED on 2023 complaining of left side pain after accidentally falling backwards over walker.  PMH significant for CVA x2, CAD s/p MI, HTN, carotid artery stenosis, remote history of colon cancer s/p colectomy.  Workup significant for left 4th through 7th rib fractures.  Limited mobility 2/2 pain.  Tolerated transfer to [facility] inpatient rehab unit on  without incident.  Today's Information: No acute events overnight.  Sitting in chair comfortably working with therapy.  Reports good sleep and appetite.  Last BM .  Vital signs at goal with no recent recorded fevers.  Good glycemic control.  No labs or imaging today.    Review of patient's allergies indicates:  No Known Allergies     Current Facility-Administered Medications:     acetaminophen tablet 650 mg, 650 mg, Oral, Q6H, Annette Calvo, FNP, 650 mg at 06/15/23 0625    ALPRAZolam tablet 0.25 mg, 0.25 mg, Oral, TID PRN, Vishal MAN Du, FNP, 0.25 mg at 23 1209    amLODIPine tablet 10 mg, 10 mg, Oral, Daily, Vishal A Du, FNP, 10 mg at 06/15/23 0809    aspirin tablet 325 mg, 325 mg, Oral, Daily, Vishal A Du, FNP, 325 mg at 06/15/23 0809    atorvastatin tablet 10 mg, 10 mg, Oral, Daily, Vishal A Du, FNP, 10 mg at 06/15/23 0809    benzonatate capsule 100 mg, 100 mg, Oral, TID PRN, Vishal Stephensonart, FNP    bisacodyL suppository 10 mg, 10 mg, Rectal, Daily PRN, Vishal Stephensonart, FNP    dextrose 10% bolus 125 mL 125 mL, 12.5 g, Intravenous, PRN, Vishal Sandy, FNP    dextrose 10% bolus 250 mL 250 mL, 25 g,  Intravenous, PRN, Vishal MAGDA Du, FNP    docusate sodium capsule 100 mg, 100 mg, Oral, BID, Vishal MAGDA Du, FNP, 100 mg at 06/15/23 0809    DULoxetine DR capsule 30 mg, 30 mg, Oral, Daily, Annette Devrieste, FNP, 30 mg at 06/15/23 0809    enoxaparin injection 30 mg, 30 mg, Subcutaneous, Q12H (treatment, non-standard time), Vishal MAN Du, FNP, 30 mg at 06/15/23 0828    folic acid tablet 1,000 mcg, 1,000 mcg, Oral, Daily, Vishal MAGDA Du, FNP, 1,000 mcg at 06/15/23 0809    gabapentin capsule 300 mg, 300 mg, Oral, Q6H, Annette Devrieste, FNP, 300 mg at 06/15/23 0541    glucagon (human recombinant) injection 1 mg, 1 mg, Intramuscular, PRN, Vishal Parisiehart, FNP    glucose chewable tablet 16 g, 16 g, Oral, PRN, Vishal MAGDA Du, FNP    glucose chewable tablet 24 g, 24 g, Oral, PRN, Vishal A Du, FNP    hydrALAZINE injection 10 mg, 10 mg, Intravenous, Q4H PRN, Vishal Parisiehart, FNP    hydrOXYzine pamoate capsule 50 mg, 50 mg, Oral, Nightly PRN, Vishal A Du, FNP    insulin aspart U-100 injection 1-10 Units, 1-10 Units, Subcutaneous, QID (AC + HS) PRN, Vishal Parisiehart, FNP, 3 Units at 06/14/23 2129    insulin detemir U-100 injection 22 Units, 22 Units, Subcutaneous, BID, Kaity Arndt, FNP, 22 Units at 06/15/23 0811    labetalol 20 mg/4 mL (5 mg/mL) IV syring, 10 mg, Intravenous, Q4H PRN, Vishal A Du, FNP    LIDOcaine 5 % patch 1 patch, 1 patch, Transdermal, Q24H, Annette Calvo, FNP, 1 patch at 06/15/23 0540    lisinopriL tablet 20 mg, 20 mg, Oral, Daily, ALIRIO Arriaga, 20 mg at 06/15/23 0809    metFORMIN tablet 500 mg, 500 mg, Oral, Daily before evening meal, Annette Calvo, FNP, 500 mg at 06/14/23 1700    methocarbamoL tablet 500 mg, 500 mg, Oral, Q6H, Annette Calvo FNP, 500 mg at 06/15/23 0541    metoprolol injection 10 mg, 10 mg, Intravenous, Q2H PRN, ALIRIO Arriaga    mirtazapine tablet 30 mg, 30 mg, Oral, After dinner, Annette MAN  "[place], FNP, 30 mg at 06/14/23 1700    nitroGLYCERIN SL tablet 0.4 mg, 0.4 mg, Sublingual, Q5 Min PRN, Vishal Sandy, FNP    ondansetron disintegrating tablet 4 mg, 4 mg, Oral, Q6H PRN, Vishal MAN Du, FNP, 4 mg at 06/14/23 1224    oxyCODONE immediate release tablet 5 mg, 5 mg, Oral, Q6H PRN, Annette MAN [place], FNP    polyethylene glycol packet 17 g, 17 g, Oral, BID PRN, Vishal Parisiehart, FNP    promethazine tablet 25 mg, 25 mg, Oral, Q6H PRN, Vishal Parisiehart, FNP    SITagliptin phosphate tablet 50 mg, 50 mg, Oral, Daily, Vishal Parisiehart, FNP, 50 mg at 06/15/23 0809    traMADoL tablet 50 mg, 50 mg, Oral, Q6H PRN, Annette MAN [place], FNP, 50 mg at 06/15/23 0916     Review of Systems   Complete 12-point review of symptoms negative except for what's mentioned in HPI     Objective:     /65   Pulse 87   Temp 98.1 °F (36.7 °C) (Oral)   Resp 18   Ht 4' 11.02" (1.499 m)   Wt 48.1 kg (106 lb 0.7 oz)   SpO2 95%   Breastfeeding No   BMI 21.41 kg/m²        Physical Exam  Constitutional:       Appearance: Normal appearance.   HENT:      Head: Normocephalic.      Mouth/Throat:      Mouth: Mucous membranes are moist.   Eyes:      Pupils: Pupils are equal, round, and reactive to light.   Cardiovascular:      Rate and Rhythm: Normal rate and regular rhythm.      Heart sounds: Normal heart sounds.   Pulmonary:      Effort: Pulmonary effort is normal.      Breath sounds: Normal breath sounds.   Abdominal:      General: Bowel sounds are normal.      Palpations: Abdomen is soft.   Musculoskeletal:      Cervical back: Neck supple.      Comments: muscle atrophy   Skin:     General: Skin is warm and dry.   Neurological:      Mental Status: She is alert and oriented to person, place, and time.      Motor: Weakness present.   Psychiatric:         Mood and Affect: Mood normal.         Behavior: Behavior normal.         Thought Content: Thought content normal.         Judgment: Judgment normal.   *MD performed " and documented physical examination       Lines/Drains/Airways       None                   Labs  Recent Results (from the past 24 hour(s))   POCT glucose    Collection Time: 06/14/23 12:02 PM   Result Value Ref Range    POCT Glucose 181 (H) 70 - 110 mg/dL   POCT glucose    Collection Time: 06/14/23  4:19 PM   Result Value Ref Range    POCT Glucose 147 (H) 70 - 110 mg/dL   POCT glucose    Collection Time: 06/14/23  8:11 PM   Result Value Ref Range    POCT Glucose 247 (H) 70 - 110 mg/dL   POCT glucose    Collection Time: 06/14/23  9:27 PM   Result Value Ref Range    POCT Glucose 253 (H) 70 - 110 mg/dL   POCT glucose    Collection Time: 06/15/23  6:28 AM   Result Value Ref Range    POCT Glucose 132 (H) 70 - 110 mg/dL       Radiology  CT head without contrast on 06/06/2023 at 3:00 p.m., IMPRESSION: No acute intracranial abnormality identified.  Findings of chronic microvascular ischemic disease.  Radiology  CT abdomen/pelvis with contrast on 6/6, IMPRESSION: No definite acute intra-abdominal or intrathoracic abnormality.  There are acute left-sided rib fractures as above.  Evaluation for additional fractures is limited by motion artifact.  Soft tissue density at the left sacrum as would be seen with decubitus ulcer is again noted.    Assessment/Plan:     87 y.o. AAF admitted on 6/9/2023    Debility   - 2/2 frequent falls with left 4th-7th rib fractures  - defer to physiatry for rehab and pain management  - PT/OT/RT following     Multiple rib fractures  - left 4-7th rib fractures  - continue                DuoNebs q.8 hours                Percocet 5 mg/325 mg q.6 hours p.r.n.                Lidoderm patch to left chest daily                Gabapentin 300 mg t.i.d.  - encourage incentive spirometer q.1 hour while awake     Proximal transverse colon adenocarcinoma  - s/p laparoscopic/robotic right hemicolectomy on 7/29/2021  - Biopsy significant for adenocarcinoma-no metastasis  - to follow up with oncology     Moderate  protein calorie malnutrition  - albumin 2.8/prealbumin 18.8-trending up  - registered dietitian following  - encourage oral nutrition and hydration  - continue                Megace 400 mg b.i.d. x5 days (initiated 6/9)      History of bilateral ischemic CVA  - 6/2020  - continue                Lipitor 10 mg daily                Aspirin 325 mg daily     CAD  - s/p MI  - denies recent chest pain or discomfort  - continue                Lisinopril 20 mg daily                Lipitor 10 mg daily                Aspirin 325 mg daily  - ECG and Nitro PRN  - not on Plavix or BB  - continue cardiac diet  - to follow-up with cardiology outpatient        HTN  - at goal!!  - continue                Lisinopril 20 mg daily                Norvasc 10 mg daily                Hydralazine 10 mg every 2 hours as needed for BP > 160/90                Labetalol 10 mg every 2 hours as needed  - Low-sodium diet      Iron deficiency anemia  - asymptomatic  - H/H stable   - continue                Folic acid 1 mg daily  - no evidence of active bleeds  - will closely monitor and transfuse if needed      History of vasovagal syncope  - s/p Linq device  - to follow up with cardiology outpatient     Left carotid stenosis  - Left ICA > 85%  - continue                Aspirin 325 mg daily                Lipitor 10 mg daily  - to follow-up outpatient with vascular surgery     Poorly controlled DM type II  - HgA1c 10.5 on 6/6/2023  - continue   Januvia 50 mg daily (initiated 6/12)  Metformin 500 mg daily (initiated 6/11)                Levemir 22 units b.i.d. (increased 6/14)                ISS   - CBGs AC/HS     HLD  - FLP at goal!!  - continue                Lipitor 10 mg daily     Constipation  - stable  - continue  Colace 100 mg BID   Miralax 17 gm BID PRN     Unstageable sacral/left buttocks decubitus ulcer  - stable  - duo derm applied  - turn q2h  - wound care following     VTE Prophylaxis:  Lovenox 30 mg b.i.d.  COVID-19 testing:   Unknown  COVID-19 vaccination status:  Vaccinated (Moderna):  02/05/2021, 03/05/2021, 10/29/2021, and 06/04/2022     POA: Alhaji Hess (son) [phone]  Living will: No  Contacts: Alhaji Hess (son) [phone]                 Elder Hess (son) [phone]                 Raegan Stewart (sister) [phone]     CODE STATUS: Full Code  Internal Medicine (attending): Troy Guerin MD  Physiatry (consulting):  Avi Melchor MD     OUTPATIENT PROVIDERS  PCP: Anant Mejia MD  Cardiology: Henry Cifuentes MD  Vascular surgery: Kip Shah MD  General surgery: Justo Henry MD  Gastroenterology:  Kip Forbes MD     DISPOSITION:  Vital signs at goal.  No labs today.  Good glycemic control.  Monitor closely.  Bowel management, sleep hygiene, and appetite at goal.  Continues to progress well with therapies.  Continue aggressive mobilization as tolerated.  Monitor closely.  Notify of acute changes.      Staffing 6/13: Incontinent of bowel and bladder. Good appetite. RT and PT: overall mod assist. OT: overall mod assist. Needs more time to increase endurance. Projected discharge 6/20.     Kaity Arndt NP conducted independent physical examination and assisted with medical documentation.

## 2023-06-16 LAB
POCT GLUCOSE: 157 MG/DL (ref 70–110)
POCT GLUCOSE: 165 MG/DL (ref 70–110)
POCT GLUCOSE: 201 MG/DL (ref 70–110)
POCT GLUCOSE: 78 MG/DL (ref 70–110)

## 2023-06-16 PROCEDURE — 99233 PR SUBSEQUENT HOSPITAL CARE,LEVL III: ICD-10-PCS | Mod: ,,, | Performed by: NURSE PRACTITIONER

## 2023-06-16 PROCEDURE — 25000003 PHARM REV CODE 250: Performed by: NURSE PRACTITIONER

## 2023-06-16 PROCEDURE — 63600175 PHARM REV CODE 636 W HCPCS: Performed by: NURSE PRACTITIONER

## 2023-06-16 PROCEDURE — 94799 UNLISTED PULMONARY SVC/PX: CPT

## 2023-06-16 PROCEDURE — 99233 SBSQ HOSP IP/OBS HIGH 50: CPT | Mod: ,,, | Performed by: NURSE PRACTITIONER

## 2023-06-16 PROCEDURE — 11800000 HC REHAB PRIVATE ROOM

## 2023-06-16 PROCEDURE — 97530 THERAPEUTIC ACTIVITIES: CPT

## 2023-06-16 PROCEDURE — 94761 N-INVAS EAR/PLS OXIMETRY MLT: CPT

## 2023-06-16 PROCEDURE — 97110 THERAPEUTIC EXERCISES: CPT

## 2023-06-16 PROCEDURE — 99900035 HC TECH TIME PER 15 MIN (STAT)

## 2023-06-16 PROCEDURE — 97535 SELF CARE MNGMENT TRAINING: CPT

## 2023-06-16 PROCEDURE — 97116 GAIT TRAINING THERAPY: CPT

## 2023-06-16 RX ORDER — TRAMADOL HYDROCHLORIDE 50 MG/1
50 TABLET ORAL EVERY 6 HOURS
Status: DISCONTINUED | OUTPATIENT
Start: 2023-06-16 | End: 2023-06-23 | Stop reason: HOSPADM

## 2023-06-16 RX ORDER — GABAPENTIN 300 MG/1
300 CAPSULE ORAL 3 TIMES DAILY
Status: DISCONTINUED | OUTPATIENT
Start: 2023-06-16 | End: 2023-06-23 | Stop reason: HOSPADM

## 2023-06-16 RX ADMIN — ENOXAPARIN SODIUM 30 MG: 30 INJECTION SUBCUTANEOUS at 09:06

## 2023-06-16 RX ADMIN — METHOCARBAMOL 500 MG: 500 TABLET ORAL at 06:06

## 2023-06-16 RX ADMIN — AMLODIPINE BESYLATE 10 MG: 5 TABLET ORAL at 08:06

## 2023-06-16 RX ADMIN — ATORVASTATIN CALCIUM 10 MG: 10 TABLET, FILM COATED ORAL at 08:06

## 2023-06-16 RX ADMIN — FOLIC ACID 1000 MCG: 1 TABLET ORAL at 08:06

## 2023-06-16 RX ADMIN — ACETAMINOPHEN 325MG 650 MG: 325 TABLET ORAL at 05:06

## 2023-06-16 RX ADMIN — INSULIN ASPART 2 UNITS: 100 INJECTION, SOLUTION INTRAVENOUS; SUBCUTANEOUS at 04:06

## 2023-06-16 RX ADMIN — METFORMIN HYDROCHLORIDE 500 MG: 500 TABLET, FILM COATED ORAL at 04:06

## 2023-06-16 RX ADMIN — METHOCARBAMOL 500 MG: 500 TABLET ORAL at 05:06

## 2023-06-16 RX ADMIN — MIRTAZAPINE 30 MG: 15 TABLET, FILM COATED ORAL at 05:06

## 2023-06-16 RX ADMIN — METHOCARBAMOL 500 MG: 500 TABLET ORAL at 12:06

## 2023-06-16 RX ADMIN — GABAPENTIN 300 MG: 300 CAPSULE ORAL at 09:06

## 2023-06-16 RX ADMIN — INSULIN ASPART 1 UNITS: 100 INJECTION, SOLUTION INTRAVENOUS; SUBCUTANEOUS at 09:06

## 2023-06-16 RX ADMIN — TRAMADOL HYDROCHLORIDE 50 MG: 50 TABLET, COATED ORAL at 06:06

## 2023-06-16 RX ADMIN — ASPIRIN 325 MG: 325 TABLET, FILM COATED ORAL at 08:06

## 2023-06-16 RX ADMIN — LISINOPRIL 20 MG: 10 TABLET ORAL at 08:06

## 2023-06-16 RX ADMIN — DOCUSATE SODIUM 100 MG: 100 CAPSULE, LIQUID FILLED ORAL at 08:06

## 2023-06-16 RX ADMIN — SITAGLIPTIN 50 MG: 50 TABLET, FILM COATED ORAL at 08:06

## 2023-06-16 RX ADMIN — TRAMADOL HYDROCHLORIDE 50 MG: 50 TABLET, COATED ORAL at 09:06

## 2023-06-16 RX ADMIN — DOCUSATE SODIUM 100 MG: 100 CAPSULE, LIQUID FILLED ORAL at 09:06

## 2023-06-16 RX ADMIN — DULOXETINE HYDROCHLORIDE 30 MG: 30 CAPSULE, DELAYED RELEASE ORAL at 08:06

## 2023-06-16 RX ADMIN — ACETAMINOPHEN 325MG 650 MG: 325 TABLET ORAL at 12:06

## 2023-06-16 RX ADMIN — ACETAMINOPHEN 325MG 650 MG: 325 TABLET ORAL at 06:06

## 2023-06-16 RX ADMIN — GABAPENTIN 300 MG: 300 CAPSULE ORAL at 06:06

## 2023-06-16 RX ADMIN — INSULIN DETEMIR 22 UNITS: 100 INJECTION, SOLUTION SUBCUTANEOUS at 09:06

## 2023-06-16 RX ADMIN — LIDOCAINE 5% 1 PATCH: 700 PATCH TOPICAL at 06:06

## 2023-06-16 RX ADMIN — GABAPENTIN 300 MG: 300 CAPSULE ORAL at 12:06

## 2023-06-16 RX ADMIN — INSULIN DETEMIR 22 UNITS: 100 INJECTION, SOLUTION SUBCUTANEOUS at 11:06

## 2023-06-16 RX ADMIN — INSULIN ASPART 4 UNITS: 100 INJECTION, SOLUTION INTRAVENOUS; SUBCUTANEOUS at 11:06

## 2023-06-16 NOTE — PT/OT/SLP RE-EVAL
Recreational Therapy Re-Evaluation      Date of Treatment: 06/16/23  Start Time: 0900  Stop Time: 0930  Total Time: 30 min  Missed Time:    Assessment      Lita Hess is a 87 y.o. female admitted with a medical diagnosis of Debility.  She presents with the following impairments/functional limitations:  weakness, impaired endurance, impaired functional mobility, gait instability, decreased lower extremity function, decreased upper extremity function, decreased coordination, decreased safety awareness .    Rehab Diagnosis:     Recent Surgery:    General Precautions: Standard, fall     Orthopedic Precautions:N/A     Braces: N/A    Rehab Prognosis: Good; patient would benefit from acute skilled Recreational Therapy services to address these deficits and reach maximum level of function.      Impairments: Balance deficits, Endurance deficits, Mobility deficits, Pain, and Strength deficits  Rehab Potential: Good  Treatment Recommendations: Continue with current plan of care   Treatment Diagnosis: Fall over walker, L4-7 rib fx, s/p CVA x2, MI, HTN, colon CA s/p colectomy  Orientation: Identifies self  Affect/Behavior: Appropriate and Cooperative  Safety/Judgement: intact   Basic Command Following: intact  Spiritual Cultural: no        History     Past Medical History:   Diagnosis Date    Chronic back pain 10/26/2022    Colon cancer     CVA (cerebral vascular accident)     Gastroesophageal reflux disease without esophagitis 10/26/2022    Hypertension     Iron deficiency anemia secondary to inadequate dietary iron intake 10/26/2022    Loss of appetite     Mixed hyperlipidemia 10/26/2022    Stroke     Type 2 diabetes mellitus without complication 10/26/2022    Vitamin D deficiency        Past Surgical History:   Procedure Laterality Date    COLECTOMY      COLONOSCOPY      ESOPHAGOGASTRODUODENOSCOPY      gastro biopsy      HIP REPLACEMENT ARTHROPLASTY      SCLEROTHERAPY WITH ULTRASOUND GUIDANCE      TRANSESOPHAGEAL  "ECHOCARDIOGRAPHY         Home Environment     Admit Date: 06/09/23  Living Situation  People in Home: alone  Lives in: house  Patients Responsibilities: Financial management, Health and wellness, Laundry, Leisure/play/hobbies, Meal preparation, Retired, Social participation  Number of Children: 2  Occupation:Retired:  /    Instrumental Activities of Daily Living     Previous Hand Dominance: Right Current Hand Dominance: Right     Other iADL Information:        Cognitive Skills Building         Cognitive Observation Activity Assist Position Equipment Response            Comment:      Dynamic Activities      Activity Assist Position Equipment Response   Activity 1 Bowling supervision Standing Rolling walker and Rubber bowling balls good   Comment: Sit to stand was supervision as was dynamic standing balance/reaching. Standing tolerance was 5 minutes.  RUE coordination was I.  Memory recall was setup. Cooperative.  Participated more willingly       Fine Motor Activities      Activity Assist Position Equipment Response           Comment:        Goals     Patient Goals  Patient Goal 1: "To get strong and start walking the right way."    Short Term Goals    Goal  Goal Status   Will increase activity tolerance to supervision Met   Will increase sit to stand to supervision Met   Will improve dynamic standing balance/reaching to supervision Met           Long Term Goals    Goal Goal Status   Will increase standing tolerance to 5 minutes Met   Will improve dynamic standing balance/reaching to setup Progressing                     Plan       Patient to be seen: Daily  Duration: Other (Comment) (5 days)  Treatments planned: Balance training, Cognitive training, Coordination, Energy conservation training, Fine motor, Safety education  Treatment plan/goals established with Patient/Caregiver: Yes     "

## 2023-06-16 NOTE — PT/OT/SLP PROGRESS
"Occupational Therapy Inpatient Rehab Treatment    Name: Lita Hess  MRN: [id number]    Assessment:  Lita Hess is a 87 y.o. female admitted with a medical diagnosis of Debility.  She presents with the following impairments/functional limitations:  weakness, impaired endurance, impaired self care skills, impaired functional mobility, gait instability, impaired balance, decreased coordination, decreased upper extremity function, decreased lower extremity function, decreased safety awareness, pain, decreased ROM, impaired coordination .    General Precautions: Standard, fall     Orthopedic Precautions:N/A     Braces: N/A    Rehab Prognosis: Good; patient would benefit from acute skilled OT services to address these deficits and reach maximum level of function.      History:     Past Medical History:   Diagnosis Date    Chronic back pain 10/26/2022    Colon cancer     CVA (cerebral vascular accident)     Gastroesophageal reflux disease without esophagitis 10/26/2022    Hypertension     Iron deficiency anemia secondary to inadequate dietary iron intake 10/26/2022    Loss of appetite     Mixed hyperlipidemia 10/26/2022    Stroke     Type 2 diabetes mellitus without complication 10/26/2022    Vitamin D deficiency        Past Surgical History:   Procedure Laterality Date    COLECTOMY      COLONOSCOPY      ESOPHAGOGASTRODUODENOSCOPY      gastro biopsy      HIP REPLACEMENT ARTHROPLASTY      SCLEROTHERAPY WITH ULTRASOUND GUIDANCE      TRANSESOPHAGEAL ECHOCARDIOGRAPHY         Subjective     Orientation: Oriented x4    Chief Complaint: "I'm very cold."      Respiratory Status: Room air    Patients cultural, spiritual, Zoroastrian conflicts given the current situation: no       Objective:     Patient found HOB elevated upon OT entry to room. Pt was sleeping and had not yet eaten her breakfast. SOT moved bed into chair position and elevated pt's head in order for her to eat.     Mobility   Patient completed:  Supine to Sit with " maximal assistance  Sit to Stand Transfer with contact guard assistance with rolling walker  Stand to Sit Transfer with contact guard assistance with rolling walker  Bed to Chair Transfer using Step Transfer technique with contact guard assistance with rolling walker  Toilet Transfer Step Transfer technique with contact guard assistance with  rolling walker      ADLs   Current Status   Eating 5   Shower, Bathe Self 4 Pt refused a shower and sponge bath due to being cold, so she simulated a sponge bath at EOB   Upper Body Dressing 3 Pt requires assistance donning shirt over R shoulder and back   Lower Body Dressing 3 Pt requires assistance threading legs through pants    Toileting Hygiene 4 CGA for balance    Toilet Transfer 4     Limiting Factors for ADLs: motor, psychsocial, endurance, limited ROM, balance, weakness, cognition, safety awareness, and pain       Patient left up in chair with call button in reach.     Education provided: Roles and goals of OT, ADLs, transfer training, bed mobility, body mechanics, assistive device, sequencing, safety precautions, fall prevention, and home safety    Multidisciplinary Problems       Occupational Therapy Goals          Problem: Occupational Therapy    Goal Priority Disciplines Outcome Interventions   Occupational Therapy Goal     OT, PT/OT Ongoing, Progressing    Description: ADLs:  Pt to perform grooming tasks with set up and clean up. Ongoing/progressing  Pt to perform UB dressing with set up and clean up. Ongoing/progressing   Pt to perform LB dressing with set up and clean up with use of AD. Ongoing/progressing  Pt to perform putting on/off footwear task with set up and clean up and AD. MET  Pt to perform putting on/off footwear task with independence and AD  Pt to perform toileting with mod a using RW and BSC. Ongoing/progressing  Pt to perform bathing with mod a using TTB. Ongoing/progressing    Functional Transfers:  Pt to perform toilet transfers with CGA and RW.  MET  Pt to perform toilet transfer with set up and clean up and RW  Pt to perform a tub transfer with mod a and TTB. MET  Pt to perform a tub transfer with SBA and TTB.                            Time Tracking     OT Received On: 06/16/23  Time In 0730     Time Out 0830  Total Time 60 min  Therapy Time: OT Individual: 60  Missed Time:    Missed Time Reason:      Billable Minutes: Self Care/Home Management 60 min    06/16/2023

## 2023-06-16 NOTE — PT/OT/SLP PROGRESS
"Occupational Therapy Inpatient Rehab Treatment    Name: Lita Hess  MRN: [id number]    Assessment:  Lita Hess is a 87 y.o. female admitted with a medical diagnosis of Debility.  She presents with the following impairments/functional limitations:  weakness, impaired endurance, impaired self care skills, impaired functional mobility, decreased upper extremity function, decreased safety awareness, pain.    General Precautions: Standard, fall     Orthopedic Precautions:N/A     Braces: N/A    Rehab Prognosis: Good; patient would benefit from acute skilled OT services to address these deficits and reach maximum level of function.      History:     Past Medical History:   Diagnosis Date    Chronic back pain 10/26/2022    Colon cancer     CVA (cerebral vascular accident)     Gastroesophageal reflux disease without esophagitis 10/26/2022    Hypertension     Iron deficiency anemia secondary to inadequate dietary iron intake 10/26/2022    Loss of appetite     Mixed hyperlipidemia 10/26/2022    Stroke     Type 2 diabetes mellitus without complication 10/26/2022    Vitamin D deficiency        Past Surgical History:   Procedure Laterality Date    COLECTOMY      COLONOSCOPY      ESOPHAGOGASTRODUODENOSCOPY      gastro biopsy      HIP REPLACEMENT ARTHROPLASTY      SCLEROTHERAPY WITH ULTRASOUND GUIDANCE      TRANSESOPHAGEAL ECHOCARDIOGRAPHY         Subjective     Orientation: Oriented x4    Chief Complaint: Pain in ribs and bottom    Patient/Family Comments/goals: "I will be going to a facility next week"    Respiratory Status: Room air    Patients cultural, spiritual, Moravian conflicts given the current situation: no       Objective:     Patient found up in chair upon OT entry to room.    Limiting Factors for ADLs: motor, endurance, limited ROM, balance, weakness, safety awareness, and pain     Therapeutic Activities  Pt performed activity called Toss tac toe. Pt able to follow directions and concept of game.    Therapeutic " Exercise  Pt performed BUE strengthening with UBE for 3 mins forward and 3 mins backwards with 2-3 min rest break between.    Patient left up in chair with all lines intact and call button in reach. CNA was getting her back to bed after cleaning her up from incontinence.    Education provided: Roles and goals of OT, sequencing, safety precautions, and fall prevention    Multidisciplinary Problems       Occupational Therapy Goals          Problem: Occupational Therapy    Goal Priority Disciplines Outcome Interventions   Occupational Therapy Goal     OT, PT/OT Ongoing, Progressing    Description: ADLs:  Pt to perform grooming tasks with set up and clean up. Ongoing/progressing  Pt to perform UB dressing with set up and clean up. Ongoing/progressing   Pt to perform LB dressing with set up and clean up with use of AD. Ongoing/progressing  Pt to perform putting on/off footwear task with set up and clean up and AD. MET  Pt to perform putting on/off footwear task with independence and AD  Pt to perform toileting with mod a using RW and BSC. Ongoing/progressing  Pt to perform bathing with mod a using TTB. Ongoing/progressing    Functional Transfers:  Pt to perform toilet transfers with CGA and RW. MET  Pt to perform toilet transfer with set up and clean up and RW  Pt to perform a tub transfer with mod a and TTB. MET  Pt to perform a tub transfer with SBA and TTB.                            Time Tracking     OT Received On: 06/16/23  Time In 1100     Time Out 1130  Total Time 30 min  Therapy Time: OT Individual: 30  Missed Time:    Missed Time Reason:      Billable Minutes: Therapeutic Activity 15 and Therapeutic Exercise 15    06/16/2023

## 2023-06-16 NOTE — PROGRESS NOTES
Subjective  HPI: 86 yo BF with PMH of CVA x 2, MI with loop recorder, HTN, GERD, hip replacement in past, and remote hx colon cancer s/p colectomy presented to the ED at [facility] on 6/6/23 after falling at home. Patient complained of left rib pain and left hip pain. CT head showed no acute intracranial abnormality identified, and chronic microvascular ischemic disease. CT cervical spine showed no acute findings. CT of chest/abdomen/ pelvis showed acute fractures of the left 4th, 5th, 6th, and 7th ribs, scoliotic curvature of the spine with no acute fracture identified; and no fracture of right ribs identified. Hip XR showed no acute fractures. Patient was admitted to ICU for close respiratory monitoring, encouraged to use IS, started duonebs, and CPT ordered. On 6/7, PT eval completed with deficits noted. On 6/8, OT eval completed with deficits noted. Labs showed low BUN of 9.6, low Na of 133, low H&H of 11.7 & 35.7, elevated CBG of 346, and low albumin of 3.1. On Lovenox for DVT prophylaxis. On 6/9, CBG remains elevated. Patient is AAOx4.  Participating with therapy. Functional status includes minimal-moderate assist needed for bed mobility, minimal assist for transfer with RW, minimal assist for walking about 30 ft with RW, purwick catheter for toileting, moderate-max assist for lower body dressing. Patient was evaluated, accepted, and admitted to inpatient rehab to improve functional status. Transferred to [facility] on 6/9 without incident.      6/16: Seen with RT, slumped down in WC with pain complaints of ribs and LLE. Rates pain at 7/10 and unsure if she received any pain medication. Nursing to bring Tramadol which she had not had since yesterday morning. Continues to require encouragement to participate. Oxycodone makes patient sleepy, but not noted to have taken in past few days. Schedule Tramadol 50mg q6h with other medications to keep pain controlled. VSSAF with noted SBP elevation before morning medications. IM  "following.           Review of Systems  Psychiatric: Per son, pt. Recently complained of depression. Pt. Has a history of anxiety and takes medication for this. She has no substance abuse history    Depression/Anxiety: Noted Remeron 30mg qPM in Home medication. Restart. Consider Cymbalta for additional coverage and pain.      mirtazapine tablet 30 mg after dinner  ALPRAZolam tablet 0.25 mg TID PRN Anxiety  DULoxetine DR capsule 30 mg qd, start 6/14  Pain: left side-ribs, LLE  DULoxetine DR capsule 30 mg qd, start 6/14  gabapentin capsule 300 mg TID. Increase q6h  acetaminophen tablet 650 mg TID. q6h  methocarbamoL tablet 500 mg TID. q6h  traMADoL tablet 50 mg q6h PRN mod pain  oxyCODONE 5 mg 1 tablet q6h PRN severe pain  Bowels/Bladder: last BM 6/14  Appetite: "good"  megestroL 400 mg/10 mL (10 mL) suspension 400 mg BID     Sleep: good         Physical Exam  General: well-developed, thin/frail appearing, in no acute distress  Respiratory: equal chest rise, no SOB, no audible wheeze  Cardiovascular: regular rate and rhythm, no edema  Gastrointestinal: soft, non-tender, non-distended   Musculoskeletal: decreased ROM/strength to LLE; generalized weakness  Integumentary: no rashes or skin lesions present, right heel/sacral pressure wounds  Neurologic: cranial nerves intact, no signs of peripheral neurological deficit, motor/sensory function intact            Assessment/Plan  Hospital   Fall   Rib fracture   Debility     Non-Hospital   Chronic back pain (Chronic)   Mixed hyperlipidemia (Chronic)   Hypertension (Chronic)   Type 2 diabetes mellitus without complication (Chronic)   Gastroesophageal reflux disease without esophagitis (Chronic)   Iron deficiency anemia secondary to inadequate dietary iron intake (Chronic)   CVA (cerebral vascular accident) (Chronic)   Loss of appetite (Chronic)   Vitamin D deficiency (Chronic)   Frailty (Chronic)   Arteriosclerosis of coronary artery   At risk of pressure injury of skin "   Hiatal hernia   Scoliosis   Stenosis of carotid artery   Severe malnutrition   Colon cancer       Wounds: right heel/sacral pressure wounds  Precautions: fall risk  Bracing/AD: RW  Swallowing: Diabetic Diet  Function: Tolerating therapy. Continue PT/OT  VTE Prophylaxis:   enoxaparin injection 30 mg SubQ q12h  Code Status: FULL CODE   Discharge: Lives alone in [place] in a single-story home with 1 threshold step to enter the residence. Completed her master's degree. She has no  history. 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 She had a volunteer come in to help her around the house and for errands every now and then. Per son, the family is arranging for her care after she leaves rehab.  Children: (2). Date 6/20 Tuesday.

## 2023-06-16 NOTE — PROGRESS NOTES
06/16/23 0900   Rec Therapy Time Calculation   Date of Treatment 06/16/23   Rec Start Time 0900   Rec Stop Time 0930   Rec Total Time (min) 30 min   Time   Treatment time 2 units   Charges   $Therapeutic Exercise 2 units   Precautions   General Precautions fall   Orthopedic Precautions  N/A   Braces N/A   Pain/Comfort   Pain Rating 1 7/10   Location - Side 1 Left   Location - Orientation 1 upper   Location 1 rib(s)   Pain Addressed 1 Reposition   OTHER   Rehab identified problem list/impairments weakness;impaired endurance;impaired functional mobility;gait instability;decreased lower extremity function;decreased upper extremity function;decreased coordination;decreased safety awareness   Values/Beliefs/Spiritual Care   Spiritual, Cultural Beliefs, Worship Practices, Values that Affect Care no   Overall Level of Functioning   Activity Tolerance Standby Assist   Dynamic Sitting Balance/Reaching Mod Indep   Dynamic Standing Balance/Reaching Standby Assist   Right UE Coodination/Dexterity Mod Indep   Left UE Coordination/Dexterity Does not occur   Problem Solving/Sequencing Skills Standby Assist   Memory Recall Standby Assist   R/L Neglect/Inattention Does not occur   Attention Span Standby Assist   Social Interaction Standby Assist   Recreational Therapy Short Term Goals   Short Term Goal 1 Progression Met   Short Term Goal 2 Progression Met   Short Term Goal 3 Progression Met   Recreational Therapy Long Term Goals   Long Term Goal 1 Progression Met   Long Term Goal 2 Progression Progressing   Plan   Patient to be seen Daily   Planned Duration Other (Comment)  (5 days)   Treatments Planned Balance training;Cognitive training;Coordination;Energy conservation training;Fine motor;Safety education   Treatment plan/goals estblished with Patient/Caregiver Yes

## 2023-06-16 NOTE — PT/OT/SLP PROGRESS
Physical Therapy Inpatient Rehab Treatment    Patient Name:  Lita Hess   MRN:  [id number]    Recommendations:     Discharge Recommendations:  nursing facility, skilled   Discharge Equipment Recommendations: bedside commode, walker, rolling, wheelchair   Barriers to discharge:  impaired functional mobility, pain, and fatigue    Assessment:     Lita Hess is a 87 y.o. female admitted with a medical diagnosis of Debility.  She presents with the following impairments/functional limitations:  weakness, impaired endurance, impaired self care skills, impaired functional mobility, decreased lower extremity function, gait instability, decreased safety awareness, pain.    Rehab Diagnosis:      General Precautions: Standard, fall     Orthopedic Precautions:N/A     Braces: N/A    Rehab Prognosis: Poor; patient would benefit from acute skilled PT services to address these deficits and reach maximum level of function.      History:     Past Medical History:   Diagnosis Date    Chronic back pain 10/26/2022    Colon cancer     CVA (cerebral vascular accident)     Gastroesophageal reflux disease without esophagitis 10/26/2022    Hypertension     Iron deficiency anemia secondary to inadequate dietary iron intake 10/26/2022    Loss of appetite     Mixed hyperlipidemia 10/26/2022    Stroke     Type 2 diabetes mellitus without complication 10/26/2022    Vitamin D deficiency        Past Surgical History:   Procedure Laterality Date    COLECTOMY      COLONOSCOPY      ESOPHAGOGASTRODUODENOSCOPY      gastro biopsy      HIP REPLACEMENT ARTHROPLASTY      SCLEROTHERAPY WITH ULTRASOUND GUIDANCE      TRANSESOPHAGEAL ECHOCARDIOGRAPHY         Subjective     Chief Complaint: pain in L ribs    Respiratory Status: Room air    Patients cultural, spiritual, Congregational conflicts given the current situation: no      Objective:     Communicated with pt prior to AM session.  Patient found up in chair with    upon PT entry to room.  Communicated with pt  prior to PM session. Patient found supine in bed upon entry to room.    Pt is Alert and Cooperative.    Vitals   AM session: 8/10 pain with activity           8/10 pain at rest  PM session: 7/10 pain with activity            7/10 pain at rest     Current   Status  Discharge   Goal   Functional Area: Care Score:    Roll Left and Right   Independent   Sit to Lying 2  Pt required assistance with bringing legs into bed. Moderate assistance needed to reposition. Draw sheet used to go to the head of bed. Set-up/clean-up   Lying to Sitting on Side of Bed 4  With bed rail and increased time Independent   Sit to Stand 4  With RW Independent   Chair/Bed-to-Chair Transfer   Independent   Car Transfer       Walk 10 Feet 4   Independent   Walk 50 Feet with Two Turns 4 Independent   Walk 150 Feet 4  PM session: pt ambulated 154' with RW. Decreased step length and height bilaterally. Increased time to ambulate due to slow gait speed.     Walk 10 Feet Uneven Surface       1 Step (Curb)       4 Steps       12 Steps       Picking Up Object       Wheel 50 Feet with Two Turns       Wheel 150 Feet           Therapeutic Activities and Exercises:  AM session:  3x15 seated Bilateral marches and LAQ with 1.5 lb weight.  2x Side stepping in parallel bars 10' in each direction  Gait training- 20' course x2: pt ambulated a 20' course with a RW which required weaving through cones in a figure eight pattern  PM session:  154' ambulated with RW    Activity Tolerance: Poor    Patient left  in wheelchair  with  FABIÁN Mejía in AM session .  Patient left supine in bed with call bell in reach and bed alarm in PM session.    Education provided: roles and goals of PT/PTA, transfer training, gait training, safety awareness, body mechanics, assistive device, strengthening exercises, and fall prevention    Expected compliance: Low compliance    Plan:     During this hospitalization, patient to be seen 5 x/week to address the identified rehab impairments via  gait training, therapeutic activities, therapeutic exercises, neuromuscular re-education and progress toward the following goals:    GOALS:   Multidisciplinary Problems       Physical Therapy Goals          Problem: Physical Therapy    Goal Priority Disciplines Outcome Goal Variances Interventions   Physical Therapy Goal     PT, PT/OT Ongoing, Progressing     Description: Bed Mobility:  MET - Roll L and R Philip  MET - Sit to supine will be Philip  MET - Supine to sit will be Philip  Roll left and right independently  Sit to supine transfer with setup/clean-up assist  Supine to sit transfer with setup/clean-up assist    Transfers:  Sit to stand supervision  Bed to Chair t/f will be supervision  Car t/f will be supervision    Mobility:  Pt will ambulate 150' CGA with RW  Pt will ambulate 15' CGA with RW over uneven terrain  Pt will climb one step CGA with RW                        Plan of Care Expires:  06/16/23  PT Next Visit Date: 06/21/23  Plan of Care reviewed with: patient    Additional Information:   Pt reported pain in L ribs and fatigue with all tasks requiring multiple breaks and increased time to perform therapy interventions.      Time Tracking:     Therapy Time  PT Start Time:  (1000;1300)  PT Stop Time:  (1100; 1330)    PT Total Time (min): 90 min  PT Individual: 90 min  Missed Time:    Time Missed due to:      Billable Minutes: Gait Training 30 min, Therapeutic Activity 30 min, and Therapeutic Exercise 30 min    06/16/2023

## 2023-06-16 NOTE — PROGRESS NOTES
[facility] ([facility])  Rehab Progress Note    Patient Name: Lita Hess  MRN: [id number]  Age: 87 y.o. Sex: female  : 1935  Hospital Length of Stay: 7 days  Date of Service: 2023   Chief Complaint: Debility 2/2 frequent falls with left 4th-7th rib fractures    Subjective:     Basic Information  Admit Information: 87-year-old  female presented to [facility] ED on 2023 complaining of left side pain after accidentally falling backwards over walker.  PMH significant for CVA x2, CAD s/p MI, HTN, carotid artery stenosis, remote history of colon cancer s/p colectomy.  Workup significant for left 4th through 7th rib fractures.  Limited mobility 2/2 pain.  Tolerated transfer to [facility] inpatient rehab unit on  without incident.  Today's Information: No acute events overnight.  Sitting in chair comfortably watching TV.  Reports good sleep and appetite.  Last BM 6/15.  Vital signs at goal with no recent recorded fevers.  Good glycemic control.  No labs or imaging today.    Review of patient's allergies indicates:  No Known Allergies     Current Facility-Administered Medications:     acetaminophen tablet 650 mg, 650 mg, Oral, Q6H, Annette Calvo, FNP, 650 mg at 23 0618    ALPRAZolam tablet 0.25 mg, 0.25 mg, Oral, TID PRN, Vishal MAN Du, FNP, 0.25 mg at 23 1209    amLODIPine tablet 10 mg, 10 mg, Oral, Daily, Vishal MAN Du, FNP, 10 mg at 23 0816    aspirin tablet 325 mg, 325 mg, Oral, Daily, Vishal A Du, FNP, 325 mg at 23 0816    atorvastatin tablet 10 mg, 10 mg, Oral, Daily, Vishal A Du, FNP, 10 mg at 23 0816    benzonatate capsule 100 mg, 100 mg, Oral, TID PRN, Vishal Sandy, FNP    bisacodyL suppository 10 mg, 10 mg, Rectal, Daily PRN, Vishal Sandy, FNP    dextrose 10% bolus 125 mL 125 mL, 12.5 g, Intravenous, PRN, Vishal Sandy, FNP    dextrose 10% bolus 250 mL 250 mL, 25 g, Intravenous,  PRN, Vishal Stephensonart, FNP    docusate sodium capsule 100 mg, 100 mg, Oral, BID, Vishal MAGDA Du, FNP, 100 mg at 06/16/23 0817    DULoxetine DR capsule 30 mg, 30 mg, Oral, Daily, Annette Devrieste, FNP, 30 mg at 06/16/23 0816    enoxaparin injection 30 mg, 30 mg, Subcutaneous, Q12H (treatment, non-standard time), Vishal Parisiehart, FNP, 30 mg at 06/16/23 0919    folic acid tablet 1,000 mcg, 1,000 mcg, Oral, Daily, Vishal A Du, FNP, 1,000 mcg at 06/16/23 0816    gabapentin capsule 300 mg, 300 mg, Oral, Q6H, Annette Devrieste, FNP, 300 mg at 06/16/23 0618    glucagon (human recombinant) injection 1 mg, 1 mg, Intramuscular, PRN, Vishal Parisiehart, FNP    glucose chewable tablet 16 g, 16 g, Oral, PRN, Vishal Parisiehart, FNP    glucose chewable tablet 24 g, 24 g, Oral, PRN, Vishal MAN Du, FNP    hydrALAZINE injection 10 mg, 10 mg, Intravenous, Q4H PRN, Vishal Parisiehart, FNP    hydrOXYzine pamoate capsule 50 mg, 50 mg, Oral, Nightly PRN, Vishal A Du, FNP    insulin aspart U-100 injection 1-10 Units, 1-10 Units, Subcutaneous, QID (AC + HS) PRN, Vishal Parisiehart, FNP, 4 Units at 06/15/23 1651    insulin detemir U-100 injection 22 Units, 22 Units, Subcutaneous, BID, Kaity Arndt, FNP, 22 Units at 06/15/23 2022    labetalol 20 mg/4 mL (5 mg/mL) IV syring, 10 mg, Intravenous, Q4H PRN, Vishal A Du, FNP    LIDOcaine 5 % patch 1 patch, 1 patch, Transdermal, Q24H, Anntete Calvo, FNP, 1 patch at 06/16/23 0618    lisinopriL tablet 20 mg, 20 mg, Oral, Daily, HAYLEE ArriagaP, 20 mg at 06/16/23 0816    metFORMIN tablet 500 mg, 500 mg, Oral, Daily before evening meal, Annette Calvo, FNP, 500 mg at 06/15/23 1702    methocarbamoL tablet 500 mg, 500 mg, Oral, Q6H, Annette Calvo FNP, 500 mg at 06/16/23 0618    metoprolol injection 10 mg, 10 mg, Intravenous, Q2H PRN, HAYLEE ArriagaP    mirtazapine tablet 30 mg, 30 mg, Oral, After dinner, HAYLEE KaminskiP, 30  "mg at 06/15/23 1702    nitroGLYCERIN SL tablet 0.4 mg, 0.4 mg, Sublingual, Q5 Min PRN, Vishal Parisiehart, FNP    ondansetron disintegrating tablet 4 mg, 4 mg, Oral, Q6H PRN, Vishal MAN Du, FNP, 4 mg at 06/14/23 1224    oxyCODONE immediate release tablet 5 mg, 5 mg, Oral, Q6H PRN, Annette MAGDA [place], FNP    polyethylene glycol packet 17 g, 17 g, Oral, BID PRN, Vishal MAN Du, FNP    promethazine tablet 25 mg, 25 mg, Oral, Q6H PRN, Vishal MAN Du, FNP    SITagliptin phosphate tablet 50 mg, 50 mg, Oral, Daily, Vishal MAN Du, FNP, 50 mg at 06/16/23 0816    traMADoL tablet 50 mg, 50 mg, Oral, Q6H PRN, Annette A [place], FNP, 50 mg at 06/16/23 0919     Review of Systems   Complete 12-point review of symptoms negative except for what's mentioned in HPI     Objective:     BP (!) 154/75   Pulse 88   Temp 98.3 °F (36.8 °C) (Oral)   Resp 20   Ht 4' 11.02" (1.499 m)   Wt 48.1 kg (106 lb 0.7 oz)   SpO2 99%   Breastfeeding No   BMI 21.41 kg/m²        Physical Exam  Constitutional:       Appearance: Normal appearance.   HENT:      Head: Normocephalic.      Mouth/Throat:      Mouth: Mucous membranes are moist.   Eyes:      Pupils: Pupils are equal, round, and reactive to light.   Cardiovascular:      Rate and Rhythm: Normal rate and regular rhythm.      Heart sounds: Normal heart sounds.   Pulmonary:      Effort: Pulmonary effort is normal.      Breath sounds: Normal breath sounds.   Abdominal:      General: Bowel sounds are normal.      Palpations: Abdomen is soft.   Musculoskeletal:      Cervical back: Neck supple.      Comments: muscle atrophy   Skin:     General: Skin is warm and dry.   Neurological:      Mental Status: She is alert and oriented to person, place, and time.      Motor: Weakness present.   Psychiatric:         Mood and Affect: Mood normal.         Behavior: Behavior normal.         Thought Content: Thought content normal.         Judgment: Judgment normal.   *MD performed and " documented physical examination       Lines/Drains/Airways       None                   Labs  Recent Results (from the past 24 hour(s))   POCT glucose    Collection Time: 06/15/23  4:47 PM   Result Value Ref Range    POCT Glucose 232 (H) 70 - 110 mg/dL   POCT glucose    Collection Time: 06/15/23  8:21 PM   Result Value Ref Range    POCT Glucose 139 (H) 70 - 110 mg/dL   POCT glucose    Collection Time: 06/16/23  6:22 AM   Result Value Ref Range    POCT Glucose 78 70 - 110 mg/dL       Radiology  CT head without contrast on 06/06/2023 at 3:00 p.m., IMPRESSION: No acute intracranial abnormality identified.  Findings of chronic microvascular ischemic disease.  Radiology  CT abdomen/pelvis with contrast on 6/6, IMPRESSION: No definite acute intra-abdominal or intrathoracic abnormality.  There are acute left-sided rib fractures as above.  Evaluation for additional fractures is limited by motion artifact.  Soft tissue density at the left sacrum as would be seen with decubitus ulcer is again noted.    Assessment/Plan:     87 y.o. AAF admitted on 6/9/2023    Debility   - 2/2 frequent falls with left 4th-7th rib fractures  - defer to physiatry for rehab and pain management  - PT/OT/RT following     Multiple rib fractures  - left 4-7th rib fractures  - continue                DuoNebs q.8 hours                Percocet 5 mg/325 mg q.6 hours p.r.n.                Lidoderm patch to left chest daily                Gabapentin 300 mg t.i.d.  - encourage incentive spirometer q.1 hour while awake     Proximal transverse colon adenocarcinoma  - s/p laparoscopic/robotic right hemicolectomy on 7/29/2021  - Biopsy significant for adenocarcinoma-no metastasis  - to follow up with oncology     Moderate protein calorie malnutrition  - albumin 2.8/prealbumin 18.8-trending up  - registered dietitian following  - encourage oral nutrition and hydration  - continue                Megace 400 mg b.i.d. x5 days (initiated 6/9)      History of  bilateral ischemic CVA  - 6/2020  - continue                Lipitor 10 mg daily                Aspirin 325 mg daily     CAD  - s/p MI  - denies recent chest pain or discomfort  - continue                Lisinopril 20 mg daily                Lipitor 10 mg daily                Aspirin 325 mg daily  - ECG and Nitro PRN  - not on Plavix or BB  - continue cardiac diet  - to follow-up with cardiology outpatient        HTN  - at goal!!  - continue                Lisinopril 20 mg daily                Norvasc 10 mg daily                Hydralazine 10 mg every 2 hours as needed for BP > 160/90                Labetalol 10 mg every 2 hours as needed  - Low-sodium diet      Iron deficiency anemia  - asymptomatic  - H/H stable   - continue                Folic acid 1 mg daily  - no evidence of active bleeds  - will closely monitor and transfuse if needed      History of vasovagal syncope  - s/p Linq device  - to follow up with cardiology outpatient     Left carotid stenosis  - Left ICA > 85%  - continue                Aspirin 325 mg daily                Lipitor 10 mg daily  - to follow-up outpatient with vascular surgery     Poorly controlled DM type II  - HgA1c 10.5 on 6/6/2023  - continue   Januvia 50 mg daily (initiated 6/12)  Metformin 500 mg daily (initiated 6/11)                Levemir 22 units b.i.d. (increased 6/14)                ISS   - CBGs AC/HS     HLD  - FLP at goal!!  - continue                Lipitor 10 mg daily     Constipation  - stable  - continue  Colace 100 mg BID   Miralax 17 gm BID PRN     Unstageable sacral/left buttocks decubitus ulcer  - stable  - duo derm applied  - turn q2h  - wound care following     VTE Prophylaxis:  Lovenox 30 mg b.i.d.  COVID-19 testing:  Unknown  COVID-19 vaccination status:  Vaccinated (Moderna):  02/05/2021, 03/05/2021, 10/29/2021, and 06/04/2022     POA: Alhaji Hess (son) [phone]  Living will: No  Contacts: Alhaji Hess (son) [phone]                 Felipesatya Hess (son)  [phone]                 Raegan Stewart (sister) [phone]     CODE STATUS: Full Code  Internal Medicine (attending): Troy Guerin MD  Physiatry (consulting):  Avi Melchor MD     OUTPATIENT PROVIDERS  PCP: Anant Mejia MD  Cardiology: Henry Cifuentes MD  Vascular surgery: Kip Shah MD  General surgery: Justo Henry MD  Gastroenterology:  Kip Forbes MD     DISPOSITION:  Vital signs at goal.  No labs today.  Good glycemic control.  Monitor closely.  Bowel management, sleep hygiene, and appetite at goal.  Continues to progress well with therapies.  Continue aggressive mobilization as tolerated.  Monitor closely.  Notify of acute changes.      Staffing 6/13: Incontinent of bowel and bladder. Good appetite. RT and PT: overall mod assist. OT: overall mod assist. Needs more time to increase endurance. Projected discharge 6/20.     Kaity Arndt NP conducted independent physical examination and assisted with medical documentation.

## 2023-06-17 LAB
POCT GLUCOSE: 139 MG/DL (ref 70–110)
POCT GLUCOSE: 143 MG/DL (ref 70–110)
POCT GLUCOSE: 237 MG/DL (ref 70–110)
POCT GLUCOSE: 94 MG/DL (ref 70–110)

## 2023-06-17 PROCEDURE — 25000003 PHARM REV CODE 250: Performed by: NURSE PRACTITIONER

## 2023-06-17 PROCEDURE — 94761 N-INVAS EAR/PLS OXIMETRY MLT: CPT

## 2023-06-17 PROCEDURE — 94799 UNLISTED PULMONARY SVC/PX: CPT

## 2023-06-17 PROCEDURE — 63600175 PHARM REV CODE 636 W HCPCS: Performed by: NURSE PRACTITIONER

## 2023-06-17 PROCEDURE — 11800000 HC REHAB PRIVATE ROOM

## 2023-06-17 RX ADMIN — TRAMADOL HYDROCHLORIDE 50 MG: 50 TABLET, COATED ORAL at 05:06

## 2023-06-17 RX ADMIN — GABAPENTIN 300 MG: 300 CAPSULE ORAL at 01:06

## 2023-06-17 RX ADMIN — GABAPENTIN 300 MG: 300 CAPSULE ORAL at 05:06

## 2023-06-17 RX ADMIN — METHOCARBAMOL 500 MG: 500 TABLET ORAL at 05:06

## 2023-06-17 RX ADMIN — ASPIRIN 325 MG: 325 TABLET, FILM COATED ORAL at 08:06

## 2023-06-17 RX ADMIN — INSULIN DETEMIR 22 UNITS: 100 INJECTION, SOLUTION SUBCUTANEOUS at 09:06

## 2023-06-17 RX ADMIN — AMLODIPINE BESYLATE 10 MG: 5 TABLET ORAL at 08:06

## 2023-06-17 RX ADMIN — ACETAMINOPHEN 325MG 650 MG: 325 TABLET ORAL at 12:06

## 2023-06-17 RX ADMIN — GABAPENTIN 300 MG: 300 CAPSULE ORAL at 09:06

## 2023-06-17 RX ADMIN — ENOXAPARIN SODIUM 30 MG: 30 INJECTION SUBCUTANEOUS at 08:06

## 2023-06-17 RX ADMIN — ATORVASTATIN CALCIUM 10 MG: 10 TABLET, FILM COATED ORAL at 08:06

## 2023-06-17 RX ADMIN — INSULIN ASPART 4 UNITS: 100 INJECTION, SOLUTION INTRAVENOUS; SUBCUTANEOUS at 12:06

## 2023-06-17 RX ADMIN — DOCUSATE SODIUM 100 MG: 100 CAPSULE, LIQUID FILLED ORAL at 08:06

## 2023-06-17 RX ADMIN — DOCUSATE SODIUM 100 MG: 100 CAPSULE, LIQUID FILLED ORAL at 09:06

## 2023-06-17 RX ADMIN — METHOCARBAMOL 500 MG: 500 TABLET ORAL at 12:06

## 2023-06-17 RX ADMIN — TRAMADOL HYDROCHLORIDE 50 MG: 50 TABLET, COATED ORAL at 12:06

## 2023-06-17 RX ADMIN — DULOXETINE HYDROCHLORIDE 30 MG: 30 CAPSULE, DELAYED RELEASE ORAL at 08:06

## 2023-06-17 RX ADMIN — LIDOCAINE 5% 1 PATCH: 700 PATCH TOPICAL at 05:06

## 2023-06-17 RX ADMIN — LISINOPRIL 20 MG: 10 TABLET ORAL at 08:06

## 2023-06-17 RX ADMIN — METFORMIN HYDROCHLORIDE 500 MG: 500 TABLET, FILM COATED ORAL at 05:06

## 2023-06-17 RX ADMIN — FOLIC ACID 1000 MCG: 1 TABLET ORAL at 08:06

## 2023-06-17 RX ADMIN — INSULIN DETEMIR 22 UNITS: 100 INJECTION, SOLUTION SUBCUTANEOUS at 08:06

## 2023-06-17 RX ADMIN — ENOXAPARIN SODIUM 30 MG: 30 INJECTION SUBCUTANEOUS at 09:06

## 2023-06-17 RX ADMIN — SITAGLIPTIN 50 MG: 50 TABLET, FILM COATED ORAL at 08:06

## 2023-06-17 RX ADMIN — MIRTAZAPINE 30 MG: 15 TABLET, FILM COATED ORAL at 05:06

## 2023-06-17 RX ADMIN — ACETAMINOPHEN 325MG 650 MG: 325 TABLET ORAL at 05:06

## 2023-06-17 NOTE — PROGRESS NOTES
[facility] ([facility])  Rehab Progress Note    Patient Name: Lita Hess  MRN: [id number]  Age: 87 y.o. Sex: female  : 1935  Hospital Length of Stay: 8 days  Date of Service: 2023   Chief Complaint: Debility 2/2 frequent falls with left 4th-7th rib fractures    Subjective:     Basic Information  Admit Information: 87-year-old  female presented to [facility] ED on 2023 complaining of left side pain after accidentally falling backwards over walker.  PMH significant for CVA x2, CAD s/p MI, HTN, carotid artery stenosis, remote history of colon cancer s/p colectomy.  Workup significant for left 4th through 7th rib fractures.  Limited mobility 2/2 pain.  Tolerated transfer to [facility] inpatient rehab unit on  without incident.  Today's Information: No acute events overnight.  Sitting in chair comfortably watching TV.  Reports good sleep and appetite.  Last BM 6/15.  Vital signs at goal with no recent recorded fevers.  Good glycemic control.  No labs or imaging today.    Review of patient's allergies indicates:  No Known Allergies     Current Facility-Administered Medications:     acetaminophen tablet 650 mg, 650 mg, Oral, Q6H, Annette Calvo, FNP, 650 mg at 23 1705    ALPRAZolam tablet 0.25 mg, 0.25 mg, Oral, TID PRN, Vishal MAN Du, FNP, 0.25 mg at 23 1209    amLODIPine tablet 10 mg, 10 mg, Oral, Daily, Vishal MAN Du, FNP, 10 mg at 23 0826    aspirin tablet 325 mg, 325 mg, Oral, Daily, Vishal A Du, FNP, 325 mg at 23 0826    atorvastatin tablet 10 mg, 10 mg, Oral, Daily, Vishal A Du, FNP, 10 mg at 23 0826    benzonatate capsule 100 mg, 100 mg, Oral, TID PRN, Vishal Sandy, FNP    bisacodyL suppository 10 mg, 10 mg, Rectal, Daily PRN, Vishal Sandy, FNP    dextrose 10% bolus 125 mL 125 mL, 12.5 g, Intravenous, PRN, Vishal Sandy, FNP    dextrose 10% bolus 250 mL 250 mL, 25 g, Intravenous,  PRN, Vishal Parisiehart, FNP    docusate sodium capsule 100 mg, 100 mg, Oral, BID, Vishal MAGDA Du, FNP, 100 mg at 06/17/23 0826    DULoxetine DR capsule 30 mg, 30 mg, Oral, Daily, Annette Devrieste, FNP, 30 mg at 06/17/23 0826    enoxaparin injection 30 mg, 30 mg, Subcutaneous, Q12H (treatment, non-standard time), Vishal Parisiehart, FNP, 30 mg at 06/17/23 0833    folic acid tablet 1,000 mcg, 1,000 mcg, Oral, Daily, Vishal A Du, FNP, 1,000 mcg at 06/17/23 0826    gabapentin capsule 300 mg, 300 mg, Oral, TID, Annette Devrieste, FNP, 300 mg at 06/17/23 1357    glucagon (human recombinant) injection 1 mg, 1 mg, Intramuscular, PRN, Vishal Parisiehart, FNP    glucose chewable tablet 16 g, 16 g, Oral, PRN, Vishal Parisiehart, FNP    glucose chewable tablet 24 g, 24 g, Oral, PRN, Vishal MAN Du, FNP    hydrALAZINE injection 10 mg, 10 mg, Intravenous, Q4H PRN, Vishal Parisiehart, FNP    hydrOXYzine pamoate capsule 50 mg, 50 mg, Oral, Nightly PRN, Vishal A Du, FNP    insulin aspart U-100 injection 1-10 Units, 1-10 Units, Subcutaneous, QID (AC + HS) PRN, Vishal Parisiehart, FNP, 4 Units at 06/17/23 1217    insulin detemir U-100 injection 22 Units, 22 Units, Subcutaneous, BID, Kaity Arndt, FNP, 22 Units at 06/17/23 0833    labetalol 20 mg/4 mL (5 mg/mL) IV syring, 10 mg, Intravenous, Q4H PRN, Vishal A Du, FNP    LIDOcaine 5 % patch 1 patch, 1 patch, Transdermal, Q24H, Annette Calvo, FNP, 1 patch at 06/17/23 0544    lisinopriL tablet 20 mg, 20 mg, Oral, Daily, HAYLEE ArriagaP, 20 mg at 06/17/23 0826    metFORMIN tablet 500 mg, 500 mg, Oral, Daily before evening meal, Annette Calvo FNP, 500 mg at 06/17/23 1705    methocarbamoL tablet 500 mg, 500 mg, Oral, Q6H, Annette Calvo FNP, 500 mg at 06/17/23 1705    metoprolol injection 10 mg, 10 mg, Intravenous, Q2H PRN, HAYLEE ArriagaP    mirtazapine tablet 30 mg, 30 mg, Oral, After dinner, HAYLEE KaminskiP, 30  "mg at 06/17/23 1705    nitroGLYCERIN SL tablet 0.4 mg, 0.4 mg, Sublingual, Q5 Min PRN, Vishal A Du, FNP    ondansetron disintegrating tablet 4 mg, 4 mg, Oral, Q6H PRN, Vishal MAN Du, FNP, 4 mg at 06/14/23 1224    oxyCODONE immediate release tablet 5 mg, 5 mg, Oral, Q6H PRN, Annette Devrieste, FNP    polyethylene glycol packet 17 g, 17 g, Oral, BID PRN, Vishal MAGDA Du, FNP    promethazine tablet 25 mg, 25 mg, Oral, Q6H PRN, Vishal Parisiehart, FNP    SITagliptin phosphate tablet 50 mg, 50 mg, Oral, Daily, Vishal MAGDA Du, FNP, 50 mg at 06/17/23 0826    traMADoL tablet 50 mg, 50 mg, Oral, Q6H, Annette Devrieste, FNP, 50 mg at 06/17/23 1705     Review of Systems   Complete 12-point review of symptoms negative except for what's mentioned in HPI     Objective:     /73   Pulse 86   Temp 97.7 °F (36.5 °C) (Oral)   Resp 18   Ht 4' 11.02" (1.499 m)   Wt 46.2 kg (101 lb 13.6 oz)   SpO2 96%   Breastfeeding No   BMI 20.56 kg/m²        Physical Exam  Constitutional:       Appearance: Normal appearance.   HENT:      Head: Normocephalic.      Mouth/Throat:      Mouth: Mucous membranes are moist.   Eyes:      Pupils: Pupils are equal, round, and reactive to light.   Cardiovascular:      Rate and Rhythm: Normal rate and regular rhythm.      Heart sounds: Normal heart sounds.   Pulmonary:      Effort: Pulmonary effort is normal.      Breath sounds: Normal breath sounds.   Abdominal:      General: Bowel sounds are normal.      Palpations: Abdomen is soft.   Musculoskeletal:      Cervical back: Neck supple.      Comments: muscle atrophy   Skin:     General: Skin is warm and dry.   Neurological:      Mental Status: She is alert and oriented to person, place, and time.      Motor: Weakness present.   Psychiatric:         Mood and Affect: Mood normal.         Behavior: Behavior normal.         Thought Content: Thought content normal.         Judgment: Judgment normal.       Lines/Drains/Airways       " None                   Labs  Recent Results (from the past 24 hour(s))   POCT glucose    Collection Time: 06/16/23  9:33 PM   Result Value Ref Range    POCT Glucose 157 (H) 70 - 110 mg/dL   POCT glucose    Collection Time: 06/17/23  5:43 AM   Result Value Ref Range    POCT Glucose 94 70 - 110 mg/dL   POCT glucose    Collection Time: 06/17/23 12:03 PM   Result Value Ref Range    POCT Glucose 237 (H) 70 - 110 mg/dL   POCT glucose    Collection Time: 06/17/23  5:03 PM   Result Value Ref Range    POCT Glucose 139 (H) 70 - 110 mg/dL       Radiology  CT head without contrast on 06/06/2023 at 3:00 p.m., IMPRESSION: No acute intracranial abnormality identified.  Findings of chronic microvascular ischemic disease.  Radiology  CT abdomen/pelvis with contrast on 6/6, IMPRESSION: No definite acute intra-abdominal or intrathoracic abnormality.  There are acute left-sided rib fractures as above.  Evaluation for additional fractures is limited by motion artifact.  Soft tissue density at the left sacrum as would be seen with decubitus ulcer is again noted.    Assessment/Plan:     87 y.o. AAF admitted on 6/9/2023    Debility   - 2/2 frequent falls with left 4th-7th rib fractures  - defer to physiatry for rehab and pain management  - PT/OT/RT following     Multiple rib fractures  - left 4-7th rib fractures  - continue                DuoNebs q.8 hours                Percocet 5 mg/325 mg q.6 hours p.r.n.                Lidoderm patch to left chest daily                Gabapentin 300 mg t.i.d.  - encourage incentive spirometer q.1 hour while awake     Proximal transverse colon adenocarcinoma  - s/p laparoscopic/robotic right hemicolectomy on 7/29/2021  - Biopsy significant for adenocarcinoma-no metastasis  - to follow up with oncology     Moderate protein calorie malnutrition  - albumin 2.8/prealbumin 18.8-trending up  - registered dietitian following  - encourage oral nutrition and hydration  - continue                Megace 400 mg  b.i.d. x5 days (initiated 6/9)      History of bilateral ischemic CVA  - 6/2020  - continue                Lipitor 10 mg daily                Aspirin 325 mg daily     CAD  - s/p MI  - denies recent chest pain or discomfort  - continue                Lisinopril 20 mg daily                Lipitor 10 mg daily                Aspirin 325 mg daily  - ECG and Nitro PRN  - not on Plavix or BB  - continue cardiac diet  - to follow-up with cardiology outpatient        HTN  - at goal!!  - continue                Lisinopril 20 mg daily                Norvasc 10 mg daily                Hydralazine 10 mg every 2 hours as needed for BP > 160/90                Labetalol 10 mg every 2 hours as needed  - Low-sodium diet      Iron deficiency anemia  - asymptomatic  - H/H stable   - continue                Folic acid 1 mg daily  - no evidence of active bleeds  - will closely monitor and transfuse if needed      History of vasovagal syncope  - s/p Linq device  - to follow up with cardiology outpatient     Left carotid stenosis  - Left ICA > 85%  - continue                Aspirin 325 mg daily                Lipitor 10 mg daily  - to follow-up outpatient with vascular surgery     Poorly controlled DM type II  - HgA1c 10.5 on 6/6/2023  - continue   Januvia 50 mg daily (initiated 6/12)  Metformin 500 mg daily (initiated 6/11)                Levemir 22 units b.i.d. (increased 6/14)                ISS   - CBGs AC/HS     HLD  - FLP at goal!!  - continue                Lipitor 10 mg daily     Constipation  - stable  - continue  Colace 100 mg BID   Miralax 17 gm BID PRN     Unstageable sacral/left buttocks decubitus ulcer  - stable  - duo derm applied  - turn q2h  - wound care following     VTE Prophylaxis:  Lovenox 30 mg b.i.d.  COVID-19 testing:  Unknown  COVID-19 vaccination status:  Vaccinated (Moderna):  02/05/2021, 03/05/2021, 10/29/2021, and 06/04/2022     POA: Alhaji Hess (son) [phone]  Living will: No  Contacts: Alhaji Hess (son)  [phone]                 Elder Hess (son) [phone]                 Raegan Stewart (sister) [phone]     CODE STATUS: Full Code  Internal Medicine (attending): Troy Guerin MD  Physiatry (consulting):  Avi Melchor MD     OUTPATIENT PROVIDERS  PCP: Anant Mejia MD  Cardiology: Henry Cifuentes MD  Vascular surgery: Kip Shah MD  General surgery: Justo Henry MD  Gastroenterology:  Kip Forbes MD     DISPOSITION:  Vital signs at goal.  No labs today.  Good glycemic control.  Monitor closely.  Bowel management, sleep hygiene, and appetite at goal.  Continues to progress well with therapies.  Continue aggressive mobilization as tolerated.  Monitor closely.  Notify of acute changes.      Staffing 6/13: Incontinent of bowel and bladder. Good appetite. RT and PT: overall mod assist. OT: overall mod assist. Needs more time to increase endurance. Projected discharge 6/20.

## 2023-06-17 NOTE — PT/OT/SLP PROGRESS
Physical Therapy      Patient Name:  Lita Hess   MRN:  [id number]    Patient not seen today secondary to Patient unwilling to participate.     Amount of therapy minutes missed:  60 Minutes.    Will follow-up in AM.    6/17/2023

## 2023-06-18 LAB
POCT GLUCOSE: 147 MG/DL (ref 70–110)
POCT GLUCOSE: 153 MG/DL (ref 70–110)
POCT GLUCOSE: 163 MG/DL (ref 70–110)
POCT GLUCOSE: 267 MG/DL (ref 70–110)

## 2023-06-18 PROCEDURE — 25000003 PHARM REV CODE 250: Performed by: NURSE PRACTITIONER

## 2023-06-18 PROCEDURE — 63600175 PHARM REV CODE 636 W HCPCS: Performed by: NURSE PRACTITIONER

## 2023-06-18 PROCEDURE — 94761 N-INVAS EAR/PLS OXIMETRY MLT: CPT

## 2023-06-18 PROCEDURE — 94799 UNLISTED PULMONARY SVC/PX: CPT

## 2023-06-18 PROCEDURE — 11800000 HC REHAB PRIVATE ROOM

## 2023-06-18 RX ADMIN — AMLODIPINE BESYLATE 10 MG: 5 TABLET ORAL at 08:06

## 2023-06-18 RX ADMIN — DOCUSATE SODIUM 100 MG: 100 CAPSULE, LIQUID FILLED ORAL at 08:06

## 2023-06-18 RX ADMIN — GABAPENTIN 300 MG: 300 CAPSULE ORAL at 08:06

## 2023-06-18 RX ADMIN — GABAPENTIN 300 MG: 300 CAPSULE ORAL at 02:06

## 2023-06-18 RX ADMIN — TRAMADOL HYDROCHLORIDE 50 MG: 50 TABLET, COATED ORAL at 12:06

## 2023-06-18 RX ADMIN — TRAMADOL HYDROCHLORIDE 50 MG: 50 TABLET, COATED ORAL at 11:06

## 2023-06-18 RX ADMIN — ENOXAPARIN SODIUM 30 MG: 30 INJECTION SUBCUTANEOUS at 08:06

## 2023-06-18 RX ADMIN — DULOXETINE HYDROCHLORIDE 30 MG: 30 CAPSULE, DELAYED RELEASE ORAL at 08:06

## 2023-06-18 RX ADMIN — ACETAMINOPHEN 325MG 650 MG: 325 TABLET ORAL at 12:06

## 2023-06-18 RX ADMIN — ENOXAPARIN SODIUM 30 MG: 30 INJECTION SUBCUTANEOUS at 09:06

## 2023-06-18 RX ADMIN — ACETAMINOPHEN 325MG 650 MG: 325 TABLET ORAL at 05:06

## 2023-06-18 RX ADMIN — LISINOPRIL 20 MG: 10 TABLET ORAL at 08:06

## 2023-06-18 RX ADMIN — INSULIN ASPART 4 UNITS: 100 INJECTION, SOLUTION INTRAVENOUS; SUBCUTANEOUS at 12:06

## 2023-06-18 RX ADMIN — ACETAMINOPHEN 325MG 650 MG: 325 TABLET ORAL at 11:06

## 2023-06-18 RX ADMIN — METHOCARBAMOL 500 MG: 500 TABLET ORAL at 05:06

## 2023-06-18 RX ADMIN — TRAMADOL HYDROCHLORIDE 50 MG: 50 TABLET, COATED ORAL at 05:06

## 2023-06-18 RX ADMIN — ASPIRIN 325 MG: 325 TABLET, FILM COATED ORAL at 08:06

## 2023-06-18 RX ADMIN — MIRTAZAPINE 30 MG: 15 TABLET, FILM COATED ORAL at 05:06

## 2023-06-18 RX ADMIN — METHOCARBAMOL 500 MG: 500 TABLET ORAL at 12:06

## 2023-06-18 RX ADMIN — METHOCARBAMOL 500 MG: 500 TABLET ORAL at 11:06

## 2023-06-18 RX ADMIN — FOLIC ACID 1000 MCG: 1 TABLET ORAL at 08:06

## 2023-06-18 RX ADMIN — LIDOCAINE 5% 1 PATCH: 700 PATCH TOPICAL at 05:06

## 2023-06-18 RX ADMIN — METFORMIN HYDROCHLORIDE 500 MG: 500 TABLET, FILM COATED ORAL at 05:06

## 2023-06-18 RX ADMIN — INSULIN ASPART 2 UNITS: 100 INJECTION, SOLUTION INTRAVENOUS; SUBCUTANEOUS at 05:06

## 2023-06-18 RX ADMIN — SITAGLIPTIN 50 MG: 50 TABLET, FILM COATED ORAL at 08:06

## 2023-06-18 RX ADMIN — INSULIN DETEMIR 22 UNITS: 100 INJECTION, SOLUTION SUBCUTANEOUS at 09:06

## 2023-06-18 RX ADMIN — GABAPENTIN 300 MG: 300 CAPSULE ORAL at 05:06

## 2023-06-18 RX ADMIN — INSULIN DETEMIR 22 UNITS: 100 INJECTION, SOLUTION SUBCUTANEOUS at 08:06

## 2023-06-18 RX ADMIN — ATORVASTATIN CALCIUM 10 MG: 10 TABLET, FILM COATED ORAL at 08:06

## 2023-06-18 NOTE — PROGRESS NOTES
[facility] ([facility])  Rehab Progress Note    Patient Name: Lita Hess  MRN: [id number]  Age: 87 y.o. Sex: female  : 1935  Hospital Length of Stay: 9 days  Date of Service: 2023   Chief Complaint: Debility 2/2 frequent falls with left 4th-7th rib fractures    Subjective:     Basic Information  Admit Information: 87-year-old  female presented to [facility] ED on 2023 complaining of left side pain after accidentally falling backwards over walker.  PMH significant for CVA x2, CAD s/p MI, HTN, carotid artery stenosis, remote history of colon cancer s/p colectomy.  Workup significant for left 4th through 7th rib fractures.  Limited mobility 2/2 pain.  Tolerated transfer to [facility] inpatient rehab unit on  without incident.  Today's Information: No acute events overnight.  Sitting in chair comfortably.  Reports good sleep and appetite.  Last BM 6/15.  Vital signs at goal with no recent recorded fevers.  Good glycemic control.  No labs or imaging today.    Review of patient's allergies indicates:  No Known Allergies     Current Facility-Administered Medications:     acetaminophen tablet 650 mg, 650 mg, Oral, Q6H, Annette Calvo, FNP, 650 mg at 23 1735    ALPRAZolam tablet 0.25 mg, 0.25 mg, Oral, TID PRN, Vishal MAN Du, FNP, 0.25 mg at 23 1209    amLODIPine tablet 10 mg, 10 mg, Oral, Daily, Vishal A Du, FNP, 10 mg at 23 0848    aspirin tablet 325 mg, 325 mg, Oral, Daily, Vishal A Du, FNP, 325 mg at 23 0848    atorvastatin tablet 10 mg, 10 mg, Oral, Daily, Vishal A Du, FNP, 10 mg at 23 0848    benzonatate capsule 100 mg, 100 mg, Oral, TID PRN, Vishal Sandy, FNP    bisacodyL suppository 10 mg, 10 mg, Rectal, Daily PRN, Vishal Sandy, FNP    dextrose 10% bolus 125 mL 125 mL, 12.5 g, Intravenous, PRN, Vishal A Du, ALIRIO    dextrose 10% bolus 250 mL 250 mL, 25 g, Intravenous, PRN,  Vishal Stephensonart, FNP    docusate sodium capsule 100 mg, 100 mg, Oral, BID, Vishal MAGDA Du, FNP, 100 mg at 06/18/23 0848    DULoxetine DR capsule 30 mg, 30 mg, Oral, Daily, Annette Devrieste, FNP, 30 mg at 06/18/23 0848    enoxaparin injection 30 mg, 30 mg, Subcutaneous, Q12H (treatment, non-standard time), Vishalmarcelino Stephensonart, FNP, 30 mg at 06/18/23 0853    folic acid tablet 1,000 mcg, 1,000 mcg, Oral, Daily, Vishal A Du, FNP, 1,000 mcg at 06/18/23 0849    gabapentin capsule 300 mg, 300 mg, Oral, TID, Annette Devrieste, FNP, 300 mg at 06/18/23 1447    glucagon (human recombinant) injection 1 mg, 1 mg, Intramuscular, PRN, Vishal Stephensonart, FNP    glucose chewable tablet 16 g, 16 g, Oral, PRN, Vishal Parisiehart, FNP    glucose chewable tablet 24 g, 24 g, Oral, PRN, Vishal Parisiehart, FNP    hydrALAZINE injection 10 mg, 10 mg, Intravenous, Q4H PRN, Vishal Stephensonart, FNP    hydrOXYzine pamoate capsule 50 mg, 50 mg, Oral, Nightly PRN, Vishal Parisiehart, FNP    insulin aspart U-100 injection 1-10 Units, 1-10 Units, Subcutaneous, QID (AC + HS) PRN, Vishal Stephensonart, FNP, 4 Units at 06/18/23 1212    insulin detemir U-100 injection 22 Units, 22 Units, Subcutaneous, BID, Kaity Arndt, FNP, 22 Units at 06/18/23 0854    labetalol 20 mg/4 mL (5 mg/mL) IV syring, 10 mg, Intravenous, Q4H PRN, Vishal Parisiehart, FNP    LIDOcaine 5 % patch 1 patch, 1 patch, Transdermal, Q24H, Annette Calvo, FNP, 1 patch at 06/18/23 0525    lisinopriL tablet 20 mg, 20 mg, Oral, Daily, Vishal Stephensonart, FNP, 20 mg at 06/18/23 0848    metFORMIN tablet 500 mg, 500 mg, Oral, Daily before evening meal, Annette Devrieste, FNP, 500 mg at 06/18/23 1738    methocarbamoL tablet 500 mg, 500 mg, Oral, Q6H, Annette Devrieste, FNP, 500 mg at 06/18/23 1735    metoprolol injection 10 mg, 10 mg, Intravenous, Q2H PRN, Vishal Sandy, FNP    mirtazapine tablet 30 mg, 30 mg, Oral, After dinner, Annette Devrieste, FNP, 30 mg at  "06/18/23 1736    nitroGLYCERIN SL tablet 0.4 mg, 0.4 mg, Sublingual, Q5 Min PRN, Vishal MAN Du, FNP    ondansetron disintegrating tablet 4 mg, 4 mg, Oral, Q6H PRN, Vishal MAN Du, FNP, 4 mg at 06/14/23 1224    oxyCODONE immediate release tablet 5 mg, 5 mg, Oral, Q6H PRN, Annette Devrieste, FNP    polyethylene glycol packet 17 g, 17 g, Oral, BID PRN, Vishal MAGDA Du, FNP    promethazine tablet 25 mg, 25 mg, Oral, Q6H PRN, Vishal MAGDA Du, FNP    SITagliptin phosphate tablet 50 mg, 50 mg, Oral, Daily, Vishal MAGDA Du, FNP, 50 mg at 06/18/23 0849    traMADoL tablet 50 mg, 50 mg, Oral, Q6H, Annette Devrieste, FNP, 50 mg at 06/18/23 1736     Review of Systems   Complete 12-point review of symptoms negative except for what's mentioned in HPI     Objective:     /65   Pulse 88   Temp 98 °F (36.7 °C) (Oral)   Resp 18   Ht 4' 11.02" (1.499 m)   Wt 46.2 kg (101 lb 13.6 oz)   SpO2 96%   Breastfeeding No   BMI 20.56 kg/m²        Physical Exam  Constitutional:       Appearance: Normal appearance.   HENT:      Head: Normocephalic.      Mouth/Throat:      Mouth: Mucous membranes are moist.   Eyes:      Pupils: Pupils are equal, round, and reactive to light.   Cardiovascular:      Rate and Rhythm: Normal rate and regular rhythm.      Heart sounds: Normal heart sounds.   Pulmonary:      Effort: Pulmonary effort is normal.      Breath sounds: Normal breath sounds.   Abdominal:      General: Bowel sounds are normal.      Palpations: Abdomen is soft.   Musculoskeletal:      Cervical back: Neck supple.      Comments: muscle atrophy   Skin:     General: Skin is warm and dry.   Neurological:      Mental Status: She is alert and oriented to person, place, and time.      Motor: Weakness present.   Psychiatric:         Mood and Affect: Mood normal.         Behavior: Behavior normal.         Thought Content: Thought content normal.         Judgment: Judgment normal.       Lines/Drains/Airways       None     "               Labs  Recent Results (from the past 24 hour(s))   POCT glucose    Collection Time: 06/17/23  9:01 PM   Result Value Ref Range    POCT Glucose 143 (H) 70 - 110 mg/dL   POCT glucose    Collection Time: 06/18/23  5:23 AM   Result Value Ref Range    POCT Glucose 147 (H) 70 - 110 mg/dL   POCT glucose    Collection Time: 06/18/23 11:38 AM   Result Value Ref Range    POCT Glucose 267 (H) 70 - 110 mg/dL   POCT glucose    Collection Time: 06/18/23  4:33 PM   Result Value Ref Range    POCT Glucose 163 (H) 70 - 110 mg/dL       Radiology  CT head without contrast on 06/06/2023 at 3:00 p.m., IMPRESSION: No acute intracranial abnormality identified.  Findings of chronic microvascular ischemic disease.  Radiology  CT abdomen/pelvis with contrast on 6/6, IMPRESSION: No definite acute intra-abdominal or intrathoracic abnormality.  There are acute left-sided rib fractures as above.  Evaluation for additional fractures is limited by motion artifact.  Soft tissue density at the left sacrum as would be seen with decubitus ulcer is again noted.    Assessment/Plan:     87 y.o. AAF admitted on 6/9/2023    Debility   - 2/2 frequent falls with left 4th-7th rib fractures  - defer to physiatry for rehab and pain management  - PT/OT/RT following     Multiple rib fractures  - left 4-7th rib fractures  - continue                DuoNebs q.8 hours                Percocet 5 mg/325 mg q.6 hours p.r.n.                Lidoderm patch to left chest daily                Gabapentin 300 mg t.i.d.  - encourage incentive spirometer q.1 hour while awake     Proximal transverse colon adenocarcinoma  - s/p laparoscopic/robotic right hemicolectomy on 7/29/2021  - Biopsy significant for adenocarcinoma-no metastasis  - to follow up with oncology     Moderate protein calorie malnutrition  - albumin 2.8/prealbumin 18.8-trending up  - registered dietitian following  - encourage oral nutrition and hydration  - continue                Megace 400 mg b.i.d.  x5 days (initiated 6/9)      History of bilateral ischemic CVA  - 6/2020  - continue                Lipitor 10 mg daily                Aspirin 325 mg daily     CAD  - s/p MI  - denies recent chest pain or discomfort  - continue                Lisinopril 20 mg daily                Lipitor 10 mg daily                Aspirin 325 mg daily  - ECG and Nitro PRN  - not on Plavix or BB  - continue cardiac diet  - to follow-up with cardiology outpatient        HTN  - at goal!!  - continue                Lisinopril 20 mg daily                Norvasc 10 mg daily                Hydralazine 10 mg every 2 hours as needed for BP > 160/90                Labetalol 10 mg every 2 hours as needed  - Low-sodium diet      Iron deficiency anemia  - asymptomatic  - H/H stable   - continue                Folic acid 1 mg daily  - no evidence of active bleeds  - will closely monitor and transfuse if needed      History of vasovagal syncope  - s/p Linq device  - to follow up with cardiology outpatient     Left carotid stenosis  - Left ICA > 85%  - continue                Aspirin 325 mg daily                Lipitor 10 mg daily  - to follow-up outpatient with vascular surgery     Poorly controlled DM type II  - HgA1c 10.5 on 6/6/2023  - continue   Januvia 50 mg daily (initiated 6/12)  Metformin 500 mg daily (initiated 6/11)                Levemir 22 units b.i.d. (increased 6/14)                ISS   - CBGs AC/HS     HLD  - FLP at goal!!  - continue                Lipitor 10 mg daily     Constipation  - stable  - continue  Colace 100 mg BID   Miralax 17 gm BID PRN     Unstageable sacral/left buttocks decubitus ulcer  - stable  - duo derm applied  - turn q2h  - wound care following     VTE Prophylaxis:  Lovenox 30 mg b.i.d.  COVID-19 testing:  Unknown  COVID-19 vaccination status:  Vaccinated (Moderna):  02/05/2021, 03/05/2021, 10/29/2021, and 06/04/2022     POA: Alhaji Hess (son) [phone]  Living will: No  Contacts: Alhaji Hess (son)  [phone]                 Elder Hess (son) [phone]                 Raegan Stewart (sister) [phone]     CODE STATUS: Full Code  Internal Medicine (attending): Troy Guerin MD  Physiatry (consulting):  Avi Melchor MD     OUTPATIENT PROVIDERS  PCP: Anant Mejia MD  Cardiology: Henry Cifuentes MD  Vascular surgery: Kip Shah MD  General surgery: Justo Henry MD  Gastroenterology:  Kip Forbes MD     DISPOSITION:  Vital signs at goal.  No labs today.  Good glycemic control.  Monitor closely.  Bowel management, sleep hygiene, and appetite at goal.  Continues to progress well with therapies.  Continue aggressive mobilization as tolerated.  Monitor closely.  Notify of acute changes.      Staffing 6/13: Incontinent of bowel and bladder. Good appetite. RT and PT: overall mod assist. OT: overall mod assist. Needs more time to increase endurance. Projected discharge 6/20.

## 2023-06-19 LAB
ALBUMIN SERPL-MCNC: 3 G/DL (ref 3.4–4.8)
ALBUMIN/GLOB SERPL: 0.7 RATIO (ref 1.1–2)
ALP SERPL-CCNC: 90 UNIT/L (ref 40–150)
ALT SERPL-CCNC: 14 UNIT/L (ref 0–55)
AST SERPL-CCNC: 12 UNIT/L (ref 5–34)
BASOPHILS # BLD AUTO: 0.05 X10(3)/MCL
BASOPHILS NFR BLD AUTO: 0.7 %
BILIRUBIN DIRECT+TOT PNL SERPL-MCNC: 0.2 MG/DL
BUN SERPL-MCNC: 27 MG/DL (ref 9.8–20.1)
CALCIUM SERPL-MCNC: 9.6 MG/DL (ref 8.4–10.2)
CHLORIDE SERPL-SCNC: 102 MMOL/L (ref 98–107)
CO2 SERPL-SCNC: 20 MMOL/L (ref 23–31)
CREAT SERPL-MCNC: 0.94 MG/DL (ref 0.55–1.02)
EOSINOPHIL # BLD AUTO: 0.13 X10(3)/MCL (ref 0–0.9)
EOSINOPHIL NFR BLD AUTO: 1.8 %
ERYTHROCYTE [DISTWIDTH] IN BLOOD BY AUTOMATED COUNT: 13.2 % (ref 11.5–17)
GFR SERPLBLD CREATININE-BSD FMLA CKD-EPI: 59 MLS/MIN/1.73/M2
GLOBULIN SER-MCNC: 4.2 GM/DL (ref 2.4–3.5)
GLUCOSE SERPL-MCNC: 77 MG/DL (ref 82–115)
HCT VFR BLD AUTO: 38.1 % (ref 37–47)
HGB BLD-MCNC: 12.7 G/DL (ref 12–16)
IMM GRANULOCYTES # BLD AUTO: 0.04 X10(3)/MCL (ref 0–0.04)
IMM GRANULOCYTES NFR BLD AUTO: 0.5 %
LYMPHOCYTES # BLD AUTO: 1.94 X10(3)/MCL (ref 0.6–4.6)
LYMPHOCYTES NFR BLD AUTO: 26.2 %
MAGNESIUM SERPL-MCNC: 2.1 MG/DL (ref 1.6–2.6)
MCH RBC QN AUTO: 31.7 PG (ref 27–31)
MCHC RBC AUTO-ENTMCNC: 33.3 G/DL (ref 33–36)
MCV RBC AUTO: 95 FL (ref 80–94)
MONOCYTES # BLD AUTO: 0.76 X10(3)/MCL (ref 0.1–1.3)
MONOCYTES NFR BLD AUTO: 10.3 %
NEUTROPHILS # BLD AUTO: 4.48 X10(3)/MCL (ref 2.1–9.2)
NEUTROPHILS NFR BLD AUTO: 60.5 %
NRBC BLD AUTO-RTO: 0 %
PHOSPHATE SERPL-MCNC: 3.1 MG/DL (ref 2.3–4.7)
PLATELET # BLD AUTO: 434 X10(3)/MCL (ref 130–400)
PMV BLD AUTO: 9 FL (ref 7.4–10.4)
POCT GLUCOSE: 129 MG/DL (ref 70–110)
POCT GLUCOSE: 163 MG/DL (ref 70–110)
POCT GLUCOSE: 229 MG/DL (ref 70–110)
POTASSIUM SERPL-SCNC: 5 MMOL/L (ref 3.5–5.1)
PREALB SERPL-MCNC: 29.4 MG/DL (ref 14–37)
PROT SERPL-MCNC: 7.2 GM/DL (ref 5.8–7.6)
RBC # BLD AUTO: 4.01 X10(6)/MCL (ref 4.2–5.4)
SODIUM SERPL-SCNC: 133 MMOL/L (ref 136–145)
WBC # SPEC AUTO: 7.4 X10(3)/MCL (ref 4.5–11.5)

## 2023-06-19 PROCEDURE — 25000003 PHARM REV CODE 250: Performed by: NURSE PRACTITIONER

## 2023-06-19 PROCEDURE — 99233 SBSQ HOSP IP/OBS HIGH 50: CPT | Mod: ,,, | Performed by: NURSE PRACTITIONER

## 2023-06-19 PROCEDURE — 51798 US URINE CAPACITY MEASURE: CPT

## 2023-06-19 PROCEDURE — 84100 ASSAY OF PHOSPHORUS: CPT | Performed by: NURSE PRACTITIONER

## 2023-06-19 PROCEDURE — 97116 GAIT TRAINING THERAPY: CPT | Mod: CQ

## 2023-06-19 PROCEDURE — 84134 ASSAY OF PREALBUMIN: CPT | Performed by: NURSE PRACTITIONER

## 2023-06-19 PROCEDURE — 85025 COMPLETE CBC W/AUTO DIFF WBC: CPT | Performed by: NURSE PRACTITIONER

## 2023-06-19 PROCEDURE — 97110 THERAPEUTIC EXERCISES: CPT | Mod: CQ

## 2023-06-19 PROCEDURE — 97530 THERAPEUTIC ACTIVITIES: CPT

## 2023-06-19 PROCEDURE — 94799 UNLISTED PULMONARY SVC/PX: CPT

## 2023-06-19 PROCEDURE — 11800000 HC REHAB PRIVATE ROOM

## 2023-06-19 PROCEDURE — 99233 PR SUBSEQUENT HOSPITAL CARE,LEVL III: ICD-10-PCS | Mod: ,,, | Performed by: NURSE PRACTITIONER

## 2023-06-19 PROCEDURE — 83735 ASSAY OF MAGNESIUM: CPT | Performed by: NURSE PRACTITIONER

## 2023-06-19 PROCEDURE — 80053 COMPREHEN METABOLIC PANEL: CPT | Performed by: NURSE PRACTITIONER

## 2023-06-19 PROCEDURE — 97110 THERAPEUTIC EXERCISES: CPT

## 2023-06-19 PROCEDURE — 97535 SELF CARE MNGMENT TRAINING: CPT

## 2023-06-19 PROCEDURE — 63600175 PHARM REV CODE 636 W HCPCS: Performed by: NURSE PRACTITIONER

## 2023-06-19 RX ORDER — TAMSULOSIN HYDROCHLORIDE 0.4 MG/1
0.4 CAPSULE ORAL NIGHTLY
Status: DISCONTINUED | OUTPATIENT
Start: 2023-06-19 | End: 2023-06-23 | Stop reason: HOSPADM

## 2023-06-19 RX ADMIN — FOLIC ACID 1000 MCG: 1 TABLET ORAL at 08:06

## 2023-06-19 RX ADMIN — INSULIN ASPART 2 UNITS: 100 INJECTION, SOLUTION INTRAVENOUS; SUBCUTANEOUS at 05:06

## 2023-06-19 RX ADMIN — ACETAMINOPHEN 325MG 650 MG: 325 TABLET ORAL at 06:06

## 2023-06-19 RX ADMIN — GABAPENTIN 300 MG: 300 CAPSULE ORAL at 08:06

## 2023-06-19 RX ADMIN — INSULIN DETEMIR 22 UNITS: 100 INJECTION, SOLUTION SUBCUTANEOUS at 08:06

## 2023-06-19 RX ADMIN — ACETAMINOPHEN 325MG 650 MG: 325 TABLET ORAL at 12:06

## 2023-06-19 RX ADMIN — INSULIN ASPART 4 UNITS: 100 INJECTION, SOLUTION INTRAVENOUS; SUBCUTANEOUS at 12:06

## 2023-06-19 RX ADMIN — ENOXAPARIN SODIUM 30 MG: 30 INJECTION SUBCUTANEOUS at 08:06

## 2023-06-19 RX ADMIN — DULOXETINE HYDROCHLORIDE 30 MG: 30 CAPSULE, DELAYED RELEASE ORAL at 08:06

## 2023-06-19 RX ADMIN — METHOCARBAMOL 500 MG: 500 TABLET ORAL at 05:06

## 2023-06-19 RX ADMIN — AMLODIPINE BESYLATE 10 MG: 5 TABLET ORAL at 08:06

## 2023-06-19 RX ADMIN — TRAMADOL HYDROCHLORIDE 50 MG: 50 TABLET, COATED ORAL at 12:06

## 2023-06-19 RX ADMIN — ENOXAPARIN SODIUM 30 MG: 30 INJECTION SUBCUTANEOUS at 11:06

## 2023-06-19 RX ADMIN — ACETAMINOPHEN 325MG 650 MG: 325 TABLET ORAL at 05:06

## 2023-06-19 RX ADMIN — SITAGLIPTIN 50 MG: 50 TABLET, FILM COATED ORAL at 08:06

## 2023-06-19 RX ADMIN — GABAPENTIN 300 MG: 300 CAPSULE ORAL at 06:06

## 2023-06-19 RX ADMIN — LIDOCAINE 5% 1 PATCH: 700 PATCH TOPICAL at 06:06

## 2023-06-19 RX ADMIN — DOCUSATE SODIUM 100 MG: 100 CAPSULE, LIQUID FILLED ORAL at 08:06

## 2023-06-19 RX ADMIN — TRAMADOL HYDROCHLORIDE 50 MG: 50 TABLET, COATED ORAL at 06:06

## 2023-06-19 RX ADMIN — METHOCARBAMOL 500 MG: 500 TABLET ORAL at 12:06

## 2023-06-19 RX ADMIN — ASPIRIN 325 MG: 325 TABLET, FILM COATED ORAL at 08:06

## 2023-06-19 RX ADMIN — TAMSULOSIN HYDROCHLORIDE 0.4 MG: 0.4 CAPSULE ORAL at 08:06

## 2023-06-19 RX ADMIN — METFORMIN HYDROCHLORIDE 500 MG: 500 TABLET, FILM COATED ORAL at 05:06

## 2023-06-19 RX ADMIN — LISINOPRIL 20 MG: 10 TABLET ORAL at 08:06

## 2023-06-19 RX ADMIN — METHOCARBAMOL 500 MG: 500 TABLET ORAL at 06:06

## 2023-06-19 RX ADMIN — ATORVASTATIN CALCIUM 10 MG: 10 TABLET, FILM COATED ORAL at 08:06

## 2023-06-19 RX ADMIN — TRAMADOL HYDROCHLORIDE 50 MG: 50 TABLET, COATED ORAL at 05:06

## 2023-06-19 RX ADMIN — MIRTAZAPINE 30 MG: 15 TABLET, FILM COATED ORAL at 05:06

## 2023-06-19 RX ADMIN — GABAPENTIN 300 MG: 300 CAPSULE ORAL at 02:06

## 2023-06-19 NOTE — PROGRESS NOTES
[facility] ([facility])  Rehab Progress Note    Patient Name: Lita Hess  MRN: [id number]  Age: 87 y.o. Sex: female  : 1935  Hospital Length of Stay: 10 days  Date of Service: 2023   Chief Complaint: Debility 2/2 frequent falls with left 4th-7th rib fractures    Subjective:     Basic Information  Admit Information: 87-year-old  female presented to [facility] ED on 2023 complaining of left side pain after accidentally falling backwards over walker.  PMH significant for CVA x2, CAD s/p MI, HTN, carotid artery stenosis, remote history of colon cancer s/p colectomy.  Workup significant for left 4th through 7th rib fractures.  Limited mobility 2/2 pain.  Tolerated transfer to [facility] inpatient rehab unit on  without incident.  Today's Information: No acute events overnight.  Walking comfortably with therapy.  Last BM .  Reports good sleep and appetite.  Vital signs at goal with no recent recorded fevers.  Good glycemic control.  Labs reviewed, hyponatremia stable, CO2 20, BUN trending up slightly, albumin and pre-albumin trending up, otherwise unremarkable.  No imaging today.    Review of patient's allergies indicates:  No Known Allergies     Current Facility-Administered Medications:     acetaminophen tablet 650 mg, 650 mg, Oral, Q6H, Annette Calvo, FNP, 650 mg at 23 0610    ALPRAZolam tablet 0.25 mg, 0.25 mg, Oral, TID PRN, Vishal Sandy, FNP, 0.25 mg at 23 1209    amLODIPine tablet 10 mg, 10 mg, Oral, Daily, Vishal Parisiehart, FNP, 10 mg at 23 0826    aspirin tablet 325 mg, 325 mg, Oral, Daily, Vishal Parisiehart, FNP, 325 mg at 23 0826    atorvastatin tablet 10 mg, 10 mg, Oral, Daily, Vishal A Du, FNP, 10 mg at 23 0826    benzonatate capsule 100 mg, 100 mg, Oral, TID PRN, Vishal Sandy, FNP    bisacodyL suppository 10 mg, 10 mg, Rectal, Daily PRN, Vishal Sandy, FNP    dextrose 10% bolus 125  mL 125 mL, 12.5 g, Intravenous, PRN, Vishal Parisiehart, FNP    dextrose 10% bolus 250 mL 250 mL, 25 g, Intravenous, PRN, Vishal A Du, FNP    docusate sodium capsule 100 mg, 100 mg, Oral, BID, Vishal A Du, FNP, 100 mg at 06/19/23 0826    DULoxetine DR capsule 30 mg, 30 mg, Oral, Daily, Annette Devrieste, FNP, 30 mg at 06/19/23 0826    enoxaparin injection 30 mg, 30 mg, Subcutaneous, Q12H (treatment, non-standard time), Vishal A Du, FNP, 30 mg at 06/18/23 2105    folic acid tablet 1,000 mcg, 1,000 mcg, Oral, Daily, Vishal A Du, FNP, 1,000 mcg at 06/19/23 0826    gabapentin capsule 300 mg, 300 mg, Oral, TID, Annette Devrieste, FNP, 300 mg at 06/19/23 0611    glucagon (human recombinant) injection 1 mg, 1 mg, Intramuscular, PRN, Vishal A Du, FNP    glucose chewable tablet 16 g, 16 g, Oral, PRN, Vishal A Du, FNP    glucose chewable tablet 24 g, 24 g, Oral, PRN, Vishal A Du, FNP    hydrALAZINE injection 10 mg, 10 mg, Intravenous, Q4H PRN, Vishal A Du, FNP    hydrOXYzine pamoate capsule 50 mg, 50 mg, Oral, Nightly PRN, Vishal A Du, FNP    insulin aspart U-100 injection 1-10 Units, 1-10 Units, Subcutaneous, QID (AC + HS) PRN, Vishal A Du, FNP, 2 Units at 06/18/23 1740    insulin detemir U-100 injection 22 Units, 22 Units, Subcutaneous, BID, HAYLEE GriffithsP, 22 Units at 06/19/23 0832    labetalol 20 mg/4 mL (5 mg/mL) IV syring, 10 mg, Intravenous, Q4H PRN, Vishal A Du, FNP    LIDOcaine 5 % patch 1 patch, 1 patch, Transdermal, Q24H, Annette Calvo FNP, 1 patch at 06/19/23 0600    lisinopriL tablet 20 mg, 20 mg, Oral, Daily, Vishal Sandy, FNP, 20 mg at 06/19/23 0826    metFORMIN tablet 500 mg, 500 mg, Oral, Daily before evening meal, Annette Calvo, FNP, 500 mg at 06/18/23 1738    methocarbamoL tablet 500 mg, 500 mg, Oral, Q6H, Annette Calvo, FNP, 500 mg at 06/19/23 0611    metoprolol injection 10 mg, 10 mg,  "Intravenous, Q2H PRN, Vishal MAN Du, FNP    mirtazapine tablet 30 mg, 30 mg, Oral, After dinner, Annette MAN [place], FNP, 30 mg at 06/18/23 1736    nitroGLYCERIN SL tablet 0.4 mg, 0.4 mg, Sublingual, Q5 Min PRN, Vishal MAN Du, FNP    ondansetron disintegrating tablet 4 mg, 4 mg, Oral, Q6H PRN, Vishal MAN Du, FNP, 4 mg at 06/14/23 1224    oxyCODONE immediate release tablet 5 mg, 5 mg, Oral, Q6H PRN, Annette A [place], FNP    polyethylene glycol packet 17 g, 17 g, Oral, BID PRN, Vishal MAGDA Du, FNP    promethazine tablet 25 mg, 25 mg, Oral, Q6H PRN, Vishal MAGDA Du, FNP    SITagliptin phosphate tablet 50 mg, 50 mg, Oral, Daily, Vishal MAGDA Du, FNP, 50 mg at 06/19/23 0826    traMADoL tablet 50 mg, 50 mg, Oral, Q6H, Annette MAN [place], FNP, 50 mg at 06/19/23 0611     Review of Systems   Complete 12-point review of symptoms negative except for what's mentioned in HPI     Objective:     /66   Pulse 91   Temp 98.4 °F (36.9 °C) (Oral)   Resp 18   Ht 4' 11.02" (1.499 m)   Wt 46.2 kg (101 lb 13.6 oz)   SpO2 96%   Breastfeeding No   BMI 20.56 kg/m²        Physical Exam  Constitutional:       Appearance: Normal appearance.   HENT:      Head: Normocephalic.      Mouth/Throat:      Mouth: Mucous membranes are moist.   Eyes:      Pupils: Pupils are equal, round, and reactive to light.   Cardiovascular:      Rate and Rhythm: Normal rate and regular rhythm.      Heart sounds: Normal heart sounds.   Pulmonary:      Effort: Pulmonary effort is normal.      Breath sounds: Normal breath sounds.   Abdominal:      General: Bowel sounds are normal.      Palpations: Abdomen is soft.   Musculoskeletal:      Cervical back: Neck supple.      Comments: muscle atrophy   Skin:     General: Skin is warm and dry.   Neurological:      Mental Status: She is alert and oriented to person, place, and time.      Motor: Weakness present.   Psychiatric:         Mood and Affect: Mood normal.         Behavior: " Behavior normal.         Thought Content: Thought content normal.         Judgment: Judgment normal.   *MD performed and documented physical examination       Lines/Drains/Airways       None                   Labs  Recent Results (from the past 24 hour(s))   POCT glucose    Collection Time: 06/18/23 11:38 AM   Result Value Ref Range    POCT Glucose 267 (H) 70 - 110 mg/dL   POCT glucose    Collection Time: 06/18/23  4:33 PM   Result Value Ref Range    POCT Glucose 163 (H) 70 - 110 mg/dL   POCT glucose    Collection Time: 06/18/23  9:00 PM   Result Value Ref Range    POCT Glucose 153 (H) 70 - 110 mg/dL   Prealbumin    Collection Time: 06/19/23  5:23 AM   Result Value Ref Range    Prealbumin 29.4 14.0 - 37.0 mg/dL   Comprehensive Metabolic Panel    Collection Time: 06/19/23  5:23 AM   Result Value Ref Range    Sodium Level 133 (L) 136 - 145 mmol/L    Potassium Level 5.0 3.5 - 5.1 mmol/L    Chloride 102 98 - 107 mmol/L    Carbon Dioxide 20 (L) 23 - 31 mmol/L    Glucose Level 77 (L) 82 - 115 mg/dL    Blood Urea Nitrogen 27.0 (H) 9.8 - 20.1 mg/dL    Creatinine 0.94 0.55 - 1.02 mg/dL    Calcium Level Total 9.6 8.4 - 10.2 mg/dL    Protein Total 7.2 5.8 - 7.6 gm/dL    Albumin Level 3.0 (L) 3.4 - 4.8 g/dL    Globulin 4.2 (H) 2.4 - 3.5 gm/dL    Albumin/Globulin Ratio 0.7 (L) 1.1 - 2.0 ratio    Bilirubin Total 0.2 <=1.5 mg/dL    Alkaline Phosphatase 90 40 - 150 unit/L    Alanine Aminotransferase 14 0 - 55 unit/L    Aspartate Aminotransferase 12 5 - 34 unit/L    eGFR 59 mls/min/1.73/m2   Magnesium    Collection Time: 06/19/23  5:23 AM   Result Value Ref Range    Magnesium Level 2.10 1.60 - 2.60 mg/dL   Phosphorus    Collection Time: 06/19/23  5:23 AM   Result Value Ref Range    Phosphorus Level 3.1 2.3 - 4.7 mg/dL   CBC with Differential    Collection Time: 06/19/23  5:23 AM   Result Value Ref Range    WBC 7.40 4.50 - 11.50 x10(3)/mcL    RBC 4.01 (L) 4.20 - 5.40 x10(6)/mcL    Hgb 12.7 12.0 - 16.0 g/dL    Hct 38.1 37.0 - 47.0 %     MCV 95.0 (H) 80.0 - 94.0 fL    MCH 31.7 (H) 27.0 - 31.0 pg    MCHC 33.3 33.0 - 36.0 g/dL    RDW 13.2 11.5 - 17.0 %    Platelet 434 (H) 130 - 400 x10(3)/mcL    MPV 9.0 7.4 - 10.4 fL    Neut % 60.5 %    Lymph % 26.2 %    Mono % 10.3 %    Eos % 1.8 %    Basophil % 0.7 %    Lymph # 1.94 0.6 - 4.6 x10(3)/mcL    Neut # 4.48 2.1 - 9.2 x10(3)/mcL    Mono # 0.76 0.1 - 1.3 x10(3)/mcL    Eos # 0.13 0 - 0.9 x10(3)/mcL    Baso # 0.05 <=0.2 x10(3)/mcL    IG# 0.04 0 - 0.04 x10(3)/mcL    IG% 0.5 %    NRBC% 0.0 %   POCT glucose    Collection Time: 06/19/23  6:26 AM   Result Value Ref Range    POCT Glucose 129 (H) 70 - 110 mg/dL       Radiology  CT head without contrast on 06/06/2023 at 3:00 p.m., IMPRESSION: No acute intracranial abnormality identified.  Findings of chronic microvascular ischemic disease.  Radiology  CT abdomen/pelvis with contrast on 6/6, IMPRESSION: No definite acute intra-abdominal or intrathoracic abnormality.  There are acute left-sided rib fractures as above.  Evaluation for additional fractures is limited by motion artifact.  Soft tissue density at the left sacrum as would be seen with decubitus ulcer is again noted.    Assessment/Plan:     87 y.o. AAF admitted on 6/9/2023    Debility   - 2/2 frequent falls with left 4th-7th rib fractures  - defer to physiatry for rehab and pain management  - PT/OT/RT following     Multiple rib fractures  - left 4-7th rib fractures  - continue                DuoNebs q.8 hours                Percocet 5 mg/325 mg q.6 hours p.r.n.                Lidoderm patch to left chest daily                Gabapentin 300 mg t.i.d.  - encourage incentive spirometer q.1 hour while awake     Proximal transverse colon adenocarcinoma  - s/p laparoscopic/robotic right hemicolectomy on 7/29/2021  - Biopsy significant for adenocarcinoma-no metastasis  - to follow up with oncology     Moderate protein calorie malnutrition  - albumin 2.8/prealbumin 18.8-trending up  - registered dietitian  following  - encourage oral nutrition and hydration  - continue                Megace 400 mg b.i.d. x5 days (initiated 6/9)      History of bilateral ischemic CVA  - 6/2020  - continue                Lipitor 10 mg daily                Aspirin 325 mg daily     CAD  - s/p MI  - denies recent chest pain or discomfort  - continue                Lisinopril 20 mg daily                Lipitor 10 mg daily                Aspirin 325 mg daily  - ECG and Nitro PRN  - not on Plavix or BB  - continue cardiac diet  - to follow-up with cardiology outpatient        HTN  - at goal!!  - continue                Lisinopril 20 mg daily                Norvasc 10 mg daily                Hydralazine 10 mg every 2 hours as needed for BP > 160/90                Labetalol 10 mg every 2 hours as needed  - Low-sodium diet      Iron deficiency anemia  - asymptomatic  - H/H stable   - continue                Folic acid 1 mg daily  - no evidence of active bleeds  - will closely monitor and transfuse if needed      History of vasovagal syncope  - s/p Linq device  - to follow up with cardiology outpatient     Left carotid stenosis  - Left ICA > 85%  - continue                Aspirin 325 mg daily                Lipitor 10 mg daily  - to follow-up outpatient with vascular surgery     Poorly controlled DM type II  - HgA1c 10.5 on 6/6/2023  - continue   Januvia 50 mg daily (initiated 6/12)  Metformin 500 mg daily (initiated 6/11)                Levemir 22 units b.i.d. (increased 6/14)                ISS   - CBGs AC/HS     HLD  - FLP at goal!!  - continue                Lipitor 10 mg daily     Constipation  - stable  - continue  Colace 100 mg BID   Miralax 17 gm BID PRN     Unstageable sacral/left buttocks decubitus ulcer  - stable  - duo derm applied  - turn q2h  - wound care following    Urinary retention  - current  - initiate   Flomax 0.4 mg at bedtime     VTE Prophylaxis:  Lovenox 30 mg b.i.d.  COVID-19 testing:  Unknown  COVID-19 vaccination  status:  Vaccinated (Moderna):  02/05/2021, 03/05/2021, 10/29/2021, and 06/04/2022     POA: Alhaji Hess (son) [phone]  Living will: No  Contacts: Alhaji Hess (son) [phone]                 Elder Hess (son) [phone]                 Raegan Stewart (sister) [phone]     CODE STATUS: Full Code  Internal Medicine (attending): Troy Guerin MD  Physiatry (consulting):  Avi Melchor MD     OUTPATIENT PROVIDERS  PCP: Anant Mejia MD  Cardiology: Henry Cifuentes MD  Vascular surgery: Kip Shah MD  General surgery: Justo Henry MD  Gastroenterology:  Kip Forbes MD     DISPOSITION:  Vital signs at goal.  Labs reviewed.  CBC unremarkable.  Hyponatremia stable.  Metabolic acidosis.  Monitor closely.  BUN trending up slightly.  Encourage oral fluid hydration.  Albumin and pre-albumin trending up.  Good glycemic control.  Monitor closely.  Bowel management, sleep hygiene, and appetite at goal.  Continues to progress well with therapies.  Bladder scans q 24 hrs. Continue aggressive mobilization as tolerated.  Monitor closely.  Notify of acute changes.  Staffing below.    Staffing 6/19: Incontinent of bladder and bowel. Good appetite. RT: overall min assist. PT: overall stand by to contact assist. Ambulates 150 feet with RW. OT: overall mod assist. Needs more time to increase endurance. Projected discharge 6/20, [facility].     Kaity Arndt NP conducted independent physical examination and assisted with medical documentation.    Total time spent on this encounter including chart review and direct MD + NP 1-on-1 patient interaction: 51 minutes   Over 50% of this time was spent in counseling and coordination of care

## 2023-06-19 NOTE — PROGRESS NOTES
06/19/23 1030   Rec Therapy Time Calculation   Date of Treatment 06/19/23   Rec Start Time 1030   Rec Stop Time 1100   Rec Total Time (min) 30 min   Time   Treatment time 2 units   Charges   $Therapeutic Exercise 2 units   Precautions   General Precautions fall   Orthopedic Precautions  N/A   Braces N/A   Pain/Comfort   Pain Rating 1 no pain   OTHER   Rehab identified problem list/impairments weakness;impaired endurance;impaired functional mobility;gait instability;decreased lower extremity function;decreased safety awareness;decreased upper extremity function   Values/Beliefs/Spiritual Care   Spiritual, Cultural Beliefs, Jew Practices, Values that Affect Care no   Overall Level of Functioning   Activity Tolerance Mod Indep   Dynamic Sitting Balance/Reaching Does not occur   Dynamic Standing Balance/Reaching Standby Assist   Right UE Coodination/Dexterity Mod Indep   Left UE Coordination/Dexterity Mod Indep   Problem Solving/Sequencing Skills Mod Indep   Memory Recall Mod Indep   R/L Neglect/Inattention Does not occur   Attention Span Mod Indep   Social Interaction Independent   Recreational Therapy Short Term Goals   Short Term Goal 1 Progression Met   Short Term Goal 2 Progression Met   Short Term Goal 3 Progression Met   Recreational Therapy Long Term Goals   Long Term Goal 1 Progression Met   Long Term Goal 2 Progression Progressing   Plan   Patient to be seen Daily   Planned Duration Other (Comment)  (1 day)   Treatments Planned Energy conservation training   Treatment plan/goals estblished with Patient/Caregiver Yes

## 2023-06-19 NOTE — PROGRESS NOTES
Dos 6/19/23  Patient seen and evaluated in OT today  Continues to participate and make progress toward goals  Working on ADL's and IADL's  Discussed with patient and OT  Reviewed chart and discussed with nursing, Dr Guerin's primary medicine team, as well as Kyleigh Calvo, my NP  Agree with present POC   Subjective  HPI: 88 yo BF with PMH of CVA x 2, MI with loop recorder, HTN, GERD, hip replacement in past, and remote hx colon cancer s/p colectomy presented to the ED at [facility] on 6/6/23 after falling at home. Patient complained of left rib pain and left hip pain. CT head showed no acute intracranial abnormality identified, and chronic microvascular ischemic disease. CT cervical spine showed no acute findings. CT of chest/abdomen/ pelvis showed acute fractures of the left 4th, 5th, 6th, and 7th ribs, scoliotic curvature of the spine with no acute fracture identified; and no fracture of right ribs identified. Hip XR showed no acute fractures. Patient was admitted to ICU for close respiratory monitoring, encouraged to use IS, started duonebs, and CPT ordered. On 6/7, PT eval completed with deficits noted. On 6/8, OT eval completed with deficits noted. Labs showed low BUN of 9.6, low Na of 133, low H&H of 11.7 & 35.7, elevated CBG of 346, and low albumin of 3.1. On Lovenox for DVT prophylaxis. On 6/9, CBG remains elevated. Patient is AAOx4.  Participating with therapy. Functional status includes minimal-moderate assist needed for bed mobility, minimal assist for transfer with RW, minimal assist for walking about 30 ft with RW, purwick catheter for toileting, moderate-max assist for lower body dressing. Patient was evaluated, accepted, and admitted to inpatient rehab to improve functional status. Transferred to [facility] on 6/9 without incident.      6/19: Seen with PT, Amb w/RW with slow gait. States that she is not ready to leave us tomorrow. She is unsure if they are going to be nice, because they have new management since  "her last stay. Participating with therapy with continued encouragement. Patient having bladder incontinence and states that she does not feel it. Bladder scans for 24 hours ordered as she had urinary retention upon arrival to Rehab. VSSAF.         Review of Systems  Psychiatric: Per son, pt. Recently complained of depression. Pt. Has a history of anxiety and takes medication for this. She has no substance abuse history    Depression/Anxiety: no complaints   mirtazapine tablet 30 mg after dinner  ALPRAZolam tablet 0.25 mg TID PRN Anxiety  DULoxetine DR capsule 30 mg qd, start 6/14  Pain: left side-ribs, LLE  DULoxetine DR capsule 30 mg qd, start 6/14  gabapentin capsule 300 mg TID. Increase q6h  acetaminophen tablet 650 mg TID. q6h  methocarbamoL tablet 500 mg TID. q6h  traMADoL tablet 50 mg q6h PRN mod pain  oxyCODONE 5 mg 1 tablet q6h PRN severe pain  Bowels/Bladder: last BM 6/17 x 2  Appetite: "good"  megestroL 400 mg/10 mL (10 mL) suspension 400 mg BID     Sleep: good         Physical Exam  General: well-developed, thin/frail appearing, in no acute distress  Respiratory: equal chest rise, no SOB, no audible wheeze  Cardiovascular: regular rate and rhythm, no edema  Gastrointestinal: soft, non-tender, non-distended   Musculoskeletal: decreased ROM/strength to LLE; generalized weakness  Integumentary: no rashes or skin lesions present, right heel/sacral pressure wounds  Neurologic: cranial nerves intact, no signs of peripheral neurological deficit, motor/sensory function intact  *MD performed and documented physical examination            Labs:   Latest Reference Range & Units 06/19/23 05:23   WBC 4.50 - 11.50 x10(3)/mcL 7.40   RBC 4.20 - 5.40 x10(6)/mcL 4.01 (L)   Hemoglobin 12.0 - 16.0 g/dL 12.7   Hematocrit 37.0 - 47.0 % 38.1   MCV 80.0 - 94.0 fL 95.0 (H)   MCH 27.0 - 31.0 pg 31.7 (H)   MCHC 33.0 - 36.0 g/dL 33.3   RDW 11.5 - 17.0 % 13.2   Platelets 130 - 400 x10(3)/mcL 434 (H)   MPV 7.4 - 10.4 fL 9.0   Neut % % " 60.5   LYMPH % % 26.2   Mono % % 10.3   Eosinophil % % 1.8   Basophil % % 0.7   Immature Granulocytes % 0.5   Neut # 2.1 - 9.2 x10(3)/mcL 4.48   Lymph # 0.6 - 4.6 x10(3)/mcL 1.94   Mono # 0.1 - 1.3 x10(3)/mcL 0.76   Eos # 0 - 0.9 x10(3)/mcL 0.13   Baso # <=0.2 x10(3)/mcL 0.05   Immature Grans (Abs) 0 - 0.04 x10(3)/mcL 0.04   nRBC % 0.0   Sodium 136 - 145 mmol/L 133 (L)   Potassium 3.5 - 5.1 mmol/L 5.0   Chloride 98 - 107 mmol/L 102   CO2 23 - 31 mmol/L 20 (L)   BUN 9.8 - 20.1 mg/dL 27.0 (H)   Creatinine 0.55 - 1.02 mg/dL 0.94   eGFR mls/min/1.73/m2 59   Glucose 82 - 115 mg/dL 77 (L)   Calcium 8.4 - 10.2 mg/dL 9.6   Phosphorus 2.3 - 4.7 mg/dL 3.1   Magnesium 1.60 - 2.60 mg/dL 2.10   Alkaline Phosphatase 40 - 150 unit/L 90   PROTEIN TOTAL 5.8 - 7.6 gm/dL 7.2   Albumin 3.4 - 4.8 g/dL 3.0 (L)   Albumin/Globulin Ratio 1.1 - 2.0 ratio 0.7 (L)   Prealbumin 14.0 - 37.0 mg/dL 29.4   BILIRUBIN TOTAL <=1.5 mg/dL 0.2   AST 5 - 34 unit/L 12   ALT 0 - 55 unit/L 14   Globulin, Total 2.4 - 3.5 gm/dL 4.2 (H)   (L): Data is abnormally low  (H): Data is abnormally high        Assessment/Plan  Hospital   Fall   Rib fracture   Debility     Non-Hospital   Chronic back pain (Chronic)   Mixed hyperlipidemia (Chronic)   Hypertension (Chronic)   Type 2 diabetes mellitus without complication (Chronic)   Gastroesophageal reflux disease without esophagitis (Chronic)   Iron deficiency anemia secondary to inadequate dietary iron intake (Chronic)   CVA (cerebral vascular accident) (Chronic)   Loss of appetite (Chronic)   Vitamin D deficiency (Chronic)   Frailty (Chronic)   Arteriosclerosis of coronary artery   At risk of pressure injury of skin   Hiatal hernia   Scoliosis   Stenosis of carotid artery   Severe malnutrition   Colon cancer       Wounds: right heel/sacral pressure wounds  Precautions: fall risk  Bracing/AD: RW  Swallowing: Diabetic Diet  Function: Tolerating therapy. Continue PT/OT  VTE Prophylaxis:   enoxaparin injection 30 mg SubQ  q12h  Code Status: FULL CODE   Discharge: Lives alone in [place] in a single-story home with 1 threshold step to enter the residence. Completed her master's degree. She has no  history. 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 She had a volunteer come in to help her around the house and for errands every now and then. Per son, the family is arranging for her care after she leaves rehab.  Children: (2). Date 6/20 Tuesday.                   Annette Calvo NP, conducted additional independent physical examination and assisted with medical documentation.

## 2023-06-19 NOTE — PT/OT/SLP PROGRESS
Physical Therapy Inpatient Rehab Treatment    Patient Name:  Lita Hess   MRN:  [id number]    Recommendations:     Discharge Recommendations:  nursing facility, skilled   Discharge Equipment Recommendations: bedside commode, walker, rolling, wheelchair   Barriers to discharge: Decreased caregiver support    Assessment:     Lita Hess is a 87 y.o. female admitted with a medical diagnosis of Debility.  She presents with the following impairments/functional limitations:  weakness, impaired endurance, impaired self care skills, impaired functional mobility, gait instability, impaired balance, decreased lower extremity function, decreased safety awareness, decreased ROM, impaired muscle length .    Rehab Diagnosis: debility      General Precautions: Standard, fall     Orthopedic Precautions:N/A     Braces: N/A    Rehab Prognosis: Good; patient would benefit from acute skilled PT services to address these deficits and reach maximum level of function.      History:     Past Medical History:   Diagnosis Date    Chronic back pain 10/26/2022    Colon cancer     CVA (cerebral vascular accident)     Gastroesophageal reflux disease without esophagitis 10/26/2022    Hypertension     Iron deficiency anemia secondary to inadequate dietary iron intake 10/26/2022    Loss of appetite     Mixed hyperlipidemia 10/26/2022    Stroke     Type 2 diabetes mellitus without complication 10/26/2022    Vitamin D deficiency        Past Surgical History:   Procedure Laterality Date    COLECTOMY      COLONOSCOPY      ESOPHAGOGASTRODUODENOSCOPY      gastro biopsy      HIP REPLACEMENT ARTHROPLASTY      SCLEROTHERAPY WITH ULTRASOUND GUIDANCE      TRANSESOPHAGEAL ECHOCARDIOGRAPHY         Subjective     Chief Complaint: none     Respiratory Status: Room air    Patients cultural, spiritual, Muslim conflicts given the current situation: no      Objective:     Communicated with nursing  prior to session.  Patient found up in chair with Other (comments)  (in wc)  upon PT entry to room.    Pt is Oriented x3 and Alert and Cooperative.    Functional Mobility:   Transfers:     Sit to Stand: Supervision or touching assistance  with rolling walker  Gait: Pt ambulates 30ft then 150ft slow rachel increased time  with RW with Supervision or touching assistance .      Current   Status  Discharge   Goal   Functional Area: Care Score:    Roll Left and Right   Independent   Sit to Lying   Set-up/clean-up   Lying to Sitting on Side of Bed   Independent   Sit to Stand   Independent   Chair/Bed-to-Chair Transfer   Independent   Car Transfer       Walk 10 Feet   Independent   Walk 50 Feet with Two Turns   Independent   Walk 150 Feet       Walk 10 Feet Uneven Surface       1 Step (Curb)       4 Steps       12 Steps       Picking Up Object       Wheel 50 Feet with Two Turns       Wheel 150 Feet           Therapeutic Activities and Exercises:  Pt performed seated HEP BLE 1# wt 2 sets 15reps     Activity Tolerance: Good    Patient left up in chair with  RT for next tx  present and pt yahaira tx well during ambulation second trial pt stood 5-7mi to speak with MD pt yahaira tx well with persuasion and distraction increased gt distance with increased time pt slowly improving with activity tolerance .    Education provided: transfer training, gait training, balance training, and strengthening exercises    Expected compliance: High compliance    GOALS:   Multidisciplinary Problems       Physical Therapy Goals          Problem: Physical Therapy    Goal Priority Disciplines Outcome Goal Variances Interventions   Physical Therapy Goal     PT, PT/OT Ongoing, Progressing     Description: Bed Mobility:  MET - Roll L and R Philip  MET - Sit to supine will be Philip  MET - Supine to sit will be Philip  Roll left and right independently  Sit to supine transfer with setup/clean-up assist  Supine to sit transfer with setup/clean-up assist    Transfers:  Sit to stand supervision  Bed to Chair t/f will be  supervision  Car t/f will be supervision    Mobility:  Pt will ambulate 150' CGA with RW  Pt will ambulate 15' CGA with RW over uneven terrain  Pt will climb one step CGA with RW                        Plan:     During this hospitalization, patient to be seen 5 x/week to address the identified rehab impairments via gait training, therapeutic activities, therapeutic exercises, neuromuscular re-education and progress toward the following goals:    Plan of Care Expires:  06/16/23  PT Next Visit Date: 06/21/23  Plan of Care reviewed with: patient    Additional Information:         Time Tracking:     Therapy Time  PT Received On: 06/19/23  PT Start Time: 0930  PT Stop Time: 1030  PT Total Time (min): 60 min   PT Individual: 60  Missed Time:    Time Missed due to:      Billable Minutes: Gait Training 30min and Therapeutic Exercise 30min    06/19/2023   Detail Level: Zone Detail Level: Generalized

## 2023-06-19 NOTE — PT/OT/SLP PROGRESS
Physical Therapy Inpatient Rehab Treatment    Patient Name:  Lita Hess   MRN:  [id number]    Recommendations:     Discharge Recommendations:  nursing facility, skilled   Discharge Equipment Recommendations: bedside commode, walker, rolling, wheelchair   Barriers to discharge:  impaired functional mobility    Assessment:     Lita Hess is a 87 y.o. female admitted with a medical diagnosis of Debility.  She presents with the following impairments/functional limitations:  weakness, impaired endurance, impaired self care skills, impaired functional mobility, decreased lower extremity function, gait instability, decreased safety awareness, pain .    Rehab Diagnosis:      General Precautions: Standard, fall     Orthopedic Precautions:N/A     Braces: N/A    Rehab Prognosis: Fair; patient would benefit from acute skilled PT services to address these deficits and reach maximum level of function.      History:     Past Medical History:   Diagnosis Date    Chronic back pain 10/26/2022    Colon cancer     CVA (cerebral vascular accident)     Gastroesophageal reflux disease without esophagitis 10/26/2022    Hypertension     Iron deficiency anemia secondary to inadequate dietary iron intake 10/26/2022    Loss of appetite     Mixed hyperlipidemia 10/26/2022    Stroke     Type 2 diabetes mellitus without complication 10/26/2022    Vitamin D deficiency        Past Surgical History:   Procedure Laterality Date    COLECTOMY      COLONOSCOPY      ESOPHAGOGASTRODUODENOSCOPY      gastro biopsy      HIP REPLACEMENT ARTHROPLASTY      SCLEROTHERAPY WITH ULTRASOUND GUIDANCE      TRANSESOPHAGEAL ECHOCARDIOGRAPHY         Subjective     Chief Complaint: L hip/rib pain, fatigue    Respiratory Status: Room air    Patients cultural, spiritual, Zoroastrian conflicts given the current situation: no      Objective:     Communicated with pt prior to session.  Patient found supine with    upon PT entry to room.    Pt is Oriented x3 and Alert and  "Cooperative.    Vitals   7/10 L hip and rib pain      Functional Mobility:      Current   Status  Discharge   Goal   Functional Area: Care Score:    Roll Left and Right 4  With bed rails and increased time. Independent   Sit to Lying   Set-up/clean-up   Lying to Sitting on Side of Bed 4  With bed rails and increased time. Independent   Sit to Stand 4  With RW Independent   Chair/Bed-to-Chair Transfer 4  With RW Independent   Car Transfer 4  With RW and significant amount of increased time     Walk 10 Feet  Independent   Walk 50 Feet with Two Turns  Independent   Walk 150 Feet      Walk 10 Feet Uneven Surface 4  Pt amb 10' on uneven surfaces with RW and CGA. Increased time required.     1 Step (Curb) 4  Pt amb up/down 4" curb with CGA and RW, VC for technique. Increased time required.     4 Steps 88  Attempted stair climbing, but pt not safe/able to complete.     12 Steps 88     Picking Up Object 5  With RW and reacher     Wheel 50 Feet with Two Turns       Wheel 150 Feet           Activity Tolerance: Fair    Patient left sitting edge of bed with  CNA present.    Education provided: roles and goals of PT/PTA, transfer training, bed mob, gait training, stair training, safety awareness, body mechanics, assistive device, and fall prevention    Expected compliance: Moderate compliance    Plan:     During this hospitalization, patient to be seen 5 x/week to address the identified rehab impairments via gait training, therapeutic activities, therapeutic exercises, neuromuscular re-education and progress toward the following goals:    GOALS:   Multidisciplinary Problems       Physical Therapy Goals          Problem: Physical Therapy    Goal Priority Disciplines Outcome Goal Variances Interventions   Physical Therapy Goal     PT, PT/OT Ongoing, Progressing     Description: Bed Mobility:  MET - Roll L and R Philip  MET - Sit to supine will be Philip  MET - Supine to sit will be Philip  Roll left and right independently  Sit to " supine transfer with setup/clean-up assist  Supine to sit transfer with setup/clean-up assist    Transfers:  Sit to stand supervision  Bed to Chair t/f will be supervision  Car t/f will be supervision    Mobility:  Pt will ambulate 150' CGA with RW  Pt will ambulate 15' CGA with RW over uneven terrain  Pt will climb one step CGA with RW                        Plan of Care Expires:  06/16/23  PT Next Visit Date: 06/21/23  Plan of Care reviewed with: patient    Additional Information:     Increased time needed + encouragement for completion of all tasks.    Time Tracking:     Therapy Time  PT Start Time: 1330  PT Stop Time: 1430  PT Total Time (min): 60 min   PT Individual: 60  Missed Time:    Time Missed due to:      Billable Minutes: Gait Training 20 min and Therapeutic Activity 40 min    06/19/2023

## 2023-06-19 NOTE — PROGRESS NOTES
[facility] - Rehab Inpatient Services  Wound Care    Patient Name:  Lita Hess   MRN:  [id number]  Date: 6/19/2023  Diagnosis: Debility    History:     Past Medical History:   Diagnosis Date    Chronic back pain 10/26/2022    Colon cancer     CVA (cerebral vascular accident)     Gastroesophageal reflux disease without esophagitis 10/26/2022    Hypertension     Iron deficiency anemia secondary to inadequate dietary iron intake 10/26/2022    Loss of appetite     Mixed hyperlipidemia 10/26/2022    Stroke     Type 2 diabetes mellitus without complication 10/26/2022    Vitamin D deficiency          Precautions:     Allergies as of 06/09/2023    (No Known Allergies)       [facility] Assessment Details/Treatment      06/19/23 1416        Altered Skin Integrity 06/09/23 1300  Sacral spine Partial thickness tissue loss. Shallow open ulcer with a red or pink wound bed, without slough. Intact or Open/Ruptured Serum-filled blister.   Date First Assessed/Time First Assessed: 06/09/23 1300   Altered Skin Integrity Present on Admission - Did Patient arrive to the hospital with altered skin?: yes  Side: (c)   Location: Sacral spine  Description of Altered Skin Integrity: (c) Partial t...   Dressing Appearance Intact   Drainage Amount None   Appearance Epithelialization  (healed)   Dressing Applied;Foam;Removed;Hydrocolloid   Dressing Change Due 06/24/23     Sacral pressure wound is healed. Applied bordered foam for prevention and comfort. Will change q 5 days and prn.    06/19/2023

## 2023-06-19 NOTE — PT/OT/SLP DISCHARGE
Recreational Therapy Re-Evaluation    Pt progress, plan of care and discharge plans were discussed during staffing at 0930      Date of Treatment: 06/19/23  Start Time: 1030  Stop Time: 1100  Total Time: 30 min  Missed Time:    Assessment      Lita Hess is a 87 y.o. female admitted with a medical diagnosis of Debility.  She presents with the following impairments/functional limitations:  weakness, impaired endurance, impaired functional mobility, gait instability, decreased lower extremity function, decreased safety awareness, decreased upper extremity function .    Rehab Diagnosis:     Recent Surgery:     General Precautions: Standard, fall     Orthopedic Precautions:N/A     Braces: N/A    Rehab Prognosis: Good; patient would benefit from acute skilled Recreational Therapy services to address these deficits and reach maximum level of function.      Impairments: Endurance deficits, Mobility deficits, and Strength deficits  Rehab Potential: Good  Treatment Recommendations: Complete discharge plan  Treatment Diagnosis: Fall over walker, L4-7 rib fx, s/p CVA x2, MI, HTN, colon CA s/p colectomy  Orientation: Oriented x4  Affect/Behavior: Appropriate and Cooperative  Safety/Judgement: intact   Basic Command Following: intact  Spiritual Cultural: no        History     Past Medical History:   Diagnosis Date    Chronic back pain 10/26/2022    Colon cancer     CVA (cerebral vascular accident)     Gastroesophageal reflux disease without esophagitis 10/26/2022    Hypertension     Iron deficiency anemia secondary to inadequate dietary iron intake 10/26/2022    Loss of appetite     Mixed hyperlipidemia 10/26/2022    Stroke     Type 2 diabetes mellitus without complication 10/26/2022    Vitamin D deficiency        Past Surgical History:   Procedure Laterality Date    COLECTOMY      COLONOSCOPY      ESOPHAGOGASTRODUODENOSCOPY      gastro biopsy      HIP REPLACEMENT ARTHROPLASTY      SCLEROTHERAPY WITH ULTRASOUND GUIDANCE       "TRANSESOPHAGEAL ECHOCARDIOGRAPHY         Home Environment     Admit Date: 06/09/23  Living Situation  People in Home: alone  Lives in: house  Patients Responsibilities: Financial management, Health and wellness, Laundry, Leisure/play/hobbies, Meal preparation, Retired, Social participation  Number of Children: 2  Occupation:Retired:  /    Instrumental Activities of Daily Living     Previous Hand Dominance: Right Current Hand Dominance: Right     Other iADL Information:        Cognitive Skills Building         Cognitive Observation Activity Assist Position Equipment Response            Comment:      Dynamic Activities      Activity Assist Position Equipment Response   Activity 1 Bean bag toos supervision Standing Rolling walker and Bean bags good   Comment: W/c to bed transfer  was contact guard.  Sit to stand was supervision as was dynamic standing balance/reaching. Standing tolerance increased to 5 minutes. UE coordination was setup as was memory and problem solving skills.  More animated and did not need verbal cues of encouraging to participate.  Talked about going to [facility] and that she feels comfortable due to knowing some of the people who work there.        Fine Motor Activities      Activity Assist Position Equipment Response           Comment:        Goals     Patient Goals  Patient Goal 1: "To get strong and start walking the right way."    Short Term Goals    Goal  Goal Status   Will increase activity tolerance to supervision Met   Will increase sit to stand to supervision Met   Will improve dynamic standing balance/reaching to supervision Met           Long Term Goals    Goal Goal Status   Will increase standing tolerance to 5 minutes Met   Will improve dynamic standing balance/reaching to setup Progressing                     Plan       Patient to be seen: Daily  Duration: Other (Comment) (1 day)  Treatments planned: Energy conservation training  Treatment plan/goals " established with Patient/Caregiver: Yes

## 2023-06-19 NOTE — PT/OT/SLP PROGRESS
"Occupational Therapy Inpatient Rehab Treatment    Name: Lita Hess  MRN: [id number]    Assessment:  Lita Hess is a 87 y.o. female admitted with a medical diagnosis of Debility.  She presents with the following impairments/functional limitations:  weakness, impaired endurance, impaired self care skills, impaired functional mobility, gait instability, impaired balance, decreased coordination, decreased upper extremity function, decreased lower extremity function, decreased safety awareness, pain, decreased ROM, impaired coordination .    General Precautions: Standard, fall     Orthopedic Precautions:N/A     Braces: N/A    Rehab Prognosis: Fair; patient would benefit from acute skilled OT services to address these deficits and reach maximum level of function.      History:     Past Medical History:   Diagnosis Date    Chronic back pain 10/26/2022    Colon cancer     CVA (cerebral vascular accident)     Gastroesophageal reflux disease without esophagitis 10/26/2022    Hypertension     Iron deficiency anemia secondary to inadequate dietary iron intake 10/26/2022    Loss of appetite     Mixed hyperlipidemia 10/26/2022    Stroke     Type 2 diabetes mellitus without complication 10/26/2022    Vitamin D deficiency        Past Surgical History:   Procedure Laterality Date    COLECTOMY      COLONOSCOPY      ESOPHAGOGASTRODUODENOSCOPY      gastro biopsy      HIP REPLACEMENT ARTHROPLASTY      SCLEROTHERAPY WITH ULTRASOUND GUIDANCE      TRANSESOPHAGEAL ECHOCARDIOGRAPHY         Subjective     Orientation: Oriented x4    Chief Complaint: "I'm cold and my ribs hurt."    Patient/Family Comments/goals: To discharge to a SNF.       Respiratory Status: Room air    Patients cultural, spiritual, Druze conflicts given the current situation: no       Objective:     Patient found supine upon OT entry to room. Pt had urinated in bed and was incontinent. Pt refused to take a shower and stated she was too cold and did not want to get " pneumonia.     Mobility   Patient completed:  Supine to Sit with moderate assistance  Sit to Stand Transfer with contact guard assistance with rolling walker  Stand to Sit Transfer with contact guard assistance with rolling walker  Bed to Chair Transfer using Step Transfer technique with moderate assistance with rolling walker      ADLs   Current Status   Oral Hygiene 5   Shower, Bathe Self 3 Pt completed sponge bath sitting in w/c   Upper Body Dressing 3 pt requires assistance with donning shirt over R shoulder   Lower Body Dressing 3 Pt requires assistance with pulling pants and diaper up   Putting On, Taking Off Footwear 5 using figure 4 technique, sock aide, and reacher     Limiting Factors for ADLs: motor, psychsocial, endurance, limited ROM, balance, weakness, body habitus, coordination, cognition, safety awareness, and pain       Patient left up in chair with call button in reach.     Education provided: Roles and goals of OT, ADLs, transfer training, bed mobility, body mechanics, assistive device, sequencing, safety precautions, fall prevention, equipment recommendations, and home safety    Multidisciplinary Problems       Occupational Therapy Goals          Problem: Occupational Therapy    Goal Priority Disciplines Outcome Interventions   Occupational Therapy Goal     OT, PT/OT Ongoing, Progressing    Description: ADLs:  Pt to perform grooming tasks with set up and clean up. Ongoing/progressing  Pt to perform UB dressing with set up and clean up. Ongoing/progressing   Pt to perform LB dressing with set up and clean up with use of AD. Ongoing/progressing  Pt to perform putting on/off footwear task with set up and clean up and AD. MET  Pt to perform putting on/off footwear task with independence and AD  Pt to perform toileting with mod a using RW and BSC. Ongoing/progressing  Pt to perform bathing with mod a using TTB. Ongoing/progressing    Functional Transfers:  Pt to perform toilet transfers with CGA and  RW. MET  Pt to perform toilet transfer with set up and clean up and RW  Pt to perform a tub transfer with mod a and TTB. MET  Pt to perform a tub transfer with SBA and TTB.                            Time Tracking     OT Received On: 06/19/23  Time In 0830     Time Out 0930  Total Time 60 min  Therapy Time: OT Individual: 60  Missed Time:    Missed Time Reason:      Billable Minutes: Self Care/Home Management 60 min    06/19/2023

## 2023-06-19 NOTE — PLAN OF CARE
Problem: Diabetes Comorbidity  Goal: Blood Glucose Level Within Targeted Range  Outcome: Ongoing, Progressing     Problem: Rehabilitation (IRF) Plan of Care  Goal: Patient-Specific Goal (Individualized)  Outcome: Ongoing, Progressing  Goal: Absence of New-Onset Illness or Injury  Outcome: Ongoing, Progressing     Problem: Fall Injury Risk  Goal: Absence of Fall and Fall-Related Injury  Outcome: Ongoing, Progressing     Problem: Skin Injury Risk Increased  Goal: Skin Health and Integrity  Outcome: Ongoing, Progressing     Problem: Impaired Wound Healing  Goal: Optimal Wound Healing  Outcome: Ongoing, Progressing

## 2023-06-20 LAB
POCT GLUCOSE: 110 MG/DL (ref 70–110)
POCT GLUCOSE: 131 MG/DL (ref 70–110)
POCT GLUCOSE: 157 MG/DL (ref 70–110)
POCT GLUCOSE: 170 MG/DL (ref 70–110)
POCT GLUCOSE: 186 MG/DL (ref 70–110)

## 2023-06-20 PROCEDURE — 25000003 PHARM REV CODE 250: Performed by: NURSE PRACTITIONER

## 2023-06-20 PROCEDURE — 97116 GAIT TRAINING THERAPY: CPT

## 2023-06-20 PROCEDURE — 51798 US URINE CAPACITY MEASURE: CPT

## 2023-06-20 PROCEDURE — 63600175 PHARM REV CODE 636 W HCPCS: Performed by: NURSE PRACTITIONER

## 2023-06-20 PROCEDURE — 97530 THERAPEUTIC ACTIVITIES: CPT

## 2023-06-20 PROCEDURE — 99233 SBSQ HOSP IP/OBS HIGH 50: CPT | Mod: ,,, | Performed by: NURSE PRACTITIONER

## 2023-06-20 PROCEDURE — 97110 THERAPEUTIC EXERCISES: CPT

## 2023-06-20 PROCEDURE — 99233 PR SUBSEQUENT HOSPITAL CARE,LEVL III: ICD-10-PCS | Mod: ,,, | Performed by: NURSE PRACTITIONER

## 2023-06-20 PROCEDURE — 11800000 HC REHAB PRIVATE ROOM

## 2023-06-20 RX ORDER — METHOCARBAMOL 500 MG/1
500 TABLET, FILM COATED ORAL EVERY 6 HOURS
Qty: 40 TABLET | Refills: 0
Start: 2023-06-20 | End: 2023-06-30

## 2023-06-20 RX ORDER — TAMSULOSIN HYDROCHLORIDE 0.4 MG/1
0.4 CAPSULE ORAL NIGHTLY
Qty: 30 CAPSULE | Refills: 11
Start: 2023-06-20 | End: 2024-06-19

## 2023-06-20 RX ORDER — AMLODIPINE BESYLATE 10 MG/1
10 TABLET ORAL DAILY
Qty: 90 TABLET | Refills: 0
Start: 2023-06-20 | End: 2023-12-17

## 2023-06-20 RX ORDER — FOLIC ACID 1 MG/1
1000 TABLET ORAL DAILY
Qty: 90 TABLET | Refills: 0
Start: 2023-06-20 | End: 2023-12-17

## 2023-06-20 RX ORDER — GABAPENTIN 300 MG/1
300 CAPSULE ORAL 3 TIMES DAILY
Qty: 90 CAPSULE | Refills: 0
Start: 2023-06-20 | End: 2023-07-20

## 2023-06-20 RX ORDER — ATORVASTATIN CALCIUM 10 MG/1
10 TABLET, FILM COATED ORAL DAILY
Qty: 90 TABLET | Refills: 3 | Status: SHIPPED | OUTPATIENT
Start: 2023-06-21 | End: 2024-06-20

## 2023-06-20 RX ORDER — ENOXAPARIN SODIUM 100 MG/ML
30 INJECTION SUBCUTANEOUS EVERY 12 HOURS
Start: 2023-06-20

## 2023-06-20 RX ORDER — TRAMADOL HYDROCHLORIDE 50 MG/1
50 TABLET ORAL EVERY 6 HOURS
Qty: 12 TABLET | Refills: 0 | Status: SHIPPED | OUTPATIENT
Start: 2023-06-20

## 2023-06-20 RX ORDER — DULOXETIN HYDROCHLORIDE 30 MG/1
30 CAPSULE, DELAYED RELEASE ORAL DAILY
Qty: 30 CAPSULE | Refills: 11
Start: 2023-06-21 | End: 2024-06-20

## 2023-06-20 RX ORDER — METFORMIN HYDROCHLORIDE 500 MG/1
500 TABLET ORAL
Qty: 90 TABLET | Refills: 3
Start: 2023-06-20 | End: 2024-06-19

## 2023-06-20 RX ORDER — ASPIRIN 325 MG
325 TABLET ORAL DAILY
Qty: 90 TABLET | Refills: 0
Start: 2023-06-20 | End: 2023-09-18

## 2023-06-20 RX ORDER — LISINOPRIL 20 MG/1
20 TABLET ORAL DAILY
Qty: 90 TABLET | Refills: 1
Start: 2023-06-20 | End: 2023-12-17

## 2023-06-20 RX ORDER — MIRTAZAPINE 30 MG/1
30 TABLET, FILM COATED ORAL
Qty: 30 TABLET | Refills: 11
Start: 2023-06-20 | End: 2024-06-19

## 2023-06-20 RX ADMIN — METHOCARBAMOL 500 MG: 500 TABLET ORAL at 05:06

## 2023-06-20 RX ADMIN — ACETAMINOPHEN 325MG 650 MG: 325 TABLET ORAL at 05:06

## 2023-06-20 RX ADMIN — SITAGLIPTIN 50 MG: 50 TABLET, FILM COATED ORAL at 07:06

## 2023-06-20 RX ADMIN — ACETAMINOPHEN 325MG 650 MG: 325 TABLET ORAL at 12:06

## 2023-06-20 RX ADMIN — ASPIRIN 325 MG: 325 TABLET, FILM COATED ORAL at 07:06

## 2023-06-20 RX ADMIN — MIRTAZAPINE 30 MG: 15 TABLET, FILM COATED ORAL at 05:06

## 2023-06-20 RX ADMIN — INSULIN DETEMIR 22 UNITS: 100 INJECTION, SOLUTION SUBCUTANEOUS at 08:06

## 2023-06-20 RX ADMIN — TRAMADOL HYDROCHLORIDE 50 MG: 50 TABLET, COATED ORAL at 12:06

## 2023-06-20 RX ADMIN — TRAMADOL HYDROCHLORIDE 50 MG: 50 TABLET, COATED ORAL at 05:06

## 2023-06-20 RX ADMIN — METHOCARBAMOL 500 MG: 500 TABLET ORAL at 12:06

## 2023-06-20 RX ADMIN — ENOXAPARIN SODIUM 30 MG: 30 INJECTION SUBCUTANEOUS at 08:06

## 2023-06-20 RX ADMIN — LISINOPRIL 20 MG: 10 TABLET ORAL at 07:06

## 2023-06-20 RX ADMIN — GABAPENTIN 300 MG: 300 CAPSULE ORAL at 08:06

## 2023-06-20 RX ADMIN — GABAPENTIN 300 MG: 300 CAPSULE ORAL at 02:06

## 2023-06-20 RX ADMIN — GABAPENTIN 300 MG: 300 CAPSULE ORAL at 05:06

## 2023-06-20 RX ADMIN — AMLODIPINE BESYLATE 10 MG: 5 TABLET ORAL at 07:06

## 2023-06-20 RX ADMIN — ATORVASTATIN CALCIUM 10 MG: 10 TABLET, FILM COATED ORAL at 07:06

## 2023-06-20 RX ADMIN — DOCUSATE SODIUM 100 MG: 100 CAPSULE, LIQUID FILLED ORAL at 08:06

## 2023-06-20 RX ADMIN — DOCUSATE SODIUM 100 MG: 100 CAPSULE, LIQUID FILLED ORAL at 07:06

## 2023-06-20 RX ADMIN — TAMSULOSIN HYDROCHLORIDE 0.4 MG: 0.4 CAPSULE ORAL at 08:06

## 2023-06-20 RX ADMIN — INSULIN ASPART 2 UNITS: 100 INJECTION, SOLUTION INTRAVENOUS; SUBCUTANEOUS at 04:06

## 2023-06-20 RX ADMIN — LIDOCAINE 5% 1 PATCH: 700 PATCH TOPICAL at 05:06

## 2023-06-20 RX ADMIN — INSULIN ASPART 1 UNITS: 100 INJECTION, SOLUTION INTRAVENOUS; SUBCUTANEOUS at 08:06

## 2023-06-20 RX ADMIN — DULOXETINE HYDROCHLORIDE 30 MG: 30 CAPSULE, DELAYED RELEASE ORAL at 07:06

## 2023-06-20 RX ADMIN — METFORMIN HYDROCHLORIDE 500 MG: 500 TABLET, FILM COATED ORAL at 04:06

## 2023-06-20 RX ADMIN — INSULIN ASPART 2 UNITS: 100 INJECTION, SOLUTION INTRAVENOUS; SUBCUTANEOUS at 12:06

## 2023-06-20 RX ADMIN — FOLIC ACID 1000 MCG: 1 TABLET ORAL at 07:06

## 2023-06-20 NOTE — PROGRESS NOTES
Inpatient Nutrition Assessment    Admit Date: 6/9/2023   Total duration of encounter: 11 days     Nutrition Recommendation/Prescription     Continue current diet as tolerated.  2.   Continue Boost GC BID (190 kcal, 16 g pro/svg)   3.   Medical management of blood sugars- may need to adj insulin as feasible  4.   Medically management of constipation - encourage H2O intake     Communication of Recommendations: reviewed with nurse and reviewed with patient    Nutrition Assessment     Malnutrition Assessment/Nutrition-Focused Physical Exam    Malnutrition Context: chronic illness  Malnutrition Level: severe  Energy Intake (Malnutrition): other (see comments) (Does not meet criteria)  Weight Loss (Malnutrition): greater than 5% in 1 month  Orbital Region (Subcutaneous Fat Loss): mild depletion  Upper Arm Region (Subcutaneous Fat Loss): mild depletion  Muscle Mass (Malnutrition): severe depletion  [place] Region (Muscle Loss): severe depletion  Clavicle Bone Region (Muscle Loss): severe depletion  Dorsal Hand (Muscle Loss): severe depletion    Fluid Accumulation (Malnutrition): other (see comments) (Does not meet criteria)    A minimum of two characteristics is recommended for diagnosis of either severe or non-severe malnutrition.    Chart Review    Reason Seen: physician consult for New Rehab Admit     Malnutrition Screening Tool Results   Have you recently lost weight without trying?: No  Have you been eating poorly because of a decreased appetite?: No   MST Score: 0     Diagnosis:  fall from standing    Relevant Medical History: CVA, MI, hypertension, and remote history of colon cancer s/p colectomy     Nutrition-Related Medications:  megace, statin, folic acid, SSI, Insulin Determir 20 units BID, lisinopril  Calorie Containing IV Medications: no significant kcals from medications at this time    Nutrition-Related Labs:  6/20: Na 133(L), Gluc 77(L), BUN 27(H), Alb 3.0(L), GFR >60, PAB 29.4  6/15: No new labs   6/9: 24  hr x CBGs (127-400)  6/7 Glu 283, Hgb A1C 10.5    Diet/PN Order: Diet diabetic  Oral Supplement Order: none  Tube Feeding Order: not applicable  Appetite/Oral Intake: good/% of meals  Factors Affecting Nutritional Intake: decreased appetite  Food/Temple/Cultural Preferences:  Strawberry ONS  Food Allergies: none reported    Skin Integrity: intact  Wound(s):      Altered Skin Integrity 06/09/23 1300  Sacral spine Partial thickness tissue loss. Shallow open ulcer with a red or pink wound bed, without slough. Intact or Open/Ruptured Serum-filled blister.-Tissue loss description: Partial thickness none noted    Comments    6/20: Pt continues with good appetite, eating ~75- 100% meals x last 3 days. LBM documented on (6/17) - on bowel regimen. Possible d/c today to SNF.    6/15: Noted megace d/c.  Pt continues to report good appetite, eating ~75 - 100% meals. Observed eating good lunch today, drinking Boost. No new wt.     6/9: Pt new admit from [facility].  Observed pt eating very good lunch during rounds (~100% shrimp etouffee), and working on sides. Pt tells me has been very hungry at meals.  Noted per RD note (6/7) - see below, pt with significant wt loss/decreased appetite at home prior to hospitalization. Started on megace - continued at rehab admit. Significant wt loss verified per EMR (~11% wt loss in x 1 month) from UBW reported of 50 kg. Observed obvious signs of malnutrition - fat/muscle wasting. Was receiving Boost VHC at [facility]. Noted pt blood sugars uncontrolled (24 hr x CBGS (127 - 400). Will transition to diabetic supplement Boost GC at this time - pt likes chocolate flavor. Encouraged intake. Pt denies N/V/D/C or chewing/swallowing difficulties. Uncertain of LBM. Did not note any stools documented per EMR in past few days.     RD note from [facility] prior to Rehab Admit:   6/7/23: Noted MST indicates unsure wt loss PTA. Pt confirmed UBW, EMR wt indicates wt loss. Pt stated last wt ~2  "weeks ago (stated she is weighed by home health at home.) Drinks 1 ONS/day at home. Offered ONS here and encouraged increasing to at least 2/day at home and here. Pt agreeable. Stated appetite good at home but noted MD notes indicate decreased appetite per son. Discussed with RN, will add pt flavor pref and higher kcal version instead of DM version of ONS in order to provide more kcal since pt usually only drinks 1/day.     Anthropometrics    Height: 4' 11.02" (149.9 cm) Height Method: Estimated  Last Weight: 46.2 kg (101 lb 13.6 oz) (23 0531) Weight Method: Bed Scale  BMI (Calculated): 20.6  BMI Classification: underweight (BMI less than 22 if >65 years of age)     Ideal Body Weight (IBW), Female: 95.1 lb     % Ideal Body Weight, Female (lb): 102.69 %    Usual Body Weight (UBW), k kg  % Usual Body Weight: 88.79  % Weight Change From Usual Weight: -11.4 %  Usual Weight Provided By: patient    Wt Readings from Last 5 Encounters:   23 46.2 kg (101 lb 13.6 oz)   23 44.3 kg (97 lb 10.6 oz)   23 49.9 kg (110 lb)   23 49.9 kg (110 lb)   22 48.5 kg (107 lb)     Weight Change(s) Since Admission:  Admit Weight: 45.7 kg (100 lb 12 oz) (23 1245) vs 48.1 kg on (6/10) - noted wt discrepency    Estimated Needs    Weight Used For Calorie Calculations: 44.3 kg (97 lb 10.6 oz)  Energy Calorie Requirements (kcal): 1519kcal (1.3 stress factor +500kcal for wt gain)  [facility]  Weight Used For Protein Calculations: 44.3 kg (97 lb 10.6 oz)  Protein Requirements: 62-71gm (1.4-1.6g/kg)  Fluid Requirements (mL): 1519ml (1ml/kcal)     Temp (24hrs), Av.7 °F (36.5 °C), Min:97.6 °F (36.4 °C), Max:97.7 °F (36.5 °C)      Enteral Nutrition    Patient not receiving enteral nutrition at this time.    Parenteral Nutrition    Patient not receiving parenteral nutrition support at this time.    Evaluation of Received Nutrient Intake    Calories: meeting estimated " needs  Protein: meeting estimated needs    Patient Education    Not applicable.    Nutrition Diagnosis     PES: Malnutrition related to chronic illness as evidenced by muscle wasting, fat loss, wt loss. (improving)    Interventions/Goals     Intervention(s): general/healthful diet, commercial beverage, prescription medication, and collaboration with other providers  Goal: Meet greater than 75% of nutritional needs by follow-up. (goal met)    Monitoring & Evaluation     Dietitian will monitor energy intake.  Nutrition Risk/Follow-Up: moderate (follow-up in 3-5 days)   Please consult if re-assessment needed sooner.

## 2023-06-20 NOTE — PROGRESS NOTES
[facility] ([facility])  Rehab Progress Note    Patient Name: Lita Hess  MRN: [id number]  Age: 87 y.o. Sex: female  : 1935  Hospital Length of Stay: 11 days  Date of Service: 2023   Chief Complaint: Debility 2/2 frequent falls with left 4th-7th rib fractures    Subjective:     Basic Information  Admit Information: 87-year-old  female presented to [facility] ED on 2023 complaining of left side pain after accidentally falling backwards over walker.  PMH significant for CVA x2, CAD s/p MI, HTN, carotid artery stenosis, remote history of colon cancer s/p colectomy.  Workup significant for left 4th through 7th rib fractures.  Limited mobility 2/2 pain.  Tolerated transfer to [facility] inpatient rehab unit on  without incident.  Today's Information: No acute events overnight.  Sitting in chair comfortably.  Reports sleep and appetite.  Last BM .  Vital signs at goal with no recent recorded fevers.  No labs or imaging today.  Required in and out catheterization overnight.  Review of patient's allergies indicates:  No Known Allergies     Current Facility-Administered Medications:     acetaminophen tablet 650 mg, 650 mg, Oral, Q6H, Annette Calvo, FNP, 650 mg at 23 1202    ALPRAZolam tablet 0.25 mg, 0.25 mg, Oral, TID PRN, Vishal MAN Du, FNP, 0.25 mg at 23 1209    amLODIPine tablet 10 mg, 10 mg, Oral, Daily, Vishal A Du, FNP, 10 mg at 23 0758    aspirin tablet 325 mg, 325 mg, Oral, Daily, Vishal A Du, FNP, 325 mg at 23 0758    atorvastatin tablet 10 mg, 10 mg, Oral, Daily, Vishal A Du, FNP, 10 mg at 23 0758    benzonatate capsule 100 mg, 100 mg, Oral, TID PRN, Vishal Sandy, FNP    bisacodyL suppository 10 mg, 10 mg, Rectal, Daily PRN, Vishal Sandy, FNP    dextrose 10% bolus 125 mL 125 mL, 12.5 g, Intravenous, PRN, Vishal Sandy, FNP    dextrose 10% bolus 250 mL 250 mL, 25 g,  Intravenous, PRN, Vishal Parisiehart, FNP    docusate sodium capsule 100 mg, 100 mg, Oral, BID, Vishal MAGDA Du, FNP, 100 mg at 06/20/23 0758    DULoxetine DR capsule 30 mg, 30 mg, Oral, Daily, Annette Devrieste, FNP, 30 mg at 06/20/23 0758    enoxaparin injection 30 mg, 30 mg, Subcutaneous, Q12H (treatment, non-standard time), Vishal MAGDA ParisiDu, FNP, 30 mg at 06/20/23 0801    folic acid tablet 1,000 mcg, 1,000 mcg, Oral, Daily, Vishal MAGDA Du, FNP, 1,000 mcg at 06/20/23 0758    gabapentin capsule 300 mg, 300 mg, Oral, TID, Annette Devrieste, FNP, 300 mg at 06/20/23 0529    glucagon (human recombinant) injection 1 mg, 1 mg, Intramuscular, PRN, Vishal Parisiehart, FNP    glucose chewable tablet 16 g, 16 g, Oral, PRN, Vishal Parisiehart, FNP    glucose chewable tablet 24 g, 24 g, Oral, PRN, Vishal MAN Du, FNP    hydrALAZINE injection 10 mg, 10 mg, Intravenous, Q4H PRN, Vishal Parisiehart, FNP    hydrOXYzine pamoate capsule 50 mg, 50 mg, Oral, Nightly PRN, Vishal A Du, FNP    insulin aspart U-100 injection 1-10 Units, 1-10 Units, Subcutaneous, QID (AC + HS) PRN, Vishal Parisiehart, FNP, 2 Units at 06/19/23 1701    insulin detemir U-100 injection 22 Units, 22 Units, Subcutaneous, BID, Kaity Arndt, FNP, 22 Units at 06/20/23 0836    labetalol 20 mg/4 mL (5 mg/mL) IV syring, 10 mg, Intravenous, Q4H PRN, Vishal A Du, FNP    LIDOcaine 5 % patch 1 patch, 1 patch, Transdermal, Q24H, Annette Calvo, FNP, 1 patch at 06/20/23 0531    lisinopriL tablet 20 mg, 20 mg, Oral, Daily, ALIRIO Arriaga, 20 mg at 06/20/23 0758    metFORMIN tablet 500 mg, 500 mg, Oral, Daily before evening meal, Annette Calvo, FNP, 500 mg at 06/19/23 1749    methocarbamoL tablet 500 mg, 500 mg, Oral, Q6H, Annette Calvo FNP, 500 mg at 06/20/23 1202    metoprolol injection 10 mg, 10 mg, Intravenous, Q2H PRN, ALIRIO Arriaga    mirtazapine tablet 30 mg, 30 mg, Oral, After dinner, Annette MAN  "[place], FNP, 30 mg at 06/19/23 1749    nitroGLYCERIN SL tablet 0.4 mg, 0.4 mg, Sublingual, Q5 Min PRN, Vishal Sandy, FNP    ondansetron disintegrating tablet 4 mg, 4 mg, Oral, Q6H PRN, Vishal MAN Du, FNP, 4 mg at 06/14/23 1224    oxyCODONE immediate release tablet 5 mg, 5 mg, Oral, Q6H PRN, Annette Devrieste, FNP    polyethylene glycol packet 17 g, 17 g, Oral, BID PRN, Vishal MAN Du, FNP    promethazine tablet 25 mg, 25 mg, Oral, Q6H PRN, Vishal Parisiehart, FNP    SITagliptin phosphate tablet 50 mg, 50 mg, Oral, Daily, Vishal Parisiehart, FNP, 50 mg at 06/20/23 0758    tamsulosin 24 hr capsule 0.4 mg, 0.4 mg, Oral, QHS, Kaity Arndt, FNP, 0.4 mg at 06/19/23 2043    traMADoL tablet 50 mg, 50 mg, Oral, Q6H, Annetet Devrieste, FNP, 50 mg at 06/20/23 1202     Review of Systems   Complete 12-point review of symptoms negative except for what's mentioned in HPI     Objective:     /67   Pulse 95   Temp 97.6 °F (36.4 °C) (Oral)   Resp 18   Ht 4' 11.02" (1.499 m)   Wt 46.2 kg (101 lb 13.6 oz)   SpO2 96%   Breastfeeding No   BMI 20.56 kg/m²        Physical Exam  Constitutional:       Appearance: Normal appearance.   HENT:      Head: Normocephalic.      Mouth/Throat:      Mouth: Mucous membranes are moist.   Eyes:      Pupils: Pupils are equal, round, and reactive to light.   Cardiovascular:      Rate and Rhythm: Normal rate and regular rhythm.      Heart sounds: Normal heart sounds.   Pulmonary:      Effort: Pulmonary effort is normal.      Breath sounds: Normal breath sounds.   Abdominal:      General: Bowel sounds are normal.      Palpations: Abdomen is soft.   Musculoskeletal:      Cervical back: Neck supple.      Comments: muscle atrophy   Skin:     General: Skin is warm and dry.   Neurological:      Mental Status: She is alert and oriented to person, place, and time.      Motor: Weakness present.   Psychiatric:         Mood and Affect: Mood normal.         Behavior: Behavior normal.   "       Thought Content: Thought content normal.         Judgment: Judgment normal.   *MD performed and documented physical examination       Lines/Drains/Airways       None                   Labs  Recent Results (from the past 24 hour(s))   POCT glucose    Collection Time: 06/19/23  4:14 PM   Result Value Ref Range    POCT Glucose 163 (H) 70 - 110 mg/dL   POCT glucose    Collection Time: 06/19/23  8:42 PM   Result Value Ref Range    POCT Glucose 131 (H) 70 - 110 mg/dL       Radiology  CT head without contrast on 06/06/2023 at 3:00 p.m., IMPRESSION: No acute intracranial abnormality identified.  Findings of chronic microvascular ischemic disease.  Radiology  CT abdomen/pelvis with contrast on 6/6, IMPRESSION: No definite acute intra-abdominal or intrathoracic abnormality.  There are acute left-sided rib fractures as above.  Evaluation for additional fractures is limited by motion artifact.  Soft tissue density at the left sacrum as would be seen with decubitus ulcer is again noted.    Assessment/Plan:     87 y.o. AAF admitted on 6/9/2023    Debility   - 2/2 frequent falls with left 4th-7th rib fractures  - defer to physiatry for rehab and pain management  - PT/OT/RT following     Multiple rib fractures  - left 4-7th rib fractures  - continue                DuoNebs q.8 hours                Percocet 5 mg/325 mg q.6 hours p.r.n.                Lidoderm patch to left chest daily                Gabapentin 300 mg t.i.d.  - encourage incentive spirometer q.1 hour while awake     Proximal transverse colon adenocarcinoma  - s/p laparoscopic/robotic right hemicolectomy on 7/29/2021  - Biopsy significant for adenocarcinoma-no metastasis  - to follow up with oncology     Moderate protein calorie malnutrition  - albumin 2.8/prealbumin 18.8-trending up  - registered dietitian following  - encourage oral nutrition and hydration  - continue                Megace 400 mg b.i.d. x5 days (initiated 6/9)      History of bilateral ischemic  CVA  - 6/2020  - continue                Lipitor 10 mg daily                Aspirin 325 mg daily     CAD  - s/p MI  - denies recent chest pain or discomfort  - continue                Lisinopril 20 mg daily                Lipitor 10 mg daily                Aspirin 325 mg daily  - ECG and Nitro PRN  - not on Plavix or BB  - continue cardiac diet  - to follow-up with cardiology outpatient        HTN  - at goal!!  - continue                Lisinopril 20 mg daily                Norvasc 10 mg daily                Hydralazine 10 mg every 2 hours as needed for BP > 160/90                Labetalol 10 mg every 2 hours as needed  - Low-sodium diet      Iron deficiency anemia  - asymptomatic  - H/H stable   - continue                Folic acid 1 mg daily  - no evidence of active bleeds  - will closely monitor and transfuse if needed      History of vasovagal syncope  - s/p Linq device  - to follow up with cardiology outpatient     Left carotid stenosis  - Left ICA > 85%  - continue                Aspirin 325 mg daily                Lipitor 10 mg daily  - to follow-up outpatient with vascular surgery     Poorly controlled DM type II  - HgA1c 10.5 on 6/6/2023  - continue   Januvia 50 mg daily (initiated 6/12)  Metformin 500 mg daily (initiated 6/11)                Levemir 22 units b.i.d. (increased 6/14)                ISS   - CBGs AC/HS     HLD  - FLP at goal!!  - continue                Lipitor 10 mg daily     Constipation  - stable  - continue  Colace 100 mg BID   Miralax 17 gm BID PRN     Unstageable sacral/left buttocks decubitus ulcer  - stable  - duo derm applied  - turn q2h  - wound care following    Urinary retention  - current  - continue bladder scan every 4 hours times 24 hours  - required 2 in and out catheterizations in the last 24 hours  - continue   Flomax 0.4 mg at bedtime     VTE Prophylaxis:  Lovenox 30 mg b.i.d.  COVID-19 testing:  Unknown  COVID-19 vaccination status:  Vaccinated (Moderna):  02/05/2021,  03/05/2021, 10/29/2021, and 06/04/2022     POA: Alhaji Hess (son) [phone]  Living will: No  Contacts: Alhaji Hess (son) [phone]                 Elder Hess (son) [phone]                 Raegan Stewart (sister) [phone]     CODE STATUS: Full Code  Internal Medicine (attending): Troy Guerin MD  Physiatry (consulting):  Avi Melchor MD     OUTPATIENT PROVIDERS  PCP: Anant Mejia MD  Cardiology: Henry Cifuentes MD  Vascular surgery: Kip Shah MD  General surgery: Justo Henry MD  Gastroenterology:  Kip Forbes MD     DISPOSITION:  Vital signs at goal.  No labs today.  Good glycemic control.  Bowel management, sleep hygiene, and appetite at goal.  Plans to discharge to [facility] today on hold 2/2 son appealing discharge from rehab with insurance.  Continue bladder scans every 4 hours times 24 hours.  Required two in and out catheterizations overnight.  If requires another in and out catheterization, bladder scan over 350 mL.  Initiate indwelling catheter.  Continue Flomax 0.4 mg at bedtime.  Continue aggressive mobilization as tolerated.  Monitor closely.  Notify of acute changes.    Staffing 6/19: Incontinent of bladder and bowel. Good appetite. RT: overall min assist. PT: overall stand by to contact assist. Ambulates 150 feet with RW. OT: overall mod assist. Needs more time to increase endurance. Projected discharge 6/20, [facility].     Kaity Arndt NP conducted independent physical examination and assisted with medical documentation.

## 2023-06-20 NOTE — PT/OT/SLP DISCHARGE
Physical Therapy Discharge Summary    Name: Lita Hess  MRN: [id number]   Principal Problem: Debility     Patient Discharged from acute Physical Therapy on 06/20/23.     Assessment:     Goals partially met. Patient appropriate for care in another setting.     Pt limited in progress with functional independence d/t decreased motivation with impairments in strength, endurance, and cognition/safety awareness. Additionally, pt limited by increased pain and fatigue levels throughout LOS.    Objective:     GOALS:   Multidisciplinary Problems       Physical Therapy Goals          Problem: Physical Therapy    Goal Priority Disciplines Outcome Goal Variances Interventions   Physical Therapy Goal     PT, PT/OT Adequate for Care Transition     Description: Bed Mobility:  MET - Roll L and R Philip  MET - Sit to supine will be Philip  MET - Supine to sit will be Philip  NOT MET - Roll left and right independently  NOT MET - Sit to supine transfer with setup/clean-up assist  NOT MET - Supine to sit transfer with setup/clean-up assist    Transfers:  NOT MET - Sit to stand supervision  NOT MET - Bed to Chair t/f will be supervision  NOT MET - Car t/f will be supervision    Mobility:  MET - Pt will ambulate 150' CGA with RW  NOT MET - Pt will ambulate 15' CGA with RW over uneven terrain  MET - Pt will climb one step CGA with RW                        Care Scores:   Admission Assessment Current   Status  Discharge   Goal   Functional Area: Care Score:  Care Score:    Roll Left and Right 3 4 Independent   Sit to Lying 2 3 Set-up/clean-up   Lying to Sitting on Side of Bed 3 4 Independent   Sit to Stand 3 4 Independent   Chair/Bed-to-Chair Transfer 3 4 Independent   Car Transfer 7 4     Walk 10 Feet 4 4 Independent   Walk 50 Feet with Two Turns 4 4 Independent   Walk 150 Feet 7 4     Walk 10 Feet Uneven Surface 7 4     1 Step (Curb) 7 4     4 Steps 9 88     12 Steps 9 88     Picking Up Object 7 5     Wheel 50 Feet with Two Turns 9 4     Wheel  150 Feet 9 3         Reasons for Discontinuation of Therapy Services  Transfer to alternate level of care. and Satisfactory goal achievement.      Plan:     Patient Discharged to: Skilled Nursing Facility.    6/20/2023

## 2023-06-20 NOTE — PLAN OF CARE
Detailed Notice of Discharge (DND) copy given to patient while we await decision of [employer] ([employer]) on the patient's/family's appeal of discharge.

## 2023-06-20 NOTE — PROGRESS NOTES
[facility] - Rehab Inpatient Services  Wound Care    Patient Name:  Lita Hess   MRN:  [id number]  Date: 6/20/2023  Diagnosis: Debility    History:     Past Medical History:   Diagnosis Date    Chronic back pain 10/26/2022    Colon cancer     CVA (cerebral vascular accident)     Gastroesophageal reflux disease without esophagitis 10/26/2022    Hypertension     Iron deficiency anemia secondary to inadequate dietary iron intake 10/26/2022    Loss of appetite     Mixed hyperlipidemia 10/26/2022    Stroke     Type 2 diabetes mellitus without complication 10/26/2022    Vitamin D deficiency          Precautions:     Allergies as of 06/09/2023    (No Known Allergies)       [facility] Assessment Details/Treatment      06/20/23 1434        Altered Skin Integrity 06/09/23 1300  Sacral spine Partial thickness tissue loss. Shallow open ulcer with a red or pink wound bed, without slough. Intact or Open/Ruptured Serum-filled blister.   Date First Assessed/Time First Assessed: 06/09/23 1300   Altered Skin Integrity Present on Admission - Did Patient arrive to the hospital with altered skin?: yes  Side: (c)   Location: Sacral spine  Description of Altered Skin Integrity: (c) Partial t...   Appearance Epithelialization  (healed)        Altered Skin Integrity 06/09/23 1300 Right Heel   Date First Assessed/Time First Assessed: 06/09/23 1300   Side: Right  Location: Heel   Description of Altered Skin Integrity Purple or maroon localized area of discolored intact skin or non-intact skin or a blood-filled blister.   Dressing Appearance Open to air   Drainage Amount None   Appearance Maroon;Intact   Wound Length (cm) 1.5 cm   Wound Width (cm) 1.5 cm   Wound Surface Area (cm^2) 2.25 cm^2   Care   (offloading)   Off Loading   (heel boots)     Sacral spine dressing intact for prevention. Left heel remains boggy and discolored. Stressed importance of wearing heel boots.  06/20/2023

## 2023-06-20 NOTE — PT/OT/SLP DISCHARGE
Occupational Therapy Discharge Summary    Lita Hess  MRN: [id number]   Principal Problem: Debility      Patient Discharged from acute Occupational Therapy on 6/20/23.    Assessment:      Patient has not met goals. Pt is no longer making progress.    Objective:     GOALS:   Multidisciplinary Problems       Occupational Therapy Goals          Problem: Occupational Therapy    Goal Priority Disciplines Outcome Interventions   Occupational Therapy Goal     OT, PT/OT Ongoing, Progressing    Description: ADLs:  Pt to perform grooming tasks with set up and clean up. Ongoing/progressing  Pt to perform UB dressing with set up and clean up. Ongoing/progressing   Pt to perform LB dressing with set up and clean up with use of AD. Ongoing/progressing  Pt to perform putting on/off footwear task with set up and clean up and AD. MET  Pt to perform putting on/off footwear task with independence and AD  Pt to perform toileting with mod a using RW and BSC. Ongoing/progressing  Pt to perform bathing with mod a using TTB. Ongoing/progressing    Functional Transfers:  Pt to perform toilet transfers with CGA and RW. MET  Pt to perform toilet transfer with set up and clean up and RW  Pt to perform a tub transfer with mod a and TTB. MET  Pt to perform a tub transfer with SBA and TTB.                            Care Scores:   Admission Assessment Current Status Discharge  Goal   Functional Area: Care Score:  Care Score:    Eating 6 5 Independent   Oral Hygiene 5 5 Independent   Toileting Hygiene 2 4 Supervision or touching assistance   Shower/Bathe Self 3 3 Supervision or touching assistance   Upper Body Dressing 3 3 Set-up/clean-up   Lower Body Dressing 3 3 Set-up/clean-up   Putting On/Taking Off Footwear 1 5 Independent   Toilet Transfer 3 4 Supervision or touching assistance   Pt unable to obtain OT goals due to increased pain level, poor endurance/strength, decreased standing balance - both static and dynamic, and body habitus due to  history of severe scoliosis. Pt also demonstrated poor motivation and willingness to participate in therapies, as pt frequently required extensive motivational cueing/encouragement to participate and initiate tasks.     Reasons for Discontinuation of Therapy Services   Transfer to alternate level of care.      Plan:     Patient Discharged to: Skilled Nursing Facility      6/20/2023

## 2023-06-20 NOTE — PLAN OF CARE
Coordinating with Rufino ([facility]) for transition there today. Sending final orders now.  Rufino will then arrange for van transport.  Report will be called.  Will relay timing to patient and son Dyllan once known.

## 2023-06-20 NOTE — PT/OT/SLP PROGRESS
"Occupational Therapy Inpatient Rehab Treatment    Name: Lita eHss  MRN: [id number]    Assessment:  Lita Hess is a 87 y.o. female admitted with a medical diagnosis of Debility.  She presents with the following impairments/functional limitations:  weakness, impaired endurance, impaired self care skills, impaired functional mobility, gait instability, impaired balance, decreased upper extremity function, decreased lower extremity function, pain, decreased safety awareness.    General Precautions: Standard, fall     Orthopedic Precautions:N/A     Braces: N/A    Rehab Prognosis: Fair; patient would benefit from acute skilled OT services to address these deficits and reach maximum level of function.      History:     Past Medical History:   Diagnosis Date    Chronic back pain 10/26/2022    Colon cancer     CVA (cerebral vascular accident)     Gastroesophageal reflux disease without esophagitis 10/26/2022    Hypertension     Iron deficiency anemia secondary to inadequate dietary iron intake 10/26/2022    Loss of appetite     Mixed hyperlipidemia 10/26/2022    Stroke     Type 2 diabetes mellitus without complication 10/26/2022    Vitamin D deficiency        Past Surgical History:   Procedure Laterality Date    COLECTOMY      COLONOSCOPY      ESOPHAGOGASTRODUODENOSCOPY      gastro biopsy      HIP REPLACEMENT ARTHROPLASTY      SCLEROTHERAPY WITH ULTRASOUND GUIDANCE      TRANSESOPHAGEAL ECHOCARDIOGRAPHY         Subjective     Orientation: Oriented x4    Chief Complaint: Fatigue     Patient/Family Comments/goals: "I am so tired and my legs hurt"    Respiratory Status: Room air    Patients cultural, spiritual, Pentecostalism conflicts given the current situation: no       Objective:     Patient found up in chair upon OT entry to room.    Mobility   Patient completed:  Sit to Supine with moderate assistance  Sit to Stand Transfer with contact guard assistance with rolling walker  Stand to Sit Transfer with contact guard assistance " with rolling walker    Functional Mobility  Patient performed short FM in room with CGA using RW, from wheelchair to bed.     Limiting Factors for ADLs: motor, psychsocial, endurance, limited ROM, balance, weakness, safety awareness, and pain     Therapeutic Activities  Patient completed reaching activity in unsupported sit to work on UE strength, core strength, and maintaining grasp on objects. With 1 lb wrist weight, patient able to reach and retrieve plastic cups from table top, 2 trials, complaints of shoulder pain during activity.     Therapeutic Exercise  Patient completed 1x10 reps bicep curls, punches, and butterflies   Patient completed yellow putty exercises to increase  strength-straight arm squeezes, 2x1 min. Patient manipulated small beans out of putty to increase pinch strength    Patient left supine with all lines intact and call button in reach.     Education provided: Roles and goals of OT, ADLs, transfer training, sequencing, safety precautions, and fall prevention    Multidisciplinary Problems       Occupational Therapy Goals          Problem: Occupational Therapy    Goal Priority Disciplines Outcome Interventions   Occupational Therapy Goal     OT, PT/OT Ongoing, Progressing    Description: ADLs:  Pt to perform grooming tasks with set up and clean up. Ongoing/progressing  Pt to perform UB dressing with set up and clean up. Ongoing/progressing   Pt to perform LB dressing with set up and clean up with use of AD. Ongoing/progressing  Pt to perform putting on/off footwear task with set up and clean up and AD. MET  Pt to perform putting on/off footwear task with independence and AD  Pt to perform toileting with mod a using RW and BSC. Ongoing/progressing  Pt to perform bathing with mod a using TTB. Ongoing/progressing    Functional Transfers:  Pt to perform toilet transfers with CGA and RW. MET  Pt to perform toilet transfer with set up and clean up and RW  Pt to perform a tub transfer with mod a  and TTB. MET  Pt to perform a tub transfer with SBA and TTB.                            Time Tracking     OT Received On: 06/20/23  Time In 1400     Time Out 1500  Total Time 60 min  Therapy Time: OT Individual: 60  Missed Time:    Missed Time Reason:      Billable Minutes: Therapeutic Activity 30 and Therapeutic Exercise 30    06/20/2023

## 2023-06-20 NOTE — PROGRESS NOTES
Subjective  HPI: 88 yo BF with PMH of CVA x 2, MI with loop recorder, HTN, GERD, hip replacement in past, and remote hx colon cancer s/p colectomy presented to the ED at [facility] on 6/6/23 after falling at home. Patient complained of left rib pain and left hip pain. CT head showed no acute intracranial abnormality identified, and chronic microvascular ischemic disease. CT cervical spine showed no acute findings. CT of chest/abdomen/ pelvis showed acute fractures of the left 4th, 5th, 6th, and 7th ribs, scoliotic curvature of the spine with no acute fracture identified; and no fracture of right ribs identified. Hip XR showed no acute fractures. Patient was admitted to ICU for close respiratory monitoring, encouraged to use IS, started duonebs, and CPT ordered. On 6/7, PT eval completed with deficits noted. On 6/8, OT eval completed with deficits noted. Labs showed low BUN of 9.6, low Na of 133, low H&H of 11.7 & 35.7, elevated CBG of 346, and low albumin of 3.1. On Lovenox for DVT prophylaxis. On 6/9, CBG remains elevated. Patient is AAOx4.  Participating with therapy. Functional status includes minimal-moderate assist needed for bed mobility, minimal assist for transfer with RW, minimal assist for walking about 30 ft with RW, purwick catheter for toileting, moderate-max assist for lower body dressing. Patient was evaluated, accepted, and admitted to inpatient rehab to improve functional status. Transferred to [facility] on 6/9 without incident.      6/20: Seen with PT, Amb w/RW with slow gait. Patient scheduled to DC today; however, son filed appeal for more time in Inpatient Rehab. The patient states that she likes everything over here and is not ready to leave. She is enjoying the food and the kindness of our staff. Insurance to review before DC. VSSAF.          Review of Systems  Psychiatric: Per son, pt. Recently complained of depression. Pt. Has a history of anxiety and takes medication for this. She has no substance  "abuse history    Depression/Anxiety: no complaints   mirtazapine tablet 30 mg after dinner  ALPRAZolam tablet 0.25 mg TID PRN Anxiety  DULoxetine DR capsule 30 mg qd, start 6/14  Pain: left side-ribs, LLE  DULoxetine DR capsule 30 mg qd, start 6/14  gabapentin capsule 300 mg TID. Increase q6h  acetaminophen tablet 650 mg TID. q6h  methocarbamoL tablet 500 mg TID. q6h  traMADoL tablet 50 mg q6h PRN mod pain  oxyCODONE 5 mg 1 tablet q6h PRN severe pain  Bowels/Bladder: last BM 6/17 x 2  Appetite: "good"  megestroL 400 mg/10 mL (10 mL) suspension 400 mg BID     Sleep: good         Physical Exam  General: well-developed, thin/frail appearing, in no acute distress  Respiratory: equal chest rise, no SOB, no audible wheeze  Cardiovascular: regular rate and rhythm, no edema  Gastrointestinal: soft, non-tender, non-distended   Musculoskeletal: decreased ROM/strength to LLE; generalized weakness  Integumentary: no rashes or skin lesions present, right heel/sacral pressure wounds  Neurologic: cranial nerves intact, no signs of peripheral neurological deficit, motor/sensory function intact                  Assessment/Plan  Hospital   Fall   Rib fracture   Debility     Non-Hospital   Chronic back pain (Chronic)   Mixed hyperlipidemia (Chronic)   Hypertension (Chronic)   Type 2 diabetes mellitus without complication (Chronic)   Gastroesophageal reflux disease without esophagitis (Chronic)   Iron deficiency anemia secondary to inadequate dietary iron intake (Chronic)   CVA (cerebral vascular accident) (Chronic)   Loss of appetite (Chronic)   Vitamin D deficiency (Chronic)   Frailty (Chronic)   Arteriosclerosis of coronary artery   At risk of pressure injury of skin   Hiatal hernia   Scoliosis   Stenosis of carotid artery   Severe malnutrition   Colon cancer       Wounds: right heel/sacral pressure wounds  Precautions: fall risk  Bracing/AD: RW  Swallowing: Diabetic Diet  Function: Tolerating therapy. Continue PT/OT  VTE " Prophylaxis:   enoxaparin injection 30 mg SubQ q12h  Code Status: FULL CODE   Discharge: Lives alone in [place] in a single-story home with 1 threshold step to enter the residence. Completed her master's degree. She has no  history. 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 She had a volunteer come in to help her around the house and for errands every now and then. Per son, the family is arranging for her care after she leaves rehab.  Children: (2). Date 6/20 Tuesday.

## 2023-06-20 NOTE — PT/OT/SLP PROGRESS
"Physical Therapy Inpatient Rehab Treatment    Patient Name:  Lita Hess   MRN:  [id number]    Recommendations:     Discharge Recommendations:  nursing facility, skilled   Discharge Equipment Recommendations: bedside commode, walker, rolling, wheelchair   Barriers to discharge: None    Assessment:     Lita Hess is a 87 y.o. female admitted with a medical diagnosis of Debility.  She presents with the following impairments/functional limitations:  weakness, impaired endurance, impaired self care skills, impaired functional mobility, gait instability, impaired balance, visual deficits, decreased lower extremity function, pain, edema.    Rehab Diagnosis:      General Precautions: Standard, fall     Orthopedic Precautions:N/A     Braces: N/A    Rehab Prognosis: Fair; patient would benefit from acute skilled PT services to address these deficits and reach maximum level of function.      History:     Past Medical History:   Diagnosis Date    Chronic back pain 10/26/2022    Colon cancer     CVA (cerebral vascular accident)     Gastroesophageal reflux disease without esophagitis 10/26/2022    Hypertension     Iron deficiency anemia secondary to inadequate dietary iron intake 10/26/2022    Loss of appetite     Mixed hyperlipidemia 10/26/2022    Stroke     Type 2 diabetes mellitus without complication 10/26/2022    Vitamin D deficiency        Past Surgical History:   Procedure Laterality Date    COLECTOMY      COLONOSCOPY      ESOPHAGOGASTRODUODENOSCOPY      gastro biopsy      HIP REPLACEMENT ARTHROPLASTY      SCLEROTHERAPY WITH ULTRASOUND GUIDANCE      TRANSESOPHAGEAL ECHOCARDIOGRAPHY         Subjective     Chief Complaint: "I didn't know you were coming"    Respiratory Status: Room air    Patients cultural, spiritual, Mormon conflicts given the current situation: no      Objective:     Communicated with TITO Hess prior to session.  Patient found up in chair upon PT entry to room.    Pt is Alert, Cooperative, and " Lethargic.        Functional Mobility:      Current   Status  Discharge   Goal   Functional Area: Care Score:    Sit to Lying 4  Supervision, on therapy mat, increased time and VC for technique Set-up/clean-up   Lying to Sitting on Side of Bed 3  Mod A for trunk after several attempts for pt to perform on her own, on therapy mat, with use of leg . Increased time required. Independent   Sit to Stand 4  Increased time and encouragement required to stand, with RW Independent   Chair/Bed-to-Chair Transfer 4  CGA with RW, stand step, increased time Independent   Walk 10 Feet 4  CGA with RW, very slow pace Independent   Walk 50 Feet with Two Turns 4  CGA with RW, very slow pace (~20min), 110ft total. Some standing rest breaks taken.  Independent       Therapeutic Activities and Exercises:  Discussed POC and daily therapy requirements, since pt is not DC'ing as originally planned.     Activity Tolerance: Fair    Patient left with seatbelt in wheelchair with call button in reach.    Education provided: roles and goals of PT/PTA, bed mob, gait training, safety awareness, body mechanics, and assistive device    Expected compliance: Moderate compliance    Plan:     During this hospitalization, patient to be seen 5 x/week to address the identified rehab impairments via gait training, therapeutic activities, therapeutic exercises, neuromuscular re-education and progress toward the following goals:    GOALS:   Multidisciplinary Problems       Physical Therapy Goals          Problem: Physical Therapy    Goal Priority Disciplines Outcome Goal Variances Interventions   Physical Therapy Goal     PT, PT/OT Adequate for Care Transition     Description: Bed Mobility:  MET - Roll L and R Philip  MET - Sit to supine will be Philip  MET - Supine to sit will be Philip  NOT MET - Roll left and right independently  NOT MET - Sit to supine transfer with setup/clean-up assist  NOT MET - Supine to sit transfer with setup/clean-up  assist    Transfers:  NOT MET - Sit to stand supervision  NOT MET - Bed to Chair t/f will be supervision  NOT MET - Car t/f will be supervision    Mobility:  MET - Pt will ambulate 150' CGA with RW  NOT MET - Pt will ambulate 15' CGA with RW over uneven terrain  MET - Pt will climb one step CGA with RW                        Plan of Care Expires:  06/16/23  PT Next Visit Date: 06/21/23  Plan of Care reviewed with: patient    Additional Information:         Time Tracking:     Therapy Time  PT Start Time: 1300  PT Stop Time: 1400  PT Total Time (min): 60 min   PT Individual: 60  Missed Time:    Time Missed due to:      Billable Minutes: Gait Training 20 and Therapeutic Activity 40    06/20/2023

## 2023-06-20 NOTE — PLAN OF CARE
-- DO NOT REPLY / DO NOT REPLY ALL --  -- Message is from the [facility]--    Referral Request  Name of Specialist: Dr. Reddy Maldonado  Provider's specialty: Cardiology    Medical condition for referral:  Follow up, lab results. Her appointment is Oct 15th.    Is this a NEW request?: yes      Referral ordered by: Dr. Murillo      Insurance type: [employer] Medicare Advantage O      Payor: [employer] MEDICARE ADVANTAGE HMO - [facility] [employer] / Plan: [employer] MEDICARE ADVANTAGE HMO - [facility] [employer] / Product Type: [employer] Risk      Preferred Delivery Method   Fax - number to send to:  [phone]    Caller Information       Type Contact Phone    10/07/2020 01:52 PM CDT Phone (Incoming) Jalyn Simental (Self) [phone] (H)          Alternative phone number: [phone]      Turnaround time given to caller:   \"This message will be sent to [state Provider's full name]. The clinical team will return your call as soon as they review your message. Typically, it takes 3 business days to process referral requests.\"   Received notification from [facility] that pt's son filed for an expedited appeal of pt's discharge per Important Message for Medicare patient rights.  The process will now entail [facility] reviewing for appropriateness of discharge.    Relayed to son Dyllan that we did receive the notification of review.  He indicated that pt's main issue is that she enjoys the care she receives here and specifically enjoys the food and recalls the food at [facility] not being as good.  She is the one who is requesting to stay on IRF longer for those reasons.  Dyllan felt he needed to proceed with appeal for these reasons.

## 2023-06-20 NOTE — PT/OT/SLP PROGRESS
"Occupational Therapy Inpatient Rehab Treatment    Name: Lita Hess  MRN: [id number]    Assessment:  Lita Hess is a 87 y.o. female admitted with a medical diagnosis of Debility.  She presents with the following impairments/functional limitations:  weakness, impaired endurance, impaired sensation, impaired self care skills, impaired functional mobility, gait instability, impaired balance, impaired cognition, decreased coordination, decreased upper extremity function, decreased lower extremity function, decreased safety awareness, pain, decreased ROM, impaired coordination .    General Precautions: Standard, fall     Orthopedic Precautions:N/A     Braces: N/A    Rehab Prognosis: Good; patient would benefit from acute skilled OT services to address these deficits and reach maximum level of function.      History:     Past Medical History:   Diagnosis Date    Chronic back pain 10/26/2022    Colon cancer     CVA (cerebral vascular accident)     Gastroesophageal reflux disease without esophagitis 10/26/2022    Hypertension     Iron deficiency anemia secondary to inadequate dietary iron intake 10/26/2022    Loss of appetite     Mixed hyperlipidemia 10/26/2022    Stroke     Type 2 diabetes mellitus without complication 10/26/2022    Vitamin D deficiency        Past Surgical History:   Procedure Laterality Date    COLECTOMY      COLONOSCOPY      ESOPHAGOGASTRODUODENOSCOPY      gastro biopsy      HIP REPLACEMENT ARTHROPLASTY      SCLEROTHERAPY WITH ULTRASOUND GUIDANCE      TRANSESOPHAGEAL ECHOCARDIOGRAPHY         Subjective     Orientation: Oriented x4    Chief Complaint: "My ribs and back hurt."    Patient/Family Comments/goals: To go home    Respiratory Status: Room air    Patients cultural, spiritual, Baptist conflicts given the current situation: no       Objective:     Patient found up in chair upon OT entry to room.    Mobility   Patient completed:  Sit to Stand Transfer with contact guard assistance with rolling " walker  Stand to Sit Transfer with contact guard assistance with rolling walker  Chair to w/c Step Transfer technique with contact guard assistance with rolling walker      Limiting Factors for ADLs: motor, sensory, psychsocial, endurance, limited ROM, balance, weakness, body habitus, coordination, cognition, safety awareness, and pain       Therapeutic Activities  - Pt participated in a game of dominos while seated unsupported in w/c. Pt required several verbal cues for sequencing of game. Pt was independent with using in hand manipulation to place the dominos.  - Pt participated in a game of HANNAH while seated unsupported in w/c. Pt required several verbal cues for turn taking and proper use of cards.   - Pt participated in game of connect 4 while seated unsupported in w/c. Pt requires several verbal cues for turn taking. Pt was independent with putting pieces into the game.     Therapeutic Exercise  - Pt tolerated 4 min of the arm bike while seated unsupported in w/c to work on trunk stability and BUE AROM.       Patient left up in chair with call button in reach.     Education provided: Roles and goals of OT, transfer training, body mechanics, assistive device, sequencing, safety precautions, and fall prevention    Multidisciplinary Problems       Occupational Therapy Goals          Problem: Occupational Therapy    Goal Priority Disciplines Outcome Interventions   Occupational Therapy Goal     OT, PT/OT Ongoing, Progressing    Description: ADLs:  Pt to perform grooming tasks with set up and clean up. Ongoing/progressing  Pt to perform UB dressing with set up and clean up. Ongoing/progressing   Pt to perform LB dressing with set up and clean up with use of AD. Ongoing/progressing  Pt to perform putting on/off footwear task with set up and clean up and AD. MET  Pt to perform putting on/off footwear task with independence and AD  Pt to perform toileting with mod a using RW and BSC. Ongoing/progressing  Pt to perform  bathing with mod a using TTB. Ongoing/progressing    Functional Transfers:  Pt to perform toilet transfers with CGA and RW. MET  Pt to perform toilet transfer with set up and clean up and RW  Pt to perform a tub transfer with mod a and TTB. MET  Pt to perform a tub transfer with SBA and TTB.                            Time Tracking     OT Received On: 06/20/23  Time In 0830     Time Out 0930  Total Time 60 min  Therapy Time: OT Individual: 60  Missed Time:    Missed Time Reason:      Billable Minutes: Therapeutic Activity 50 min and Therapeutic Exercise 10 min    06/20/2023

## 2023-06-21 LAB
POCT GLUCOSE: 122 MG/DL (ref 70–110)
POCT GLUCOSE: 140 MG/DL (ref 70–110)
POCT GLUCOSE: 358 MG/DL (ref 70–110)

## 2023-06-21 PROCEDURE — 99900035 HC TECH TIME PER 15 MIN (STAT)

## 2023-06-21 PROCEDURE — 63600175 PHARM REV CODE 636 W HCPCS: Performed by: NURSE PRACTITIONER

## 2023-06-21 PROCEDURE — 97116 GAIT TRAINING THERAPY: CPT

## 2023-06-21 PROCEDURE — 25000003 PHARM REV CODE 250: Performed by: NURSE PRACTITIONER

## 2023-06-21 PROCEDURE — 94761 N-INVAS EAR/PLS OXIMETRY MLT: CPT

## 2023-06-21 PROCEDURE — 97530 THERAPEUTIC ACTIVITIES: CPT

## 2023-06-21 PROCEDURE — 97110 THERAPEUTIC EXERCISES: CPT

## 2023-06-21 PROCEDURE — 97535 SELF CARE MNGMENT TRAINING: CPT

## 2023-06-21 PROCEDURE — 94799 UNLISTED PULMONARY SVC/PX: CPT

## 2023-06-21 PROCEDURE — 11800000 HC REHAB PRIVATE ROOM

## 2023-06-21 PROCEDURE — 51798 US URINE CAPACITY MEASURE: CPT

## 2023-06-21 RX ADMIN — LIDOCAINE 5% 1 PATCH: 700 PATCH TOPICAL at 05:06

## 2023-06-21 RX ADMIN — MIRTAZAPINE 30 MG: 15 TABLET, FILM COATED ORAL at 05:06

## 2023-06-21 RX ADMIN — TAMSULOSIN HYDROCHLORIDE 0.4 MG: 0.4 CAPSULE ORAL at 08:06

## 2023-06-21 RX ADMIN — METHOCARBAMOL 500 MG: 500 TABLET ORAL at 12:06

## 2023-06-21 RX ADMIN — TRAMADOL HYDROCHLORIDE 50 MG: 50 TABLET, COATED ORAL at 04:06

## 2023-06-21 RX ADMIN — DULOXETINE HYDROCHLORIDE 30 MG: 30 CAPSULE, DELAYED RELEASE ORAL at 08:06

## 2023-06-21 RX ADMIN — ENOXAPARIN SODIUM 30 MG: 30 INJECTION SUBCUTANEOUS at 08:06

## 2023-06-21 RX ADMIN — INSULIN DETEMIR 22 UNITS: 100 INJECTION, SOLUTION SUBCUTANEOUS at 08:06

## 2023-06-21 RX ADMIN — INSULIN ASPART 10 UNITS: 100 INJECTION, SOLUTION INTRAVENOUS; SUBCUTANEOUS at 12:06

## 2023-06-21 RX ADMIN — DOCUSATE SODIUM 100 MG: 100 CAPSULE, LIQUID FILLED ORAL at 08:06

## 2023-06-21 RX ADMIN — ACETAMINOPHEN 325MG 650 MG: 325 TABLET ORAL at 05:06

## 2023-06-21 RX ADMIN — ATORVASTATIN CALCIUM 10 MG: 10 TABLET, FILM COATED ORAL at 08:06

## 2023-06-21 RX ADMIN — TRAMADOL HYDROCHLORIDE 50 MG: 50 TABLET, COATED ORAL at 12:06

## 2023-06-21 RX ADMIN — ACETAMINOPHEN 325MG 650 MG: 325 TABLET ORAL at 12:06

## 2023-06-21 RX ADMIN — SITAGLIPTIN 50 MG: 50 TABLET, FILM COATED ORAL at 08:06

## 2023-06-21 RX ADMIN — LISINOPRIL 20 MG: 10 TABLET ORAL at 08:06

## 2023-06-21 RX ADMIN — METHOCARBAMOL 500 MG: 500 TABLET ORAL at 04:06

## 2023-06-21 RX ADMIN — ASPIRIN 325 MG: 325 TABLET, FILM COATED ORAL at 08:06

## 2023-06-21 RX ADMIN — FOLIC ACID 1000 MCG: 1 TABLET ORAL at 08:06

## 2023-06-21 RX ADMIN — GABAPENTIN 300 MG: 300 CAPSULE ORAL at 05:06

## 2023-06-21 RX ADMIN — AMLODIPINE BESYLATE 10 MG: 5 TABLET ORAL at 08:06

## 2023-06-21 RX ADMIN — METHOCARBAMOL 500 MG: 500 TABLET ORAL at 05:06

## 2023-06-21 RX ADMIN — GABAPENTIN 300 MG: 300 CAPSULE ORAL at 02:06

## 2023-06-21 RX ADMIN — TRAMADOL HYDROCHLORIDE 50 MG: 50 TABLET, COATED ORAL at 05:06

## 2023-06-21 RX ADMIN — GABAPENTIN 300 MG: 300 CAPSULE ORAL at 08:06

## 2023-06-21 RX ADMIN — METFORMIN HYDROCHLORIDE 500 MG: 500 TABLET, FILM COATED ORAL at 04:06

## 2023-06-21 NOTE — PT/OT/SLP PROGRESS
Occupational Therapy Inpatient Rehab Treatment    Name: Lita Hess  MRN: [id number]    Assessment:  Lita Hess is a 87 y.o. female admitted with a medical diagnosis of Debility.  She presents with the following impairments/functional limitations:  weakness, impaired endurance, impaired self care skills, impaired functional mobility, gait instability, impaired balance, decreased lower extremity function, decreased upper extremity function, pain, decreased safety awareness, decreased ROM.  Patient fatigued and decreased motivation to participate in therapy     General Precautions: Standard, fall     Orthopedic Precautions:N/A     Braces: N/A    Rehab Prognosis: Fair; patient would benefit from acute skilled OT services to address these deficits and reach maximum level of function.      History:     Past Medical History:   Diagnosis Date    Chronic back pain 10/26/2022    Colon cancer     CVA (cerebral vascular accident)     Gastroesophageal reflux disease without esophagitis 10/26/2022    Hypertension     Iron deficiency anemia secondary to inadequate dietary iron intake 10/26/2022    Loss of appetite     Mixed hyperlipidemia 10/26/2022    Stroke     Type 2 diabetes mellitus without complication 10/26/2022    Vitamin D deficiency        Past Surgical History:   Procedure Laterality Date    COLECTOMY      COLONOSCOPY      ESOPHAGOGASTRODUODENOSCOPY      gastro biopsy      HIP REPLACEMENT ARTHROPLASTY      SCLEROTHERAPY WITH ULTRASOUND GUIDANCE      TRANSESOPHAGEAL ECHOCARDIOGRAPHY         Subjective     Orientation: Oriented x4    Chief Complaint: Tired     Respiratory Status: Room air    Patients cultural, spiritual, Spiritism conflicts given the current situation: no       Objective:     Patient found up in chair with    upon OT entry to room.    Mobility   Patient completed:  Sit to Supine with minimum assistance  Sit to Stand Transfer with contact guard assistance with rolling walker  Stand to Sit Transfer with  contact guard assistance with rolling walker    Functional Mobility  Patient performed short FM in room with CGA using RW    ADLs   Current Status   Putting On, Taking Off Footwear 4 Verbal cues for correct use of AE. Increased time required      Limiting Factors for ADLs: motor, endurance, limited ROM, balance, weakness, and safety awareness     Therapeutic Activities  Patient completed large 24 piece puzzle activity in unsupported sit in wheelchair. Max verbal cues required for piece placement and problem solving. Fair response to therapist direction during activity.     Therapeutic Exercise  Patient completed 4 min F and 4 min B on UBE to increase UE strength and endurance.   Patient completed 2 sets of balloon volleyball with therapist, using 1 lb dowel to hit balloon back and forth to increase UE strength with overhead task.     Patient left supine with all lines intact and call button in reach.     Education provided: Roles and goals of OT, ADLs, transfer training, modified goals, sequencing, safety precautions, and fall prevention    Multidisciplinary Problems       Occupational Therapy Goals          Problem: Occupational Therapy    Goal Priority Disciplines Outcome Interventions   Occupational Therapy Goal     OT, PT/OT Ongoing, Progressing    Description: ADLs:  Pt to perform grooming tasks with set up and clean up. Ongoing/progressing  Pt to perform UB dressing with set up and clean up. Ongoing/progressing   Pt to perform LB dressing with set up and clean up with use of AD. Ongoing/progressing  Pt to perform putting on/off footwear task with set up and clean up and AD. MET  Pt to perform putting on/off footwear task with independence and AD  Pt to perform toileting with mod a using RW and BSC. Ongoing/progressing  Pt to perform bathing with mod a using TTB. Ongoing/progressing    Functional Transfers:  Pt to perform toilet transfers with CGA and RW. MET  Pt to perform toilet transfer with set up and clean  up and RW  Pt to perform a tub transfer with mod a and TTB. MET  Pt to perform a tub transfer with SBA and TTB.                            Time Tracking     OT Received On: 06/21/23  Time In 1030     Time Out 1145  Total Time 75 min  Therapy Time: OT Individual: 75  Missed Time:    Missed Time Reason:      Billable Minutes: Self Care/Home Management 15, Therapeutic Activity 45, and Therapeutic Exercise 15    06/21/2023

## 2023-06-21 NOTE — PLAN OF CARE
Still awaiting word back from [facility] re: pt's appeal of discharge from IRF.  Will update team and [facility] Rufino as soon as decision is made.

## 2023-06-21 NOTE — PROGRESS NOTES
06/21/23 0830   Rec Therapy Time Calculation   Date of Treatment 06/21/23   Rec Start Time 0830   Rec Stop Time 0900   Rec Total Time (min) 30 min   Time   Treatment time 2 units   Charges   $Therapeutic Exercise 2 units   Precautions   General Precautions fall   Orthopedic Precautions  N/A   Braces N/A   Pain/Comfort   Pain Rating 1 9/10   Location - Side 1 Bilateral   Location - Orientation 1 upper   Location 1 rib(s)   Pain Addressed 1 Reposition   OTHER   Rehab identified problem list/impairments weakness;impaired endurance;impaired functional mobility;gait instability;decreased lower extremity function;decreased safety awareness   Values/Beliefs/Spiritual Care   Spiritual, Cultural Beliefs, [employer]ybeterian Practices, Values that Affect Care no   Overall Level of Functioning   Activity Tolerance Mod Indep   Dynamic Sitting Balance/Reaching Does not occur   Dynamic Standing Balance/Reaching Standby Assist   Right UE Coodination/Dexterity Independent   Left UE Coordination/Dexterity Independent   Problem Solving/Sequencing Skills Standby Assist   Memory Recall Standby Assist   R/L Neglect/Inattention Does not occur   Attention Span Mod Indep   Social Interaction Independent   Recreational Therapy Short Term Goals   Short Term Goal 1 Progression Met   Short Term Goal 2 Progression Met   Short Term Goal 3 Progression Met   Recreational Therapy Long Term Goals   Long Term Goal 1 Progression Progressing   Long Term Goal 2 Progression Progressing   Plan   Patient to be seen Daily   Planned Duration Other (Comment)  (2 days)   Treatments Planned Energy conservation training   Treatment plan/goals estblished with Patient/Caregiver Yes

## 2023-06-21 NOTE — PT/OT/SLP PROGRESS
Physical Therapy Inpatient Rehab Treatment    Patient Name:  Lita Hess   MRN:  [id number]    Recommendations:     Discharge Recommendations:  nursing facility, skilled   Discharge Equipment Recommendations: bedside commode, walker, rolling, wheelchair   Barriers to discharge:  impaired functional mobility    Assessment:     Lita Hess is a 87 y.o. female admitted with a medical diagnosis of Debility.  She presents with the following impairments/functional limitations:  weakness, impaired endurance, impaired balance, impaired functional mobility, decreased lower extremity function, pain, decreased safety awareness.    Rehab Diagnosis:      General Precautions: Standard, fall     Orthopedic Precautions:N/A     Braces: N/A    Rehab Prognosis: Poor; patient would benefit from acute skilled PT services to address these deficits and reach maximum level of function.      History:     Past Medical History:   Diagnosis Date    Chronic back pain 10/26/2022    Colon cancer     CVA (cerebral vascular accident)     Gastroesophageal reflux disease without esophagitis 10/26/2022    Hypertension     Iron deficiency anemia secondary to inadequate dietary iron intake 10/26/2022    Loss of appetite     Mixed hyperlipidemia 10/26/2022    Stroke     Type 2 diabetes mellitus without complication 10/26/2022    Vitamin D deficiency        Past Surgical History:   Procedure Laterality Date    COLECTOMY      COLONOSCOPY      ESOPHAGOGASTRODUODENOSCOPY      gastro biopsy      HIP REPLACEMENT ARTHROPLASTY      SCLEROTHERAPY WITH ULTRASOUND GUIDANCE      TRANSESOPHAGEAL ECHOCARDIOGRAPHY         Subjective     Chief Complaint: pain in L LE during AM session; not wanting to participate in therapy    Respiratory Status: Room air    Patients cultural, spiritual, Pentecostal conflicts given the current situation: no      Objective:     Communicated with pt prior to both sessions.  Patient found  up in wheelchair  with    upon PT entry to room in AM  session. Patient found supine in bed upon PT entry to room in PM session.    Pt is Alert, Cooperative, and Motivated.    Vitals   AM:  7/10 pain with activity in L LE  6/10 pain at rest    PM:  0/10 pain reported    Functional Mobility:      Current   Status  Discharge   Goal   Functional Area: Care Score:    Roll Left and Right   Independent   Sit to Lying 2  Pt required assistance with bringing both legs into the bed as well as maximal assistance with scooting up in bed to reposition trunk. Draw sheet utilized to move trunk. Set-up/clean-up   Lying to Sitting on Side of Bed 4 Independent   Sit to Stand 3  Pt requires contact guard assist to minimal assistance with sit to stands.  Independent   Chair/Bed-to-Chair Transfer 3 Independent   Car Transfer       Walk 10 Feet 4 Independent   Walk 50 Feet with Two Turns 4  Pt ambulated 100' and 75' with RW. Very slow gait speed (~25 minutes to ambulate). Decreased step length and height bilateral with occasional standing breaks. Independent   Walk 150 Feet       Walk 10 Feet Uneven Surface       1 Step (Curb)       4 Steps       12 Steps       Picking Up Object       Wheel 50 Feet with Two Turns       Wheel 150 Feet           Therapeutic Activities and Exercises:  AM session:  Sit to stand training from wheelchair- 3x8  Side stepping in parallel bars 3x 10 feet both directions    PM session:  Bed mobility transfers for in and out of bed at beginning and ending of session  Gait training listed above    Activity Tolerance: Poor    Patient left  up in wheelchair  with  OT Saad present in AM. Patient left supine in bed with call bell and bed alarm on in PM.    Education provided: roles and goals of PT/PTA, transfer training, bed mob, gait training, safety awareness, assistive device, and fall prevention    Expected compliance: Low compliance    Plan:     During this hospitalization, patient to be seen 5 x/week to address the identified rehab impairments via gait training,  "therapeutic activities, therapeutic exercises, neuromuscular re-education and progress toward the following goals:    GOALS:   Multidisciplinary Problems       Physical Therapy Goals          Problem: Physical Therapy    Goal Priority Disciplines Outcome Goal Variances Interventions   Physical Therapy Goal     PT, PT/OT Ongoing, Progressing     Description: Bed Mobility:  MET - Roll L and R Philip  MET - Sit to supine will be Philip  MET - Supine to sit will be Philip  Roll left and right independently  Sit to supine transfer with setup/clean-up assist  Supine to sit transfer with setup/clean-up assist    Transfers:  Sit to stand supervision  Bed to Chair t/f will be supervision  Car t/f will be supervision    Mobility:  MET - Pt will ambulate 150' CGA with RW  Pt will ambulate 15' CGA with RW over uneven terrain  MET - Pt will climb one step CGA with RW                        Plan of Care Expires:  06/16/23  PT Next Visit Date: 06/21/23  Plan of Care reviewed with: patient    Additional Information:   Pt stated that she "can't do therapy today" at beginning of AM and PM sessions. Eventually participated but low motivation seen throughout the treatments. Patient complained of pain and fatigue which required more and longer break sessions. She required much encouragement and convincing to continue exercises and stop rest breaks.    Pt was scheduled for extra PT time today, but politely declined to perform more than the required therapy time today d/t increased fatigue.  Time Tracking:     Therapy Time  PT Start Time:  (0930; 1330)  PT Stop Time:  (1030;1415)   PT Individual: 105  Missed Time:    Time Missed due to:      Billable Minutes: Gait Training 25 min, Therapeutic Activity 20 min, and Therapeutic Exercise 60 min    06/21/2023  "

## 2023-06-21 NOTE — PT/OT/SLP PROGRESS
Recreational Therapy Treatment    Date of Treatment: 06/21/23  Start Time: 0830  Stop Time: 0900  Total Time: 30 min  Missed Time:    General Precautions: fall  Ortho Precautions: N/A  Braces: N/A    Vitals   Vitals at Rest  BP    HR    O2 Sat    Pain 9/10     Vitals With Activity  BP    HR    O2 Sat    Pain 9/10       Treatment     Cognitive Skills Building   Cognitive Observation Activity Assist Position Equipment Response            Comment:          Dynamic Activities   Activity Assist Position Equipment Response   Activity 1 Bocce ball supervision Standing Rolling walker and Bocce balls good   Comment: Sit to stand was supervision as was dynamic standing balance/reaching. Standing tolerance was 3 minutes. UE coordination was I.  Memory and problem solving skills were supervision. Cooperative       Fine Motor Activities   Activity Assist Position Equipment Response           Comment:          Additional Info: Initiated participation    Goals     Short Term Goals    Goal  Goal Status   Will increase activity tolerance to supervision Met   Will increase sit to stand to supervision Met   Will improve dynamic standing balance/reaching to supervision Met           Long Term Goals    Goal Goal Status   Will increase standing tolerance to 5 minutes Progressing   Will improve dynamic standing balance/reaching to setup Progressing

## 2023-06-21 NOTE — PROGRESS NOTES
[facility] ([facility])  Rehab Progress Note    Patient Name: Lita Hess  MRN: [id number]  Age: 87 y.o. Sex: female  : 1935  Hospital Length of Stay: 12 days  Date of Service: 2023   Chief Complaint: Debility 2/2 frequent falls with left 4th-7th rib fractures    Subjective:     Basic Information  Admit Information: 87-year-old  female presented to [facility] ED on 2023 complaining of left side pain after accidentally falling backwards over walker.  PMH significant for CVA x2, CAD s/p MI, HTN, carotid artery stenosis, remote history of colon cancer s/p colectomy.  Workup significant for left 4th through 7th rib fractures.  Limited mobility 2/2 pain.  Tolerated transfer to [facility] inpatient rehab unit on  without incident.  Today's Information: No acute events overnight.  Mobilizing on parallel bars with physical therapy.  Reports good sleep and appetite.  Last BM .  Good glycemic control.  Vital signs at goal with no recorded fevers.  No new labs or imaging today.  Review of patient's allergies indicates:  No Known Allergies     Current Facility-Administered Medications:     acetaminophen tablet 650 mg, 650 mg, Oral, Q6H, HAYLEE KaminskiP, 650 mg at 23 0544    ALPRAZolam tablet 0.25 mg, 0.25 mg, Oral, TID PRN, Vishal MAN Du, FNP, 0.25 mg at 23 1209    amLODIPine tablet 10 mg, 10 mg, Oral, Daily, Vishal A Du, FNP, 10 mg at 23 0758    aspirin tablet 325 mg, 325 mg, Oral, Daily, Vishal A Du, FNP, 325 mg at 23 0758    atorvastatin tablet 10 mg, 10 mg, Oral, Daily, Vishal A Du, FNP, 10 mg at 23 0758    benzonatate capsule 100 mg, 100 mg, Oral, TID PRN, Vishal Sandy, FNP    bisacodyL suppository 10 mg, 10 mg, Rectal, Daily PRN, Vishal Sandy, FNP    dextrose 10% bolus 125 mL 125 mL, 12.5 g, Intravenous, PRN, Vishal Sandy, FNP    dextrose 10% bolus 250 mL 250 mL, 25 g,  Intravenous, PRN, Vishal Parisiehart, FNP    docusate sodium capsule 100 mg, 100 mg, Oral, BID, Vishal MAGDA Du, FNP, 100 mg at 06/20/23 2041    DULoxetine DR capsule 30 mg, 30 mg, Oral, Daily, Annette Devrieste, FNP, 30 mg at 06/20/23 0758    enoxaparin injection 30 mg, 30 mg, Subcutaneous, Q12H (treatment, non-standard time), Vishalmarcelino Parisiehart, FNP, 30 mg at 06/20/23 2041    folic acid tablet 1,000 mcg, 1,000 mcg, Oral, Daily, Vishal MAGDA Du, FNP, 1,000 mcg at 06/20/23 0758    gabapentin capsule 300 mg, 300 mg, Oral, TID, Annette Devrieste, FNP, 300 mg at 06/21/23 0544    glucagon (human recombinant) injection 1 mg, 1 mg, Intramuscular, PRN, Vishal Parisiehart, FNP    glucose chewable tablet 16 g, 16 g, Oral, PRN, Vishal Parisiehart, FNP    glucose chewable tablet 24 g, 24 g, Oral, PRN, Vishal MAN Du, FNP    hydrALAZINE injection 10 mg, 10 mg, Intravenous, Q4H PRN, Vishal Parisiehart, FNP    hydrOXYzine pamoate capsule 50 mg, 50 mg, Oral, Nightly PRN, Vishal A Du, FNP    insulin aspart U-100 injection 1-10 Units, 1-10 Units, Subcutaneous, QID (AC + HS) PRN, Vishal Parisiehart, FNP, 1 Units at 06/20/23 2041    insulin detemir U-100 injection 22 Units, 22 Units, Subcutaneous, BID, Kaity Arndt, FNP, 22 Units at 06/20/23 2041    labetalol 20 mg/4 mL (5 mg/mL) IV syring, 10 mg, Intravenous, Q4H PRN, Vishal A Du, FNP    LIDOcaine 5 % patch 1 patch, 1 patch, Transdermal, Q24H, Annette Calvo, FNP, 1 patch at 06/21/23 0544    lisinopriL tablet 20 mg, 20 mg, Oral, Daily, ALIRIO Arriaga, 20 mg at 06/20/23 0758    metFORMIN tablet 500 mg, 500 mg, Oral, Daily before evening meal, Annette Calvo, FNP, 500 mg at 06/20/23 1641    methocarbamoL tablet 500 mg, 500 mg, Oral, Q6H, Annette Calvo FNP, 500 mg at 06/21/23 0544    metoprolol injection 10 mg, 10 mg, Intravenous, Q2H PRN, ALIRIO Arriaga    mirtazapine tablet 30 mg, 30 mg, Oral, After dinner, Annette MAN  "Simpson, FNP, 30 mg at 06/20/23 1729    nitroGLYCERIN SL tablet 0.4 mg, 0.4 mg, Sublingual, Q5 Min PRN, Vishal Stephensonart, FNP    ondansetron disintegrating tablet 4 mg, 4 mg, Oral, Q6H PRN, Vishal MAN Du, FNP, 4 mg at 06/14/23 1224    oxyCODONE immediate release tablet 5 mg, 5 mg, Oral, Q6H PRN, Annette Devrieste, FNP    polyethylene glycol packet 17 g, 17 g, Oral, BID PRN, Vishal MAN Du, FNP    promethazine tablet 25 mg, 25 mg, Oral, Q6H PRN, Vishal MAN Du, FNP    SITagliptin phosphate tablet 50 mg, 50 mg, Oral, Daily, Vishal MAN Du, FNP, 50 mg at 06/20/23 0758    tamsulosin 24 hr capsule 0.4 mg, 0.4 mg, Oral, QHS, Kaity Arndt, FNP, 0.4 mg at 06/20/23 2041    traMADoL tablet 50 mg, 50 mg, Oral, Q6H, Annette Devrieste, FNP, 50 mg at 06/21/23 0544     Review of Systems   Complete 12-point review of symptoms negative except for what's mentioned in HPI     Objective:     /77   Pulse 97   Temp 98.7 °F (37.1 °C) (Oral)   Resp 18   Ht 4' 11.02" (1.499 m)   Wt 46.2 kg (101 lb 13.6 oz)   SpO2 99%   Breastfeeding No   BMI 20.56 kg/m²        Physical Exam  Constitutional:       Appearance: Normal appearance.   HENT:      Head: Normocephalic.      Mouth/Throat:      Mouth: Mucous membranes are moist.   Eyes:      Pupils: Pupils are equal, round, and reactive to light.   Cardiovascular:      Rate and Rhythm: Normal rate and regular rhythm.      Heart sounds: Normal heart sounds.   Pulmonary:      Effort: Pulmonary effort is normal.      Breath sounds: Normal breath sounds.   Abdominal:      General: Bowel sounds are normal.      Palpations: Abdomen is soft.   Musculoskeletal:      Cervical back: Neck supple.      Comments: muscle atrophy   Skin:     General: Skin is warm and dry.   Neurological:      Mental Status: She is alert and oriented to person, place, and time.      Motor: Weakness present.   Psychiatric:         Mood and Affect: Mood normal.         Behavior: Behavior normal.   "       Thought Content: Thought content normal.         Judgment: Judgment normal.   *MD performed and documented physical examination       Lines/Drains/Airways       None                   Labs  Recent Results (from the past 24 hour(s))   POCT glucose    Collection Time: 06/20/23 11:45 AM   Result Value Ref Range    POCT Glucose 186 (H) 70 - 110 mg/dL   POCT glucose    Collection Time: 06/20/23  3:56 PM   Result Value Ref Range    POCT Glucose 157 (H) 70 - 110 mg/dL   POCT glucose    Collection Time: 06/20/23  8:38 PM   Result Value Ref Range    POCT Glucose 170 (H) 70 - 110 mg/dL   POCT glucose    Collection Time: 06/21/23  5:43 AM   Result Value Ref Range    POCT Glucose 140 (H) 70 - 110 mg/dL       Radiology  CT head without contrast on 06/06/2023 at 3:00 p.m., IMPRESSION: No acute intracranial abnormality identified.  Findings of chronic microvascular ischemic disease.  Radiology  CT abdomen/pelvis with contrast on 6/6, IMPRESSION: No definite acute intra-abdominal or intrathoracic abnormality.  There are acute left-sided rib fractures as above.  Evaluation for additional fractures is limited by motion artifact.  Soft tissue density at the left sacrum as would be seen with decubitus ulcer is again noted.    Assessment/Plan:     87 y.o. AAF admitted on 6/9/2023    Debility   - 2/2 frequent falls with left 4th-7th rib fractures  - defer to physiatry for rehab and pain management  - PT/OT/RT following     Multiple rib fractures  - left 4-7th rib fractures  - continue                DuoNebs q.8 hours                Percocet 5 mg/325 mg q.6 hours p.r.n.                Lidoderm patch to left chest daily                Gabapentin 300 mg t.i.d.  - encourage incentive spirometer q.1 hour while awake     Proximal transverse colon adenocarcinoma  - s/p laparoscopic/robotic right hemicolectomy on 7/29/2021  - Biopsy significant for adenocarcinoma-no metastasis  - to follow up with oncology     Moderate protein calorie  malnutrition  - albumin 3.0/prealbumin 29.4-trending up  - registered dietitian following  - encourage oral nutrition and hydration  - s/p Megace 400 mg b.i.d. 6/9-6/14      History of bilateral ischemic CVA  - 6/2020  - continue                Lipitor 10 mg daily                Aspirin 325 mg daily     CAD  - s/p MI  - denies recent chest pain or discomfort  - continue                Lisinopril 20 mg daily                Lipitor 10 mg daily                Aspirin 325 mg daily  - ECG and Nitro PRN  - not on Plavix or BB  - continue cardiac diet  - to follow-up with cardiology outpatient      HTN  - at goal!!  - continue                Lisinopril 20 mg daily                Norvasc 10 mg daily                Hydralazine 10 mg every 2 hours as needed for BP > 160/90                Labetalol 10 mg every 2 hours as needed  - Low-sodium diet      Iron deficiency anemia  - asymptomatic  - H/H stable   - continue                Folic acid 1 mg daily  - no evidence of active bleeds  - will closely monitor and transfuse if needed      History of vasovagal syncope  - s/p Linq device  - to follow up with cardiology outpatient     Left carotid stenosis  - Left ICA > 85%  - continue                Aspirin 325 mg daily                Lipitor 10 mg daily  - to follow-up outpatient with vascular surgery     Poorly controlled DM type II  - HgA1c 10.5 on 6/6/2023  - continue   Januvia 50 mg daily (initiated 6/12)  Metformin 500 mg daily (initiated 6/11)                Levemir 22 units b.i.d. (increased 6/14)                ISS   - CBGs AC/HS     HLD  - FLP at goal!!  - continue                Lipitor 10 mg daily     Constipation  - stable  - continue  Colace 100 mg BID   Miralax 17 gm BID PRN     Unstageable sacral/left buttocks decubitus ulcer  - stable  - duo derm applied  - turn q2h  - wound care following    Urinary retention  - current  - continue bladder scan every 4 hours times 24 hours  - required 2 in and out catheterizations  in the last 24 hours  - continue   Flomax 0.4 mg at bedtime     VTE Prophylaxis:  Lovenox 30 mg b.i.d.  COVID-19 testing:  Unknown  COVID-19 vaccination status:  Vaccinated (Moderna):  02/05/2021, 03/05/2021, 10/29/2021, and 06/04/2022     POA: Alhaji Hess (son) [phone]  Living will: No  Contacts: Alhaji Hess (son) [phone]                 Elder Hess (son) [phone]                 Raegan Stewart (sister) [phone]     CODE STATUS: Full Code  Internal Medicine (attending): Troy Guerin MD  Physiatry (consulting):  Avi Melchor MD     OUTPATIENT PROVIDERS  PCP: Anant Mejia MD  Cardiology: Henry Cifuentes MD  Vascular surgery: Kip Shah MD  General surgery: Justo Henry MD  Gastroenterology:  Kip Forbes MD     DISPOSITION:  Sleep hygiene, bowel maintenance, and appetite at goal.  Last BM 6/17.  Vital signs at goal with no recorded fevers.  Good glycemic control.  No new labs or imaging today.  Continue aggressive mobilization as tolerated.  Monitor closely.  Notify of acute changes.  Needs p.r.n. laxative for bowel movement today.    Staffing 6/19/2023: Incontinent of bladder and bowel. Good appetite. RT: overall min assist. PT: overall stand by to contact assist. Ambulates 150 feet with RW. OT: overall mod assist. Needs more time to increase endurance. Projected discharge 6/20, [facility].     Jesus Sandy NP conducted independent physical examination and assisted with medical documentation.

## 2023-06-22 VITALS
BODY MASS INDEX: 20.54 KG/M2 | HEIGHT: 59 IN | TEMPERATURE: 98 F | OXYGEN SATURATION: 99 % | DIASTOLIC BLOOD PRESSURE: 70 MMHG | RESPIRATION RATE: 20 BRPM | HEART RATE: 92 BPM | WEIGHT: 101.88 LBS | SYSTOLIC BLOOD PRESSURE: 109 MMHG

## 2023-06-22 LAB
POCT GLUCOSE: 133 MG/DL (ref 70–110)
POCT GLUCOSE: 150 MG/DL (ref 70–110)
POCT GLUCOSE: 239 MG/DL (ref 70–110)
POCT GLUCOSE: 305 MG/DL (ref 70–110)
POCT GLUCOSE: 70 MG/DL (ref 70–110)
POCT GLUCOSE: 96 MG/DL (ref 70–110)

## 2023-06-22 PROCEDURE — 25000003 PHARM REV CODE 250: Performed by: NURSE PRACTITIONER

## 2023-06-22 PROCEDURE — 97164 PT RE-EVAL EST PLAN CARE: CPT

## 2023-06-22 PROCEDURE — 99233 SBSQ HOSP IP/OBS HIGH 50: CPT | Mod: ,,, | Performed by: NURSE PRACTITIONER

## 2023-06-22 PROCEDURE — 94799 UNLISTED PULMONARY SVC/PX: CPT

## 2023-06-22 PROCEDURE — 99900035 HC TECH TIME PER 15 MIN (STAT)

## 2023-06-22 PROCEDURE — 99233 PR SUBSEQUENT HOSPITAL CARE,LEVL III: ICD-10-PCS | Mod: ,,, | Performed by: NURSE PRACTITIONER

## 2023-06-22 PROCEDURE — 11800000 HC REHAB PRIVATE ROOM

## 2023-06-22 PROCEDURE — 97530 THERAPEUTIC ACTIVITIES: CPT

## 2023-06-22 PROCEDURE — 63600175 PHARM REV CODE 636 W HCPCS: Performed by: NURSE PRACTITIONER

## 2023-06-22 PROCEDURE — 25000003 PHARM REV CODE 250: Performed by: PHYSICAL MEDICINE & REHABILITATION

## 2023-06-22 PROCEDURE — 97116 GAIT TRAINING THERAPY: CPT

## 2023-06-22 PROCEDURE — 97535 SELF CARE MNGMENT TRAINING: CPT

## 2023-06-22 RX ORDER — METOCLOPRAMIDE 10 MG/1
10 TABLET ORAL ONCE
Status: DISCONTINUED | OUTPATIENT
Start: 2023-06-22 | End: 2023-06-22

## 2023-06-22 RX ORDER — METOCLOPRAMIDE 10 MG/1
10 TABLET ORAL ONCE
Status: COMPLETED | OUTPATIENT
Start: 2023-06-22 | End: 2023-06-22

## 2023-06-22 RX ADMIN — ASPIRIN 325 MG: 325 TABLET, FILM COATED ORAL at 08:06

## 2023-06-22 RX ADMIN — ONDANSETRON 4 MG: 4 TABLET, ORALLY DISINTEGRATING ORAL at 09:06

## 2023-06-22 RX ADMIN — ONDANSETRON 4 MG: 4 TABLET, ORALLY DISINTEGRATING ORAL at 01:06

## 2023-06-22 RX ADMIN — LIDOCAINE 5% 1 PATCH: 700 PATCH TOPICAL at 05:06

## 2023-06-22 RX ADMIN — INSULIN ASPART 8 UNITS: 100 INJECTION, SOLUTION INTRAVENOUS; SUBCUTANEOUS at 11:06

## 2023-06-22 RX ADMIN — SITAGLIPTIN 50 MG: 50 TABLET, FILM COATED ORAL at 08:06

## 2023-06-22 RX ADMIN — AMLODIPINE BESYLATE 10 MG: 5 TABLET ORAL at 08:06

## 2023-06-22 RX ADMIN — MIRTAZAPINE 30 MG: 15 TABLET, FILM COATED ORAL at 09:06

## 2023-06-22 RX ADMIN — ENOXAPARIN SODIUM 30 MG: 30 INJECTION SUBCUTANEOUS at 08:06

## 2023-06-22 RX ADMIN — ENOXAPARIN SODIUM 30 MG: 30 INJECTION SUBCUTANEOUS at 09:06

## 2023-06-22 RX ADMIN — LISINOPRIL 20 MG: 10 TABLET ORAL at 08:06

## 2023-06-22 RX ADMIN — METHOCARBAMOL 500 MG: 500 TABLET ORAL at 11:06

## 2023-06-22 RX ADMIN — ACETAMINOPHEN 325MG 650 MG: 325 TABLET ORAL at 11:06

## 2023-06-22 RX ADMIN — FOLIC ACID 1000 MCG: 1 TABLET ORAL at 08:06

## 2023-06-22 RX ADMIN — TRAMADOL HYDROCHLORIDE 50 MG: 50 TABLET, COATED ORAL at 11:06

## 2023-06-22 RX ADMIN — GABAPENTIN 300 MG: 300 CAPSULE ORAL at 09:06

## 2023-06-22 RX ADMIN — METOCLOPRAMIDE 10 MG: 10 TABLET ORAL at 03:06

## 2023-06-22 RX ADMIN — INSULIN DETEMIR 22 UNITS: 100 INJECTION, SOLUTION SUBCUTANEOUS at 09:06

## 2023-06-22 RX ADMIN — INSULIN DETEMIR 22 UNITS: 100 INJECTION, SOLUTION SUBCUTANEOUS at 08:06

## 2023-06-22 RX ADMIN — METHOCARBAMOL 500 MG: 500 TABLET ORAL at 12:06

## 2023-06-22 RX ADMIN — ACETAMINOPHEN 325MG 650 MG: 325 TABLET ORAL at 12:06

## 2023-06-22 RX ADMIN — TRAMADOL HYDROCHLORIDE 50 MG: 50 TABLET, COATED ORAL at 12:06

## 2023-06-22 RX ADMIN — DOCUSATE SODIUM 100 MG: 100 CAPSULE, LIQUID FILLED ORAL at 08:06

## 2023-06-22 RX ADMIN — DULOXETINE HYDROCHLORIDE 30 MG: 30 CAPSULE, DELAYED RELEASE ORAL at 08:06

## 2023-06-22 RX ADMIN — TRAMADOL HYDROCHLORIDE 50 MG: 50 TABLET, COATED ORAL at 05:06

## 2023-06-22 RX ADMIN — GABAPENTIN 300 MG: 300 CAPSULE ORAL at 05:06

## 2023-06-22 RX ADMIN — DOCUSATE SODIUM 100 MG: 100 CAPSULE, LIQUID FILLED ORAL at 09:06

## 2023-06-22 RX ADMIN — METHOCARBAMOL 500 MG: 500 TABLET ORAL at 05:06

## 2023-06-22 RX ADMIN — INSULIN ASPART 2 UNITS: 100 INJECTION, SOLUTION INTRAVENOUS; SUBCUTANEOUS at 09:06

## 2023-06-22 RX ADMIN — TAMSULOSIN HYDROCHLORIDE 0.4 MG: 0.4 CAPSULE ORAL at 09:06

## 2023-06-22 RX ADMIN — ATORVASTATIN CALCIUM 10 MG: 10 TABLET, FILM COATED ORAL at 08:06

## 2023-06-22 NOTE — PROGRESS NOTES
[facility] - Re[facility] Inpatient Services  Wound Care    Patient Name:  Lita Hess   MRN:  [id number]  Date: 6/22/2023  Diagnosis: Debility    History:     Past Medical History:   Diagnosis Date    Chronic back pain 10/26/2022    Colon cancer     CVA (cerebral vascular accident)     Gastroesophageal reflux disease without esophagitis 10/26/2022    Hypertension     Iron deficiency anemia secondary to inadequate dietary iron intake 10/26/2022    Loss of appetite     Mixed hyperlipidemia 10/26/2022    Stroke     Type 2 diabetes mellitus without complication 10/26/2022    Vitamin D deficiency      Allergies as of 06/09/2023    (No Known Allergies)       [facility] Assessment Details/Treatment        06/22/23 1423        Altered Skin Integrity 06/09/23 1300  Sacral spine Partial thickness tissue loss. Shallow open ulcer with a red or pink wound bed, without slough. Intact or Open/Ruptured Serum-filled blister.   Date First Assessed/Time First Assessed: 06/09/23 1300   Altered Skin Integrity Present on Admission - Did Patient arrive to the hospital with altered skin?: yes  Side: (c)   Location: Sacral spine  Description of Altered Skin Integrity: (c) Partial t...   Dressing Appearance No dressing   Appearance Epithelialization  (healed/ dsg for prevention)   Care Applied:   Dressing Foam        Altered Skin Integrity 06/09/23 1300 Right Heel   Date First Assessed/Time First Assessed: 06/09/23 1300   Side: Right  Location: Heel   Dressing Appearance Open to air   Appearance Pink;Epithelialization  (darkening due to natural discoloration/ healed/no pain)     Patient continues with recently healed sacral pressure injury. Bordered foam applied for prevention and comfort.  Right heel pressure wound has faded. Pressure wound is healed.06/22/2023

## 2023-06-22 NOTE — PROGRESS NOTES
[facility] ([facility])  Rehab Progress Note    Patient Name: Lita Hess  MRN: [id number]  Age: 87 y.o. Sex: female  : 1935  Hospital Length of Stay: 13 days  Date of Service: 2023   Chief Complaint: Debility 2/2 frequent falls with left 4th-7th rib fractures    Subjective:     Basic Information  Admit Information: 87-year-old  female presented to [facility] ED on 2023 complaining of left side pain after accidentally falling backwards over walker.  PMH significant for CVA x2, CAD s/p MI, HTN, carotid artery stenosis, remote history of colon cancer s/p colectomy.  Workup significant for left 4th through 7th rib fractures.  Limited mobility 2/2 pain.  Tolerated transfer to [facility] inpatient rehab unit on  without incident.  Today's Information: No acute events overnight.  Sitting up in chair.  Reports good sleep and appetite.  Last BM .  Vital signs at goal with no recorded fevers.  Good glycemic control.  No new labs or imaging today.  Review of patient's allergies indicates:  No Known Allergies     Current Facility-Administered Medications:     acetaminophen tablet 650 mg, 650 mg, Oral, Q6H, Annette Calvo, FNP, 650 mg at 23 0025    ALPRAZolam tablet 0.25 mg, 0.25 mg, Oral, TID PRN, Vishal MAN Du, FNP, 0.25 mg at 23 1209    amLODIPine tablet 10 mg, 10 mg, Oral, Daily, Vishal MAN Du, FNP, 10 mg at 23 0808    aspirin tablet 325 mg, 325 mg, Oral, Daily, Vishal A Du, FNP, 325 mg at 23 0808    atorvastatin tablet 10 mg, 10 mg, Oral, Daily, Vishal MAN Du, FNP, 10 mg at 23 0808    benzonatate capsule 100 mg, 100 mg, Oral, TID PRN, Vishal Sandy, FNP    bisacodyL suppository 10 mg, 10 mg, Rectal, Daily PRN, Vishal Sandy, FNP    dextrose 10% bolus 125 mL 125 mL, 12.5 g, Intravenous, PRN, ALIRIO Arriaga    dextrose 10% bolus 250 mL 250 mL, 25 g, Intravenous, PRN, Vishal A  Du, FNP    docusate sodium capsule 100 mg, 100 mg, Oral, BID, Vishal MAN Du, FNP, 100 mg at 06/22/23 0808    DULoxetine DR capsule 30 mg, 30 mg, Oral, Daily, Annette Devrieste, FNP, 30 mg at 06/22/23 0808    enoxaparin injection 30 mg, 30 mg, Subcutaneous, Q12H (treatment, non-standard time), Vishal MAN Du, FNP, 30 mg at 06/22/23 0812    folic acid tablet 1,000 mcg, 1,000 mcg, Oral, Daily, Vishal MAGDA Du, FNP, 1,000 mcg at 06/22/23 0808    gabapentin capsule 300 mg, 300 mg, Oral, TID, Annette Devrieste, FNP, 300 mg at 06/22/23 0522    glucagon (human recombinant) injection 1 mg, 1 mg, Intramuscular, PRN, Vishalmarcelino Parisiehart, FNP    glucose chewable tablet 16 g, 16 g, Oral, PRN, Vishal MAGDA Du, FNP    glucose chewable tablet 24 g, 24 g, Oral, PRN, Vishal Parisiehart, FNP    hydrALAZINE injection 10 mg, 10 mg, Intravenous, Q4H PRN, Vishalmarcelino Parisiehart, FNP    hydrOXYzine pamoate capsule 50 mg, 50 mg, Oral, Nightly PRN, Vishal MAN Du, FNP    insulin aspart U-100 injection 1-10 Units, 1-10 Units, Subcutaneous, QID (AC + HS) PRN, Vishal MAGDA Ud, FNP, 10 Units at 06/21/23 1227    insulin detemir U-100 injection 22 Units, 22 Units, Subcutaneous, BID, Kaity Arndt, FNP, 22 Units at 06/22/23 0807    labetalol 20 mg/4 mL (5 mg/mL) IV syring, 10 mg, Intravenous, Q4H PRN, Vishal Parisiehart, FNP    LIDOcaine 5 % patch 1 patch, 1 patch, Transdermal, Q24H, Annette Calvo, FNP, 1 patch at 06/22/23 0522    lisinopriL tablet 20 mg, 20 mg, Oral, Daily, Vishalmarcelino Parisiehart, FNP, 20 mg at 06/22/23 0808    metFORMIN tablet 500 mg, 500 mg, Oral, Daily before evening meal, Annette Devrieste, FNP, 500 mg at 06/21/23 1652    methocarbamoL tablet 500 mg, 500 mg, Oral, Q6H, Annette Devrieste, FNP, 500 mg at 06/22/23 0522    metoprolol injection 10 mg, 10 mg, Intravenous, Q2H PRN, ALIRIO Arriaga    mirtazapine tablet 30 mg, 30 mg, Oral, After dinner, Annette Calvo, FNP, 30 mg at 06/21/23  "1703    nitroGLYCERIN SL tablet 0.4 mg, 0.4 mg, Sublingual, Q5 Min PRN, Vishal Stephensonart, FNP    ondansetron disintegrating tablet 4 mg, 4 mg, Oral, Q6H PRN, Vishal A Du, FNP, 4 mg at 06/14/23 1224    oxyCODONE immediate release tablet 5 mg, 5 mg, Oral, Q6H PRN, Annette Devrieste, FNP    polyethylene glycol packet 17 g, 17 g, Oral, BID PRN, Vishal MAN Du, FNP    promethazine tablet 25 mg, 25 mg, Oral, Q6H PRN, Vishal MAN Du, FNP    SITagliptin phosphate tablet 50 mg, 50 mg, Oral, Daily, Vishal Parisiehart, FNP, 50 mg at 06/22/23 0808    tamsulosin 24 hr capsule 0.4 mg, 0.4 mg, Oral, QHS, Kaity Arndt, FNP, 0.4 mg at 06/21/23 2053    traMADoL tablet 50 mg, 50 mg, Oral, Q6H, Annette Devrieste, FNP, 50 mg at 06/22/23 0522     Review of Systems   Complete 12-point review of symptoms negative except for what's mentioned in HPI     Objective:     BP (!) 94/58   Pulse 96   Temp 97.9 °F (36.6 °C) (Oral)   Resp 20   Ht 4' 11.02" (1.499 m)   Wt 46.2 kg (101 lb 13.6 oz)   SpO2 97%   Breastfeeding No   BMI 20.56 kg/m²        Physical Exam  Constitutional:       Appearance: Normal appearance.   HENT:      Head: Normocephalic.      Mouth/Throat:      Mouth: Mucous membranes are moist.   Eyes:      Pupils: Pupils are equal, round, and reactive to light.   Cardiovascular:      Rate and Rhythm: Normal rate and regular rhythm.      Heart sounds: Normal heart sounds.   Pulmonary:      Effort: Pulmonary effort is normal.      Breath sounds: Normal breath sounds.   Abdominal:      General: Bowel sounds are normal.      Palpations: Abdomen is soft.   Musculoskeletal:      Cervical back: Neck supple.      Comments: muscle atrophy   Skin:     General: Skin is warm and dry.   Neurological:      Mental Status: She is alert and oriented to person, place, and time.      Motor: Weakness present.   Psychiatric:         Mood and Affect: Mood normal.         Behavior: Behavior normal.         Thought Content: " Thought content normal.         Judgment: Judgment normal.   *MD performed and documented physical examination       Lines/Drains/Airways       None                   Labs  Recent Results (from the past 24 hour(s))   POCT glucose    Collection Time: 06/21/23 12:13 PM   Result Value Ref Range    POCT Glucose 358 (H) 70 - 110 mg/dL   POCT glucose    Collection Time: 06/21/23  4:53 PM   Result Value Ref Range    POCT Glucose 122 (H) 70 - 110 mg/dL   POCT glucose    Collection Time: 06/22/23  5:21 AM   Result Value Ref Range    POCT Glucose 70 70 - 110 mg/dL       Radiology  CT head without contrast on 06/06/2023 at 3:00 p.m., IMPRESSION: No acute intracranial abnormality identified.  Findings of chronic microvascular ischemic disease.  Radiology  CT abdomen/pelvis with contrast on 6/6, IMPRESSION: No definite acute intra-abdominal or intrathoracic abnormality.  There are acute left-sided rib fractures as above.  Evaluation for additional fractures is limited by motion artifact.  Soft tissue density at the left sacrum as would be seen with decubitus ulcer is again noted.    Assessment/Plan:     87 y.o. AAF admitted on 6/9/2023    Debility   - 2/2 frequent falls with left 4th-7th rib fractures  - defer to physiatry for rehab and pain management  - PT/OT/RT following     Multiple rib fractures  - left 4-7th rib fractures  - continue                DuoNebs q.8 hours                Percocet 5 mg/325 mg q.6 hours p.r.n.                Lidoderm patch to left chest daily                Gabapentin 300 mg t.i.d.  - encourage incentive spirometer q.1 hour while awake     Proximal transverse colon adenocarcinoma  - s/p laparoscopic/robotic right hemicolectomy on 7/29/2021  - Biopsy significant for adenocarcinoma-no metastasis  - to follow up with oncology     Moderate protein calorie malnutrition  - albumin 3.0/prealbumin 29.4-trending up  - registered dietitian following  - encourage oral nutrition and hydration  - s/p Megace 400  mg b.i.d. 6/9-6/14      History of bilateral ischemic CVA  - 6/2020  - continue                Lipitor 10 mg daily                Aspirin 325 mg daily     CAD  - s/p MI  - denies recent chest pain or discomfort  - continue                Lisinopril 20 mg daily                Lipitor 10 mg daily                Aspirin 325 mg daily  - ECG and Nitro PRN  - not on Plavix or BB  - continue cardiac diet  - to follow-up with cardiology outpatient      HTN  - at goal!!  - continue                Lisinopril 20 mg daily                Norvasc 10 mg daily                Hydralazine 10 mg every 2 hours as needed for BP > 160/90                Labetalol 10 mg every 2 hours as needed  - Low-sodium diet      Iron deficiency anemia  - asymptomatic  - H/H stable   - continue                Folic acid 1 mg daily  - no evidence of active bleeds  - will closely monitor and transfuse if needed      History of vasovagal syncope  - s/p Linq device  - to follow up with cardiology outpatient     Left carotid stenosis  - Left ICA > 85%  - continue                Aspirin 325 mg daily                Lipitor 10 mg daily  - to follow-up outpatient with vascular surgery     Poorly controlled DM type II  - HgA1c 10.5 on 6/6/2023  - continue   Januvia 50 mg daily (initiated 6/12)  Metformin 500 mg daily (initiated 6/11)                Levemir 22 units b.i.d. (increased 6/14)                ISS   - CBGs AC/HS     HLD  - FLP at goal!!  - continue                Lipitor 10 mg daily     Constipation  - stable  - continue  Colace 100 mg BID   Miralax 17 gm BID PRN     Unstageable sacral/left buttocks decubitus ulcer  - stable  - duo derm applied  - turn q2h  - wound care following    Urinary retention  - current  - continue bladder scan every 4 hours times 24 hours  - required 2 in and out catheterizations in the last 24 hours  - continue   Flomax 0.4 mg at bedtime     VTE Prophylaxis:  Lovenox 30 mg b.i.d.  COVID-19 testing:  Unknown  COVID-19  vaccination status:  Vaccinated (Moderna):  02/05/2021, 03/05/2021, 10/29/2021, and 06/04/2022     POA: Alhaji Hess (son) [phone]  Living will: No  Contacts: Alhaji Hess (son) [phone]                 Elder Hess (son) [phone]                 Raegan Stewart (sister) [phone]     CODE STATUS: Full Code  Internal Medicine (attending): Troy Guerin MD  Physiatry (consulting):  Avi Melchor MD     OUTPATIENT PROVIDERS  PCP: Anant Mejia MD  Cardiology: Henry Cifuentes MD  Vascular surgery: Kip Shah MD  General surgery: Justo Henry MD  Gastroenterology:  Kip Forbes MD     DISPOSITION:  Sleep hygiene, bowel maintenance, and appetite at goal.  Last BM 6/21.  Vital signs at goal with no recorded fevers.  Good glycemic control.  No new labs or imaging today.  Good urinary output reported.  Still continues on Flomax.  Continue bladder scans for postvoid residuals.  Monitor closely.  Notify of acute changes.    Staffing 6/19/2023: Incontinent of bladder and bowel. Good appetite. RT: overall min assist. PT: overall stand by to contact assist. Ambulates 150 feet with RW. OT: overall mod assist. Needs more time to increase endurance. Projected discharge 6/20, [facility].     Jesus Sandy NP conducted independent physical examination and assisted with medical documentation.

## 2023-06-22 NOTE — NURSING
Patient c/o nausea and feels food is stuck in upper abdomen. Zofran given. B/p 86/61. Skin clammy. Cbg 150. Spoke with Jesus Sandy NP. Will continue to monitor.

## 2023-06-22 NOTE — PT/OT/SLP PROGRESS
Occupational Therapy Inpatient Rehab Treatment    Name: Lita Hess  MRN: [id number]    Assessment:  Lita Hess is a 87 y.o. female admitted with a medical diagnosis of Debility.  She presents with the following impairments/functional limitations:  impaired endurance, weakness, impaired self care skills, impaired functional mobility, decreased upper extremity function, decreased lower extremity function, pain.    General Precautions: Standard, fall     Orthopedic Precautions:N/A     Braces: N/A    Rehab Prognosis: Fair; patient would benefit from acute skilled OT services to address these deficits and reach maximum level of function.      History:     Past Medical History:   Diagnosis Date    Chronic back pain 10/26/2022    Colon cancer     CVA (cerebral vascular accident)     Gastroesophageal reflux disease without esophagitis 10/26/2022    Hypertension     Iron deficiency anemia secondary to inadequate dietary iron intake 10/26/2022    Loss of appetite     Mixed hyperlipidemia 10/26/2022    Stroke     Type 2 diabetes mellitus without complication 10/26/2022    Vitamin D deficiency        Past Surgical History:   Procedure Laterality Date    COLECTOMY      COLONOSCOPY      ESOPHAGOGASTRODUODENOSCOPY      gastro biopsy      HIP REPLACEMENT ARTHROPLASTY      SCLEROTHERAPY WITH ULTRASOUND GUIDANCE      TRANSESOPHAGEAL ECHOCARDIOGRAPHY         Subjective     Orientation: Alert and able to participate in therapy. Pt thought it was 8 am    Chief Complaint: Tired    Respiratory Status: Room air    Patients cultural, spiritual, Spiritism conflicts given the current situation: no       Objective:     Patient found up in chair upon OT entry to room.    Mobility   Patient completed:  Sit to Stand Transfer with contact guard assistance with rolling walker  Stand to Sit Transfer with contact guard assistance with rolling walker  Toilet Transfer Step Transfer technique with contact guard assistance with  rolling walker  Tub  Transfer Step Transfer technique with contact guard assistance with rolling walker    Functional Mobility  Pt performed short FM in room with CGA using RW, to and from bathroom from wheelchair.     ADLs   Current Status   Shower, Bathe Self 4 Pt able to wash self including bottom. CGA or safety when pt bending/leaning over    Upper Body Dressing 4 SPV, extended time required    Lower Body Dressing 3 Assistance to thread legs into pull up due to fatigue. Assistance to doff pants off of feet    Toileting Hygiene 4 +void urine, small BM. Pt able to wipe on her own. CGA for safety in standing to manipulate clothing   Toilet Transfer 4 CGA for safety   Putting On, Taking Off Footwear  Patient donned/doffed socks using reacher and sock aide, extended time required      Limiting Factors for ADLs: motor, endurance, limited ROM, balance, weakness, and pain     Patient left up in chair with all lines intact, call button in reach, and chair alarm on.     Education provided: Roles and goals of OT, ADLs, transfer training, modified goals, sequencing, safety precautions, and fall prevention    Multidisciplinary Problems       Occupational Therapy Goals          Problem: Occupational Therapy    Goal Priority Disciplines Outcome Interventions   Occupational Therapy Goal     OT, PT/OT Ongoing, Progressing    Description: ADLs:  Pt to perform grooming tasks with set up and clean up. Ongoing/progressing  Pt to perform UB dressing with set up and clean up. Ongoing/progressing   Pt to perform LB dressing with set up and clean up with use of AD. Ongoing/progressing  Pt to perform putting on/off footwear task with set up and clean up and AD. MET  Pt to perform putting on/off footwear task with independence and AD  Pt to perform toileting with mod a using RW and BSC. MET  Pt to perform toileting with SPV by d/c.   Pt to perform bathing with mod a using TTB. MET  Pt to perform bathing with SPV by d/c.     Functional Transfers:  Pt to  perform toilet transfers with CGA and RW. MET  Pt to perform toilet transfer with set up and clean up and RW  Pt to perform a tub transfer with mod a and TTB. MET  Pt to perform a tub transfer with SBA and TTB.                            Time Tracking     OT Received On: 06/22/23  Time In 1030     Time Out 1200  Total Time 90 min  Therapy Time: OT Individual: 90  Missed Time:    Missed Time Reason:      Billable Minutes: Self Care/Home Management 90    06/22/2023

## 2023-06-22 NOTE — PROGRESS NOTES
Subjective  HPI: 86 yo BF with PMH of CVA x 2, MI with loop recorder, HTN, GERD, hip replacement in past, and remote hx colon cancer s/p colectomy presented to the ED at [facility] on 6/6/23 after falling at home. Patient complained of left rib pain and left hip pain. CT head showed no acute intracranial abnormality identified, and chronic microvascular ischemic disease. CT cervical spine showed no acute findings. CT of chest/abdomen/ pelvis showed acute fractures of the left 4th, 5th, 6th, and 7th ribs, scoliotic curvature of the spine with no acute fracture identified; and no fracture of right ribs identified. Hip XR showed no acute fractures. Patient was admitted to ICU for close respiratory monitoring, encouraged to use IS, started duonebs, and CPT ordered. On 6/7, PT eval completed with deficits noted. On 6/8, OT eval completed with deficits noted. Labs showed low BUN of 9.6, low Na of 133, low H&H of 11.7 & 35.7, elevated CBG of 346, and low albumin of 3.1. On Lovenox for DVT prophylaxis. On 6/9, CBG remains elevated. Patient is AAOx4.  Participating with therapy. Functional status includes minimal-moderate assist needed for bed mobility, minimal assist for transfer with RW, minimal assist for walking about 30 ft with RW, purwick catheter for toileting, moderate-max assist for lower body dressing. Patient was evaluated, accepted, and admitted to inpatient rehab to improve functional status. Transferred to [facility] on 6/9 without incident.      6/22: Seen with PT, Amb w/RW with slow gait. Often keeping her eyes closed while walking. VC to continue. Encouragement for more effort and participation. Stops for seated rest break. Tells me she had a good breakfast. VSSAF with low BP. No dizziness. IM following.          Review of Systems  Psychiatric: Per son, pt. Recently complained of depression. Pt. Has a history of anxiety and takes medication for this. She has no substance abuse history    Depression/Anxiety: no  "complaints   mirtazapine tablet 30 mg after dinner  ALPRAZolam tablet 0.25 mg TID PRN Anxiety  DULoxetine DR capsule 30 mg qd, start 6/14  Pain: left side-ribs, LLE  DULoxetine DR capsule 30 mg qd, start 6/14  gabapentin capsule 300 mg TID. Increase q6h  acetaminophen tablet 650 mg TID. q6h  methocarbamoL tablet 500 mg TID. q6h  traMADoL tablet 50 mg q6h PRN mod pain  oxyCODONE 5 mg 1 tablet q6h PRN severe pain  Bowels/Bladder: last BM 6/21 x 2  Appetite: "good"  megestroL 400 mg/10 mL (10 mL) suspension 400 mg BID     Sleep: good         Physical Exam  General: well-developed, thin/frail appearing, in no acute distress  Respiratory: equal chest rise, no SOB, no audible wheeze  Cardiovascular: regular rate and rhythm, no edema  Gastrointestinal: soft, non-tender, non-distended   Musculoskeletal: decreased ROM/strength to LLE; generalized weakness  Integumentary: no rashes or skin lesions present, right heel/sacral pressure wounds  Neurologic: cranial nerves intact, no signs of peripheral neurological deficit, motor/sensory function intact  *MD performed and documented physical examination                  Assessment/Plan  Hospital   Fall   Rib fracture   Debility     Non-Hospital   Chronic back pain (Chronic)   Mixed hyperlipidemia (Chronic)   Hypertension (Chronic)   Type 2 diabetes mellitus without complication (Chronic)   Gastroesophageal reflux disease without esophagitis (Chronic)   Iron deficiency anemia secondary to inadequate dietary iron intake (Chronic)   CVA (cerebral vascular accident) (Chronic)   Loss of appetite (Chronic)   Vitamin D deficiency (Chronic)   Frailty (Chronic)   Arteriosclerosis of coronary artery   At risk of pressure injury of skin   Hiatal hernia   Scoliosis   Stenosis of carotid artery   Severe malnutrition   Colon cancer       Wounds: right heel/sacral pressure wounds  Precautions: fall risk  Bracing/AD: RW  Swallowing: Diabetic Diet  Function: Tolerating therapy. Continue PT/OT  VTE " Prophylaxis:   enoxaparin injection 30 mg SubQ q12h  Code Status: FULL CODE   Discharge: Lives alone in [place] in a single-story home with 1 threshold step to enter the residence. Completed her master's degree. She has no  history. 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 She had a volunteer come in to help her around the house and for errands every now and then. Per son, the family is arranging for her care after she leaves rehab.  Children: (2). Date 6/20 Tuesday. Postponed 2/2 Insurance appeal placed by family.                  Annette Calvo NP, conducted additional independent physical examination and assisted with medical documentation.

## 2023-06-22 NOTE — PT/OT/SLP RE-EVAL
Physical Therapy Rehab Re-Evaluation    Patient Name:  Lita Hess   MRN:  [id number]    Recommendations:     Discharge Recommendations:  nursing facility, skilled   Discharge Equipment Recommendations: bedside commode, walker, rolling, wheelchair   Barriers to discharge:  impaired functional mobility    Assessment:     Lita Hess is a 87 y.o. female admitted with a medical diagnosis of Debility.  She presents with the following impairments/functional limitations:  impaired endurance, weakness, impaired functional mobility, impaired balance, decreased lower extremity function, decreased safety awareness, pain, decreased upper extremity function.    Rehab Diagnosis:      General Precautions: Standard, fall     Orthopedic Precautions: N/A     Braces: N/A    Rehab Prognosis: Poor; patient would benefit from acute skilled PT services to address these deficits and reach maximum level of function.      History:     Past Medical History:   Diagnosis Date    Chronic back pain 10/26/2022    Colon cancer     CVA (cerebral vascular accident)     Gastroesophageal reflux disease without esophagitis 10/26/2022    Hypertension     Iron deficiency anemia secondary to inadequate dietary iron intake 10/26/2022    Loss of appetite     Mixed hyperlipidemia 10/26/2022    Stroke     Type 2 diabetes mellitus without complication 10/26/2022    Vitamin D deficiency        Past Surgical History:   Procedure Laterality Date    COLECTOMY      COLONOSCOPY      ESOPHAGOGASTRODUODENOSCOPY      gastro biopsy      HIP REPLACEMENT ARTHROPLASTY      SCLEROTHERAPY WITH ULTRASOUND GUIDANCE      TRANSESOPHAGEAL ECHOCARDIOGRAPHY         Subjective     Chief Complaint: Pain in L LE and L ribs    Patients cultural, spiritual, Hoahaoism conflicts given the current situation: no       Living Environment  People in Home: alone  Living Arrangements: house  Home Accessibility: wheelchair accessible  Home Layout: Able to live on 1st floor  Equipment Currently  Used at Home: walker, rolling, shower chair    Prior Level of Function  Ambulation Skills: needs device    Upon discharge, patient will have assistance from family.    Objective:     Communicated with pt prior to session.  Patient found  in wheelchair with alarm  with    upon PT entry to room.    Vitals   Vitals at Rest  BP     HR     O2 Sat    Pain Pain Rating 1: 8/10 (AM session)  Location - Side 1: Left  Location - Orientation 1: lower  Location 1: rib(s)  Pain Addressed 1: Reposition, Cessation of Activity  Pain Rating Post-Intervention 1: 8/10  Pain Rating 2: 7/10 (PM session)  Location - Side 2: Left  Location 2: rib(s)  Pain Addressed 2: Reposition, Cessation of Activity  Pain Rating Post-Intervention 2: 7/10     Vitals With Activity  BP     HR     O2 Sat     Pain Pain Rating 1: 7/10  Location - Side 1: Left  Location 1: knee  Pain Addressed 1: Reposition, Distraction, Cessation of Activity  Pain Rating Post-Intervention 1: 6/10  Pain Rating 2: 7/10  Location - Side 2: Left  Location 2: rib(s)  Pain Addressed 2: Reposition, Distraction, Cessation of Activity  Pain Rating Post-Intervention 2: 6/10     Respiratory Status: Room air    GGs   Admit Current   Status Goal   Functional Area: Care Score: Care Score: Care Score:   Roll Left and Right 3 4 Independent   Sit to Lying 2 3  Pt needs assistance with Bilateral LE  to bring into the bed Set-up/clean-up   Lying to Sitting on Side of Bed 3 4 Independent   Sit to Stand 3 4  With RW Independent   Chair/Bed-to-Chair Transfer 3 4  With RW Independent   Car Transfer 7 4  Pt needed guidance for her feet when entering the car     Walk 10 Feet 4 4 Independent   Walk 50 Feet with Two Turns 4 4 Independent   Walk 150 Feet 7 4  Pt ambulated 150' with RW. Decreased step length and height bilaterally. Very slow gait speed with occasional standing breaks.     Walk 10 Feet Uneven Surface 7 4  With RW with ramp     1 Step (Curb) 7 4  With RW and VC and TC for placement of  walker     4 Steps 9 88     12 Steps 9 88     Picking Up Object 7 5     Wheel 50 Feet with Two Turns 9      Wheel 150 Feet 9        Therapeutic Activities and Exercises:  Gait: 150'  Transfer training- in/out of bed and in/out car  Sit to stands- 2x8  Standing balance- 3 minutes    Activity Tolerance: Poor    Patient left  up in wheelchair  with call button in reach and chair alarm on.    Education Provided: roles and goals of PT/PTA, transfer training, bed mob, gait training, stair training, safety awareness, body mechanics, assistive device, and strengthening exercises    Expected compliance: Low compliance      Plan:     During this hospitalization, patient to be seen 5 x/week to address the identified rehab impairments via gait training, therapeutic activities, therapeutic exercises, neuromuscular re-education and progress toward the following goals:    GOALS:   Multidisciplinary Problems       Physical Therapy Goals          Problem: Physical Therapy    Goal Priority Disciplines Outcome Goal Variances Interventions   Physical Therapy Goal     PT, PT/OT Ongoing, Progressing     Description: Bed Mobility:  MET - Roll L and R Philip  MET - Sit to supine will be Philip  MET - Supine to sit will be Philip  Roll left and right independently  Sit to supine transfer with setup/clean-up assist  Supine to sit transfer with setup/clean-up assist    Transfers:  MET- Sit to stand supervision  Bed to Chair t/f will be supervision  Car t/f will be supervision    Mobility:  MET - Pt will ambulate 150' CGA with RW  MET- Pt will ambulate 15' CGA with RW over uneven terrain  MET - Pt will climb one step CGA with RW                        Plan of Care Expires:  06/16/23  PT Next Visit Date: 06/21/23  Plan of Care reviewed with: patient      Additional Infomation:   Patient required additional time to perform all task due to fatigue and pain. Much encouragement needed to participate during the session secondary to low  motivation.        Time Tracking:     Therapy Time   PT Start Time: 0830  PT Stop Time: 1000  PT Total Time (min): 90 min  PT Individual: 90  Missed Time:    Time Missed due to:      Billable Minutes: Re-eval 40 min, Gait Training 20 min, and Therapeutic Activity 30 min    06/22/2023

## 2023-06-23 PROCEDURE — 31500 INSERT EMERGENCY AIRWAY: CPT

## 2023-06-23 NOTE — PT/OT/SLP DISCHARGE
Occupational Therapy Discharge Summary    Lita Hess  MRN: [id number]   Principal Problem: Debility      Patient Discharged from acute Occupational Therapy on 2023.  Please refer to prior OT note dated 2023 for functional status.    Assessment:       Pt passed away this morning.    Objective:     GOALS:   Multidisciplinary Problems       Occupational Therapy Goals          Problem: Occupational Therapy    Goal Priority Disciplines Outcome Interventions   Occupational Therapy Goal     OT, PT/OT Ongoing, Progressing    Description: ADLs:  Pt to perform grooming tasks with set up and clean up. Ongoing/progressing  Pt to perform UB dressing with set up and clean up. Ongoing/progressing   Pt to perform LB dressing with set up and clean up with use of AD. Ongoing/progressing  Pt to perform putting on/off footwear task with set up and clean up and AD. MET  Pt to perform putting on/off footwear task with independence and AD  Pt to perform toileting with mod a using RW and BSC. MET  Pt to perform toileting with SPV by d/c.   Pt to perform bathing with mod a using TTB. MET  Pt to perform bathing with SPV by d/c.     Functional Transfers:  Pt to perform toilet transfers with CGA and RW. MET  Pt to perform toilet transfer with set up and clean up and RW  Pt to perform a tub transfer with mod a and TTB. MET  Pt to perform a tub transfer with SBA and TTB.                            Care Scores:   Admission Assessment Current Status Discharge  Goal   Functional Area: Care Score:  Care Score:    Eating 6 5 Independent   Oral Hygiene 5 5 Independent   Toileting Hygiene 2 4 Supervision or touching assistance   Shower/Bathe Self 3 4 Supervision or touching assistance   Upper Body Dressing 3 4 Set-up/clean-up   Lower Body Dressing 3 3 Set-up/clean-up   Putting On/Taking Off Footwear 1 4 Independent   Toilet Transfer 3 4 Supervision or touching assistance     Reasons for Discontinuation of Therapy Services           Plan:     None      6/23/2023

## 2023-06-23 NOTE — ED NOTES
Code blue called overhead at approximately 5:00 a.m. found pulseless during morning vital checks, CPR in progress on arrival, initial rhythm asystole.  Patient was intubated and received 3 rounds of epi without response.  Efforts were ceased at 5:22 a.m.    ToD: 0522 on 6/23/23  CoD: Cardiopulmonary Arrest. Hx of Dementia, CVA, MI, Colon CA, Recent fall with traumatic fractures of left ribs 4-7          Intubation    Date/Time: 6/23/2023 5:06 AM  Location procedure was performed: [facility] EMERGENCY DEPARTMENT  Performed by: Don Fuentes DO  Authorized by: Troy Guerin MD   Consent Done: Emergent Situation  Indications: respiratory failure (Cardiac arrest)  Intubation method: direct  Laryngoscope size: Mac 3  Tube size: 7.5 mm  Tube type: cuffed  Number of attempts: 1  Post-procedure assessment: CO2 detector and chest rise  Cuff inflated: yes  ETT to lip: 22 cm      Critical Care    Date/Time: 6/23/2023 5:32 AM  Performed by: Don Fuentes DO  Authorized by: Troy Guerin MD   Other critical care time: 35 minutes  Total critical care time (exclusive of procedural time) : 35 minutes  Critical care was necessary to treat or prevent imminent or life-threatening deterioration of the following conditions: cardiac failure and respiratory failure.  Comments:     CODE BLUE:     Initial Rhythm: Asystole    Total Duration of CPR: 19 minutes    Drugs: Epinephrine x 3 (1 via ET tube and 2 via IV)    Intubation: Direct, 3 Mac, 1st attempt, Confirmed with CO2 detector and chest rise    Result of Code: Death    ToD: 0522 on 6/23/23  CoD: Cardiopulmonary Arrest. Hx of Dementia, CVA, MI, Colon CA, Recent fall with traumatic fractures of left ribs 4-7           Don Fuentes DO  06/23/23 0611

## 2023-06-23 NOTE — PLAN OF CARE
Problem: Diabetes Comorbidity  Goal: Blood Glucose Level Within Targeted Range  Outcome: Ongoing, Progressing      O-T Plasty Text: The defect edges were debeveled with a #15 scalpel blade.  Given the location of the defect, shape of the defect and the proximity to free margins an O-T plasty was deemed most appropriate.  Using a sterile surgical marker, an appropriate O-T plasty was drawn incorporating the defect and placing the expected incisions within the relaxed skin tension lines where possible.    The area thus outlined was incised deep to adipose tissue with a #15 scalpel blade.  The skin margins were undermined to an appropriate distance in all directions utilizing iris scissors.

## 2023-06-23 NOTE — PLAN OF CARE
Patient was found unresponsive/pulseless around 0500 today.  Code called.  Could not reestablish pulse.  Nursing contacted family.  Sister here now.  Son Dyllan on his way in from [facility].    Relayed events to Rufino with [facility] and URCM/CM manager.

## 2023-06-23 NOTE — DISCHARGE SUMMARY
[facility] ([facility])  Rehab Death Summary    Patient Name: Lita Hess  MRN: [id number]  Age: 87 y.o. Sex: female  : 1935  Hospital Length of Stay: 14 days   Date of Service: 2023      Discharge Information   Date of Admission: 2023  Date of Discharge: 2023  Admit Diagnosis: Debility         Multiple rib fracture         Proximal transverse adenocarcinoma          Moderate protein calorie malnutrition          History of bilateral ischemic CVA         CAD         HTN         Iron-deficiency anemia         History of vasovagal syncope          Left carotid stenosis         Poorly-controlled DM type 2          HLD         Constipation         Unstageable sacral/left buttocks decubitus ulcer  Discharge Diagnosis:     Hospital Course   87-year-old  female presented to [facility] ED on 2023 complaining of left side pain after accidentally falling backwards over walker.  PMH significant for CVA x2, CAD s/p MI, HTN, carotid artery stenosis, remote history of colon cancer s/p colectomy.  Workup significant for left 4th through 7th rib fractures.  Limited mobility 2/2 pain.  Tolerated transfer to [facility] inpatient rehab unit on  without incident.    During inpatient rehab course Januvia 50 mg daily initiated on .  Pain well managed.  Started working with therapy.  Levemir 22 units b.i.d increased.  Remains incontinent of bowel and bladder.  Ambulating 150 ft with rolling walker.  Overall standby to contact guard assist with PT.  Mod assist with OT.  Needs more time due to endurance.  Plan was to discharge to skilled nursing facility on  bed family appealed discharge from rehab with entrance.  Patient was given several more days.  Indwelling catheter initiated on  due to retention.  She did complain of some nausea with an episode of vomiting on .  Staff found patient unresponsive and pulseless at 5:00 a.m. on .  Code blue  and CPR initiated.  Initial rhythm asystole.  ED intubated and patient received 3 rounds of epi without response.  Time of death called at 5:22 a.m..  Family notified.  Postmortem care completed.  Family given time grieve with patient.  Devinhemalatha picked up patient from floor at 9:43 a.m. Our prayers and thoughts are with this family during their time of loss.    Chief Complaint: Debility 2/2 frequent falls with left 4th-7th rib fractures    Discharge Summary Plan   Discharge Status:     Location: Discharge to  home      eJsus Sandy NP conducted independent examination and assisted with medical documentation.     Discharge Time: 36 minutes

## 2023-06-23 NOTE — PT/OT/SLP DISCHARGE
Recreational Therapy Discharge    Pt  this morning       Date of Treatment: 23  Start Time: 0830  Stop Time: 0900  Total Time: 30 min  Missed Time:    Assessment      Lita Hess is a 87 y.o. female admitted with a medical diagnosis of Debility.  She presents with the following impairments/functional limitations:  weakness, impaired endurance, impaired functional mobility, gait instability, decreased lower extremity function, decreased safety awareness .    Rehab Diagnosis:     Recent Surgery:    General Precautions: Standard, fall     Orthopedic Precautions:N/A     Braces: N/A    Rehab Prognosis:  N/A ; patient would benefit from acute skilled Recreational Therapy services to address these deficits and reach maximum level of function.      Impairments: Endurance deficits, Mobility deficits, and Strength deficits  Rehab Potential: Guarded; due to: Acute medical state  Treatment Recommendations: Complete discharge plan  Treatment Diagnosis: Fall over walker, L4-7 rib fx, s/p CVA x2, MI, HTN, colon CA s/p colectomy  Orientation: Identifies self  Affect/Behavior: Appropriate and Cooperative  Safety/Judgement: N/A   Basic Command Following: N/A  Spiritual Cultural: no        History     Past Medical History:   Diagnosis Date    Chronic back pain 10/26/2022    Colon cancer     CVA (cerebral vascular accident)     Gastroesophageal reflux disease without esophagitis 10/26/2022    Hypertension     Iron deficiency anemia secondary to inadequate dietary iron intake 10/26/2022    Loss of appetite     Mixed hyperlipidemia 10/26/2022    Stroke     Type 2 diabetes mellitus without complication 10/26/2022    Vitamin D deficiency        Past Surgical History:   Procedure Laterality Date    COLECTOMY      COLONOSCOPY      ESOPHAGOGASTRODUODENOSCOPY      gastro biopsy      HIP REPLACEMENT ARTHROPLASTY      SCLEROTHERAPY WITH ULTRASOUND GUIDANCE      TRANSESOPHAGEAL ECHOCARDIOGRAPHY         Home Environment     Admit  "Date: 23  Living Situation  People in Home: alone  Lives in: house  Patients Responsibilities: Financial management, Health and wellness, Laundry, Leisure/play/hobbies, Meal preparation, Retired, Social participation  Number of Children: 2  Occupation:Retired:  /    Instrumental Activities of Daily Living     Previous Hand Dominance: Right Current Hand Dominance: Right     Other iADL Information:        Cognitive Skills Building         Cognitive Observation Activity Assist Position Equipment Response            Comment:      Dynamic Activities      Activity Assist Position Equipment Response           Comment:        Fine Motor Activities      Activity Assist Position Equipment Response           Comment:        Goals     Patient Goals  Patient Goal 1: "To get strong and start walking the right way."    Short Term Goals    Goal  Goal Status   Will increase activity tolerance to supervision Met   Will increase sit to stand to supervision Met   Will improve dynamic standing balance/reaching to supervision Met           Long Term Goals    Goal Goal Status   Will increase standing tolerance to 5 minutes Progressing   Will improve dynamic standing balance/reaching to setup Progressing                     Plan       Patient to be seen: Pt  this morning  Duration:   Treatments planned:   Treatment plan/goals established with Patient/Caregiver:      "

## 2023-09-20 ENCOUNTER — EXTERNAL HOME HEALTH (OUTPATIENT)
Dept: HOME HEALTH SERVICES | Facility: HOSPITAL | Age: 88
End: 2023-09-20
Payer: MEDICARE

## 2023-09-20 NOTE — TELEPHONE ENCOUNTER
Patient canceled her new patient appointment with Dr. Ny.  Prior to me renewing her home health, she must have a new patient appointment with a provider within our group.

## 2024-03-19 NOTE — PT/OT/SLP DISCHARGE
Physical Therapy Discharge Summary    Name: Lita Hess  MRN: [id number]   Principal Problem: Debility     Patient Discharged from acute Physical Therapy on 23.     Assessment:     Pt     Objective:     GOALS:   Multidisciplinary Problems       Physical Therapy Goals          Problem: Physical Therapy    Goal Priority Disciplines Outcome Goal Variances Interventions   Physical Therapy Goal     PT, PT/OT Unable to Meet, Plan Revised     Description: Bed Mobility:  MET - Roll L and R Philip  MET - Sit to supine will be Philip  MET - Supine to sit will be Philip  NOT MET - Roll left and right independently  NOT MET - Sit to supine transfer with setup/clean-up assist  NOT MET - Supine to sit transfer with setup/clean-up assist    Transfers:  MET- Sit to stand supervision  NOT MET - Bed to Chair t/f will be supervision  NOT MET - Car t/f will be supervision    Mobility:  MET - Pt will ambulate 150' CGA with RW  MET- Pt will ambulate 15' CGA with RW over uneven terrain  MET - Pt will climb one step CGA with RW                        Care Scores:   Admission Assessment Current   Status  Discharge   Goal   Functional Area: Care Score:  Care Score:    Roll Left and Right 3 4 Independent   Sit to Lying 2 3 Set-up/clean-up   Lying to Sitting on Side of Bed 3 4 Independent   Sit to Stand 3 4 Independent   Chair/Bed-to-Chair Transfer 3 4 Independent   Car Transfer 7 4     Walk 10 Feet 4 4 Independent   Walk 50 Feet with Two Turns 4 4 Independent   Walk 150 Feet 7 4     Walk 10 Feet Uneven Surface 7 4     1 Step (Curb) 7 4     4 Steps 9 88     12 Steps 9 88     Picking Up Object 7 5     Wheel 50 Feet with Two Turns 9 4     Wheel 150 Feet 9 3         Reasons for Discontinuation of Therapy Services  Pt        Plan:     None    2023   Detail Level: Simple